# Patient Record
Sex: MALE | Race: WHITE | NOT HISPANIC OR LATINO
[De-identification: names, ages, dates, MRNs, and addresses within clinical notes are randomized per-mention and may not be internally consistent; named-entity substitution may affect disease eponyms.]

---

## 2017-03-22 ENCOUNTER — APPOINTMENT (OUTPATIENT)
Dept: SURGERY | Facility: CLINIC | Age: 82
End: 2017-03-22

## 2017-03-22 VITALS
HEART RATE: 78 BPM | HEIGHT: 67 IN | SYSTOLIC BLOOD PRESSURE: 168 MMHG | WEIGHT: 221 LBS | OXYGEN SATURATION: 98 % | DIASTOLIC BLOOD PRESSURE: 66 MMHG | TEMPERATURE: 98.2 F | BODY MASS INDEX: 34.69 KG/M2 | RESPIRATION RATE: 15 BRPM

## 2017-03-22 DIAGNOSIS — D50.9 IRON DEFICIENCY ANEMIA, UNSPECIFIED: ICD-10-CM

## 2017-03-22 DIAGNOSIS — H40.9 UNSPECIFIED GLAUCOMA: ICD-10-CM

## 2017-03-22 DIAGNOSIS — K59.09 OTHER CONSTIPATION: ICD-10-CM

## 2017-03-22 DIAGNOSIS — Z82.49 FAMILY HISTORY OF ISCHEMIC HEART DISEASE AND OTHER DISEASES OF THE CIRCULATORY SYSTEM: ICD-10-CM

## 2017-03-22 DIAGNOSIS — D46.9 MYELODYSPLASTIC SYNDROME, UNSPECIFIED: ICD-10-CM

## 2017-03-22 DIAGNOSIS — N40.0 BENIGN PROSTATIC HYPERPLASIA WITHOUT LOWER URINARY TRACT SYMPMS: ICD-10-CM

## 2017-03-22 DIAGNOSIS — R21 RASH AND OTHER NONSPECIFIC SKIN ERUPTION: ICD-10-CM

## 2017-03-22 DIAGNOSIS — Z82.61 FAMILY HISTORY OF ARTHRITIS: ICD-10-CM

## 2017-03-22 RX ORDER — OXYBUTYNIN CHLORIDE 10 MG/1
10 TABLET, EXTENDED RELEASE ORAL
Qty: 90 | Refills: 0 | Status: DISCONTINUED | COMMUNITY
Start: 2017-01-16

## 2017-03-22 RX ORDER — PNEUMOCOCCAL 23-VAL P-SAC VAC 25MCG/0.5
25 VIAL (ML) INJECTION
Qty: 1 | Refills: 0 | Status: DISCONTINUED | COMMUNITY
Start: 2016-12-26

## 2017-03-22 RX ORDER — TAMSULOSIN HYDROCHLORIDE 0.4 MG/1
0.4 CAPSULE ORAL
Qty: 30 | Refills: 0 | Status: ACTIVE | COMMUNITY
Start: 2016-12-26

## 2017-03-22 RX ORDER — FERROUS FUMARATE/ASCORBIC ACID 65MG-25 MG
65-25 TABLET, EXTENDED RELEASE ORAL
Qty: 60 | Refills: 0 | Status: DISCONTINUED | COMMUNITY
Start: 2017-01-04

## 2017-03-22 RX ORDER — DORZOLAMIDE HYDROCHLORIDE TIMOLOL MALEATE 20; 5 MG/ML; MG/ML
22.3-6.8 SOLUTION/ DROPS OPHTHALMIC
Qty: 10 | Refills: 0 | Status: DISCONTINUED | COMMUNITY
Start: 2016-10-17

## 2017-03-22 RX ORDER — AMLODIPINE BESYLATE 10 MG/1
10 TABLET ORAL
Qty: 30 | Refills: 0 | Status: ACTIVE | COMMUNITY
Start: 2016-10-24

## 2017-03-22 RX ORDER — BRIMONIDINE TARTRATE 1 MG/ML
0.1 SOLUTION/ DROPS OPHTHALMIC
Qty: 5 | Refills: 0 | Status: DISCONTINUED | COMMUNITY
Start: 2016-09-04

## 2017-03-22 RX ORDER — FINASTERIDE 5 MG/1
5 TABLET, FILM COATED ORAL
Qty: 30 | Refills: 0 | Status: DISCONTINUED | COMMUNITY
Start: 2016-12-26

## 2017-03-22 RX ORDER — DORZOLAMIDE HYDROCHLORIDE AND TIMOLOL MALEATE 20; 5 MG/ML; MG/ML
SOLUTION/ DROPS OPHTHALMIC
Refills: 0 | Status: ACTIVE | COMMUNITY

## 2017-03-22 RX ORDER — ALPRAZOLAM 0.25 MG/1
0.25 TABLET ORAL
Qty: 60 | Refills: 0 | Status: DISCONTINUED | COMMUNITY
Start: 2016-12-30

## 2017-03-22 RX ORDER — OMEPRAZOLE 20 MG/1
20 CAPSULE, DELAYED RELEASE ORAL
Qty: 90 | Refills: 0 | Status: DISCONTINUED | COMMUNITY
Start: 2016-04-25

## 2017-03-22 RX ORDER — ATENOLOL 50 MG/1
50 TABLET ORAL
Qty: 30 | Refills: 0 | Status: DISCONTINUED | COMMUNITY
Start: 2016-09-06

## 2017-03-22 RX ORDER — FINASTERIDE 5 MG/1
5 TABLET, FILM COATED ORAL
Refills: 0 | Status: ACTIVE | COMMUNITY

## 2017-04-07 ENCOUNTER — OUTPATIENT (OUTPATIENT)
Dept: OUTPATIENT SERVICES | Facility: HOSPITAL | Age: 82
LOS: 1 days | End: 2017-04-07
Payer: MEDICARE

## 2017-04-07 VITALS
TEMPERATURE: 98 F | SYSTOLIC BLOOD PRESSURE: 160 MMHG | HEIGHT: 67 IN | OXYGEN SATURATION: 98 % | HEART RATE: 64 BPM | RESPIRATION RATE: 14 BRPM | WEIGHT: 223.11 LBS | DIASTOLIC BLOOD PRESSURE: 63 MMHG

## 2017-04-07 DIAGNOSIS — M95.0 ACQUIRED DEFORMITY OF NOSE: Chronic | ICD-10-CM

## 2017-04-07 DIAGNOSIS — S61.412A LACERATION WITHOUT FOREIGN BODY OF LEFT HAND, INITIAL ENCOUNTER: Chronic | ICD-10-CM

## 2017-04-07 DIAGNOSIS — Z01.818 ENCOUNTER FOR OTHER PREPROCEDURAL EXAMINATION: ICD-10-CM

## 2017-04-07 DIAGNOSIS — K40.90 UNILATERAL INGUINAL HERNIA, WITHOUT OBSTRUCTION OR GANGRENE, NOT SPECIFIED AS RECURRENT: ICD-10-CM

## 2017-04-07 PROCEDURE — 93005 ELECTROCARDIOGRAM TRACING: CPT

## 2017-04-07 PROCEDURE — G0463: CPT

## 2017-04-07 PROCEDURE — 80048 BASIC METABOLIC PNL TOTAL CA: CPT

## 2017-04-07 PROCEDURE — 93010 ELECTROCARDIOGRAM REPORT: CPT

## 2017-04-07 PROCEDURE — 85027 COMPLETE CBC AUTOMATED: CPT

## 2017-04-07 RX ORDER — SODIUM CHLORIDE 9 MG/ML
3 INJECTION INTRAMUSCULAR; INTRAVENOUS; SUBCUTANEOUS EVERY 8 HOURS
Qty: 0 | Refills: 0 | Status: DISCONTINUED | OUTPATIENT
Start: 2017-04-18 | End: 2017-05-03

## 2017-04-07 RX ORDER — LIDOCAINE HCL 20 MG/ML
0.2 VIAL (ML) INJECTION ONCE
Qty: 0 | Refills: 0 | Status: DISCONTINUED | OUTPATIENT
Start: 2017-04-18 | End: 2017-05-03

## 2017-04-07 RX ORDER — CEFAZOLIN SODIUM 1 G
2000 VIAL (EA) INJECTION ONCE
Qty: 0 | Refills: 0 | Status: DISCONTINUED | OUTPATIENT
Start: 2017-04-18 | End: 2017-05-03

## 2017-04-07 NOTE — H&P PST ADULT - NSANTHOSAYNRD_GEN_A_CORE
No. DILIA screening performed.  STOP BANG Legend: 0-2 = LOW Risk; 3-4 = INTERMEDIATE Risk; 5-8 = HIGH Risk

## 2017-04-07 NOTE — H&P PST ADULT - PSH
History of cataract extraction  right eye 6/11  Laceration of finger, left, with tendon  1976  Mohs defect of nose  1990's  Osteomyelitis  left humerous- surgery childhood age 13  S/P Cholecystectomy    S/P TURP

## 2017-04-07 NOTE — H&P PST ADULT - PMH
Anemia    Benign Hypertension    BPH (Benign Prostatic Hypertrophy)    Chronic constipation    Gall stones    Glaucoma    MDS (Myelodysplastic Syndrome)    Nocturia associated with benign prostatic hypertrophy

## 2017-04-17 ENCOUNTER — RESULT REVIEW (OUTPATIENT)
Age: 82
End: 2017-04-17

## 2017-04-18 ENCOUNTER — OUTPATIENT (OUTPATIENT)
Dept: OUTPATIENT SERVICES | Facility: HOSPITAL | Age: 82
LOS: 1 days | End: 2017-04-18
Payer: MEDICARE

## 2017-04-18 ENCOUNTER — RESULT REVIEW (OUTPATIENT)
Age: 82
End: 2017-04-18

## 2017-04-18 ENCOUNTER — TRANSCRIPTION ENCOUNTER (OUTPATIENT)
Age: 82
End: 2017-04-18

## 2017-04-18 ENCOUNTER — APPOINTMENT (OUTPATIENT)
Dept: SURGERY | Facility: HOSPITAL | Age: 82
End: 2017-04-18

## 2017-04-18 VITALS
OXYGEN SATURATION: 98 % | HEIGHT: 67 IN | WEIGHT: 223.11 LBS | HEART RATE: 75 BPM | SYSTOLIC BLOOD PRESSURE: 179 MMHG | DIASTOLIC BLOOD PRESSURE: 69 MMHG | RESPIRATION RATE: 18 BRPM | TEMPERATURE: 98 F

## 2017-04-18 VITALS
SYSTOLIC BLOOD PRESSURE: 143 MMHG | DIASTOLIC BLOOD PRESSURE: 67 MMHG | HEART RATE: 68 BPM | OXYGEN SATURATION: 99 % | RESPIRATION RATE: 15 BRPM

## 2017-04-18 DIAGNOSIS — K40.90 UNILATERAL INGUINAL HERNIA, WITHOUT OBSTRUCTION OR GANGRENE, NOT SPECIFIED AS RECURRENT: ICD-10-CM

## 2017-04-18 DIAGNOSIS — S61.412A LACERATION WITHOUT FOREIGN BODY OF LEFT HAND, INITIAL ENCOUNTER: Chronic | ICD-10-CM

## 2017-04-18 DIAGNOSIS — M95.0 ACQUIRED DEFORMITY OF NOSE: Chronic | ICD-10-CM

## 2017-04-18 PROCEDURE — 88307 TISSUE EXAM BY PATHOLOGIST: CPT | Mod: 26

## 2017-04-18 PROCEDURE — 88341 IMHCHEM/IMCYTCHM EA ADD ANTB: CPT | Mod: 26,59

## 2017-04-18 PROCEDURE — 88307 TISSUE EXAM BY PATHOLOGIST: CPT

## 2017-04-18 PROCEDURE — 88342 IMHCHEM/IMCYTCHM 1ST ANTB: CPT

## 2017-04-18 PROCEDURE — 88184 FLOWCYTOMETRY/ TC 1 MARKER: CPT

## 2017-04-18 PROCEDURE — 88189 FLOWCYTOMETRY/READ 16 & >: CPT

## 2017-04-18 PROCEDURE — 88360 TUMOR IMMUNOHISTOCHEM/MANUAL: CPT

## 2017-04-18 PROCEDURE — 88264 CHROMOSOME ANALYSIS 20-25: CPT

## 2017-04-18 PROCEDURE — C1781: CPT

## 2017-04-18 PROCEDURE — 88237 TISSUE CULTURE BONE MARROW: CPT

## 2017-04-18 PROCEDURE — 88185 FLOWCYTOMETRY/TC ADD-ON: CPT

## 2017-04-18 PROCEDURE — 88341 IMHCHEM/IMCYTCHM EA ADD ANTB: CPT

## 2017-04-18 PROCEDURE — 88342 IMHCHEM/IMCYTCHM 1ST ANTB: CPT | Mod: 26,59

## 2017-04-18 PROCEDURE — 49505 PRP I/HERN INIT REDUC >5 YR: CPT | Mod: RT

## 2017-04-18 PROCEDURE — 88280 CHROMOSOME KARYOTYPE STUDY: CPT

## 2017-04-18 PROCEDURE — 88360 TUMOR IMMUNOHISTOCHEM/MANUAL: CPT | Mod: 26

## 2017-04-18 RX ORDER — SODIUM CHLORIDE 9 MG/ML
1000 INJECTION, SOLUTION INTRAVENOUS
Qty: 0 | Refills: 0 | Status: DISCONTINUED | OUTPATIENT
Start: 2017-04-18 | End: 2017-05-03

## 2017-04-18 RX ORDER — ONDANSETRON 8 MG/1
4 TABLET, FILM COATED ORAL ONCE
Qty: 0 | Refills: 0 | Status: COMPLETED | OUTPATIENT
Start: 2017-04-18 | End: 2017-04-18

## 2017-04-18 RX ORDER — ACETAMINOPHEN 500 MG
1000 TABLET ORAL ONCE
Qty: 0 | Refills: 0 | Status: COMPLETED | OUTPATIENT
Start: 2017-04-18 | End: 2017-04-18

## 2017-04-18 RX ADMIN — Medication 400 MILLIGRAM(S): at 17:20

## 2017-04-18 RX ADMIN — ONDANSETRON 4 MILLIGRAM(S): 8 TABLET, FILM COATED ORAL at 17:20

## 2017-04-18 NOTE — BRIEF OPERATIVE NOTE - POST-OP DX
Non-recurrent unilateral inguinal hernia without obstruction or gangrene  04/18/2017    Active  Mady Leary

## 2017-04-18 NOTE — ASU DISCHARGE PLAN (ADULT/PEDIATRIC). - MEDICATION SUMMARY - MEDICATIONS TO TAKE
I will START or STAY ON the medications listed below when I get home from the hospital:    Proscar 5 mg oral tablet  -- 1 tab(s) by mouth once a day  -- Indication: For home medication    losartan 50 mg oral tablet  -- 1 tab(s) by mouth once a day  -- Indication: For home medication    terazosin 10 mg oral tablet  -- 1 tab(s) by mouth once a day  -- Indication: For home medication    Flomax 0.4 mg oral capsule  -- 1 cap(s) by mouth once a day  -- Indication: For home medication    atenolol 50 mg oral tablet  -- 1 tab(s) by mouth once a day  -- Indication: For home medication    amLODIPine 5 mg oral tablet  -- 1 tab(s) by mouth once a day  -- Indication: For home medication    Aranesp 25 mcg/0.42 mL injectable solution  -- 1 dose(s) injectable every 4 weeks  -- Indication: For home medication    Linzess 145 mcg oral capsule  -- 1 cap(s) by mouth once a day  -- Indication: For home medication    ursodiol 300 mg oral tablet  -- tab(s) by mouth 2 times a day  -- Indication: For home medication    Fish Oil oral capsule  -- 2 cap(s) by mouth 2 times a day  -- Indication: For home medication    CoQ10 300 mg oral capsule  -- 1 cap(s) by mouth once a day  -- Indication: For home medication     Alphagan P 0.15% ophthalmic solution  -- 1 drop(s) to each affected eye 2 times a day  -- Indication: For home medication    Cosopt PF 2%-0.5% ophthalmic solution  -- 1 drop(s) to each affected eye 2 times a day  -- Indication: For home medication    Lumigan 0.03% ophthalmic solution  -- 1 drop(s) to each affected eye once a day (in the evening)  -- Indication: For home medication    pilocarpine 4% ophthalmic solution  -- 2 drop(s) to each affected eye 4 times a day  -- Indication: For home medication    Probiotic Formula oral capsule  -- 1 cap(s) by mouth once a day  -- Indication: For home medication    oxybutynin 15 mg/24 hr oral tablet, extended release  -- 1 tab(s) by mouth once a day  -- Indication: For home medication    Calcium 600+D oral tablet  -- 1 tab(s) by mouth 2 times a day  -- Indication: For home medication    Multi-Day Plus Minerals oral tablet  -- 1 tab(s) by mouth once a day  -- Indication: For home medication

## 2017-04-18 NOTE — ASU DISCHARGE PLAN (ADULT/PEDIATRIC). - NOTIFY
Swelling that continues/Pain not relieved by Medications/Fever greater than 101/Persistent Nausea and Vomiting/Unable to Urinate/Bleeding that does not stop

## 2017-04-19 ENCOUNTER — EMERGENCY (EMERGENCY)
Facility: HOSPITAL | Age: 82
LOS: 1 days | Discharge: HOME | End: 2017-04-19
Attending: EMERGENCY MEDICINE | Admitting: EMERGENCY MEDICINE
Payer: MEDICARE

## 2017-04-19 VITALS
TEMPERATURE: 98 F | DIASTOLIC BLOOD PRESSURE: 58 MMHG | SYSTOLIC BLOOD PRESSURE: 132 MMHG | OXYGEN SATURATION: 98 % | HEART RATE: 72 BPM | RESPIRATION RATE: 18 BRPM

## 2017-04-19 VITALS
SYSTOLIC BLOOD PRESSURE: 136 MMHG | RESPIRATION RATE: 18 BRPM | DIASTOLIC BLOOD PRESSURE: 60 MMHG | HEART RATE: 63 BPM | OXYGEN SATURATION: 98 % | TEMPERATURE: 98 F

## 2017-04-19 DIAGNOSIS — M95.0 ACQUIRED DEFORMITY OF NOSE: Chronic | ICD-10-CM

## 2017-04-19 DIAGNOSIS — Z88.2 ALLERGY STATUS TO SULFONAMIDES: ICD-10-CM

## 2017-04-19 DIAGNOSIS — Z86.2 PERSONAL HISTORY OF DISEASES OF THE BLOOD AND BLOOD-FORMING ORGANS AND CERTAIN DISORDERS INVOLVING THE IMMUNE MECHANISM: ICD-10-CM

## 2017-04-19 DIAGNOSIS — Z79.899 OTHER LONG TERM (CURRENT) DRUG THERAPY: ICD-10-CM

## 2017-04-19 DIAGNOSIS — Z90.49 ACQUIRED ABSENCE OF OTHER SPECIFIED PARTS OF DIGESTIVE TRACT: ICD-10-CM

## 2017-04-19 DIAGNOSIS — R33.9 RETENTION OF URINE, UNSPECIFIED: ICD-10-CM

## 2017-04-19 DIAGNOSIS — S61.412A LACERATION WITHOUT FOREIGN BODY OF LEFT HAND, INITIAL ENCOUNTER: Chronic | ICD-10-CM

## 2017-04-19 DIAGNOSIS — Z87.19 PERSONAL HISTORY OF OTHER DISEASES OF THE DIGESTIVE SYSTEM: ICD-10-CM

## 2017-04-19 DIAGNOSIS — Z86.69 PERSONAL HISTORY OF OTHER DISEASES OF THE NERVOUS SYSTEM AND SENSE ORGANS: ICD-10-CM

## 2017-04-19 DIAGNOSIS — Z98.890 OTHER SPECIFIED POSTPROCEDURAL STATES: ICD-10-CM

## 2017-04-19 DIAGNOSIS — I10 ESSENTIAL (PRIMARY) HYPERTENSION: ICD-10-CM

## 2017-04-19 DIAGNOSIS — Z88.1 ALLERGY STATUS TO OTHER ANTIBIOTIC AGENTS STATUS: ICD-10-CM

## 2017-04-19 DIAGNOSIS — Z87.430 PERSONAL HISTORY OF PROSTATIC DYSPLASIA: ICD-10-CM

## 2017-04-19 PROCEDURE — 99283 EMERGENCY DEPT VISIT LOW MDM: CPT

## 2017-04-19 NOTE — ED ADULT NURSE NOTE - OBJECTIVE STATEMENT
89 year old male A&OX3 PMHX of Anemia, BPH to which he self catheterizes, hernia surgery today. Patient states that he was seen today at ambulatory surgery for a hernia repair. Patient states that tonight when he attempted to self catheterize x 2 urine did not drain. Patient states that he does not feel any pain or discomfort at this time. Patient's abdomen is distended emile is not tender to palpation. Upon arrival Bladder scan was performed which revealed 30 ml of urine. Patient denies burning urination, fevers, chills, nausea, vomiting, dizziness, weakness.

## 2017-04-19 NOTE — ED PROVIDER NOTE - MEDICAL DECISION MAKING DETAILS
89M concern for urinary retention sp elective hernia repair today. Last normal urination at 8pm. Denies pain, distension in the abdomen. Will bladder scan and catheterize if required. Landon Epstein, Resident.

## 2017-04-19 NOTE — ED ADULT NURSE REASSESSMENT NOTE - NS ED NURSE REASSESS COMMENT FT1
Pt to be discharged, pt urinated a second time 'about 100 ccs' as per patient. Pt discharged, VSS, afebrile, ambulating independently.

## 2017-04-21 ENCOUNTER — APPOINTMENT (OUTPATIENT)
Dept: SURGERY | Facility: CLINIC | Age: 82
End: 2017-04-21

## 2017-04-21 VITALS
DIASTOLIC BLOOD PRESSURE: 52 MMHG | TEMPERATURE: 98.4 F | OXYGEN SATURATION: 98 % | HEART RATE: 93 BPM | RESPIRATION RATE: 16 BRPM | SYSTOLIC BLOOD PRESSURE: 166 MMHG

## 2017-04-21 RX ORDER — OXYCODONE AND ACETAMINOPHEN 5; 325 MG/1; MG/1
5-325 TABLET ORAL
Qty: 10 | Refills: 0 | Status: DISCONTINUED | COMMUNITY
Start: 2017-04-18 | End: 2017-04-21

## 2017-04-24 LAB — TM INTERPRETATION: SIGNIFICANT CHANGE UP

## 2017-04-27 LAB — SURGICAL PATHOLOGY STUDY: SIGNIFICANT CHANGE UP

## 2017-05-03 ENCOUNTER — APPOINTMENT (OUTPATIENT)
Dept: SURGERY | Facility: CLINIC | Age: 82
End: 2017-05-03

## 2017-05-03 VITALS
TEMPERATURE: 98.2 F | DIASTOLIC BLOOD PRESSURE: 63 MMHG | RESPIRATION RATE: 15 BRPM | HEART RATE: 58 BPM | SYSTOLIC BLOOD PRESSURE: 149 MMHG | OXYGEN SATURATION: 98 %

## 2017-05-03 DIAGNOSIS — K40.90 UNILATERAL INGUINAL HERNIA, W/OUT OBSTRUCTION OR GANGRENE, NOT SPECIFIED AS RECURRENT: ICD-10-CM

## 2017-05-03 LAB — CHROM ANALY OVERALL INTERP SPEC-IMP: SIGNIFICANT CHANGE UP

## 2017-06-18 ENCOUNTER — EMERGENCY (EMERGENCY)
Facility: HOSPITAL | Age: 82
LOS: 1 days | Discharge: HOME | End: 2017-06-18
Attending: EMERGENCY MEDICINE
Payer: MEDICARE

## 2017-06-18 VITALS
OXYGEN SATURATION: 95 % | RESPIRATION RATE: 18 BRPM | HEART RATE: 66 BPM | TEMPERATURE: 98 F | SYSTOLIC BLOOD PRESSURE: 169 MMHG | DIASTOLIC BLOOD PRESSURE: 72 MMHG

## 2017-06-18 VITALS
DIASTOLIC BLOOD PRESSURE: 74 MMHG | HEART RATE: 67 BPM | OXYGEN SATURATION: 97 % | TEMPERATURE: 98 F | RESPIRATION RATE: 16 BRPM | SYSTOLIC BLOOD PRESSURE: 179 MMHG

## 2017-06-18 DIAGNOSIS — S61.412A LACERATION WITHOUT FOREIGN BODY OF LEFT HAND, INITIAL ENCOUNTER: Chronic | ICD-10-CM

## 2017-06-18 DIAGNOSIS — M95.0 ACQUIRED DEFORMITY OF NOSE: Chronic | ICD-10-CM

## 2017-06-18 LAB
ALBUMIN SERPL ELPH-MCNC: 3.8 G/DL — SIGNIFICANT CHANGE UP (ref 3.3–5)
ALP SERPL-CCNC: 51 U/L — SIGNIFICANT CHANGE UP (ref 40–120)
ALT FLD-CCNC: 12 U/L RC — SIGNIFICANT CHANGE UP (ref 10–45)
ANION GAP SERPL CALC-SCNC: 13 MMOL/L — SIGNIFICANT CHANGE UP (ref 5–17)
APTT BLD: 28 SEC — SIGNIFICANT CHANGE UP (ref 27.5–37.4)
AST SERPL-CCNC: 18 U/L — SIGNIFICANT CHANGE UP (ref 10–40)
BASOPHILS # BLD AUTO: 0.1 K/UL — SIGNIFICANT CHANGE UP (ref 0–0.2)
BASOPHILS NFR BLD AUTO: 0.8 % — SIGNIFICANT CHANGE UP (ref 0–2)
BILIRUB SERPL-MCNC: 0.5 MG/DL — SIGNIFICANT CHANGE UP (ref 0.2–1.2)
BUN SERPL-MCNC: 46 MG/DL — HIGH (ref 7–23)
CALCIUM SERPL-MCNC: 9.2 MG/DL — SIGNIFICANT CHANGE UP (ref 8.4–10.5)
CHLORIDE SERPL-SCNC: 102 MMOL/L — SIGNIFICANT CHANGE UP (ref 96–108)
CK MB BLD-MCNC: 6 % — HIGH (ref 0–3.5)
CK MB CFR SERPL CALC: 2.6 NG/ML — SIGNIFICANT CHANGE UP (ref 0–6.7)
CK SERPL-CCNC: 43 U/L — SIGNIFICANT CHANGE UP (ref 30–200)
CO2 SERPL-SCNC: 24 MMOL/L — SIGNIFICANT CHANGE UP (ref 22–31)
CREAT SERPL-MCNC: 1.8 MG/DL — HIGH (ref 0.5–1.3)
EOSINOPHIL # BLD AUTO: 0.1 K/UL — SIGNIFICANT CHANGE UP (ref 0–0.5)
EOSINOPHIL NFR BLD AUTO: 2 % — SIGNIFICANT CHANGE UP (ref 0–6)
GAS PNL BLDV: SIGNIFICANT CHANGE UP
GLUCOSE SERPL-MCNC: 135 MG/DL — HIGH (ref 70–99)
HCT VFR BLD CALC: 34.9 % — LOW (ref 39–50)
HGB BLD-MCNC: 11.1 G/DL — LOW (ref 13–17)
INR BLD: 1.14 RATIO — SIGNIFICANT CHANGE UP (ref 0.88–1.16)
LYMPHOCYTES # BLD AUTO: 2.1 K/UL — SIGNIFICANT CHANGE UP (ref 1–3.3)
LYMPHOCYTES # BLD AUTO: 22 % — SIGNIFICANT CHANGE UP (ref 13–44)
MCHC RBC-ENTMCNC: 31.7 GM/DL — LOW (ref 32–36)
MCHC RBC-ENTMCNC: 31.9 PG — SIGNIFICANT CHANGE UP (ref 27–34)
MCV RBC AUTO: 101 FL — HIGH (ref 80–100)
MONOCYTES # BLD AUTO: 0.8 K/UL — SIGNIFICANT CHANGE UP (ref 0–0.9)
MONOCYTES NFR BLD AUTO: 6 % — SIGNIFICANT CHANGE UP (ref 2–14)
NEUTROPHILS # BLD AUTO: 6.6 K/UL — SIGNIFICANT CHANGE UP (ref 1.8–7.4)
NEUTROPHILS NFR BLD AUTO: 68 % — SIGNIFICANT CHANGE UP (ref 43–77)
NT-PROBNP SERPL-SCNC: 583 PG/ML — HIGH (ref 0–300)
PLATELET # BLD AUTO: 322 K/UL — SIGNIFICANT CHANGE UP (ref 150–400)
POTASSIUM SERPL-MCNC: 4.3 MMOL/L — SIGNIFICANT CHANGE UP (ref 3.5–5.3)
POTASSIUM SERPL-SCNC: 4.3 MMOL/L — SIGNIFICANT CHANGE UP (ref 3.5–5.3)
PROT SERPL-MCNC: 7.3 G/DL — SIGNIFICANT CHANGE UP (ref 6–8.3)
PROTHROM AB SERPL-ACNC: 12.4 SEC — SIGNIFICANT CHANGE UP (ref 9.8–12.7)
RBC # BLD: 3.47 M/UL — LOW (ref 4.2–5.8)
RBC # FLD: 19 % — HIGH (ref 10.3–14.5)
SODIUM SERPL-SCNC: 139 MMOL/L — SIGNIFICANT CHANGE UP (ref 135–145)
TROPONIN T SERPL-MCNC: <0.01 NG/ML — SIGNIFICANT CHANGE UP (ref 0–0.06)
WBC # BLD: 9.7 K/UL — SIGNIFICANT CHANGE UP (ref 3.8–10.5)
WBC # FLD AUTO: 9.7 K/UL — SIGNIFICANT CHANGE UP (ref 3.8–10.5)

## 2017-06-18 PROCEDURE — 82803 BLOOD GASES ANY COMBINATION: CPT

## 2017-06-18 PROCEDURE — 80053 COMPREHEN METABOLIC PANEL: CPT

## 2017-06-18 PROCEDURE — 82435 ASSAY OF BLOOD CHLORIDE: CPT

## 2017-06-18 PROCEDURE — 85610 PROTHROMBIN TIME: CPT

## 2017-06-18 PROCEDURE — 82550 ASSAY OF CK (CPK): CPT

## 2017-06-18 PROCEDURE — 71045 X-RAY EXAM CHEST 1 VIEW: CPT

## 2017-06-18 PROCEDURE — 83880 ASSAY OF NATRIURETIC PEPTIDE: CPT

## 2017-06-18 PROCEDURE — 85014 HEMATOCRIT: CPT

## 2017-06-18 PROCEDURE — 93005 ELECTROCARDIOGRAM TRACING: CPT

## 2017-06-18 PROCEDURE — 83605 ASSAY OF LACTIC ACID: CPT

## 2017-06-18 PROCEDURE — 71010: CPT | Mod: 26

## 2017-06-18 PROCEDURE — 82553 CREATINE MB FRACTION: CPT

## 2017-06-18 PROCEDURE — 82330 ASSAY OF CALCIUM: CPT

## 2017-06-18 PROCEDURE — 84484 ASSAY OF TROPONIN QUANT: CPT

## 2017-06-18 PROCEDURE — 99283 EMERGENCY DEPT VISIT LOW MDM: CPT | Mod: 25

## 2017-06-18 PROCEDURE — 85730 THROMBOPLASTIN TIME PARTIAL: CPT

## 2017-06-18 PROCEDURE — 84295 ASSAY OF SERUM SODIUM: CPT

## 2017-06-18 PROCEDURE — 85027 COMPLETE CBC AUTOMATED: CPT

## 2017-06-18 PROCEDURE — 84132 ASSAY OF SERUM POTASSIUM: CPT

## 2017-06-18 PROCEDURE — 99285 EMERGENCY DEPT VISIT HI MDM: CPT | Mod: 25,GC

## 2017-06-18 PROCEDURE — 82947 ASSAY GLUCOSE BLOOD QUANT: CPT

## 2017-06-18 PROCEDURE — 93010 ELECTROCARDIOGRAM REPORT: CPT

## 2017-06-18 RX ORDER — SODIUM CHLORIDE 9 MG/ML
3 INJECTION INTRAMUSCULAR; INTRAVENOUS; SUBCUTANEOUS ONCE
Qty: 0 | Refills: 0 | Status: COMPLETED | OUTPATIENT
Start: 2017-06-18 | End: 2017-06-18

## 2017-06-18 RX ADMIN — SODIUM CHLORIDE 3 MILLILITER(S): 9 INJECTION INTRAMUSCULAR; INTRAVENOUS; SUBCUTANEOUS at 07:14

## 2017-06-18 NOTE — ED ADULT NURSE NOTE - CAS EDN DISCHARGE ASSESSMENT
Alert and oriented to person, place and time/Symptoms improved/MD Yojana aware of vital signs at DC, states pt OK to leave

## 2017-06-18 NOTE — ED PROVIDER NOTE - CARE PLAN
Principal Discharge DX:	Palpitations  Instructions for follow-up, activity and diet:	Please follow up with Dr. Patel in the next 3-5 days in regards to your symptoms.  Drink at least 2 Liters or 64 Ounces of water each day.  Return for any persistent, worsening symptoms, or ANY concerns at all.

## 2017-06-18 NOTE — ED PROVIDER NOTE - OBJECTIVE STATEMENT
89 y/o M with PMHx of HTN, BPH, chronic constipation, peripheral edema presents with elevated heart rate x 4 hrs. Pt states his heart rate is usually 50-60's but today it is elevated to over 70's. He states this has happened in the past and resolved on its own without intervention, but usually within 2 hours. Pt alerted Dr Rincon (on call for PMD) who recommended to come to ED for evaluation. Pt also reports bilateral lower leg edema, which is at baseline. He denies fever, chest pain, SOB, diaphoresis, syncope, n/v, abdominal pain, back pain, headache. He also reports urinary frequency, but no dysuria.   PCP: Dr. Patel 89 y/o M with PMHx of HTN, BPH, chronic constipation, peripheral edema presents with elevated heart rate x 4 hrs. Pt states his heart rate is usually 50-60's but today it is elevated to over 70's (he hears his elevated heart rate in his ears). He states this has happened in the past and resolved on its own without intervention, but usually within 2 hours. Pt alerted Dr Rincon (on call for PMD) who recommended to come to ED for evaluation. Pt also reports bilateral lower leg edema, which is at baseline. He denies fever, chest pain, SOB, diaphoresis, syncope, n/v, abdominal pain, back pain, headache. He also reports urinary frequency, but no dysuria.   PCP: Dr. Patel

## 2017-06-18 NOTE — ED PROVIDER NOTE - MEDICAL DECISION MAKING DETAILS
89 M presents with feeling of elevated heart rate and sent in by PMD.  Will obtain labs, ekg, cxr and re-asses.  - Eboni Martinez, DO

## 2017-06-18 NOTE — MEDICAL STUDENT ADULT H&P (EDUCATION) - NS MD HP STUD HX OF PRESENT ILLNESS FT
89 y/o M with PMHx of HTN, BPH, chronic constipation, edema presents with elevated heart rate x 4 hrs. Pt states his heart rate is usually 50-60's but today it is elevated to over 70's. He states this has happened in the past and resolved on its own without intervention, but usually within 2 hours. Pt alerted PMD who recommended to come to ED for evaluation. Pt also reports bilateral lower leg edema, which is at baseline. He denies fever, chest pain, SOB, diaphoresis, syncope, n/v, abdominal pain, back pain, headache. He also reports urinary frequency, but no dysuria.   PCP: Dr. Patel

## 2017-06-18 NOTE — ED PROVIDER NOTE - PHYSICAL EXAMINATION
Gen: NAD, AOx3  Head: NCAT  HEENT: PERRL, oral mucosa moist, normal conjunctiva  Lung: CTAB, no respiratory distress  CV: rrr, no murmurs, Normal perfusion, bilateral pedal edema  Abd: soft, NTND, no CVA tenderness  MSK: No edema, no visible deformities  Neuro: No focal neurologic deficits  Skin: No rash   Psych: normal affect   - Eboni Martinez, DO

## 2017-06-18 NOTE — MEDICAL STUDENT ADULT H&P (EDUCATION) - NS MD HP STUD ASPLAN ASSES FT
89 y/o M with PMHx HTN presents with elevated heartrate (75, baseline 50-60's) x 4 hours, told to get ED evaluation by PMD. No other complaints. On exam, vital signs stable and exam was unremarkable. Plan- cardiac work-up to rule out ACS

## 2017-06-18 NOTE — ED ADULT NURSE NOTE - OBJECTIVE STATEMENT
89 y.o male A+Ox3 with pmh HTN, BPH, depression and anxiety BIBA from [facility] accompanied by his wife c/o palpitations and "rapid HR". pt states his HR is normally in the 50s-60s and states he began feeling palpitations this morning with a rapid HR that he can feel and hear in his ears. states he had this happen before a few months ago and was told it was tachycardia and his HR had resolved on its own. pts HR en route to ED and upon ED arrival is in the 70s which pt states is not his norm and is irregular for him. denies any other s/s. no sob, cp, weakness, numbness, tingling, dizziness, or diaphoresis. VS and BP stable, EKG fone upon arrival. wife at bedside. safety and fall precautions maintained.

## 2017-06-18 NOTE — ED PROVIDER NOTE - PROGRESS NOTE DETAILS
called dr noonan at 830; re attempting now. spoke with dr noonan; was solely concerned about heart rate and if patient had gone into afib; agreed that patient can go home and follow up with Dr. Patel after case and results were discussed;  patient is asymptomatic and feels good, heart rate below 100 not feeling palpitations or hearing heartbeat in ear.

## 2017-06-18 NOTE — ED PROVIDER NOTE - PLAN OF CARE
Please follow up with Dr. Patel in the next 3-5 days in regards to your symptoms.  Drink at least 2 Liters or 64 Ounces of water each day.  Return for any persistent, worsening symptoms, or ANY concerns at all.

## 2017-06-18 NOTE — ED PROVIDER NOTE - ATTENDING CONTRIBUTION TO CARE
Attending MD Rhoades: I personally have seen and examined this patient.  Resident note reviewed and agree on plan of care and except where noted.  See below for details.    89M with PMH including HTN, BPH, chronic constipation, B/L LE edema presents to the ED with elevated HR for 4 hours.  Reports that his HR is usually in the 50-60s but today it has been in the 70s for about 4 hours.  Reports that this has happened in the past and has resolved without intervention but that usually happens within two hours.  Reports spoke with Dr. Rincon (on call PMD) who recommended he come to the ED for evaluation.  Reports bilateral lower extremity edema unchanged. Denies abdominal pain, nausea, vomiting, diarrhea, blood in stools. Denies dysuria, hematuria, change in urinary habits including urgency.  Reports baseline urinates frequently.  Denies chest pain, shortness of breath.  On exam, head NCAT, PERRL, FROM at neck, no tenderness to palpation or stepoffs along length of spine, lungs CTAB with good inspiratory effort, +S1S2, no m/r/g, abdomen soft with +BS, NT, ND, no CVAT, moving all extremities with 5/5 strength bilateral upper and lower extremities, good and equal  strength bilaterally, +pitting edema bilateral lower extremities; A/P: 89M with reports of increased HR, which are in the ED, within normal limits, EKG, CXR, labs, monitor, contact PMD, reassess

## 2017-09-25 ENCOUNTER — INPATIENT (INPATIENT)
Facility: HOSPITAL | Age: 82
LOS: 0 days | Discharge: HOME | DRG: 378 | End: 2017-09-26
Attending: INTERNAL MEDICINE | Admitting: INTERNAL MEDICINE
Payer: COMMERCIAL

## 2017-09-25 VITALS — DIASTOLIC BLOOD PRESSURE: 58 MMHG | SYSTOLIC BLOOD PRESSURE: 140 MMHG | HEART RATE: 80 BPM

## 2017-09-25 DIAGNOSIS — K62.5 HEMORRHAGE OF ANUS AND RECTUM: ICD-10-CM

## 2017-09-25 DIAGNOSIS — N18.3 CHRONIC KIDNEY DISEASE, STAGE 3 (MODERATE): ICD-10-CM

## 2017-09-25 DIAGNOSIS — I10 ESSENTIAL (PRIMARY) HYPERTENSION: ICD-10-CM

## 2017-09-25 DIAGNOSIS — Z29.9 ENCOUNTER FOR PROPHYLACTIC MEASURES, UNSPECIFIED: ICD-10-CM

## 2017-09-25 DIAGNOSIS — H40.9 UNSPECIFIED GLAUCOMA: ICD-10-CM

## 2017-09-25 DIAGNOSIS — S61.412A LACERATION WITHOUT FOREIGN BODY OF LEFT HAND, INITIAL ENCOUNTER: Chronic | ICD-10-CM

## 2017-09-25 DIAGNOSIS — N40.1 BENIGN PROSTATIC HYPERPLASIA WITH LOWER URINARY TRACT SYMPTOMS: ICD-10-CM

## 2017-09-25 DIAGNOSIS — D72.829 ELEVATED WHITE BLOOD CELL COUNT, UNSPECIFIED: ICD-10-CM

## 2017-09-25 DIAGNOSIS — D46.9 MYELODYSPLASTIC SYNDROME, UNSPECIFIED: ICD-10-CM

## 2017-09-25 DIAGNOSIS — M95.0 ACQUIRED DEFORMITY OF NOSE: Chronic | ICD-10-CM

## 2017-09-25 DIAGNOSIS — E87.1 HYPO-OSMOLALITY AND HYPONATREMIA: ICD-10-CM

## 2017-09-25 LAB
ALBUMIN SERPL ELPH-MCNC: 3.7 G/DL — SIGNIFICANT CHANGE UP (ref 3.3–5)
ALP SERPL-CCNC: 42 U/L — SIGNIFICANT CHANGE UP (ref 40–120)
ALT FLD-CCNC: 22 U/L RC — SIGNIFICANT CHANGE UP (ref 10–45)
ANION GAP SERPL CALC-SCNC: 10 MMOL/L — SIGNIFICANT CHANGE UP (ref 5–17)
ANION GAP SERPL CALC-SCNC: 9 MMOL/L — SIGNIFICANT CHANGE UP (ref 5–17)
AST SERPL-CCNC: 29 U/L — SIGNIFICANT CHANGE UP (ref 10–40)
BASOPHILS # BLD AUTO: 0.1 K/UL — SIGNIFICANT CHANGE UP (ref 0–0.2)
BASOPHILS NFR BLD AUTO: 0.7 % — SIGNIFICANT CHANGE UP (ref 0–2)
BILIRUB SERPL-MCNC: 0.8 MG/DL — SIGNIFICANT CHANGE UP (ref 0.2–1.2)
BLD GP AB SCN SERPL QL: NEGATIVE — SIGNIFICANT CHANGE UP
BUN SERPL-MCNC: 39 MG/DL — HIGH (ref 7–23)
BUN SERPL-MCNC: 42 MG/DL — HIGH (ref 7–23)
CALCIUM SERPL-MCNC: 9.2 MG/DL — SIGNIFICANT CHANGE UP (ref 8.4–10.5)
CALCIUM SERPL-MCNC: 9.2 MG/DL — SIGNIFICANT CHANGE UP (ref 8.4–10.5)
CHLORIDE SERPL-SCNC: 92 MMOL/L — LOW (ref 96–108)
CHLORIDE SERPL-SCNC: 94 MMOL/L — LOW (ref 96–108)
CO2 SERPL-SCNC: 26 MMOL/L — SIGNIFICANT CHANGE UP (ref 22–31)
CO2 SERPL-SCNC: 28 MMOL/L — SIGNIFICANT CHANGE UP (ref 22–31)
CREAT SERPL-MCNC: 1.55 MG/DL — HIGH (ref 0.5–1.3)
CREAT SERPL-MCNC: 1.65 MG/DL — HIGH (ref 0.5–1.3)
EOSINOPHIL # BLD AUTO: 0.1 K/UL — SIGNIFICANT CHANGE UP (ref 0–0.5)
EOSINOPHIL NFR BLD AUTO: 0.5 % — SIGNIFICANT CHANGE UP (ref 0–6)
GAS PNL BLDV: SIGNIFICANT CHANGE UP
GLUCOSE SERPL-MCNC: 122 MG/DL — HIGH (ref 70–99)
GLUCOSE SERPL-MCNC: 132 MG/DL — HIGH (ref 70–99)
HCT VFR BLD CALC: 28.4 % — LOW (ref 39–50)
HCT VFR BLD CALC: 29.6 % — LOW (ref 39–50)
HGB BLD-MCNC: 9.8 G/DL — LOW (ref 13–17)
HGB BLD-MCNC: 9.9 G/DL — LOW (ref 13–17)
INR BLD: 1.13 RATIO — SIGNIFICANT CHANGE UP (ref 0.88–1.16)
LYMPHOCYTES # BLD AUTO: 18.7 % — SIGNIFICANT CHANGE UP (ref 13–44)
LYMPHOCYTES # BLD AUTO: 2.2 K/UL — SIGNIFICANT CHANGE UP (ref 1–3.3)
MAGNESIUM SERPL-MCNC: 1.8 MG/DL — SIGNIFICANT CHANGE UP (ref 1.6–2.6)
MCHC RBC-ENTMCNC: 33.3 PG — SIGNIFICANT CHANGE UP (ref 27–34)
MCHC RBC-ENTMCNC: 33.5 GM/DL — SIGNIFICANT CHANGE UP (ref 32–36)
MCHC RBC-ENTMCNC: 34.5 GM/DL — SIGNIFICANT CHANGE UP (ref 32–36)
MCHC RBC-ENTMCNC: 34.5 PG — HIGH (ref 27–34)
MCV RBC AUTO: 99.3 FL — SIGNIFICANT CHANGE UP (ref 80–100)
MCV RBC AUTO: 99.9 FL — SIGNIFICANT CHANGE UP (ref 80–100)
MONOCYTES # BLD AUTO: 1.1 K/UL — HIGH (ref 0–0.9)
MONOCYTES NFR BLD AUTO: 9.1 % — SIGNIFICANT CHANGE UP (ref 2–14)
NEUTROPHILS # BLD AUTO: 8.6 K/UL — HIGH (ref 1.8–7.4)
NEUTROPHILS NFR BLD AUTO: 71 % — SIGNIFICANT CHANGE UP (ref 43–77)
OSMOLALITY SERPL: 283 MOS/KG — SIGNIFICANT CHANGE UP (ref 275–300)
OSMOLALITY UR: 420 MOS/KG — SIGNIFICANT CHANGE UP (ref 50–1200)
PLATELET # BLD AUTO: 291 K/UL — SIGNIFICANT CHANGE UP (ref 150–400)
PLATELET # BLD AUTO: 301 K/UL — SIGNIFICANT CHANGE UP (ref 150–400)
POTASSIUM SERPL-MCNC: 4.6 MMOL/L — SIGNIFICANT CHANGE UP (ref 3.5–5.3)
POTASSIUM SERPL-MCNC: 4.6 MMOL/L — SIGNIFICANT CHANGE UP (ref 3.5–5.3)
POTASSIUM SERPL-SCNC: 4.6 MMOL/L — SIGNIFICANT CHANGE UP (ref 3.5–5.3)
POTASSIUM SERPL-SCNC: 4.6 MMOL/L — SIGNIFICANT CHANGE UP (ref 3.5–5.3)
PROT SERPL-MCNC: 6.7 G/DL — SIGNIFICANT CHANGE UP (ref 6–8.3)
PROTHROM AB SERPL-ACNC: 12.4 SEC — SIGNIFICANT CHANGE UP (ref 9.8–12.7)
RBC # BLD: 2.85 M/UL — LOW (ref 4.2–5.8)
RBC # BLD: 2.98 M/UL — LOW (ref 4.2–5.8)
RBC # FLD: 18.5 % — HIGH (ref 10.3–14.5)
RBC # FLD: 18.6 % — HIGH (ref 10.3–14.5)
RH IG SCN BLD-IMP: POSITIVE — SIGNIFICANT CHANGE UP
SODIUM SERPL-SCNC: 128 MMOL/L — LOW (ref 135–145)
SODIUM SERPL-SCNC: 131 MMOL/L — LOW (ref 135–145)
SODIUM UR-SCNC: 70 MMOL/L — SIGNIFICANT CHANGE UP
WBC # BLD: 12.1 K/UL — HIGH (ref 3.8–10.5)
WBC # BLD: 12.5 K/UL — HIGH (ref 3.8–10.5)
WBC # FLD AUTO: 12.1 K/UL — HIGH (ref 3.8–10.5)
WBC # FLD AUTO: 12.5 K/UL — HIGH (ref 3.8–10.5)

## 2017-09-25 PROCEDURE — 99285 EMERGENCY DEPT VISIT HI MDM: CPT | Mod: GC

## 2017-09-25 PROCEDURE — 99223 1ST HOSP IP/OBS HIGH 75: CPT

## 2017-09-25 RX ORDER — TAMSULOSIN HYDROCHLORIDE 0.4 MG/1
0.4 CAPSULE ORAL AT BEDTIME
Qty: 0 | Refills: 0 | Status: DISCONTINUED | OUTPATIENT
Start: 2017-09-25 | End: 2017-09-26

## 2017-09-25 RX ORDER — LATANOPROST 0.05 MG/ML
1 SOLUTION/ DROPS OPHTHALMIC; TOPICAL AT BEDTIME
Qty: 0 | Refills: 0 | Status: DISCONTINUED | OUTPATIENT
Start: 2017-09-25 | End: 2017-09-26

## 2017-09-25 RX ORDER — BRIMONIDINE TARTRATE 2 MG/MG
1 SOLUTION/ DROPS OPHTHALMIC
Qty: 0 | Refills: 0 | Status: DISCONTINUED | OUTPATIENT
Start: 2017-09-25 | End: 2017-09-26

## 2017-09-25 RX ORDER — AMLODIPINE BESYLATE 2.5 MG/1
2.5 TABLET ORAL DAILY
Qty: 0 | Refills: 0 | Status: DISCONTINUED | OUTPATIENT
Start: 2017-09-25 | End: 2017-09-26

## 2017-09-25 RX ORDER — OXYBUTYNIN CHLORIDE 5 MG
5 TABLET ORAL THREE TIMES A DAY
Qty: 0 | Refills: 0 | Status: DISCONTINUED | OUTPATIENT
Start: 2017-09-25 | End: 2017-09-26

## 2017-09-25 RX ORDER — DORZOLAMIDE HYDROCHLORIDE 20 MG/ML
1 SOLUTION/ DROPS OPHTHALMIC
Qty: 0 | Refills: 0 | Status: DISCONTINUED | OUTPATIENT
Start: 2017-09-25 | End: 2017-09-26

## 2017-09-25 RX ORDER — MULTIVIT-MIN/FERROUS GLUCONATE 9 MG/15 ML
1 LIQUID (ML) ORAL DAILY
Qty: 0 | Refills: 0 | Status: DISCONTINUED | OUTPATIENT
Start: 2017-09-25 | End: 2017-09-26

## 2017-09-25 RX ORDER — LACTOBACILLUS ACIDOPHILUS 100MM CELL
1 CAPSULE ORAL DAILY
Qty: 0 | Refills: 0 | Status: DISCONTINUED | OUTPATIENT
Start: 2017-09-25 | End: 2017-09-26

## 2017-09-25 RX ORDER — PANTOPRAZOLE SODIUM 20 MG/1
40 TABLET, DELAYED RELEASE ORAL
Qty: 0 | Refills: 0 | Status: DISCONTINUED | OUTPATIENT
Start: 2017-09-25 | End: 2017-09-26

## 2017-09-25 RX ORDER — AMLODIPINE BESYLATE 2.5 MG/1
0.5 TABLET ORAL
Qty: 0 | Refills: 0 | COMMUNITY

## 2017-09-25 RX ORDER — BRIMONIDINE TARTRATE 2 MG/MG
1 SOLUTION/ DROPS OPHTHALMIC DAILY
Qty: 0 | Refills: 0 | Status: DISCONTINUED | OUTPATIENT
Start: 2017-09-25 | End: 2017-09-26

## 2017-09-25 RX ORDER — PILOCARPINE HCL 4 %
2 DROPS OPHTHALMIC (EYE)
Qty: 0 | Refills: 0 | COMMUNITY

## 2017-09-25 RX ORDER — ALPRAZOLAM 0.25 MG
0.25 TABLET ORAL
Qty: 0 | Refills: 0 | Status: DISCONTINUED | OUTPATIENT
Start: 2017-09-25 | End: 2017-09-26

## 2017-09-25 RX ORDER — LOSARTAN POTASSIUM 100 MG/1
100 TABLET, FILM COATED ORAL DAILY
Qty: 0 | Refills: 0 | Status: DISCONTINUED | OUTPATIENT
Start: 2017-09-25 | End: 2017-09-26

## 2017-09-25 RX ORDER — UBIDECARENONE 100 MG
1 CAPSULE ORAL
Qty: 0 | Refills: 0 | COMMUNITY

## 2017-09-25 RX ORDER — TIMOLOL 0.5 %
1 DROPS OPHTHALMIC (EYE)
Qty: 0 | Refills: 0 | Status: DISCONTINUED | OUTPATIENT
Start: 2017-09-25 | End: 2017-09-26

## 2017-09-25 RX ORDER — AMLODIPINE BESYLATE 2.5 MG/1
1 TABLET ORAL
Qty: 0 | Refills: 0 | COMMUNITY

## 2017-09-25 RX ORDER — TAMSULOSIN HYDROCHLORIDE 0.4 MG/1
1 CAPSULE ORAL
Qty: 0 | Refills: 0 | COMMUNITY

## 2017-09-25 RX ORDER — INFLUENZA VIRUS VACCINE 15; 15; 15; 15 UG/.5ML; UG/.5ML; UG/.5ML; UG/.5ML
0.5 SUSPENSION INTRAMUSCULAR ONCE
Qty: 0 | Refills: 0 | Status: DISCONTINUED | OUTPATIENT
Start: 2017-09-25 | End: 2017-09-26

## 2017-09-25 RX ORDER — PILOCARPINE HCL 4 %
1 DROPS OPHTHALMIC (EYE) THREE TIMES A DAY
Qty: 0 | Refills: 0 | Status: DISCONTINUED | OUTPATIENT
Start: 2017-09-25 | End: 2017-09-26

## 2017-09-25 RX ORDER — DORZOLAMIDE HYDROCHLORIDE TIMOLOL MALEATE 20; 5 MG/ML; MG/ML
1 SOLUTION/ DROPS OPHTHALMIC
Qty: 0 | Refills: 0 | Status: DISCONTINUED | OUTPATIENT
Start: 2017-09-25 | End: 2017-09-25

## 2017-09-25 RX ORDER — DARBEPOETIN ALFA IN POLYSORBAT 200MCG/0.4
1 PEN INJECTOR (ML) SUBCUTANEOUS
Qty: 0 | Refills: 0 | COMMUNITY

## 2017-09-25 RX ORDER — URSODIOL 250 MG/1
300 TABLET, FILM COATED ORAL
Qty: 0 | Refills: 0 | Status: DISCONTINUED | OUTPATIENT
Start: 2017-09-25 | End: 2017-09-26

## 2017-09-25 RX ORDER — OMEGA-3 ACID ETHYL ESTERS 1 G
2 CAPSULE ORAL
Qty: 0 | Refills: 0 | COMMUNITY

## 2017-09-25 RX ORDER — FINASTERIDE 5 MG/1
5 TABLET, FILM COATED ORAL DAILY
Qty: 0 | Refills: 0 | Status: DISCONTINUED | OUTPATIENT
Start: 2017-09-25 | End: 2017-09-26

## 2017-09-25 RX ORDER — POLYETHYLENE GLYCOL 3350 17 G/17G
17 POWDER, FOR SOLUTION ORAL DAILY
Qty: 0 | Refills: 0 | Status: DISCONTINUED | OUTPATIENT
Start: 2017-09-25 | End: 2017-09-26

## 2017-09-25 RX ORDER — DOXAZOSIN MESYLATE 4 MG
8 TABLET ORAL AT BEDTIME
Qty: 0 | Refills: 0 | Status: DISCONTINUED | OUTPATIENT
Start: 2017-09-25 | End: 2017-09-26

## 2017-09-25 RX ORDER — ATENOLOL 25 MG/1
50 TABLET ORAL DAILY
Qty: 0 | Refills: 0 | Status: DISCONTINUED | OUTPATIENT
Start: 2017-09-25 | End: 2017-09-26

## 2017-09-25 RX ORDER — OXYBUTYNIN CHLORIDE 5 MG
1 TABLET ORAL
Qty: 0 | Refills: 0 | COMMUNITY

## 2017-09-25 RX ADMIN — Medication 10 MILLIGRAM(S): at 21:00

## 2017-09-25 RX ADMIN — POLYETHYLENE GLYCOL 3350 17 GRAM(S): 17 POWDER, FOR SOLUTION ORAL at 18:56

## 2017-09-25 RX ADMIN — DORZOLAMIDE HYDROCHLORIDE 1 DROP(S): 20 SOLUTION/ DROPS OPHTHALMIC at 18:43

## 2017-09-25 RX ADMIN — LATANOPROST 1 DROP(S): 0.05 SOLUTION/ DROPS OPHTHALMIC; TOPICAL at 22:31

## 2017-09-25 RX ADMIN — Medication 5 MILLIGRAM(S): at 22:26

## 2017-09-25 RX ADMIN — Medication 5 MILLIGRAM(S): at 18:13

## 2017-09-25 RX ADMIN — TAMSULOSIN HYDROCHLORIDE 0.4 MILLIGRAM(S): 0.4 CAPSULE ORAL at 22:26

## 2017-09-25 RX ADMIN — Medication 1 DROP(S): at 18:14

## 2017-09-25 RX ADMIN — URSODIOL 300 MILLIGRAM(S): 250 TABLET, FILM COATED ORAL at 18:15

## 2017-09-25 RX ADMIN — BRIMONIDINE TARTRATE 1 DROP(S): 2 SOLUTION/ DROPS OPHTHALMIC at 18:18

## 2017-09-25 RX ADMIN — BRIMONIDINE TARTRATE 1 DROP(S): 2 SOLUTION/ DROPS OPHTHALMIC at 18:14

## 2017-09-25 RX ADMIN — Medication 1 DROP(S): at 18:42

## 2017-09-25 RX ADMIN — FINASTERIDE 5 MILLIGRAM(S): 5 TABLET, FILM COATED ORAL at 18:13

## 2017-09-25 RX ADMIN — Medication 8 MILLIGRAM(S): at 22:59

## 2017-09-25 RX ADMIN — Medication 1 DROP(S): at 22:28

## 2017-09-25 NOTE — H&P ADULT - ASSESSMENT
The patient is a 89-year-old man with PMH of HTN, MDS, BPH s/p TURP, CKD 3, chronic constipation, glaucoma, osteomyelitis (of the left arm at age 13), and choledocholithiasis who presents with bloody stools.

## 2017-09-25 NOTE — ED ADULT NURSE NOTE - OBJECTIVE STATEMENT
89 year old male patient BIBA from [facility] c/o three episodes of blood in stool since yesterday  Patient had a procedure for enlarged prostate one week ago, in which patient states "they put heat up my rectum." Patient d/c home with rivera for two weeks and has leg bag with clear urine present. Patient reports first noticing bright red blood in stool yesterday morning, and bright red blood while wiping. Denies passage of clots, chest pain, palpitations, SOB, dizziness or lightheadedness, abd pain, n/v/d, fever, chills. +nathanael by MD. 18G IV present to RUE by EMS, flushes without difficulty. Patient well appearing, not in apparent distress.

## 2017-09-25 NOTE — ED PROVIDER NOTE - PHYSICAL EXAMINATION
Gen: NAD, AOx3  Head: NCAT  HEENT: PERRL, oral mucosa moist, normal conjunctiva  Lung: CTAB, no respiratory distress  CV: rrr, no murmurs, Normal perfusion  Abd: soft, NTND  Rectal: No hemorrhoids or fissures, reddish-brown stool in vault, Guaiac positive  MSK: No edema, no visible deformities  Neuro: No focal neurologic deficits  Skin: No rash   - Eboni Markham, DO

## 2017-09-25 NOTE — H&P ADULT - PMH
Anemia    Benign Hypertension    BPH (Benign Prostatic Hypertrophy)    Choledocholithiasis    Chronic constipation    CKD (chronic kidney disease) stage 3, GFR 30-59 ml/min    Gall stones    Glaucoma    MDS (Myelodysplastic Syndrome)    Nocturia associated with benign prostatic hypertrophy    Osteomyelitis of arm

## 2017-09-25 NOTE — CONSULT NOTE ADULT - SUBJECTIVE AND OBJECTIVE BOX
SUBJECTIVE:  89yMale presents with lower GI bleeding.  Had a transrectal urologic procedure (not sure exactly which) a week ago.  Two days ago had acute worsening of his chronic constipation (for which he takes Linzess 290 mcg daily) - eventually started defecating, mostly formed, had "up to 20 bowel movements in last day".  Saw BRB mixed with stool and on toilet paper so came to ER.  Denies dizziness, nausea, vomiting.  Mild LLQ cramps/tenderness, now resolved.  Reports that his last bowel movement did NOT show blood in it.  No recent significant weight loss (patient thinks he lost a few lbs, family does not), no change in appetite.  ______________________________________________________________________  PMH/PSH:  PAST MEDICAL & SURGICAL HISTORY:  CKD (chronic kidney disease) stage 3, GFR 30-59 ml/min  Choledocholithiasis  Osteomyelitis of arm  Nocturia associated with benign prostatic hypertrophy  Gall stones  Chronic constipation  Anemia  Glaucoma  MDS (Myelodysplastic Syndrome)  BPH (Benign Prostatic Hypertrophy)  Benign Hypertension  Mohs defect of nose: 1990&#x27;s  Laceration of finger, left, with tendon: 1976  Osteomyelitis: left humerous- surgery childhood age 13  History of cataract extraction: right eye 6/11  S/P TURP  S/P Cholecystectomy    ______________________________________________________________________  MEDS:  MEDICATIONS  (STANDING):  influenza   Vaccine 0.5 milliLiter(s) IntraMuscular once  finasteride 5 milliGRAM(s) Oral daily  losartan 100 milliGRAM(s) Oral daily  tamsulosin 0.4 milliGRAM(s) Oral at bedtime  doxazosin 8 milliGRAM(s) Oral at bedtime  ATENolol  Tablet 50 milliGRAM(s) Oral daily  amLODIPine   Tablet 2.5 milliGRAM(s) Oral daily  ursodiol Capsule 300 milliGRAM(s) Oral two times a day with meals  polyethylene glycol 3350 17 Gram(s) Oral daily  latanoprost 0.005% Ophthalmic Solution 1 Drop(s) Both EYES at bedtime  pantoprazole    Tablet 40 milliGRAM(s) Oral before breakfast  multivitamin/minerals 1 Tablet(s) Oral daily  pilocarpine 4% Solution 1 Drop(s) Left EYE three times a day  calcium carbonate 1250 mG + Vitamin D (OsCal 500 + D) 1 Tablet(s) Oral daily  oxybutynin 5 milliGRAM(s) Oral three times a day  lactobacillus acidophilus 1 Tablet(s) Oral daily  brimonidine 0.2% Ophthalmic Solution 1 Drop(s) Right EYE two times a day  brimonidine 0.2% Ophthalmic Solution 1 Drop(s) Left EYE daily  dorzolamide 2% Ophthalmic Solution 1 Drop(s) Both EYES two times a day  timolol 0.5% Solution 1 Drop(s) Both EYES two times a day    MEDICATIONS  (PRN):  ALPRAZolam 0.25 milliGRAM(s) Oral two times a day PRN Anxiety  bisacodyl 10 milliGRAM(s) Oral daily PRN for constipation    ______________________________________________________________________  ALL:   Allergies    Cipro (Swelling)  sulfa drugs (Rash)    Intolerances      ______________________________________________________________________  SH:  ______________________________________________________________________  FH:  FAMILY HISTORY:  Family history of prostate cancer (Father)    ______________________________________________________________________  ROS:    CONSTITUTIONAL:  No weight loss, fever, chills, weakness or fatigue.    HEENT:  Eyes:  No visual loss, blurred vision, double vision or yellow sclerae. Ears, Nose, Throat:  No hearing loss, sneezing, congestion, runny nose or sore throat.    SKIN:  No rash or itching.    CARDIOVASCULAR:  No chest pain, chest pressure or chest discomfort. No palpitations or edema.    RESPIRATORY:  No shortness of breath, cough or sputum.    GASTROINTESTINAL:  SEE HPI    GENITOURINARY:  No dysuria, hematuria, urinary frequency.  has chronic Beckman    NEUROLOGICAL:  No headache, dizziness, syncope, paralysis, ataxia, numbness or tingling in the extremities. No change in bowel or bladder control.    MUSCULOSKELETAL:  No muscle, back pain, joint pain or stiffness.    HEMATOLOGIC:  No anemia, bleeding or bruising.    LYMPHATICS:  No enlarged nodes. No history of splenectomy.    PSYCHIATRIC:  No history of depression or anxiety.    ENDOCRINOLOGIC:  No reports of sweating, cold or heat intolerance. No polyuria or polydipsia.    ALLERGIES:  No history of asthma, hives, eczema or rhinitis.  ______________________________________________________________________  PHYSICAL EXAM:  T(C): 36.7 (09-25-17 @ 10:42), Max: 36.8 (09-25-17 @ 06:15)  HR: 55 (09-25-17 @ 10:42)  BP: 139/49 (09-25-17 @ 10:42)  RR: 19 (09-25-17 @ 10:42)  SpO2: 98% (09-25-17 @ 10:42)  Wt(kg): --    PHYSICAL EXAM:      Constitutional: NAD    Respiratory: CTA    Cardiovascular: irregular RR    Gastrointestinal: soft, NTND, +BS, no r/g    Genitourinary: +Beckman    Rectal: brown stool, +blood in ER    Extremities: no C/C    Neurological: A&O x 3    ______________________________________________________________________  LABS:                        9.9    12.1  )-----------( 301      ( 25 Sep 2017 06:27 )             29.6     09-25    128<L>  |  92<L>  |  42<H>  ----------------------------<  122<H>  4.6   |  26  |  1.55<H>    Ca    9.2      25 Sep 2017 06:27    TPro  6.7  /  Alb  3.7  /  TBili  0.8  /  DBili  x   /  AST  29  /  ALT  22  /  AlkPhos  42  09-25    LIVER FUNCTIONS - ( 25 Sep 2017 06:27 )  Alb: 3.7 g/dL / Pro: 6.7 g/dL / ALK PHOS: 42 U/L / ALT: 22 U/L RC / AST: 29 U/L / GGT: x           ______________________________________________________________________  IMAGING: no pertinent imaging    ______________________________________________________________________  ASSESSMENT:  89y Male with acute lower GI bleed, ?hemorrhoidal vs. ulcer from urologic procedure vs. ischemic colitis, stable Hgb (has known MDS, baselined Hgb ~9).  Clinically better.    PLAN:  1.  Trend Hgb (q8-12 hrs), keep on liquid diet  2.  If stable hgb and no further GI bleeding may advance diet  3.  Continue Miralax, hold Linzess for now  4.  Will follow - if bleeding recurs will consider flexible sigmoidoscopy      Papo Andrews M.D.  [facility]  (O) [phone]

## 2017-09-25 NOTE — H&P ADULT - NSHPLABSRESULTS_GEN_ALL_CORE
LABS:                        9.9    12.1  )-----------( 301      ( 25 Sep 2017 06:27 )             29.6     09-25    128<L>  |  92<L>  |  42<H>  ----------------------------<  122<H>  4.6   |  26  |  1.55<H>    Ca    9.2      25 Sep 2017 06:27    TPro  6.7  /  Alb  3.7  /  TBili  0.8  /  DBili  x   /  AST  29  /  ALT  22  /  AlkPhos  42  09-25    PT/INR - ( 25 Sep 2017 06:27 )   PT: 12.4 sec;   INR: 1.13 ratio      VBG: pH-7.40     EKG - Sinus bradycardia with first degree AV block.

## 2017-09-25 NOTE — H&P ADULT - PROBLEM SELECTOR PLAN 6
Continue home meds. Mild leukocytosis.  Afebrile, no symptoms of infection.  Monitor clinically and repeat CBC in AM.

## 2017-09-25 NOTE — H&P ADULT - PROBLEM SELECTOR PLAN 4
New hyponatremia.  Sodium level was 139 in June.  Suspect may be secondary to HCTZ which per patient was started about 2 months ago.  (Amlodipine dose was decreased at that time due to chronic LE edema and HCTZ was added.)  Will check urine Na, urine and serum osmol.  Hold HCTZ for now.

## 2017-09-25 NOTE — CONSULT NOTE ADULT - ASSESSMENT
89-year-old man with PMH of HTN, MDS, BPH s/p TURP, CKD 3, chronic constipation, glaucoma, osteomyelitis (of the left arm at age 13), and choledocholithiasis with hyponatremia

## 2017-09-25 NOTE — H&P ADULT - PROBLEM SELECTOR PLAN 1
Rectal bleeding in setting of recent transrectal urologic procedure and multiple recent bowel movements.  Suspect hemorrhoidal bleed versus bleeding possibly related to recent urologic procedure.  hemoglobin at prior baseline.  Trend Hemoglobin.  GI consult (Dr. Asmita Cuevas) called.  Case briefly discussed with Dr. Asmita Cuevas, full consult pending.

## 2017-09-25 NOTE — ED PROVIDER NOTE - MEDICAL DECISION MAKING DETAILS
89 M pmh BPG, anemia with recent procedure in rectum for BPH p/w rectal bleeding.  3 episodes, not on blood thinners.  No chest pain, shortness of breath, dizziness.  Will obtain cbc, cmp, type, ekg, provide blood tx if necessary and likely admit.    - Eboni Markham,  89 M pmh BPH, anemia with recent procedure in rectum for BPH p/w rectal bleeding.  3 episodes, not on blood thinners.  No chest pain, shortness of breath, dizziness.  Will obtain cbc, cmp, type, ekg, provide blood tx if necessary and likely admit.    - DO EVER Quiroz: 89 year old male with brbpr x 3 episodes in the past day. no sob, no cp. on aranesp.  recent urologic procedure as well. will get labs, ivf, admit for gi bleed.

## 2017-09-25 NOTE — CONSULT NOTE ADULT - SUBJECTIVE AND OBJECTIVE BOX
The patient is a 89-year-old man with PMH of HTN, MDS, BPH s/p TURP, CKD 3, chronic constipation, glaucoma, osteomyelitis (of the left arm at age 13), and choledocholithiasis.  The patient was in his usual state of health until six days ago when he underwent a urologic transrectal procedure with thermal therapy for management of BPH with difficulty urinating.  After the procedure the patient had a rivera placed.  The patient had no complications, but subsequently he developed severe constipation and took a dose of Linzess.  The patient that had about 20 bowel movements (one loose, the rest formed) from two days ago into yesterday.  Then, yesterday afternoon, the patient had a bloody bowel movement with blood mixed with brown stool.  The patient also has blood on the toilet paper with wiping.  The patient then had a second bloody bowel movement at about 3am and presented to the ED.  No fevers.  No abd pain.  No N/V.  Reports 7lb weight loss over the past 2 weeks.  Exam in the ED showed reddish/brown stool in the rectal vault that Guaiac positive.    Pt has had dec po intake, recently some diarrhea and on hctz  Denies any n/v/cp/sob currently    Cipro (Swelling)  sulfa drugs (Rash)    Hospital Medications:   MEDICATIONS  (STANDING):  influenza   Vaccine 0.5 milliLiter(s) IntraMuscular once  finasteride 5 milliGRAM(s) Oral daily  losartan 100 milliGRAM(s) Oral daily  tamsulosin 0.4 milliGRAM(s) Oral at bedtime  doxazosin 8 milliGRAM(s) Oral at bedtime  ATENolol  Tablet 50 milliGRAM(s) Oral daily  amLODIPine   Tablet 2.5 milliGRAM(s) Oral daily  ursodiol Capsule 300 milliGRAM(s) Oral two times a day with meals  polyethylene glycol 3350 17 Gram(s) Oral daily  latanoprost 0.005% Ophthalmic Solution 1 Drop(s) Both EYES at bedtime  pantoprazole    Tablet 40 milliGRAM(s) Oral before breakfast  multivitamin/minerals 1 Tablet(s) Oral daily  pilocarpine 4% Solution 1 Drop(s) Left EYE three times a day  calcium carbonate 1250 mG + Vitamin D (OsCal 500 + D) 1 Tablet(s) Oral daily  oxybutynin 5 milliGRAM(s) Oral three times a day  lactobacillus acidophilus 1 Tablet(s) Oral daily  brimonidine 0.2% Ophthalmic Solution 1 Drop(s) Right EYE two times a day  brimonidine 0.2% Ophthalmic Solution 1 Drop(s) Left EYE daily  dorzolamide 2% Ophthalmic Solution 1 Drop(s) Both EYES two times a day  timolol 0.5% Solution 1 Drop(s) Both EYES two times a day     Review of Systems:  · Negative General Symptoms	no fever	  · Negative Skin Symptoms	no rash	  · Negative Ophthalmologic Symptoms	Blind in left eye	  · Negative ENMT Symptoms	no throat pain; no dysphagia	  · ENMT Symptoms	hearing difficulty	  · Hearing Disturbance	loss	  · Hearing Loss	L; R	  · Negative Respiratory and Thorax Symptoms	no dyspnea; no cough	  · Negative Cardiovascular Symptoms	no chest pain	  · Cardiovascular Symptoms	peripheral edema	  · Cardiovascular Comments	chronic bilateral leg edema.	  · Negative Gastrointestinal Symptoms	no nausea; no vomiting	  · Negative General Genitourinary Symptoms	no hematuria	  · Negative Musculoskeletal Symptoms	no myalgia	  · Negative Neurological Symptoms	no headache	    VITALS:  T(F): 98 (09-25-17 @ 10:42), Max: 98.3 (09-25-17 @ 06:15)  HR: 55 (09-25-17 @ 10:42)  BP: 139/49 (09-25-17 @ 10:42)  RR: 19 (09-25-17 @ 10:42)  SpO2: 98% (09-25-17 @ 10:42)  Wt(kg): --      PHYSICAL EXAM:  Constitutional: NAD  HEENT: anicteric sclera, oropharynx clear, MMM  Neck: No JVD  Respiratory: CTAB, no wheezes, rales or rhonchi  Cardiovascular: S1, S2, RRR  Gastrointestinal: BS+, soft, NT/ND  Extremities: 1+ peripheral edema  Neurological: A/O x 3, no focal deficits  Psychiatric: Normal mood, normal affect  : + INDWELLING RIVERA  skin: No rashes      LABS:  09-25    128<L>  |  92<L>  |  42<H>  ----------------------------<  122<H>  4.6   |  26  |  1.55<H>    Ca    9.2      25 Sep 2017 06:27    TPro  6.7  /  Alb  3.7  /  TBili  0.8  /  DBili      /  AST  29  /  ALT  22  /  AlkPhos  42  09-25    Creatinine Trend: 1.55 <--                        9.9    12.1  )-----------( 301      ( 25 Sep 2017 06:27 )             29.6     Urine Studies:      RADIOLOGY & ADDITIONAL STUDIES:

## 2017-09-25 NOTE — ED PROVIDER NOTE - PROGRESS NOTE DETAILS
Spoke to Dr. Zapata (urologist), made aware patient is being admitted to [facility] for BRBPR w/ recent urology procedure.

## 2017-09-25 NOTE — PATIENT PROFILE ADULT. - FALL HARM RISK
age(85 years old or older) coagulation(Bleeding disorder R/T clinical cond/anti-coags)/age(85 years old or older)

## 2017-09-25 NOTE — CONSULT NOTE ADULT - PROBLEM SELECTOR RECOMMENDATION 9
likely hypovolemic 2/2 diarrhea/dec po intake/and being on HCTZ  agree with holding hctz  check urine osm, urine na/k/cl/urea  check tsh/am cortisol/uric acid/  trend na q8h for now  may need IVF- will watch off for now

## 2017-09-25 NOTE — H&P ADULT - HISTORY OF PRESENT ILLNESS
The patient is a 89-year-old man with PMH of HTN, MDS, BPH s/p TURP, CKD 3, chronic constipation, glaucoma, osteomyelitis (of the left arm at age 13), and choledocholithiasis.  The patient was in his usual state of health until six days ago when he underwent a urologic transrectal procedure with thermal therapy for management of BPH with difficulty urinating.  After the procedure the patient had a rivera placed.  The patient had no complications, but subsequently he developed severe constipation and took a dose of Linzess.  The patient that had about 20 bowel movements (one loose, the rest formed) from two days ago into yesterday.  Then, yesterday afternoon, the patient had a bloody bowel movement with blood mixed with brown stool.  The patient also has blood on the toilet paper with wiping.  The patient then had a second bloody bowel movement at about 3am and presented to the ED.  No fevers.  No abd pain.  No N/V.  Reports 7lb weight loss over the past 2 weeks.  Exam in the ED showed reddish/brown stool in the rectal vault that Guaiac positive.

## 2017-09-25 NOTE — ED PROVIDER NOTE - OBJECTIVE STATEMENT
89 M pmh HTN, MDS, anemia, BPH p/w BRBPR.  Pt had recent procedure done through his rectum for his BPH and yesterday he had 3 episodes of bright red blood per rectum.  Pt denies chest pain, shortness of breath, nausea/vomiting/diarrhea, abd pain, fever/chills.  Pt has history of anemia and was due for an aranesp shot today with his doctor.    - Eboni Markham DO 89 M pmh HTN, MDS, anemia, BPH p/w BRBPR.  Pt had recent procedure done through his rectum for his BPH and yesterday he had 3 episodes of bright red blood per rectum.  Pt denies chest pain, shortness of breath, nausea/vomiting/diarrhea, abd pain, fever/chills.  Pt has history of anemia and was due for an aranesp shot today with his doctor.    PMD/Card: Jorge  Uro: Lobo Alston  - Eboni Markham DO 89 M pmh HTN, MDS, anemia, BPH p/w BRBPR.  Pt had recent procedure about one week ago done through his rectum for his BPH and yesterday he had 3 episodes of bright red blood per rectum.  Pt denies chest pain, shortness of breath, nausea/vomiting/diarrhea, abd pain, fever/chills.  Pt has history of anemia and was due for an aranesp shot today with his doctor.    PMD/Card: Jorge  Uro: Lobo Alston DO

## 2017-09-25 NOTE — PATIENT PROFILE ADULT. - VISION (WITH CORRECTIVE LENSES IF THE PATIENT USUALLY WEARS THEM):
Normal vision: sees adequately in most situations; can see medication labels, newsprint/"long distance glasses Normal vision: sees adequately in most situations; can see medication labels, newsprint

## 2017-09-25 NOTE — H&P ADULT - NSHPSOCIALHISTORY_GEN_ALL_CORE
Former cigar smoker (smoked 4 cigars a day for about 20 years).  No recent alcohol use.  No drug use.

## 2017-09-25 NOTE — ED ADULT NURSE NOTE - CHPI ED SYMPTOMS NEG
no hematuria/no dysuria/no vomiting/no fever/no abdominal distension/no nausea/no diarrhea/no chills/no burning urination

## 2017-09-25 NOTE — H&P ADULT - EYES
detailed exam Left pupil clouded, not reactive to light.  Right pupil round, reactive to light./EOMI

## 2017-09-25 NOTE — PATIENT PROFILE ADULT. - ABILITY TO HEAR (WITH HEARING AID OR HEARING APPLIANCE IF NORMALLY USED):
left ear hearing aide/Adequate: hears normal conversation without difficulty Adequate: hears normal conversation without difficulty

## 2017-09-26 ENCOUNTER — TRANSCRIPTION ENCOUNTER (OUTPATIENT)
Age: 82
End: 2017-09-26

## 2017-09-26 VITALS
OXYGEN SATURATION: 98 % | DIASTOLIC BLOOD PRESSURE: 70 MMHG | SYSTOLIC BLOOD PRESSURE: 159 MMHG | RESPIRATION RATE: 17 BRPM | HEART RATE: 54 BPM | TEMPERATURE: 99 F

## 2017-09-26 DIAGNOSIS — K59.09 OTHER CONSTIPATION: ICD-10-CM

## 2017-09-26 DIAGNOSIS — R73.03 PREDIABETES: ICD-10-CM

## 2017-09-26 LAB
ANION GAP SERPL CALC-SCNC: 15 MMOL/L — SIGNIFICANT CHANGE UP (ref 5–17)
BASOPHILS # BLD AUTO: 0.02 K/UL — SIGNIFICANT CHANGE UP (ref 0–0.2)
BASOPHILS NFR BLD AUTO: 0.2 % — SIGNIFICANT CHANGE UP (ref 0–2)
BUN SERPL-MCNC: 34 MG/DL — HIGH (ref 7–23)
CALCIUM SERPL-MCNC: 9.2 MG/DL — SIGNIFICANT CHANGE UP (ref 8.4–10.5)
CHLORIDE SERPL-SCNC: 92 MMOL/L — LOW (ref 96–108)
CHLORIDE UR-SCNC: 51 MMOL/L — SIGNIFICANT CHANGE UP
CO2 SERPL-SCNC: 23 MMOL/L — SIGNIFICANT CHANGE UP (ref 22–31)
CREAT ?TM UR-MCNC: 58 MG/DL — SIGNIFICANT CHANGE UP
CREAT SERPL-MCNC: 1.3 MG/DL — SIGNIFICANT CHANGE UP (ref 0.5–1.3)
EOSINOPHIL # BLD AUTO: 0.05 K/UL — SIGNIFICANT CHANGE UP (ref 0–0.5)
EOSINOPHIL NFR BLD AUTO: 0.6 % — SIGNIFICANT CHANGE UP (ref 0–6)
GLUCOSE SERPL-MCNC: 113 MG/DL — HIGH (ref 70–99)
HCT VFR BLD CALC: 27.7 % — LOW (ref 39–50)
HGB BLD-MCNC: 9.5 G/DL — LOW (ref 13–17)
IMM GRANULOCYTES NFR BLD AUTO: 0.6 % — SIGNIFICANT CHANGE UP (ref 0–1.5)
LYMPHOCYTES # BLD AUTO: 2.17 K/UL — SIGNIFICANT CHANGE UP (ref 1–3.3)
LYMPHOCYTES # BLD AUTO: 25.4 % — SIGNIFICANT CHANGE UP (ref 13–44)
MAGNESIUM SERPL-MCNC: 1.8 MG/DL — SIGNIFICANT CHANGE UP (ref 1.6–2.6)
MCHC RBC-ENTMCNC: 32.1 PG — SIGNIFICANT CHANGE UP (ref 27–34)
MCHC RBC-ENTMCNC: 34.3 GM/DL — SIGNIFICANT CHANGE UP (ref 32–36)
MCV RBC AUTO: 93.6 FL — SIGNIFICANT CHANGE UP (ref 80–100)
MONOCYTES # BLD AUTO: 0.67 K/UL — SIGNIFICANT CHANGE UP (ref 0–0.9)
MONOCYTES NFR BLD AUTO: 7.8 % — SIGNIFICANT CHANGE UP (ref 2–14)
NEUTROPHILS # BLD AUTO: 5.58 K/UL — SIGNIFICANT CHANGE UP (ref 1.8–7.4)
NEUTROPHILS NFR BLD AUTO: 65.4 % — SIGNIFICANT CHANGE UP (ref 43–77)
OSMOLALITY UR: 390 MOS/KG — SIGNIFICANT CHANGE UP (ref 50–1200)
PLATELET # BLD AUTO: 301 K/UL — SIGNIFICANT CHANGE UP (ref 150–400)
POTASSIUM SERPL-MCNC: 4.3 MMOL/L — SIGNIFICANT CHANGE UP (ref 3.5–5.3)
POTASSIUM SERPL-SCNC: 4.3 MMOL/L — SIGNIFICANT CHANGE UP (ref 3.5–5.3)
POTASSIUM UR-SCNC: 23 MMOL/L — SIGNIFICANT CHANGE UP
PROT ?TM UR-MCNC: 79 MG/DL — HIGH (ref 0–12)
PROT/CREAT UR-RTO: 1.4 RATIO — HIGH (ref 0–0.2)
RBC # BLD: 2.96 M/UL — LOW (ref 4.2–5.8)
RBC # FLD: 19.4 % — HIGH (ref 10.3–14.5)
SODIUM SERPL-SCNC: 130 MMOL/L — LOW (ref 135–145)
SODIUM UR-SCNC: 57 MMOL/L — SIGNIFICANT CHANGE UP
TSH SERPL-MCNC: 1.75 UIU/ML — SIGNIFICANT CHANGE UP (ref 0.27–4.2)
URATE SERPL-MCNC: 6.3 MG/DL — SIGNIFICANT CHANGE UP (ref 3.4–8.8)
UUN UR-MCNC: 594 MG/DL — SIGNIFICANT CHANGE UP
WBC # BLD: 8.54 K/UL — SIGNIFICANT CHANGE UP (ref 3.8–10.5)
WBC # FLD AUTO: 8.54 K/UL — SIGNIFICANT CHANGE UP (ref 3.8–10.5)

## 2017-09-26 RX ORDER — LINACLOTIDE 145 UG/1
2 CAPSULE, GELATIN COATED ORAL
Qty: 60 | Refills: 0
Start: 2017-09-26 | End: 2017-10-26

## 2017-09-26 RX ORDER — PANTOPRAZOLE SODIUM 20 MG/1
1 TABLET, DELAYED RELEASE ORAL
Qty: 30 | Refills: 0
Start: 2017-09-26 | End: 2017-10-26

## 2017-09-26 RX ORDER — LINACLOTIDE 145 UG/1
1 CAPSULE, GELATIN COATED ORAL
Qty: 0 | Refills: 0 | COMMUNITY

## 2017-09-26 RX ORDER — LINACLOTIDE 145 UG/1
290 CAPSULE, GELATIN COATED ORAL
Qty: 0 | Refills: 0 | Status: DISCONTINUED | OUTPATIENT
Start: 2017-09-26 | End: 2017-09-26

## 2017-09-26 RX ORDER — OMEPRAZOLE 10 MG/1
1 CAPSULE, DELAYED RELEASE ORAL
Qty: 0 | Refills: 0 | COMMUNITY

## 2017-09-26 RX ORDER — GLYCERIN ADULT
1 SUPPOSITORY, RECTAL RECTAL
Qty: 0 | Refills: 0 | Status: DISCONTINUED | OUTPATIENT
Start: 2017-09-26 | End: 2017-09-26

## 2017-09-26 RX ADMIN — LOSARTAN POTASSIUM 100 MILLIGRAM(S): 100 TABLET, FILM COATED ORAL at 06:20

## 2017-09-26 RX ADMIN — POLYETHYLENE GLYCOL 3350 17 GRAM(S): 17 POWDER, FOR SOLUTION ORAL at 11:47

## 2017-09-26 RX ADMIN — FINASTERIDE 5 MILLIGRAM(S): 5 TABLET, FILM COATED ORAL at 11:46

## 2017-09-26 RX ADMIN — BRIMONIDINE TARTRATE 1 DROP(S): 2 SOLUTION/ DROPS OPHTHALMIC at 06:22

## 2017-09-26 RX ADMIN — DORZOLAMIDE HYDROCHLORIDE 1 DROP(S): 20 SOLUTION/ DROPS OPHTHALMIC at 06:24

## 2017-09-26 RX ADMIN — Medication 1 SUPPOSITORY(S): at 02:36

## 2017-09-26 RX ADMIN — Medication 1 DROP(S): at 06:23

## 2017-09-26 RX ADMIN — Medication 1 TABLET(S): at 11:46

## 2017-09-26 RX ADMIN — ATENOLOL 50 MILLIGRAM(S): 25 TABLET ORAL at 06:20

## 2017-09-26 RX ADMIN — Medication 1 DROP(S): at 06:25

## 2017-09-26 RX ADMIN — Medication 1 SUPPOSITORY(S): at 09:56

## 2017-09-26 RX ADMIN — BRIMONIDINE TARTRATE 1 DROP(S): 2 SOLUTION/ DROPS OPHTHALMIC at 11:47

## 2017-09-26 RX ADMIN — AMLODIPINE BESYLATE 2.5 MILLIGRAM(S): 2.5 TABLET ORAL at 06:20

## 2017-09-26 RX ADMIN — Medication 5 MILLIGRAM(S): at 06:56

## 2017-09-26 RX ADMIN — PANTOPRAZOLE SODIUM 40 MILLIGRAM(S): 20 TABLET, DELAYED RELEASE ORAL at 06:20

## 2017-09-26 RX ADMIN — URSODIOL 300 MILLIGRAM(S): 250 TABLET, FILM COATED ORAL at 09:11

## 2017-09-26 RX ADMIN — Medication 1 ENEMA: at 02:36

## 2017-09-26 NOTE — DISCHARGE NOTE ADULT - PLAN OF CARE
resolution of symptoms There are 2 common types of GI Bleed, Upper GI Bleed and Lower GI Bleed.  Upper GI Bleed affects the esophagus, stomach, and first part of the small intestine. Lower GI Bleed affects the colon and rectum.  Upper GI Bleed signs and symptoms to notify your Health Care Provider are vomiting blood, or coffee ground vomitus, and bowel movements that look like black tar.  Lower GI Bleed signs and symptoms to notify your health care provider are bright red bloody bowel movements.   Take your medications as prescribed by your Gastroenterologist.  If you have had an Endoscopy or Colonoscopy, follow up with your Gastroenterologist for Pathology results.  Avoid NSAIDs unless your Health Care Provider tells you that it is ok (Aspirin, Ibuprofen, Advil, Motrin, Aleve).  Follow up with your Gastroenterologist within 1-2 weeks of discharge. Follow up with your medical doctor to establish long term blood pressure treatment goals. continue with rivera catheter  continue home medications  follow up with urology out patient stop taking hydrochlorothiazide  follow up with Dr. Patel for BMP within 2 days follow up with outpatient provider

## 2017-09-26 NOTE — CONSULT NOTE ADULT - CONSULT REASON
89 yo m known to me many years under my care at [facility], chronic constip, called me two wks ago for refferal to GI 2nd opinion, saw Dr Burrell, changed Laxatives, then pt called 5am fri last wk co no bm, I did rx laxative, which worked, then ca.lled me yesterday am re going to ed c BRBPR  adm, see labs, see notes, rob , going home this am and can fu c his usual team of docs, tho often calls me in emergency and can fu c me if needed AT [facility]

## 2017-09-26 NOTE — DISCHARGE NOTE ADULT - ADDITIONAL INSTRUCTIONS
follow up with Dr. Patel within 2 days for check of lab values  follow up with Dr. Eleni LAWRENCE within 1 week

## 2017-09-26 NOTE — CONSULT NOTE ADULT - ASSESSMENT
no bm in hosp  no further GI B  H/H stable  Na lo  elev wbc  feels ok, wants to go home  plan re primary team  I will fu for continuity at [facility] where pt lives

## 2017-09-26 NOTE — DISCHARGE NOTE ADULT - MEDICATION SUMMARY - MEDICATIONS TO CHANGE
I will SWITCH the dose or number of times a day I take the medications listed below when I get home from the hospital:    Linzess 145 mcg oral capsule  -- 1 cap(s) by mouth once a day, As Needed - for constipation    PriLOSEC 20 mg oral delayed release capsule  -- 1 cap(s) by mouth once a day

## 2017-09-26 NOTE — DISCHARGE NOTE ADULT - CARE PLAN
Principal Discharge DX:	Rectal bleed  Secondary Diagnosis:	Essential hypertension  Secondary Diagnosis:	Benign prostatic hyperplasia with incomplete bladder emptying  Secondary Diagnosis:	Hyponatremia  Secondary Diagnosis:	MDS (Myelodysplastic Syndrome) Principal Discharge DX:	Rectal bleed  Goal:	resolution of symptoms  Instructions for follow-up, activity and diet:	There are 2 common types of GI Bleed, Upper GI Bleed and Lower GI Bleed.  Upper GI Bleed affects the esophagus, stomach, and first part of the small intestine. Lower GI Bleed affects the colon and rectum.  Upper GI Bleed signs and symptoms to notify your Health Care Provider are vomiting blood, or coffee ground vomitus, and bowel movements that look like black tar.  Lower GI Bleed signs and symptoms to notify your health care provider are bright red bloody bowel movements.   Take your medications as prescribed by your Gastroenterologist.  If you have had an Endoscopy or Colonoscopy, follow up with your Gastroenterologist for Pathology results.  Avoid NSAIDs unless your Health Care Provider tells you that it is ok (Aspirin, Ibuprofen, Advil, Motrin, Aleve).  Follow up with your Gastroenterologist within 1-2 weeks of discharge.  Secondary Diagnosis:	Essential hypertension  Instructions for follow-up, activity and diet:	Follow up with your medical doctor to establish long term blood pressure treatment goals.  Secondary Diagnosis:	Benign prostatic hyperplasia with incomplete bladder emptying  Instructions for follow-up, activity and diet:	continue with rivera catheter  continue home medications  follow up with urology out patient  Secondary Diagnosis:	Hyponatremia  Instructions for follow-up, activity and diet:	stop taking hydrochlorothiazide  follow up with Dr. Patel for BMP within 2 days  Secondary Diagnosis:	MDS (Myelodysplastic Syndrome)  Instructions for follow-up, activity and diet:	follow up with outpatient provider

## 2017-09-26 NOTE — DISCHARGE NOTE ADULT - HOSPITAL COURSE
The patient is a 89-year-old man with PMH of HTN, MDS, BPH s/p TURP, CKD 3, chronic constipation, glaucoma, osteomyelitis (of the left arm at age 13), and choledocholithiasis and hyponatremia.  The patient was in his usual state of health until six days ago when he underwent a urologic transrectal procedure with thermal therapy for management of BPH with difficulty urinating.  After the procedure the patient had a rivera placed.  The patient had no complications, but subsequently he developed severe constipation and took a dose of Linzess.  The patient that had about 20 bowel movements (one loose, the rest formed) from two days ago into yesterday.  Then, yesterday afternoon, the patient had a bloody bowel movement with blood mixed with brown stool.  Likely hypovolemic hyponatremia 2/2 diarrhea/dec po intake/and being on HCTZ  hold hctz  check urine osm, urine na/k/cl/urea  check tsh/am cortisol/uric acid  /89y Male with acute lower GI bleed, ?hemorrhoidal vs. ulcer from urologic procedure vs. ischemic colitis, stable Hgb (has known MDS, baselined Hgb ~9).  Clinically better.    PLAN:   Trend Hgb (q8-12 hrs),    stable hgb 9.4 , no further GI bleeding diet advanced to regular    Continue Miralax,   Resume Linzess 290 mcg daily   OK to discharge home with close outpatient follow up with Dr. Woody Knowles The patient is a 89-year-old man with PMH of HTN, MDS, BPH s/p TURP, CKD 3, chronic constipation, glaucoma, osteomyelitis (of the left arm at age 13), and choledocholithiasis and hyponatremia.  The patient was in his usual state of health until six days ago when he underwent a urologic transrectal procedure with thermal therapy for management of BPH with difficulty urinating.  After the procedure the patient had a rivera placed.  The patient had no complications, but subsequently he developed severe constipation and took a dose of Linzess.  The patient that had about 20 bowel movements (one loose, the rest formed) from two days ago into yesterday.  Then, yesterday afternoon, the patient had a bloody bowel movement with blood mixed with brown stool.  Likely hypovolemic hyponatremia 2/2 diarrhea/dec po intake/and being on HCTZ  hold hctz  check urine osm, urine na/k/cl/urea  check tsh/am cortisol/uric acid  /89y Male with acute lower GI bleed, ?hemorrhoidal vs. ulcer from urologic procedure vs. ischemic colitis, stable Hgb (has known MDS, baselined Hgb ~9).  Clinically better.    PLAN:   Trend Hgb (q8-12 hrs),    stable hgb 9.4 , no further GI bleeding diet advanced to regular    Continue Miralax,   Resume Linzess 290 mcg daily   OK to discharge home with close outpatient follow up with Dr. Woody Knowles      89-year-old man with PMH of HTN, MDS, BPH s/p TURP, CKD 3, chronic constipation, glaucoma, osteomyelitis (of the left arm at age 13), and choledocholithiasis who presents with bloody stools found to be hyponatremic 2/2 HTZ.  HTZ stopped and sodium has improved greatly.  blood in stools has resolved and pt evaluted by GI. hb stable pt has been cleared by GI for DC.  pt is to have repeat  bmp in 48hrs to monitor renal funtion.  pt has improved greatly and is ready for DC

## 2017-09-26 NOTE — CONSULT NOTE ADULT - SUBJECTIVE AND OBJECTIVE BOX
Patient is a 89y old  Male who presents with a chief complaint of Bloody bowel movements (25 Sep 2017 11:55)      HPI:  The patient is a 89-year-old man with PMH of HTN, MDS, BPH s/p TURP, CKD 3, chronic constipation, glaucoma, osteomyelitis (of the left arm at age 13), and choledocholithiasis.  The patient was in his usual state of health until six days ago when he underwent a urologic transrectal procedure with thermal therapy for management of BPH with difficulty urinating.  After the procedure the patient had a rivera placed.  The patient had no complications, but subsequently he developed severe constipation and took a dose of Linzess.  The patient that had about 20 bowel movements (one loose, the rest formed) from two days ago into yesterday.  Then, yesterday afternoon, the patient had a bloody bowel movement with blood mixed with brown stool.  The patient also has blood on the toilet paper with wiping.  The patient then had a second bloody bowel movement at about 3am and presented to the ED.  No fevers.  No abd pain.  No N/V.  Reports 7lb weight loss over the past 2 weeks.  Exam in the ED showed reddish/brown stool in the rectal vault that Guaiac positive. (25 Sep 2017 11:55)        PAST MEDICAL & SURGICAL HISTORY:  CKD (chronic kidney disease) stage 3, GFR 30-59 ml/min  Choledocholithiasis  Osteomyelitis of arm  Nocturia associated with benign prostatic hypertrophy  Gall stones  Chronic constipation  Anemia  Glaucoma  MDS (Myelodysplastic Syndrome)  BPH (Benign Prostatic Hypertrophy)  Benign Hypertension  Mohs defect of nose: 1990&#x27;s  Laceration of finger, left, with tendon: 1976  Osteomyelitis: left humerous- surgery childhood age 13  History of cataract extraction: right eye 6/11  S/P TURP  S/P Cholecystectomy      Medications:  influenza   Vaccine 0.5 milliLiter(s) IntraMuscular once  finasteride 5 milliGRAM(s) Oral daily  losartan 100 milliGRAM(s) Oral daily  tamsulosin 0.4 milliGRAM(s) Oral at bedtime  doxazosin 8 milliGRAM(s) Oral at bedtime  ALPRAZolam 0.25 milliGRAM(s) Oral two times a day PRN  ATENolol  Tablet 50 milliGRAM(s) Oral daily  amLODIPine   Tablet 2.5 milliGRAM(s) Oral daily  ursodiol Capsule 300 milliGRAM(s) Oral two times a day with meals  bisacodyl 10 milliGRAM(s) Oral daily PRN  polyethylene glycol 3350 17 Gram(s) Oral daily  latanoprost 0.005% Ophthalmic Solution 1 Drop(s) Both EYES at bedtime  pantoprazole    Tablet 40 milliGRAM(s) Oral before breakfast  multivitamin/minerals 1 Tablet(s) Oral daily  pilocarpine 4% Solution 1 Drop(s) Left EYE three times a day  calcium carbonate 1250 mG + Vitamin D (OsCal 500 + D) 1 Tablet(s) Oral daily  oxybutynin 5 milliGRAM(s) Oral three times a day  lactobacillus acidophilus 1 Tablet(s) Oral daily  brimonidine 0.2% Ophthalmic Solution 1 Drop(s) Right EYE two times a day  brimonidine 0.2% Ophthalmic Solution 1 Drop(s) Left EYE daily  dorzolamide 2% Ophthalmic Solution 1 Drop(s) Both EYES two times a day  timolol 0.5% Solution 1 Drop(s) Both EYES two times a day  glycerin Suppository - Adult 1 Suppository(s) Rectal five times a day        FAMILY HISTORY:  Family history of prostate cancer (Father)      Social History non smoker, no etoh, lives c wife at [employer], health declining, stopped driving, requiring more medical fu recently    REVIEW OF SYSTEMS      General:feels better nad	    Skin/Breast:  	  Ophthalmologic:no ch v/h  	  ENMT:	    Respiratory and Thorax:no cough nosp  	  Cardiovascular:	nio cp palp sob    Gastrointestinal:no nvd no BM yet today no discomfort no further bleed    Genitourinary:	no fdi    Musculoskeletal:	no pain    Neurological:	    Psychiatric:gets anxious, occ fixates on BM	    Hematology/Lymphatics:	know labs ok, gets aranasp    Endocrine:	    Allergic/Immunologic:	  AOSN    Vital Signs Last 24 Hrs  T(C): 36.6 (26 Sep 2017 04:33), Max: 36.8 (25 Sep 2017 16:15)  T(F): 97.8 (26 Sep 2017 04:33), Max: 98.2 (25 Sep 2017 16:15)  HR: 60 (26 Sep 2017 04:33) (50 - 112)  BP: 158/68 (26 Sep 2017 04:33) (119/69 - 158/68)  BP(mean): --  RR: 17 (26 Sep 2017 04:33) (17 - 19)  SpO2: 97% (26 Sep 2017 04:33) (95% - 98%)    Physical Exam: nad vssa, oob ambulated ate  H&N:nc at   CV:rrr  Pulm:ctab norrw  GI:bs + soft obese nt  :  Extrem:mild pitting edema bl  Skin: cdi  Vasc:  Neuro:Speechclear               Affect:approp               Memory:ok               Judgment: ok but gets anxious               Orientation:x3               Cognition:int, nl               Sensory:gr nl               Motor:nf atmae               Gait:able indep               CN:gr nl  Psych:anxiety  Other:    Labs:  CBC Full  -  ( 25 Sep 2017 17:09 )  WBC Count : 12.5 K/uL  Hemoglobin : 9.8 g/dL  Hematocrit : 28.4 %  Platelet Count - Automated : 291 K/uL  Mean Cell Volume : 99.9 fl  Mean Cell Hemoglobin : 34.5 pg  Mean Cell Hemoglobin Concentration : 34.5 gm/dL  Auto Neutrophil # : x  Auto Lymphocyte # : x  Auto Monocyte # : x  Auto Eosinophil # : x  Auto Basophil # : x  Auto Neutrophil % : x  Auto Lymphocyte % : x  Auto Monocyte % : x  Auto Eosinophil % : x  Auto Basophil % : x      09-25    131<L>  |  94<L>  |  39<H>  ----------------------------<  132<H>  4.6   |  28  |  1.65<H>    Ca    9.2      25 Sep 2017 15:33  Mg     1.8     09-25    TPro  6.7  /  Alb  3.7  /  TBili  0.8  /  DBili  x   /  AST  29  /  ALT  22  /  AlkPhos  42  09-25      LIVER FUNCTIONS - ( 25 Sep 2017 06:27 )  Alb: 3.7 g/dL / Pro: 6.7 g/dL / ALK PHOS: 42 U/L / ALT: 22 U/L RC / AST: 29 U/L / GGT: x             PT/INR - ( 25 Sep 2017 06:27 )   PT: 12.4 sec;   INR: 1.13 ratio             HEALTH ISSUES - PROBLEM Dx:  Leukocytosis: Leukocytosis  Need for prophylactic measure: Need for prophylactic measure  Glaucoma: Glaucoma  Benign prostatic hyperplasia with incomplete bladder emptying: Benign prostatic hyperplasia with incomplete bladder emptying  Essential hypertension: Essential hypertension  Hyponatremia: Hyponatremia  CKD (chronic kidney disease) stage 3, GFR 30-59 ml/min: CKD (chronic kidney disease) stage 3, GFR 30-59 ml/min  MDS (Myelodysplastic Syndrome): MDS (Myelodysplastic Syndrome)  Rectal bleed: Rectal bleed

## 2017-09-26 NOTE — PROGRESS NOTE ADULT - PROBLEM SELECTOR PLAN 1
Na better  likely 2/2 hctz  hold hctz  limit fee h20 to 1liter a day  plan for discharge - would check bmp in 2 days by pmd  d/w wife/pt/and daughter and primary team

## 2017-09-26 NOTE — DISCHARGE NOTE ADULT - CARE PROVIDER_API CALL
Daisy Patel), Cardiovascular Disease; Internal Medicine  [address] [facility]  [address]  [place] [address]  Phone: [phone]  Fax: [phone]    Woody Knowles), Gastroenterology; Internal Medicine  [address] [place] [id number]  Phone: [phone]  Fax: [phone]

## 2017-09-26 NOTE — PROGRESS NOTE ADULT - SUBJECTIVE AND OBJECTIVE BOX
Patient seen and examined  no complains      Cipro (Swelling)  sulfa drugs (Rash)    Hospital Medications:   MEDICATIONS  (STANDING):  influenza   Vaccine 0.5 milliLiter(s) IntraMuscular once  finasteride 5 milliGRAM(s) Oral daily  losartan 100 milliGRAM(s) Oral daily  tamsulosin 0.4 milliGRAM(s) Oral at bedtime  doxazosin 8 milliGRAM(s) Oral at bedtime  ATENolol  Tablet 50 milliGRAM(s) Oral daily  amLODIPine   Tablet 2.5 milliGRAM(s) Oral daily  ursodiol Capsule 300 milliGRAM(s) Oral two times a day with meals  polyethylene glycol 3350 17 Gram(s) Oral daily  latanoprost 0.005% Ophthalmic Solution 1 Drop(s) Both EYES at bedtime  pantoprazole    Tablet 40 milliGRAM(s) Oral before breakfast  multivitamin/minerals 1 Tablet(s) Oral daily  pilocarpine 4% Solution 1 Drop(s) Left EYE three times a day  calcium carbonate 1250 mG + Vitamin D (OsCal 500 + D) 1 Tablet(s) Oral daily  oxybutynin 5 milliGRAM(s) Oral three times a day  lactobacillus acidophilus 1 Tablet(s) Oral daily  brimonidine 0.2% Ophthalmic Solution 1 Drop(s) Right EYE two times a day  brimonidine 0.2% Ophthalmic Solution 1 Drop(s) Left EYE daily  dorzolamide 2% Ophthalmic Solution 1 Drop(s) Both EYES two times a day  timolol 0.5% Solution 1 Drop(s) Both EYES two times a day  glycerin Suppository - Adult 1 Suppository(s) Rectal five times a day  linaclotide 290 MICROGram(s) Oral before breakfast      VITALS:  T(F): 98.7 (09-26-17 @ 12:08), Max: 98.7 (09-26-17 @ 12:08)  HR: 54 (09-26-17 @ 12:08)  BP: 159/70 (09-26-17 @ 12:08)  RR: 17 (09-26-17 @ 12:08)  SpO2: 98% (09-26-17 @ 12:08)  Wt(kg): --    09-25 @ 07:01  -  09-26 @ 07:00  --------------------------------------------------------  IN: 240 mL / OUT: 1150 mL / NET: -910 mL    09-26 @ 07:01  -  09-26 @ 13:29  --------------------------------------------------------  IN: 240 mL / OUT: 0 mL / NET: 240 mL      Height (cm): 165.1 (09-25 @ 16:15)  Weight (kg): 88.6 (09-25 @ 16:15)  BMI (kg/m2): 32.5 (09-25 @ 16:15)  BSA (m2): 1.96 (09-25 @ 16:15)  PHYSICAL EXAM:  Constitutional: NAD  HEENT: anicteric sclera, oropharynx clear, MMM  Neck: No JVD  Respiratory: CTAB, no wheezes, rales or rhonchi  Cardiovascular: S1, S2, RRR  Gastrointestinal: BS+, soft, NT/ND  Extremities: 1+ peripheral edema  Neurological: A/O x 3, no focal deficits  Psychiatric: Normal mood, normal affect  : No CVA tenderness. +rivera.   Skin: No rashes      LABS:  09-26    130<L>  |  92<L>  |  34<H>  ----------------------------<  113<H>  4.3   |  23  |  1.30    Ca    9.2      26 Sep 2017 09:25  Mg     1.8     09-26    TPro  6.7  /  Alb  3.7  /  TBili  0.8  /  DBili      /  AST  29  /  ALT  22  /  AlkPhos  42  09-25    Creatinine Trend: 1.30 <--, 1.65 <--, 1.55 <--                        9.5    8.54  )-----------( 301      ( 26 Sep 2017 09:48 )             27.7     Urine Studies:    Creatinine, Random Urine: 58 mg/dL (09-25 @ 23:23)  Protein/Creatinine Ratio Calculation: 1.4 Ratio (09-25 @ 23:23)  Chloride, Random Urine: 51 mmol/L (09-25 @ 23:23)  Potassium, Random Urine: 23 mmol/L (09-25 @ 23:23)  Osmolality, Random Urine: 390 mos/kg (09-25 @ 23:23)  Sodium, Random Urine: 57 mmol/L (09-25 @ 23:23)  Osmolality, Random Urine: 420 mos/kg (09-25 @ 15:21)  Sodium, Random Urine: 70 mmol/L (09-25 @ 15:21)    RADIOLOGY & ADDITIONAL STUDIES:

## 2017-09-26 NOTE — DISCHARGE NOTE ADULT - PATIENT PORTAL LINK FT
“You can access the Follow[employer] Patient Portal, offered by [facility], by registering with the following website: [email or web address]”

## 2017-09-26 NOTE — DISCHARGE NOTE ADULT - SECONDARY DIAGNOSIS.
Essential hypertension Benign prostatic hyperplasia with incomplete bladder emptying Hyponatremia MDS (Myelodysplastic Syndrome)

## 2017-09-26 NOTE — PROGRESS NOTE ADULT - SUBJECTIVE AND OBJECTIVE BOX
SUBJECTIVE:  No further GI bleeding, feels fine, anxious to go home.  _____________________________________________________  OBJECTIVE:    T(C): 36.6 (09-26-17 @ 04:33), Max: 36.8 (09-25-17 @ 16:15)  HR: 60 (09-26-17 @ 04:33)  BP: 158/68 (09-26-17 @ 04:33)  RR: 17 (09-26-17 @ 04:33)  SpO2: 97% (09-26-17 @ 04:33)  Wt(kg): --    09-25 @ 07:01  -  09-26 @ 07:00  --------------------------------------------------------  IN:    Oral Fluid: 240 mL  Total IN: 240 mL    OUT:    Indwelling Catheter - Urethral: 1150 mL  Total OUT: 1150 mL    Total NET: -910 mL        PHYSICAL EXAM:    Gastrointestinal: soft, NTND, +BS    _____________________________________________________  LABS:                        9.8    12.5  )-----------( 291      ( 25 Sep 2017 17:09 )             28.4     09-25    131<L>  |  94<L>  |  39<H>  ----------------------------<  132<H>  4.6   |  28  |  1.65<H>    Ca    9.2      25 Sep 2017 15:33  Mg     1.8     09-25    TPro  6.7  /  Alb  3.7  /  TBili  0.8  /  DBili  x   /  AST  29  /  ALT  22  /  AlkPhos  42  09-25    LIVER FUNCTIONS - ( 25 Sep 2017 06:27 )  Alb: 3.7 g/dL / Pro: 6.7 g/dL / ALK PHOS: 42 U/L / ALT: 22 U/L RC / AST: 29 U/L / GGT: x           Hemoglobin: 9.8 (09-25-17 @ 17:09)  Hemoglobin: 9.9 (09-25-17 @ 06:27)    _____________________________________________________  ACTIVE MEDS:  MEDICATIONS  (STANDING):  influenza   Vaccine 0.5 milliLiter(s) IntraMuscular once  finasteride 5 milliGRAM(s) Oral daily  losartan 100 milliGRAM(s) Oral daily  tamsulosin 0.4 milliGRAM(s) Oral at bedtime  doxazosin 8 milliGRAM(s) Oral at bedtime  ATENolol  Tablet 50 milliGRAM(s) Oral daily  amLODIPine   Tablet 2.5 milliGRAM(s) Oral daily  ursodiol Capsule 300 milliGRAM(s) Oral two times a day with meals  polyethylene glycol 3350 17 Gram(s) Oral daily  latanoprost 0.005% Ophthalmic Solution 1 Drop(s) Both EYES at bedtime  pantoprazole    Tablet 40 milliGRAM(s) Oral before breakfast  multivitamin/minerals 1 Tablet(s) Oral daily  pilocarpine 4% Solution 1 Drop(s) Left EYE three times a day  calcium carbonate 1250 mG + Vitamin D (OsCal 500 + D) 1 Tablet(s) Oral daily  oxybutynin 5 milliGRAM(s) Oral three times a day  lactobacillus acidophilus 1 Tablet(s) Oral daily  brimonidine 0.2% Ophthalmic Solution 1 Drop(s) Right EYE two times a day  brimonidine 0.2% Ophthalmic Solution 1 Drop(s) Left EYE daily  dorzolamide 2% Ophthalmic Solution 1 Drop(s) Both EYES two times a day  timolol 0.5% Solution 1 Drop(s) Both EYES two times a day  glycerin Suppository - Adult 1 Suppository(s) Rectal five times a day    MEDICATIONS  (PRN):  ALPRAZolam 0.25 milliGRAM(s) Oral two times a day PRN Anxiety  bisacodyl 10 milliGRAM(s) Oral daily PRN for constipation    _____________________________________________________  ASSESSMENT:  89yMale with resolved acute lower GI bleed    PLAN:  1.  Advance diet to regular  2.  Resume Linzess 290 mcg daily  3.  OK to discharge home with close outpatient follow up with Dr. Woody Andrews M.D.  [facility]  (O) [phone]

## 2017-09-26 NOTE — DISCHARGE NOTE ADULT - MEDICATION SUMMARY - MEDICATIONS TO TAKE
I will START or STAY ON the medications listed below when I get home from the hospital:    CoQ10 oral capsule  -- 1 cap(s) by mouth once a day  -- Indication: For supplement    Proscar 5 mg oral tablet  -- 1 tab(s) by mouth once a day  -- Indication: For Bph    losartan 100 mg oral tablet  -- 1 tab(s) by mouth once a day  -- Indication: For Htn    Flomax 0.4 mg oral capsule  -- 1 cap(s) by mouth once a day (at bedtime)  -- Indication: For Bph    terazosin 10 mg oral tablet  -- 1 tab(s) by mouth once a day  -- Indication: For Bph    Xanax 0.25 mg oral tablet  -- 1 tab(s) by mouth 2 times a day, As Needed  -- Indication: For anxiety    atenolol 50 mg oral tablet  -- 1 tab(s) by mouth once a day  -- Indication: For Htn    amLODIPine 2.5 mg oral tablet  -- 1 tab(s) by mouth once a day  -- Indication: For Htn    Aranesp 300 mcg/mL injectable solution  -- 1 injection every 4 weeks as needed depending on hemoglobin level  -- Indication: For anemia    Linzess 145 mcg oral capsule  -- 2 cap(s) by mouth once a day (before a meal)  -- Indication: For Constipation    ursodiol 300 mg oral tablet  -- 1 tab(s) by mouth 2 times a day  -- Indication: For supplement    Dulcolax Laxative 5 mg oral delayed release tablet  -- 2 tab(s) by mouth once a day, As Needed - for constipation  -- Indication: For Constipation    MiraLax oral powder for reconstitution  -- 17 gram(s) by mouth once a day  -- Indication: For Constipation    Fish Oil oral capsule  -- 2 cap(s) by mouth once a day  -- Indication: For supplement    Lumigan 0.03% ophthalmic solution  -- 1 drop(s) in each eye once a day (in the evening)  -- Indication: For Glaucoma    Alphagan P 0.15% ophthalmic solution  -- 1-2 drop(s) in the right eye 2 times a day and 1 drop in the left eye once daily  -- Indication: For Glaucoma    pilocarpine 4% ophthalmic solution  -- 1 drop(s) in the left eye 3 times a day  -- Indication: For Glaucoma    Cosopt PF 2%-0.5% ophthalmic solution  -- 1 drop(s) to each affected eye 2 times a day  -- Indication: For Glaucoma    Probiotic Formula oral capsule  -- 1 cap(s) by mouth once a day  -- Indication: For supplement    pantoprazole 40 mg oral delayed release tablet  -- 1 tab(s) by mouth once a day (before a meal)  -- Indication: For Gib    oxybutynin 15 mg/24 hr oral tablet, extended release  -- 1 tab(s) by mouth once a day  -- Indication: For Oab    Calcium 600+D oral tablet  -- 1 tab(s) by mouth once a day  -- Indication: For supplement    Multi-Day Plus Minerals oral tablet  -- 1 tab(s) by mouth once a day  -- Indication: For supplement

## 2017-10-02 LAB
CORTICOSTEROID BINDING GLOBULIN RESULT: 1.8 MG/DL — SIGNIFICANT CHANGE UP
CORTIS F/TOTAL MFR SERPL: 28 % — SIGNIFICANT CHANGE UP
CORTIS SERPL-MCNC: 16 UG/DL — SIGNIFICANT CHANGE UP
CORTISOL, FREE RESULT: 4.5 UG/DL — SIGNIFICANT CHANGE UP

## 2017-10-19 PROCEDURE — 82533 TOTAL CORTISOL: CPT

## 2017-10-19 PROCEDURE — 80053 COMPREHEN METABOLIC PANEL: CPT

## 2017-10-19 PROCEDURE — 84300 ASSAY OF URINE SODIUM: CPT

## 2017-10-19 PROCEDURE — 84156 ASSAY OF PROTEIN URINE: CPT

## 2017-10-19 PROCEDURE — 82435 ASSAY OF BLOOD CHLORIDE: CPT

## 2017-10-19 PROCEDURE — 84132 ASSAY OF SERUM POTASSIUM: CPT

## 2017-10-19 PROCEDURE — 82330 ASSAY OF CALCIUM: CPT

## 2017-10-19 PROCEDURE — 82272 OCCULT BLD FECES 1-3 TESTS: CPT

## 2017-10-19 PROCEDURE — 83930 ASSAY OF BLOOD OSMOLALITY: CPT

## 2017-10-19 PROCEDURE — 84133 ASSAY OF URINE POTASSIUM: CPT

## 2017-10-19 PROCEDURE — 84295 ASSAY OF SERUM SODIUM: CPT

## 2017-10-19 PROCEDURE — 82803 BLOOD GASES ANY COMBINATION: CPT

## 2017-10-19 PROCEDURE — 84540 ASSAY OF URINE/UREA-N: CPT

## 2017-10-19 PROCEDURE — 82436 ASSAY OF URINE CHLORIDE: CPT

## 2017-10-19 PROCEDURE — 86900 BLOOD TYPING SEROLOGIC ABO: CPT

## 2017-10-19 PROCEDURE — 84550 ASSAY OF BLOOD/URIC ACID: CPT

## 2017-10-19 PROCEDURE — 83735 ASSAY OF MAGNESIUM: CPT

## 2017-10-19 PROCEDURE — 86901 BLOOD TYPING SEROLOGIC RH(D): CPT

## 2017-10-19 PROCEDURE — 93005 ELECTROCARDIOGRAM TRACING: CPT

## 2017-10-19 PROCEDURE — 84443 ASSAY THYROID STIM HORMONE: CPT

## 2017-10-19 PROCEDURE — 85014 HEMATOCRIT: CPT

## 2017-10-19 PROCEDURE — 83605 ASSAY OF LACTIC ACID: CPT

## 2017-10-19 PROCEDURE — 86850 RBC ANTIBODY SCREEN: CPT

## 2017-10-19 PROCEDURE — 82947 ASSAY GLUCOSE BLOOD QUANT: CPT

## 2017-10-19 PROCEDURE — 80048 BASIC METABOLIC PNL TOTAL CA: CPT

## 2017-10-19 PROCEDURE — 85610 PROTHROMBIN TIME: CPT

## 2017-10-19 PROCEDURE — 85027 COMPLETE CBC AUTOMATED: CPT

## 2017-10-19 PROCEDURE — 99285 EMERGENCY DEPT VISIT HI MDM: CPT | Mod: 25

## 2017-10-19 PROCEDURE — 83935 ASSAY OF URINE OSMOLALITY: CPT

## 2017-11-26 NOTE — ED ADULT NURSE NOTE - NS ED NOTE ABUSE SUSPICION NEGLECT YN
"I'm having LLQ abd pain x 4days, increasing in pain", lupus pt, frequency and dysuria with urination, 101.1 temp yesterday no

## 2018-01-11 NOTE — ASU DISCHARGE PLAN (ADULT/PEDIATRIC). - ACCOMPANYING ADULT'S SIGNATURE_______________________________________
Chief Complaint  She is a 10year old patient here for her Flu injection today      Active Problems    1  Acute suppurative otitis media of left ear without spontaneous rupture of tympanic   membrane, recurrence not specified (382 00) (H66 002)   2  Need for prophylactic fluoride administration (V07 31) (Z29 3)   3  Pes planus of both feet (734) (M21 41,M21 42)    Current Meds   1  Amoxicillin-Pot Clavulanate 400-57 MG/5ML Oral Suspension Reconstituted; TAKE 5 ML   EVERY 12 HOURS UNTIL GONE;   Therapy: [id number] to (Evaluate:06Oct2017)  Requested for: [id number]; Last   Rx:[id number] Ordered   2  Fluoritab 2 2 (1 F) MG Oral Tablet Chewable; Take 1 tablet daily; Therapy: [id number] to (Last Rx:12Jun2017)  Requested for: 12Jun2017 Ordered   3  Kids Gummy Bear Vitamins CHEW;   Therapy: (Recorded:[id number]) to Recorded    Allergies    1  No Known Drug Allergies    2   Seasonal    Plan  Encounter for immunization    · Fluzone Quadrivalent 0 5 ML Intramuscular Suspension Prefilled Syringe    Future Appointments    Date/Time Provider Specialty Site   10/16/2017 03:45 PM Ladan Nolan MD Pediatrics [facility]     Signatures   Electronically signed by : Nicolle Hanley MD; Oct  6 2017  5:12PM EST                       (Author)
Statement Selected

## 2018-05-02 ENCOUNTER — APPOINTMENT (OUTPATIENT)
Dept: UROLOGY | Facility: CLINIC | Age: 83
End: 2018-05-02

## 2018-05-27 ENCOUNTER — TRANSCRIPTION ENCOUNTER (OUTPATIENT)
Age: 83
End: 2018-05-27

## 2018-07-10 ENCOUNTER — TRANSCRIPTION ENCOUNTER (OUTPATIENT)
Age: 83
End: 2018-07-10

## 2018-10-04 ENCOUNTER — TRANSCRIPTION ENCOUNTER (OUTPATIENT)
Age: 83
End: 2018-10-04

## 2018-11-26 ENCOUNTER — TRANSCRIPTION ENCOUNTER (OUTPATIENT)
Age: 83
End: 2018-11-26

## 2019-01-19 NOTE — H&P ADULT - CRANIAL NERVE
Patient Education     Nosebleed (Adult)    Bleeding from the nose most commonly occurs due to injury or drying and cracking of the inner lining of the nose. Â Most nosebleeds are due to dry air or nose-picking. Â TheyÂ can occur during a common cold or anÂ allergy attack. They can also occur on a very hot day, or from dry air in the winter. If the bleeding site is found, it may be cauterized (treated to cause a blood clot to form). This may be done with a chemical, heat, or electricity. If the bleeding continues after the site is cauterized, or if the site cannot be found, packing may be placed in your nose. This is to apply pressure and stop the bleeding. The packing may be made of gauze or sponge. A small balloon catheter is sometimes used. These must be removed by your doctor. Some types of packing dissolve on their own. Home care  Â· If packing was put in your nose, unless told otherwise, do not pull on it or try to remove it yourself. You will be given an appointment to have it removed. You may also have been given antibiotics to prevent a sinus infection. If so, finish all of the medicine. Â· Do not blow your nose for 12 hours after the bleeding stops. This will allow a strong blood clot to form. Do not pick your nose. This may restart bleeding. Â· Avoid drinking alcohol and hot liquids for the next two days. Alcohol or hot liquids in your mouth can dilate blood vessels in your nose. This can cause bleeding to start again. Â· Do not take ibuprofen, naproxen,Â or aspirin-containing medicines. These thin the blood and may promote nose bleeding. You may take acetaminophenÂ for pain, unless another pain medicine was prescribed. Â· If the bleeding starts again, sit up and lean forward to prevent swallowing blood. Pinch your nose tightlyÂ onÂ both sides, as depicted above, for 10 to 15 minutes. Â  Time yourself, and donât release the pressure on your nose until 10 minutes is up.  If bleeding is not controlled, continue to pinchÂ your noseÂ and call your health care provider or return to this facility. Â· If you have a cold, allergies, or dry nasal membranes, lubricate the nasal passages. Apply a small amount of petroleum jelly inside the nose with a cotton swab twice a day (morning and night). Â· Avoid overheating your home, which can dry the air and worsen your condition. Â· A humidifier in the room where you sleep willÂ add moisture to the air. Â· Use a saline nasal sprayÂ to keep nasal passages moist.  Â· Do not pick your nose. Keep fingernails trimmed to decrease risk of bleeds. Follow-up care  Follow up with your health care provider, or as advised. Â Nasal packing should be rechecked or removed within 2 to 3 days. When to seek medical advice  Call your health care provider right away if any of these occur. Â· Another nosebleed that you cannot control  Â· Dizziness, weakness or fainting  Â· You become tired or confused  Â· Fever ofÂ 100.4ÂºF (38ÂºC)Â or higher, or as directed by your health care provider  Â· Headache  Â· Sinus or facial pain  Â· Shortness of breath or trouble breathing  Â© 7385-6768 The [address] [place], [place] [address]y, [place], [address] [facility]. All rights reserved. This information is not intended as a substitute for professional medical care. Always follow your healthcare professional's instructions. 1.  For prevention of nosebleeds: Use a saline nasal spray your rinse 5 or 6 times daily and apply ointment to both nostrils for you go to bed at night. Continue with the vaporizer humidifier in your bedroom. Drink plenty of fluids throughout the day. She get a nosebleed do his you been directed in the past:keep your head bent down, use Afrin and you'll sever some Afrin, nasal decongestant, nasal spray 2 sprays each nostril once, use a moistened use of tissue paper or cotton in cold water placing your nose and then use the clamp or squeeze with your 2 fingers for at least 10 minutes.     2. I put in a referral to see the ENT specialist as possible. On Monday after 8 AM call Marisela Carr at [phone], she can help get urine appointment to see the ENT specialist as soon as possible. 3. I will make sure your primary care physician and your hematologist know that you're having trouble with recurrent nosebleeds. [place].  Pupillary exam as above.  Otherwise CN II-XII grossly intact.

## 2019-06-19 PROBLEM — K80.50 CALCULUS OF BILE DUCT WITHOUT CHOLANGITIS OR CHOLECYSTITIS WITHOUT OBSTRUCTION: Chronic | Status: ACTIVE | Noted: 2017-09-25

## 2019-06-19 PROBLEM — N40.1 BENIGN PROSTATIC HYPERPLASIA WITH LOWER URINARY TRACT SYMPTOMS: Chronic | Status: ACTIVE | Noted: 2017-04-07

## 2019-06-19 PROBLEM — M86.9 OSTEOMYELITIS, UNSPECIFIED: Chronic | Status: ACTIVE | Noted: 2017-09-25

## 2019-06-19 PROBLEM — N18.3 CHRONIC KIDNEY DISEASE, STAGE 3 (MODERATE): Chronic | Status: ACTIVE | Noted: 2017-09-25

## 2019-06-20 ENCOUNTER — OUTPATIENT (OUTPATIENT)
Dept: OUTPATIENT SERVICES | Facility: HOSPITAL | Age: 84
LOS: 1 days | End: 2019-06-20
Payer: MEDICARE

## 2019-06-20 ENCOUNTER — APPOINTMENT (OUTPATIENT)
Dept: CT IMAGING | Facility: CLINIC | Age: 84
End: 2019-06-20
Payer: MEDICARE

## 2019-06-20 DIAGNOSIS — Z00.8 ENCOUNTER FOR OTHER GENERAL EXAMINATION: ICD-10-CM

## 2019-06-20 DIAGNOSIS — M95.0 ACQUIRED DEFORMITY OF NOSE: Chronic | ICD-10-CM

## 2019-06-20 DIAGNOSIS — S61.412A LACERATION WITHOUT FOREIGN BODY OF LEFT HAND, INITIAL ENCOUNTER: Chronic | ICD-10-CM

## 2019-06-20 PROCEDURE — 70486 CT MAXILLOFACIAL W/O DYE: CPT

## 2019-06-20 PROCEDURE — 70486 CT MAXILLOFACIAL W/O DYE: CPT | Mod: 26

## 2019-07-20 ENCOUNTER — APPOINTMENT (OUTPATIENT)
Dept: MRI IMAGING | Facility: CLINIC | Age: 84
End: 2019-07-20

## 2019-08-03 ENCOUNTER — APPOINTMENT (OUTPATIENT)
Dept: MRI IMAGING | Facility: CLINIC | Age: 84
End: 2019-08-03

## 2019-10-07 ENCOUNTER — APPOINTMENT (OUTPATIENT)
Dept: ULTRASOUND IMAGING | Facility: CLINIC | Age: 84
End: 2019-10-07

## 2019-10-22 ENCOUNTER — APPOINTMENT (OUTPATIENT)
Dept: ULTRASOUND IMAGING | Facility: CLINIC | Age: 84
End: 2019-10-22
Payer: MEDICARE

## 2019-10-22 ENCOUNTER — OUTPATIENT (OUTPATIENT)
Dept: OUTPATIENT SERVICES | Facility: HOSPITAL | Age: 84
LOS: 1 days | End: 2019-10-22
Payer: MEDICARE

## 2019-10-22 DIAGNOSIS — S61.412A LACERATION WITHOUT FOREIGN BODY OF LEFT HAND, INITIAL ENCOUNTER: Chronic | ICD-10-CM

## 2019-10-22 DIAGNOSIS — M95.0 ACQUIRED DEFORMITY OF NOSE: Chronic | ICD-10-CM

## 2019-10-22 DIAGNOSIS — R35.1 NOCTURIA: ICD-10-CM

## 2019-10-22 DIAGNOSIS — N18.4 CHRONIC KIDNEY DISEASE, STAGE 4 (SEVERE): ICD-10-CM

## 2019-10-22 DIAGNOSIS — I10 ESSENTIAL (PRIMARY) HYPERTENSION: ICD-10-CM

## 2019-10-22 DIAGNOSIS — N18.9 CHRONIC KIDNEY DISEASE, UNSPECIFIED: ICD-10-CM

## 2019-10-22 PROCEDURE — 76770 US EXAM ABDO BACK WALL COMP: CPT

## 2019-10-22 PROCEDURE — 76770 US EXAM ABDO BACK WALL COMP: CPT | Mod: 26

## 2020-04-22 ENCOUNTER — INPATIENT (INPATIENT)
Facility: HOSPITAL | Age: 85
LOS: 8 days | Discharge: NURSING, REHAB OR LONG-TERM CARE | End: 2020-05-01
Attending: INTERNAL MEDICINE | Admitting: INTERNAL MEDICINE
Payer: MEDICARE

## 2020-04-22 VITALS
DIASTOLIC BLOOD PRESSURE: 75 MMHG | OXYGEN SATURATION: 95 % | SYSTOLIC BLOOD PRESSURE: 143 MMHG | HEART RATE: 66 BPM | RESPIRATION RATE: 20 BRPM

## 2020-04-22 DIAGNOSIS — Z29.9 ENCOUNTER FOR PROPHYLACTIC MEASURES, UNSPECIFIED: ICD-10-CM

## 2020-04-22 DIAGNOSIS — J98.8 OTHER SPECIFIED RESPIRATORY DISORDERS: ICD-10-CM

## 2020-04-22 DIAGNOSIS — S61.412A LACERATION WITHOUT FOREIGN BODY OF LEFT HAND, INITIAL ENCOUNTER: Chronic | ICD-10-CM

## 2020-04-22 DIAGNOSIS — N18.3 CHRONIC KIDNEY DISEASE, STAGE 3 (MODERATE): ICD-10-CM

## 2020-04-22 DIAGNOSIS — U07.1 COVID-19: ICD-10-CM

## 2020-04-22 DIAGNOSIS — Z71.89 OTHER SPECIFIED COUNSELING: ICD-10-CM

## 2020-04-22 DIAGNOSIS — R00.1 BRADYCARDIA, UNSPECIFIED: ICD-10-CM

## 2020-04-22 DIAGNOSIS — Z02.9 ENCOUNTER FOR ADMINISTRATIVE EXAMINATIONS, UNSPECIFIED: ICD-10-CM

## 2020-04-22 DIAGNOSIS — T68.XXXA HYPOTHERMIA, INITIAL ENCOUNTER: ICD-10-CM

## 2020-04-22 DIAGNOSIS — N30.00 ACUTE CYSTITIS WITHOUT HEMATURIA: ICD-10-CM

## 2020-04-22 DIAGNOSIS — M95.0 ACQUIRED DEFORMITY OF NOSE: Chronic | ICD-10-CM

## 2020-04-22 DIAGNOSIS — I48.91 UNSPECIFIED ATRIAL FIBRILLATION: ICD-10-CM

## 2020-04-22 LAB
ALBUMIN SERPL ELPH-MCNC: 3.3 G/DL — SIGNIFICANT CHANGE UP (ref 3.3–5)
ALP SERPL-CCNC: 65 U/L — SIGNIFICANT CHANGE UP (ref 40–120)
ALT FLD-CCNC: 37 U/L — SIGNIFICANT CHANGE UP (ref 4–41)
ANION GAP SERPL CALC-SCNC: 14 MMO/L — SIGNIFICANT CHANGE UP (ref 7–14)
ANISOCYTOSIS BLD QL: SLIGHT — SIGNIFICANT CHANGE UP
APPEARANCE UR: CLEAR — SIGNIFICANT CHANGE UP
AST SERPL-CCNC: 45 U/L — HIGH (ref 4–40)
BACTERIA # UR AUTO: HIGH
BASE EXCESS BLDV CALC-SCNC: -2.4 MMOL/L — SIGNIFICANT CHANGE UP
BASOPHILS # BLD AUTO: 0.04 K/UL — SIGNIFICANT CHANGE UP (ref 0–0.2)
BASOPHILS NFR BLD AUTO: 0.6 % — SIGNIFICANT CHANGE UP (ref 0–2)
BASOPHILS NFR SPEC: 0 % — SIGNIFICANT CHANGE UP (ref 0–2)
BILIRUB SERPL-MCNC: 0.6 MG/DL — SIGNIFICANT CHANGE UP (ref 0.2–1.2)
BILIRUB UR-MCNC: NEGATIVE — SIGNIFICANT CHANGE UP
BLASTS # FLD: 0 % — SIGNIFICANT CHANGE UP (ref 0–0)
BLOOD GAS VENOUS - CREATININE: 1.48 MG/DL — HIGH (ref 0.5–1.3)
BLOOD GAS VENOUS - FIO2: 21 — SIGNIFICANT CHANGE UP
BLOOD UR QL VISUAL: SIGNIFICANT CHANGE UP
BUN SERPL-MCNC: 42 MG/DL — HIGH (ref 7–23)
CALCIUM SERPL-MCNC: 9 MG/DL — SIGNIFICANT CHANGE UP (ref 8.4–10.5)
CHLORIDE BLDV-SCNC: 110 MMOL/L — HIGH (ref 96–108)
CHLORIDE SERPL-SCNC: 105 MMOL/L — SIGNIFICANT CHANGE UP (ref 98–107)
CO2 SERPL-SCNC: 20 MMOL/L — LOW (ref 22–31)
COLOR SPEC: YELLOW — SIGNIFICANT CHANGE UP
CREAT SERPL-MCNC: 1.47 MG/DL — HIGH (ref 0.5–1.3)
EOSINOPHIL # BLD AUTO: 0.01 K/UL — SIGNIFICANT CHANGE UP (ref 0–0.5)
EOSINOPHIL NFR BLD AUTO: 0.1 % — SIGNIFICANT CHANGE UP (ref 0–6)
EOSINOPHIL NFR FLD: 0 % — SIGNIFICANT CHANGE UP (ref 0–6)
GAS PNL BLDV: 138 MMOL/L — SIGNIFICANT CHANGE UP (ref 136–146)
GIANT PLATELETS BLD QL SMEAR: PRESENT — SIGNIFICANT CHANGE UP
GLUCOSE BLDV-MCNC: 151 MG/DL — HIGH (ref 70–99)
GLUCOSE SERPL-MCNC: 159 MG/DL — HIGH (ref 70–99)
GLUCOSE UR-MCNC: NEGATIVE — SIGNIFICANT CHANGE UP
HCO3 BLDV-SCNC: 22 MMOL/L — SIGNIFICANT CHANGE UP (ref 20–27)
HCT VFR BLD CALC: 29.4 % — LOW (ref 39–50)
HCT VFR BLDV CALC: 33.1 % — LOW (ref 39–51)
HGB BLD-MCNC: 10.2 G/DL — LOW (ref 13–17)
HGB BLDV-MCNC: 10.7 G/DL — LOW (ref 13–17)
HYALINE CASTS # UR AUTO: NEGATIVE — SIGNIFICANT CHANGE UP
IMM GRANULOCYTES NFR BLD AUTO: 6.3 % — HIGH (ref 0–1.5)
KETONES UR-MCNC: NEGATIVE — SIGNIFICANT CHANGE UP
LACTATE BLDV-MCNC: 1.5 MMOL/L — SIGNIFICANT CHANGE UP (ref 0.5–2)
LEUKOCYTE ESTERASE UR-ACNC: SIGNIFICANT CHANGE UP
LYMPHOCYTES # BLD AUTO: 1.04 K/UL — SIGNIFICANT CHANGE UP (ref 1–3.3)
LYMPHOCYTES # BLD AUTO: 15 % — SIGNIFICANT CHANGE UP (ref 13–44)
LYMPHOCYTES NFR SPEC AUTO: 9.7 % — LOW (ref 13–44)
MACROCYTES BLD QL: SLIGHT — SIGNIFICANT CHANGE UP
MCHC RBC-ENTMCNC: 33 PG — SIGNIFICANT CHANGE UP (ref 27–34)
MCHC RBC-ENTMCNC: 34.7 % — SIGNIFICANT CHANGE UP (ref 32–36)
MCV RBC AUTO: 95.1 FL — SIGNIFICANT CHANGE UP (ref 80–100)
METAMYELOCYTES # FLD: 0.9 % — SIGNIFICANT CHANGE UP (ref 0–1)
MONOCYTES # BLD AUTO: 0.41 K/UL — SIGNIFICANT CHANGE UP (ref 0–0.9)
MONOCYTES NFR BLD AUTO: 5.9 % — SIGNIFICANT CHANGE UP (ref 2–14)
MONOCYTES NFR BLD: 1.8 % — LOW (ref 2–9)
MYELOCYTES NFR BLD: 3.5 % — HIGH (ref 0–0)
NEUTROPHIL AB SER-ACNC: 69.9 % — SIGNIFICANT CHANGE UP (ref 43–77)
NEUTROPHILS # BLD AUTO: 5 K/UL — SIGNIFICANT CHANGE UP (ref 1.8–7.4)
NEUTROPHILS NFR BLD AUTO: 72.1 % — SIGNIFICANT CHANGE UP (ref 43–77)
NEUTS BAND # BLD: 8 % — HIGH (ref 0–6)
NITRITE UR-MCNC: POSITIVE — HIGH
NRBC # BLD: 2 /100WBC — SIGNIFICANT CHANGE UP
NRBC # FLD: 0.03 K/UL — SIGNIFICANT CHANGE UP (ref 0–0)
NT-PROBNP SERPL-SCNC: 9222 PG/ML — SIGNIFICANT CHANGE UP
OTHER - HEMATOLOGY %: 0 — SIGNIFICANT CHANGE UP
PCO2 BLDV: 38 MMHG — LOW (ref 41–51)
PH BLDV: 7.38 PH — SIGNIFICANT CHANGE UP (ref 7.32–7.43)
PH UR: 5.5 — SIGNIFICANT CHANGE UP (ref 5–8)
PHOSPHATE SERPL-MCNC: 3 MG/DL — SIGNIFICANT CHANGE UP (ref 2.5–4.5)
PLATELET # BLD AUTO: 373 K/UL — SIGNIFICANT CHANGE UP (ref 150–400)
PLATELET COUNT - ESTIMATE: NORMAL — SIGNIFICANT CHANGE UP
PMV BLD: 12.3 FL — SIGNIFICANT CHANGE UP (ref 7–13)
PO2 BLDV: 34 MMHG — LOW (ref 35–40)
POIKILOCYTOSIS BLD QL AUTO: SLIGHT — SIGNIFICANT CHANGE UP
POLYCHROMASIA BLD QL SMEAR: SLIGHT — SIGNIFICANT CHANGE UP
POTASSIUM BLDV-SCNC: 3.6 MMOL/L — SIGNIFICANT CHANGE UP (ref 3.4–4.5)
POTASSIUM SERPL-MCNC: 3.5 MMOL/L — SIGNIFICANT CHANGE UP (ref 3.5–5.3)
POTASSIUM SERPL-SCNC: 3.5 MMOL/L — SIGNIFICANT CHANGE UP (ref 3.5–5.3)
PROMYELOCYTES # FLD: 0 % — SIGNIFICANT CHANGE UP (ref 0–0)
PROT SERPL-MCNC: 7.5 G/DL — SIGNIFICANT CHANGE UP (ref 6–8.3)
PROT UR-MCNC: 50 — SIGNIFICANT CHANGE UP
RBC # BLD: 3.09 M/UL — LOW (ref 4.2–5.8)
RBC # FLD: 19.3 % — HIGH (ref 10.3–14.5)
RBC CASTS # UR COMP ASSIST: SIGNIFICANT CHANGE UP (ref 0–?)
SAO2 % BLDV: 57.9 % — LOW (ref 60–85)
SARS-COV-2 RNA SPEC QL NAA+PROBE: DETECTED
SCHISTOCYTES BLD QL AUTO: SLIGHT — SIGNIFICANT CHANGE UP
SODIUM SERPL-SCNC: 139 MMOL/L — SIGNIFICANT CHANGE UP (ref 135–145)
SP GR SPEC: 1.02 — SIGNIFICANT CHANGE UP (ref 1–1.04)
SQUAMOUS # UR AUTO: SIGNIFICANT CHANGE UP
TARGETS BLD QL SMEAR: SLIGHT — SIGNIFICANT CHANGE UP
UROBILINOGEN FLD QL: NORMAL — SIGNIFICANT CHANGE UP
VARIANT LYMPHS # BLD: 6.2 % — SIGNIFICANT CHANGE UP
WBC # BLD: 6.94 K/UL — SIGNIFICANT CHANGE UP (ref 3.8–10.5)
WBC # FLD AUTO: 6.94 K/UL — SIGNIFICANT CHANGE UP (ref 3.8–10.5)
WBC UR QL: HIGH (ref 0–?)

## 2020-04-22 PROCEDURE — 71045 X-RAY EXAM CHEST 1 VIEW: CPT | Mod: 26

## 2020-04-22 PROCEDURE — 99233 SBSQ HOSP IP/OBS HIGH 50: CPT

## 2020-04-22 PROCEDURE — 99223 1ST HOSP IP/OBS HIGH 75: CPT

## 2020-04-22 PROCEDURE — 99497 ADVNCD CARE PLAN 30 MIN: CPT

## 2020-04-22 PROCEDURE — 93010 ELECTROCARDIOGRAM REPORT: CPT | Mod: CS

## 2020-04-22 PROCEDURE — 12345: CPT | Mod: NC

## 2020-04-22 PROCEDURE — 70450 CT HEAD/BRAIN W/O DYE: CPT | Mod: 26

## 2020-04-22 RX ORDER — CEFTRIAXONE 500 MG/1
1000 INJECTION, POWDER, FOR SOLUTION INTRAMUSCULAR; INTRAVENOUS ONCE
Refills: 0 | Status: COMPLETED | OUTPATIENT
Start: 2020-04-22 | End: 2020-04-22

## 2020-04-22 RX ORDER — LATANOPROST 0.05 MG/ML
1 SOLUTION/ DROPS OPHTHALMIC; TOPICAL AT BEDTIME
Refills: 0 | Status: DISCONTINUED | OUTPATIENT
Start: 2020-04-22 | End: 2020-05-01

## 2020-04-22 RX ORDER — DORZOLAMIDE HYDROCHLORIDE TIMOLOL MALEATE 20; 5 MG/ML; MG/ML
1 SOLUTION/ DROPS OPHTHALMIC
Refills: 0 | Status: DISCONTINUED | OUTPATIENT
Start: 2020-04-22 | End: 2020-04-22

## 2020-04-22 RX ORDER — HEPARIN SODIUM 5000 [USP'U]/ML
5000 INJECTION INTRAVENOUS; SUBCUTANEOUS EVERY 8 HOURS
Refills: 0 | Status: DISCONTINUED | OUTPATIENT
Start: 2020-04-22 | End: 2020-04-23

## 2020-04-22 RX ORDER — AMLODIPINE BESYLATE 2.5 MG/1
5 TABLET ORAL DAILY
Refills: 0 | Status: DISCONTINUED | OUTPATIENT
Start: 2020-04-22 | End: 2020-05-01

## 2020-04-22 RX ORDER — TAMSULOSIN HYDROCHLORIDE 0.4 MG/1
0.4 CAPSULE ORAL AT BEDTIME
Refills: 0 | Status: DISCONTINUED | OUTPATIENT
Start: 2020-04-22 | End: 2020-05-01

## 2020-04-22 RX ORDER — PILOCARPINE HCL 4 %
2 DROPS OPHTHALMIC (EYE)
Qty: 0 | Refills: 0 | DISCHARGE

## 2020-04-22 RX ORDER — SODIUM CHLORIDE 9 MG/ML
1000 INJECTION, SOLUTION INTRAVENOUS
Refills: 0 | Status: DISCONTINUED | OUTPATIENT
Start: 2020-04-22 | End: 2020-04-24

## 2020-04-22 RX ORDER — PANTOPRAZOLE SODIUM 20 MG/1
40 TABLET, DELAYED RELEASE ORAL
Refills: 0 | Status: DISCONTINUED | OUTPATIENT
Start: 2020-04-22 | End: 2020-05-01

## 2020-04-22 RX ORDER — PILOCARPINE HCL 4 %
1 DROPS OPHTHALMIC (EYE) THREE TIMES A DAY
Refills: 0 | Status: DISCONTINUED | OUTPATIENT
Start: 2020-04-22 | End: 2020-05-01

## 2020-04-22 RX ORDER — LOSARTAN POTASSIUM 100 MG/1
100 TABLET, FILM COATED ORAL DAILY
Refills: 0 | Status: DISCONTINUED | OUTPATIENT
Start: 2020-04-22 | End: 2020-04-22

## 2020-04-22 RX ORDER — FINASTERIDE 5 MG/1
5 TABLET, FILM COATED ORAL DAILY
Refills: 0 | Status: DISCONTINUED | OUTPATIENT
Start: 2020-04-22 | End: 2020-05-01

## 2020-04-22 RX ORDER — ACETAMINOPHEN 500 MG
650 TABLET ORAL EVERY 8 HOURS
Refills: 0 | Status: DISCONTINUED | OUTPATIENT
Start: 2020-04-22 | End: 2020-05-01

## 2020-04-22 RX ORDER — PILOCARPINE HCL 4 %
1 DROPS OPHTHALMIC (EYE)
Qty: 0 | Refills: 0 | DISCHARGE

## 2020-04-22 RX ORDER — URSODIOL 250 MG/1
300 TABLET, FILM COATED ORAL
Refills: 0 | Status: DISCONTINUED | OUTPATIENT
Start: 2020-04-22 | End: 2020-05-01

## 2020-04-22 RX ORDER — CEFTRIAXONE 500 MG/1
1000 INJECTION, POWDER, FOR SOLUTION INTRAMUSCULAR; INTRAVENOUS EVERY 24 HOURS
Refills: 0 | Status: COMPLETED | OUTPATIENT
Start: 2020-04-23 | End: 2020-04-29

## 2020-04-22 RX ADMIN — PANTOPRAZOLE SODIUM 40 MILLIGRAM(S): 20 TABLET, DELAYED RELEASE ORAL at 09:50

## 2020-04-22 RX ADMIN — FINASTERIDE 5 MILLIGRAM(S): 5 TABLET, FILM COATED ORAL at 13:28

## 2020-04-22 RX ADMIN — Medication 1 DROP(S): at 13:28

## 2020-04-22 RX ADMIN — HEPARIN SODIUM 5000 UNIT(S): 5000 INJECTION INTRAVENOUS; SUBCUTANEOUS at 21:43

## 2020-04-22 RX ADMIN — Medication 1 DROP(S): at 21:44

## 2020-04-22 RX ADMIN — HEPARIN SODIUM 5000 UNIT(S): 5000 INJECTION INTRAVENOUS; SUBCUTANEOUS at 13:27

## 2020-04-22 RX ADMIN — SODIUM CHLORIDE 50 MILLILITER(S): 9 INJECTION, SOLUTION INTRAVENOUS at 17:04

## 2020-04-22 RX ADMIN — AMLODIPINE BESYLATE 5 MILLIGRAM(S): 2.5 TABLET ORAL at 09:50

## 2020-04-22 RX ADMIN — TAMSULOSIN HYDROCHLORIDE 0.4 MILLIGRAM(S): 0.4 CAPSULE ORAL at 21:43

## 2020-04-22 RX ADMIN — URSODIOL 300 MILLIGRAM(S): 250 TABLET, FILM COATED ORAL at 17:04

## 2020-04-22 RX ADMIN — LATANOPROST 1 DROP(S): 0.05 SOLUTION/ DROPS OPHTHALMIC; TOPICAL at 23:27

## 2020-04-22 RX ADMIN — CEFTRIAXONE 100 MILLIGRAM(S): 500 INJECTION, POWDER, FOR SOLUTION INTRAMUSCULAR; INTRAVENOUS at 09:47

## 2020-04-22 NOTE — H&P ADULT - NSHPSOCIALHISTORY_GEN_ALL_CORE
[facility]ident [facility] Resident of [facility]    Spouse reportedly with h/o dementia also in assisted living   Retired  Reports no h/o tobacco or alcohol use

## 2020-04-22 NOTE — ED ADULT NURSE NOTE - CHIEF COMPLAINT QUOTE
Pt arrives from [facility]. As per EMS reports pt called ambulance himself c/o "sob, memory loss and increase in urination". Hx BPH, HTN, Glaucoma. Unable to obtain temp in triage. No distress noted. Noted pt arrives with x1 shoe.

## 2020-04-22 NOTE — CONSULT NOTE ADULT - ATTENDING COMMENTS
89y old male with history of  hx anemia, dementia, MDS, pre-DM, HTN, BPH, glaucoma, brought into the ED complaining of memory loss, shortness of breath, lower leg swelling. fatigue, weakness and urinary frequency. Found to be COVID 19+ with new onset  atrial fibrillation with slow ventricular response (30's)  with pauses. Will dc atenolol and timolol eye drops. Keep atropine at bedside. Will follow.

## 2020-04-22 NOTE — CHART NOTE - NSCHARTNOTEFT_GEN_A_CORE
Spoke to daughter Lasha @ [phone] and updated on patients current status and plan of care.  [facility] discussed and Lasha states that the patient has a living will which states DNR/DNI and no feeding tube however defers to her sister Damari who is a RN (both daughters are HCP per Lasha) and she is in agreement with whatever decision her Damari makes.  Damari contacted @ [phone] and updated on patients status GOC discussed and Damari states that her fathers shared living will with both daughters expressing DNR/ DNI no feeding tube.  They are in agreement for IV antibiotics & fluids. MOLST form filled out and placed in chart, DNR order entered.  Conversation discussed with attending.

## 2020-04-22 NOTE — CONSULT NOTE ADULT - SUBJECTIVE AND OBJECTIVE BOX
Patient is a 89y old male with history of  hx anemia, dementia, MDS, pre-DM, HTN, BPH, glaucoma, brought into the ED from the [facility] assisted living in [place]  complaining of memory loss, shortness of breath, lower leg swelling. fatigue, weakness and urinary frequency. Also states he fell one week ago but could not give details. He does not recall having chest pain, dizziness, palpitations or syncope either before or during this hospitalization. Found to be COVID 19+.  In the ED he was  in atrial fibrillation with slow ventricular response to the low 30's and pauses.  The atrial fibrillation is a new diagnosis although he was recently noted to be bradycardic (40's) at the assisted living and he was told to stop the Atenolol but it is unclear whether he did. Atenolol on hold now.   Today, on telemetry he continue to be in atrial fibrillation with pauses.  He has been asymptomatic and hemodynamically stable.  He is not hypoxic- his O2 saturation is 98% RA. Last dose of Atenolol was yesterday.                 PAST MEDICAL & SURGICAL HISTORY:  Glaucoma  Anemia  MDS (myelodysplastic syndrome)  BPH (benign prostatic hyperplasia)  No significant past surgical history                            MEDICATIONS  (STANDING):  amLODIPine   Tablet 5 milliGRAM(s) Oral daily  dorzolamide 2%/timolol 0.5% Ophthalmic Solution 1 Drop(s) Both EYES two times a day  finasteride 5 milliGRAM(s) Oral daily  heparin   Injectable 5000 Unit(s) SubCutaneous every 8 hours  latanoprost 0.005% Ophthalmic Solution 1 Drop(s) Both EYES at bedtime  pantoprazole    Tablet 40 milliGRAM(s) Oral before breakfast  pilocarpine 1% Solution 1 Drop(s) Left EYE three times a day  tamsulosin 0.4 milliGRAM(s) Oral at bedtime  ursodiol Capsule 300 milliGRAM(s) Oral two times a day    MEDICATIONS  (PRN):  acetaminophen   Tablet .. 650 milliGRAM(s) Oral every 8 hours PRN Temp greater or equal to 38C (100.4F)      FAMILY HISTORY:  No pertinent family history in first degree relatives      SOCIAL HISTORY:    CIGARETTES:    ALCOHOL:    REVIEW OF SYSTEMS:    CONSTITUTIONAL: No fever, weight loss, chills, shakes, or fatigue  EYES: No eye pain, visual disturbances, or discharge  ENMT:  No difficulty hearing, tinnitus, vertigo; No sinus or throat pain  NECK: No pain or stiffness  BREASTS: No pain, masses, or nipple discharge  RESPIRATORY: No cough, wheezing, hemoptysis, or shortness of breath  CARDIOVASCULAR: No chest pain, dyspnea, palpitations, dizziness, syncope, paroxysmal nocturnal dyspnea, orthopnea, or arm or leg swelling  GASTROINTESTINAL: No abdominal  or epigastric pain, nausea, vomiting, hematemesis, diarrhea, constipation, melena or bright red blood.  GENITOURINARY: No dysuria, nocturia, hematuria, or urinary incontinence  NEUROLOGICAL: No headaches, memory loss, slurred speech, limb weakness, loss of strength, numbness, or tremors  SKIN: No itching, burning, rashes, or lesions   LYMPH NODES: No enlarged glands  ENDOCRINE: No heat or cold intolerance, or hair loss  MUSCULOSKELETAL: No joint pain or swelling, muscle, back, or extremity pain  PSYCHIATRIC: No depression, anxiety, or difficulty sleeping  HEME/LYMPH: No easy bruising or bleeding gums  ALLERY AND IMMUNOLOGIC: No hives or rash.      Vital Signs Last 24 Hrs  T(C): 33.5 (2020 08:51), Max: 34.6 (2020 07:18)  T(F): 92.3 (2020 08:51), Max: 94.2 (2020 07:18)  HR: 60 (2020 08:51) (60 - 73)  BP: 152/68 (2020 08:51) (131/63 - 176/76)  BP(mean): --  RR: 18 (2020 08:51) (15 - 25)  SpO2: 94% (2020 08:51) (91% - 98%)    PHYSICAL EXAM:    GENERAL: In no apparent distress, well nourished, and hydrated.  HEAD:  Atraumatic, Normocephalic  EYES: EOMI, PERRLA, conjunctiva and sclera clear  ENMT: No tonsillar erythema, exudates, or enlargement; Moist mucous membranes, Good dentition, No lesions  NECK: Supple and normal thyroid.  No JVD or carotid bruit.  Carotid pulse is 2+ bilaterally.  HEART: Regular rate and rhythm; No murmurs, rubs, or gallops.  PULMONARY: Clear to auscultation and perfusion.  No rales, wheezing, or rhonchi bilaterally.  ABDOMEN: Soft, Nontender, Nondistended; Bowel sounds present  EXTREMITIES:  2+ Peripheral Pulses, No clubbing, cyanosis, or edema  LYMPH: No lymphadenopathy noted  NEUROLOGICAL: Grossly nonfocal          INTERPRETATION OF TELEMETRY:    ECG:    I&O's Detail      LABS:                        10.2   6.94  )-----------( 373      ( 2020 04:00 )             29.4         139  |  105  |  42<H>  ----------------------------<  159<H>  3.5   |  20<L>  |  1.47<H>    Ca    9.0      2020 04:00  Phos  3.0         TPro  7.5  /  Alb  3.3  /  TBili  0.6  /  DBili  x   /  AST  45<H>  /  ALT  37  /  AlkPhos  65            Urinalysis Basic - ( 2020 04:30 )    Color: YELLOW / Appearance: CLEAR / S.016 / pH: 5.5  Gluc: NEGATIVE / Ketone: NEGATIVE  / Bili: NEGATIVE / Urobili: NORMAL   Blood: TRACE / Protein: 50 / Nitrite: POSITIVE   Leuk Esterase: TRACE / RBC: 0-2 / WBC 6-10   Sq Epi: OCC / Non Sq Epi: x / Bacteria: MANY      BNPSerum Pro-Brain Natriuretic Peptide: 9222 pg/mL ( @ 04:00)    I&O's Detail    Daily Height in cm: 167.64 (2020 08:51)    Daily Weight in k.3 (2020 08:51)    RADIOLOGY & ADDITIONAL STUDIES:  PREVIOUS DIAGNOSTIC TESTING:      ECHO  FINDINGS:    STRESS  FINDINGS:    CATHETERIZATION  FINDINGS:      ASSESSMENT:        RECOMMENDATIONS: Patient is a 89y old male with history of  hx anemia, dementia, MDS, pre-DM, HTN, BPH, glaucoma, brought into the ED from the [facility] assisted living in [place]  complaining of memory loss, shortness of breath, lower leg swelling. fatigue, weakness and urinary frequency. Also states he fell one week ago but could not give details. He does not recall having chest pain, dizziness, palpitations or syncope either before or during this hospitalization. Found to be COVID 19+.  In the ED he was  in atrial fibrillation with slow ventricular response to the low 30's and pauses.  The atrial fibrillation is a new diagnosis although he was recently noted to be bradycardic (40's) at the assisted living and he was told to stop the Atenolol but it is unclear whether he did. Atenolol on hold now.   Today, on telemetry he continue to be in atrial fibrillation with pauses.  He has been asymptomatic and hemodynamically stable.  He is not hypoxic- his O2 saturation is 98% RA. Last dose of Atenolol was yesterday.       PAST MEDICAL & SURGICAL HISTORY:  Glaucoma  Anemia  MDS (myelodysplastic syndrome)  BPH (benign prostatic hyperplasia)  No significant past surgical history      MEDICATIONS  (STANDING):  amLODIPine   Tablet 5 milliGRAM(s) Oral daily  dorzolamide 2%/timolol 0.5% Ophthalmic Solution 1 Drop(s) Both EYES two times a day  finasteride 5 milliGRAM(s) Oral daily  heparin   Injectable 5000 Unit(s) SubCutaneous every 8 hours  latanoprost 0.005% Ophthalmic Solution 1 Drop(s) Both EYES at bedtime  pantoprazole    Tablet 40 milliGRAM(s) Oral before breakfast  pilocarpine 1% Solution 1 Drop(s) Left EYE three times a day  tamsulosin 0.4 milliGRAM(s) Oral at bedtime  ursodiol Capsule 300 milliGRAM(s) Oral two times a day    MEDICATIONS  (PRN):  acetaminophen   Tablet .. 650 milliGRAM(s) Oral every 8 hours PRN Temp greater or equal to 38C (100.4F)      FAMILY HISTORY:  No pertinent family history in first degree relatives      SOCIAL HISTORY: Lives with his wife in the [facility]. His wife suffers from dementia.  He is the caretaker.    CIGARETTES:  no    ALCOHOL:  no    REVIEW OF SYSTEMS:    CONSTITUTIONAL: No fever, weight loss, chills, shakes  +weakness and fatigue x 1-2 weeks  EYES: No eye pain but decreased visual acuity  ENMT:  Hard of hearing tinnitus, vertigo; No sinus or throat pain  NECK: No pain or stiffness  BREASTS: No pain, masses, or nipple discharge  RESPIRATORY: + cough and shortness of breath  CARDIOVASCULAR: No chest pain, dyspnea, palpitations, dizziness, syncope, paroxysmal nocturnal dyspnea, orthopnea, or arm or leg swelling  GASTROINTESTINAL: No abdominal  or epigastric pain, nausea, vomiting, hematemesis, diarrhea, constipation, melena or bright red blood.  GENITOURINARY: No dysuria, nocturia, hematuria, or urinary incontinence  NEUROLOGICAL: No headaches, slurred speech, limb weakness, loss of strength, numbness, or tremors  Increasing memory loss  SKIN: No itching, burning, rashes, or lesions   LYMPH NODES: No enlarged glands  ENDOCRINE: No heat or cold intolerance, or hair loss  MUSCULOSKELETAL: No joint pain or swelling, muscle, back, or extremity pain  PSYCHIATRIC: No depression, anxiety, or difficulty sleeping  HEME/LYMPH: No easy bruising or bleeding gums  ALLERY AND IMMUNOLOGIC: No hives or rash.      Vital Signs Last 24 Hrs  T(C): 33.5 (2020 08:51), Max: 34.6 (2020 07:18)  T(F): 92.3 (2020 08:51), Max: 94.2 (2020 07:18)  HR: 60 (2020 08:51) (60 - 73)  BP: 152/68 (2020 08:51) (131/63 - 176/76)  BP(mean): --  RR: 18 (2020 08:51) (15 - 25)  SpO2: 94% (2020 08:51) (91% - 98%)    PHYSICAL EXAM:    GENERAL: In no apparent distress, well nourished, and hydrated.  HEAD:  Atraumatic, Normocephalic  EYES: EOMI, PERRLA, conjunctiva and sclera clear  ENMT: No tonsillar erythema, exudates, or enlargement; Moist mucous membranes, Good dentition, No lesions  NECK: Supple and normal thyroid.  No JVD or carotid bruit.  Carotid pulse is 2+ bilaterally.  HEART: Regular rate and rhythm; No murmurs, rubs, or gallops.  PULMONARY: Clear to auscultation and perfusion.  No rales, wheezing, or rhonchi bilaterally.  ABDOMEN: Soft, Nontender, Nondistended; Bowel sounds present  EXTREMITIES:  2+ Peripheral Pulses, No clubbing, cyanosis, or edema  LYMPH: No lymphadenopathy noted  NEUROLOGICAL: Grossly nonfocal          INTERPRETATION OF TELEMETRY:    ECG:    I&O's Detail      LABS:                        10.2   6.94  )-----------( 373      ( 2020 04:00 )             29.4         139  |  105  |  42<H>  ----------------------------<  159<H>  3.5   |  20<L>  |  1.47<H>    Ca    9.0      2020 04:00  Phos  3.0         TPro  7.5  /  Alb  3.3  /  TBili  0.6  /  DBili  x   /  AST  45<H>  /  ALT  37  /  AlkPhos  65            Urinalysis Basic - ( 2020 04:30 )    Color: YELLOW / Appearance: CLEAR / S.016 / pH: 5.5  Gluc: NEGATIVE / Ketone: NEGATIVE  / Bili: NEGATIVE / Urobili: NORMAL   Blood: TRACE / Protein: 50 / Nitrite: POSITIVE   Leuk Esterase: TRACE / RBC: 0-2 / WBC 6-10   Sq Epi: OCC / Non Sq Epi: x / Bacteria: MANY      BNPSerum Pro-Brain Natriuretic Peptide: 9222 pg/mL ( @ 04:00)    I&O's Detail    Daily Height in cm: 167.64 (2020 08:51)    Daily Weight in k.3 (2020 08:51)    RADIOLOGY & ADDITIONAL STUDIES:  PREVIOUS DIAGNOSTIC TESTING:      ECHO  FINDINGS:    STRESS  FINDINGS:    CATHETERIZATION  FINDINGS:      ASSESSMENT:        RECOMMENDATIONS: Patient is a 89y old male with history of  hx anemia, dementia, MDS, pre-DM, HTN, BPH, glaucoma, brought into the ED from the [facility] assisted [facility] in [place]  complaining of memory loss, shortness of breath, lower leg swelling. fatigue, weakness and urinary frequency. Also states he fell one week ago but could not give details. He does not recall having chest pain, dizziness, palpitations or syncope either before or during this hospitalization. In the ED he was bradycardic and hypothermic. Found to be COVID 19+and in atrial fibrillation with slow ventricular response to the low 30's with pauses.  The atrial fibrillation is a new diagnosis although he was recently noted to be bradycardic (40's) at the assisted living and he was told to stop the Atenolol but it is unclear whether he did. Atenolol on hold now.   Today, on telemetry he continue to be in atrial fibrillation with pauses.  He has been asymptomatic and hemodynamically stable.  He is not hypoxic- his O2 saturation is 98% RA. Last dose of Atenolol was yesterday.       PAST MEDICAL & SURGICAL HISTORY:  Glaucoma  Anemia  MDS (myelodysplastic syndrome)  BPH (benign prostatic hyperplasia)  No significant past surgical history      MEDICATIONS  (STANDING):  amLODIPine   Tablet 5 milliGRAM(s) Oral daily  dorzolamide 2%/timolol 0.5% Ophthalmic Solution 1 Drop(s) Both EYES two times a day  finasteride 5 milliGRAM(s) Oral daily  heparin   Injectable 5000 Unit(s) SubCutaneous every 8 hours  latanoprost 0.005% Ophthalmic Solution 1 Drop(s) Both EYES at bedtime  pantoprazole    Tablet 40 milliGRAM(s) Oral before breakfast  pilocarpine 1% Solution 1 Drop(s) Left EYE three times a day  tamsulosin 0.4 milliGRAM(s) Oral at bedtime  ursodiol Capsule 300 milliGRAM(s) Oral two times a day    MEDICATIONS  (PRN):  acetaminophen   Tablet .. 650 milliGRAM(s) Oral every 8 hours PRN Temp greater or equal to 38C (100.4F)      FAMILY HISTORY:  No pertinent family history in first degree relatives      SOCIAL HISTORY: Lives with his wife in the [facility]. His wife suffers from dementia.  He is the caretaker.    CIGARETTES:  no    ALCOHOL:  no    REVIEW OF SYSTEMS:    CONSTITUTIONAL: No fever, weight loss, chills, shakes  +weakness and fatigue x 1-2 weeks  EYES: No eye pain but decreased visual acuity  ENMT:  Hard of hearing tinnitus, vertigo; No sinus or throat pain  NECK: No pain or stiffness  BREASTS: No pain, masses, or nipple discharge  RESPIRATORY: + cough and shortness of breath  CARDIOVASCULAR: No chest pain, dyspnea, palpitations, dizziness, syncope, paroxysmal nocturnal dyspnea, orthopnea, or arm or leg swelling  GASTROINTESTINAL: No abdominal  or epigastric pain, nausea, vomiting, hematemesis, diarrhea, constipation, melena or bright red blood.  GENITOURINARY: No dysuria, nocturia, hematuria, or urinary incontinence  NEUROLOGICAL: No headaches, slurred speech, limb weakness, loss of strength, numbness, or tremors  Increasing memory loss  SKIN: No itching, burning, rashes, or lesions   LYMPH NODES: No enlarged glands  ENDOCRINE: No heat or cold intolerance, or hair loss  MUSCULOSKELETAL: No joint pain or swelling, muscle, back, or extremity pain  PSYCHIATRIC: No depression, anxiety, or difficulty sleeping  HEME/LYMPH: No easy bruising or bleeding gums  ALLERGY AND IMMUNOLOGIC: No hives or rash.      Vital Signs Last 24 Hrs  T(C): 33.5 (2020 08:51), Max: 34.6 (2020 07:18)  T(F): 92.3 (2020 08:51), Max: 94.2 (2020 07:18)  HR: 60 (2020 08:51) (60 - 73)  BP: 152/68 (2020 08:51) (131/63 - 176/76)  BP(mean): --  RR: 18 (2020 08:51) (15 - 25)  SpO2: 94% (2020 08:51) (91% - 98%)    PHYSICAL EXAM:    GENERAL: In no apparent distress, well nourished, and hydrated.   HEAD:  Atraumatic, Normocephalic  EYES: EOMI, PERRLA, conjunctiva and sclera clear  ENMT: No tonsillar erythema, exudates, or enlargement; Drymucous membranes, Good dentition, No lesions  NECK: Supple and normal thyroid.  No JVD or carotid bruit.  Carotid pulse is 2+ bilaterally.  HEART: Irregular rate and rhythm; No murmurs, rubs, or gallops.  PULMONARY: Bilateral inspiratory wheezing.   ABDOMEN: Soft, obese, nontender.  Bowel sounds present  EXTREMITIES:  2+ Peripheral Pulses, No clubbing, cyanosis.  + edema  LYMPH: No lymphadenopathy noted  NEUROLOGICAL: Grossly nonfocal          INTERPRETATION OF TELEMETRY:  Atrial fibrillation with ventricular rates 30-70's. Pauses < 50 seconds.     ECG: Atrial fibrillation 76 bpm      LABS:                        10.2   6.94  )-----------( 373      ( 2020 04:00 )             29.4         139  |  105  |  42<H>  ----------------------------<  159<H>  3.5   |  20<L>  |  1.47<H>    Ca    9.0      2020 04:00  Phos  3.0         TPro  7.5  /  Alb  3.3  /  TBili  0.6  /  DBili  x   /  AST  45<H>  /  ALT  37  /  AlkPhos  65            Urinalysis Basic - ( 2020 04:30 )    Color: YELLOW / Appearance: CLEAR / S.016 / pH: 5.5  Gluc: NEGATIVE / Ketone: NEGATIVE  / Bili: NEGATIVE / Urobili: NORMAL   Blood: TRACE / Protein: 50 / Nitrite: POSITIVE   Leuk Esterase: TRACE / RBC: 0-2 / WBC 6-10   Sq Epi: OCC / Non Sq Epi: x / Bacteria: MANY      BNPSerum Pro-Brain Natriuretic Peptide: 9222 pg/mL ( @ 04:00)    Daily Height in cm: 167.64 (2020 08:51)    Daily Weight in k.3 (2020 08:51)    RADIOLOGY & ADDITIONAL STUDIES:  PREVIOUS DIAGNOSTIC TESTING:      < from: Xray Chest 1 View- PORTABLE-Urgent (20 @ 04:47) >  EXAM:  XR CHEST PORTABLE URGENT 1V        PROCEDURE DATE:  2020    INTERPRETATION:  CLINICAL INFORMATION: Shortness of breath.    EXAM: AP portable radiograph of the chest.    COMPARISON: No similar prior studies available for comparison.    FINDINGS:  Patchy/hazy bilateral opacities.  No pleural effusion or pneumothorax.  Cardiac size is not accurately evaluated in this projection.  Intact visualized osseous structures.    IMPRESSION:  Patchy/hazy bilateral opacities consistent with multifocal covid 19 pneumonia.    ES CAVANAUGH M.D., RADIOLOGY RESIDENT  This document has been electronically signed.  IAN MENDENHALL M.D., ATTENDING RADIOLOGIST  This document has been electronically signed. 2020  7:38AM            ECHO  FINDINGS:   NA    STRESS  FINDINGS:   NA

## 2020-04-22 NOTE — H&P ADULT - NSHPLABSRESULTS_GEN_ALL_CORE
CT BRAIN    PROCEDURE DATE:  Apr 22 2020   INTERPRETATION:  CLINICAL INFORMATION: Acute onset memory loss  TECHNIQUE:  Axial CT images were acquired through the head.  Intravenous contrast: None  Two-dimensional reformats were generated.  COMPARISON STUDY: None  FINDINGS:   There is no CT evidence of acute intracranial hemorrhage,  mass effect, midline shift or acute territorial infarct.   There is mild cerebral volume loss. There is mild white matter hypoattenuation which is nonspecific in etiology but likely related to chronic microvascular ischemic disease.  The basal cisterns are patent.    Retention cyst or polyp is noted in the left sphenoid sinus.  The mastoid air cells and middle ear cavities are clear.  The calvarium, skull base, and extracranial soft tissues are unremarkable.  IMPRESSION:   No CT evidence of acute intracranial hemorrhage,  mass effect, midline shift or acute territorial infarct.    CXR - b/l extensive patchy opacities c/w multifocal pneumonia seen in COVID-19 infection - my reading EKG, afib, 62bpm, qtc 505, RBBB, no acute Tw or ST changes - my reading     CT BRAIN    PROCEDURE DATE:  Apr 22 2020   INTERPRETATION:  CLINICAL INFORMATION: Acute onset memory loss  TECHNIQUE:  Axial CT images were acquired through the head.  Intravenous contrast: None  Two-dimensional reformats were generated.  COMPARISON STUDY: None  FINDINGS:   There is no CT evidence of acute intracranial hemorrhage,  mass effect, midline shift or acute territorial infarct.   There is mild cerebral volume loss. There is mild white matter hypoattenuation which is nonspecific in etiology but likely related to chronic microvascular ischemic disease.  The basal cisterns are patent.    Retention cyst or polyp is noted in the left sphenoid sinus.  The mastoid air cells and middle ear cavities are clear.  The calvarium, skull base, and extracranial soft tissues are unremarkable.  IMPRESSION:   No CT evidence of acute intracranial hemorrhage,  mass effect, midline shift or acute territorial infarct.    CXR - b/l extensive patchy opacities c/w multifocal pneumonia seen in COVID-19 infection - my reading

## 2020-04-22 NOTE — ED PROVIDER NOTE - ATTENDING CONTRIBUTION TO CARE
MD Panchal:  I performed a face to face bedside interview with patient regarding history of present illness, review of symptoms and past medical history. I completed an independent physical exam(documented below).  I have discussed patient's plan of care with resident.   I agree with note as stated above, having amended the EMR as needed to reflect my findings. I have discussed the assessment and plan of care.  This includes during the time I functioned as the attending physician for this patient.  PE:  Gen: Alert, NAD  Head: NC, AT,  EOMI, normal lids/conjunctiva  ENT:  normal hearing, patent oropharynx without erythema/exudate  Neck: +supple, no tenderness/meningismus/JVD, +Trachea midline  Chest: no chest wall tenderness, equal chest rise  Pulm: Bilateral BS, normal resp effort, no wheeze/stridor/retractions  CV: intermittently bradycardic, no M/R/G, +dist pulses  Abd: +BS, soft, NT/ND  Rectal: deferred  Mskel: no edema/erythema/cyanosis  Skin: no rash  Neuro: AAOx2, moves all extremities; +word finding difficulty   MDM:   89yo M w/ pmh of htn, bph, bibems from NH c/o short term memory loss and sob X couple of days. On ROS, reports fall 1wk ago, no LOC, not on blood thinners. Rectal temp is 93.5. Denies fever or chills. Denies cp, n/v. Resident spoke w/ pt's daughter, states pt is DNR/DNI but no MOLST form and no health proxy listed on NH paperwork. Will call nursing home to get more info; for now pt will remain fullcode (until further clarification). Labs, imaging, admit.

## 2020-04-22 NOTE — H&P ADULT - PROBLEM SELECTOR PLAN 8
1.  Name of PCP: Rocky Rahman [phone]  2.  PCP Contacted on Admission: [ ] Y    [ ] N    3.  PCP contacted at Discharge: [ ] Y    [ ] N    [ ] N/A  4.  Post-Discharge Appointment Date and Location:  5.  Summary of Handoff given to PCP: Heparin sq DVT ppx

## 2020-04-22 NOTE — H&P ADULT - MENTAL STATUS
AO to self, not lethargic, slightly confused to time and place, answers simple questions, follows only one-step simple commands

## 2020-04-22 NOTE — ED PROVIDER NOTE - OBJECTIVE STATEMENT
89M hx htn, bph, glaucoma, per EMS pt coming from [facility] (where he reportedly called 911 from) complaining of memory loss, shortness of breath, and urinary difficulties. On questioning pt confirms he's been short of breath today and that he "lost his memory" today, but denies pain or problems with urination. When asked if he's taken any recent falls, pt says "yes, a week ago" but denies trauma to his head. Pt also denies recent fever, chills, chest pain, abdominal pain, n/v/d. 89M hx myodysplasia, htn, bph, glaucoma, per EMS pt coming from [facility] (where he reportedly called 911 from) complaining of memory loss, shortness of breath, and urinary difficulties. On questioning pt confirms he's been short of breath today and that he "lost his memory" today, but denies pain or problems with urination. When asked if he's taken any recent falls, pt says "yes, a week ago" but denies trauma to his head. Pt also denies recent fever, chills, chest pain, abdominal pain, n/v/d.

## 2020-04-22 NOTE — H&P ADULT - PROBLEM SELECTOR PLAN 5
EKG c/w Afib, likely new, with episodes of bradycardia to 30s;   [id number] risk stratification score 3  TSH pending   Given Covid-19 (+) holding TTE  EP c/s as above  Atenolol held EKG c/w Afib, likely new, with episodes of bradycardia to 30s;   [id number] risk stratification score 3  TSH pending   Given Covid-19 (+) holding TTE pending EP evaluation   EP c/s as above  Atenolol held EKG c/w Afib, likely new, with episodes of bradycardia to 30s;   [id number] risk stratification score 3  TSH pending   Given Covid-19 (+) holding TTE pending EP evaluation   EP c/s as above  Atenolol held  Given MDS, anemia and ?dementia holding full a/c until GOC d/w the family

## 2020-04-22 NOTE — PROGRESS NOTE ADULT - PROBLEM SELECTOR PLAN 7
Unable to d/w GOC with the pt due to mild encephalopathy;   Unable to confirm DNR status with the pt daughter via phone as above per HPI  -Primary team to attempt to confirm GOC with the family in AM

## 2020-04-22 NOTE — CONSULT NOTE ADULT - ASSESSMENT
Patient is a 89y old male with history of  hx anemia, dementia, MDS, pre-DM, HTN, BPH, glaucoma, brought into the ED complaining of memory loss, shortness of breath, lower leg swelling. fatigue, weakness and urinary frequency. Found to be COVID 19+ with new onset  atrial fibrillation with slow ventricular response (30's)  with pauses.  The atrial fibrillation is a new diagnosis although he was recently noted to be bradycardic (40's) at the assisted living and he was told to stop the Atenolol but it is unclear whether he did. Atenolol on hold now. Patient also on timolol eye gtts for glaucoma. .  He has been asymptomatic. LVEF unknown. No history of syncope. It is likely that improvement of his respiratory status as well as holding AV dulce blocker (Atenolol and timolol eye gtts) will improve his overall heart rate. If significant bradycardia remains atropine can be used.  He may need a cardioversion and or  pacemaker in the future but not until he becomes COVID negative.   Plan: Atropine at the bedside           Zoll pads on           TSH with next blood draw           Continue telemetry           Avoid AV nodals -> hold Atenolol and timolol.            Will follow with you.

## 2020-04-22 NOTE — ED PROVIDER NOTE - CLINICAL SUMMARY MEDICAL DECISION MAKING FREE TEXT BOX
89M BIBEMS from [facility] after reportedly calling EMS for 'memory loss, shortness of breath, and problems with urination'. VSWNL. Pt AOx3 on arrival but demonstrates significant word-finding difficulty - unclear if baseline. No focal neuro deficits. Lungs sound clear, abdomen nondistended and nontender. 2+ pitting edema to b/l LEs. Will obtain CT head to evaluate for acute intracranial cause of reported memory problems, urine, labs, pro-bnp, blood gas, tba. Will also swab for covid-19.

## 2020-04-22 NOTE — ED ADULT NURSE NOTE - OBJECTIVE STATEMENT
Pt. presents to room 4, a&ox4, brought in by EMS from [facility]. Pt. states he called 911 because he felt short of breath and because he states he is experiencing memory loss. Pt. also c/o urinary incontinence and states he has difficulty urinating. Skin appears intact. Pt. denies any fever, chills, nausea, vomiting, HA, or chest pain. VS as noted. MD notified. 20 G IV placed to right arm. Labs drawn and sent. PHx of HTN, BPH, and glaucoma. Awaiting further orders. Will continue to monitor.

## 2020-04-22 NOTE — ED PROVIDER NOTE - CARE PLAN
Principal Discharge DX:	Respiratory infection Principal Discharge DX:	Respiratory infection  Secondary Diagnosis:	AMS (altered mental status)

## 2020-04-22 NOTE — H&P ADULT - PROBLEM SELECTOR PLAN 3
Telemetry - episode of bradycardia to 30s; Reports no CP or palpitations   Hold Atenolol   EP c/s in AM  TSH added on   Trop added on  f/u CKMB/CK and #2 hsTrop Telemetry - episode of bradycardia to 30s (strips in the chart); Reports no CP or palpitations   Hold Atenolol and other dulce blockers   EP c/s in AM  TSH added on   Trop added on  f/u CKMB/CK and #2 hsTrop  Zoll Pads applied  Atropine at bedside

## 2020-04-22 NOTE — H&P ADULT - NSICDXPASTMEDICALHX_GEN_ALL_CORE_FT
PAST MEDICAL HISTORY:  Anemia     BPH (benign prostatic hyperplasia)     Glaucoma     MDS (myelodysplastic syndrome)

## 2020-04-22 NOTE — H&P ADULT - PROBLEM SELECTOR PLAN 7
Heparin sq DVT ppx Unable to d/w GOC with the pt due to mild encephalopathy;   Unable to confirm DNR status with the pt daughter via phone as above per HPI  -Primary team to attempt to confirm GOC with the family in AM

## 2020-04-22 NOTE — H&P ADULT - PROBLEM/PLAN-8
DISPLAY PLAN FREE TEXT Consent 1/Introductory Paragraph: The rationale for Mohs was explained to the patient and consent was obtained. The risks, benefits and alternatives to therapy were discussed in detail. Specifically, the risks of infection, scarring, bleeding, prolonged wound healing, incomplete removal, allergy to anesthesia, nerve injury and recurrence were addressed. Prior to the procedure, the treatment site was clearly identified and confirmed by the patient. All components of Universal Protocol/PAUSE Rule completed.

## 2020-04-22 NOTE — H&P ADULT - HISTORY OF PRESENT ILLNESS
89yo Male hx htn, bph, glaucoma, per EMS pt coming from [facility] (where he reportedly called 911 from) complaining of memory loss, shortness of breath, and urinary difficulties. On questioning pt confirms he's been short of breath today and that he "lost his memory" today, but denies pain or problems with urination. When asked if he's taken any recent falls, pt says "yes, a week ago" but denies trauma to his head. Pt also denies recent fever, chills, chest pain, abdominal pain, n/v/d 89yo Male hx anemia, MDS, pre-DM, htn, bph, glaucoma, per EMS pt coming from [facility] (where he reportedly called 911 from) complaining of memory loss, shortness of breath, and urinary difficulties. On questioning pt confirms he's been short of breath today and that he "lost his memory" today, but denies pain or problems with urination. When asked if he's taken any recent falls, pt says "yes, a week ago" but denies trauma to his head. Pt also denies recent fever, chills, chest pain, abdominal pain, n/v/d 89yo Male hx anemia, dementia, MDS, pre-DM, HTN, BPH, glaucoma, per EMS [facility] 911 from[facility] complaining of memory loss, shortness of breath, and urinary frequency. Reports no CP, chills, sore throat, Abd pain, diarrhea, n/v; Pt is slightly confused, does not know where he is. Does not voice other complaints.   Of note, per ED note, the daughter and the patient are in agreement, and have previously discussed, that pt was code status was DNR/DNI, however ED staff did not confirm whether the daughter is HCP or if there are other children. The pt is , however the spouse reportedly is demented. Second attempted to call/clarify the DNR/DNI stauts by ED staff in AM was unsuccessful.

## 2020-04-22 NOTE — H&P ADULT - PROBLEM SELECTOR PLAN 2
1.  Name of PCP: Rocky Rahman [phone]  2.  PCP Contacted on Admission: [ ] Y    [ ] N    3.  PCP contacted at Discharge: [ ] Y    [ ] N    [ ] N/A  4.  Post-Discharge Appointment Date and Location:  5.  Summary of Handoff given to PCP: UA (+) Nitrite; Reports increased urgency   Started Ceftriaxone IV  UCx ordered UA (+) Nitrite; Reports increased urgency; Bandemia 8%  Started Ceftriaxone IV  UCx ordered

## 2020-04-22 NOTE — H&P ADULT - PROBLEM SELECTOR PLAN 9
1.  Name of PCP: Rocky Rahman [phone]  2.  PCP Contacted on Admission: [ ] Y    [ ] N    3.  PCP contacted at Discharge: [ ] Y    [ ] N    [ ] N/A  4.  Post-Discharge Appointment Date and Location:  5.  Summary of Handoff given to PCP:

## 2020-04-22 NOTE — H&P ADULT - ASSESSMENT
89yo Male hx anemia, dementia, MDS, pre-DM, HTN, BPH, glaucoma a/w UTI 89yo Male hx anemia, dementia, MDS, pre-DM, HTN, BPH, glaucoma a/w UTI, A-fib with bradycardia to 30s, multifocal viral PNA due to Covid-19 infection; 89yo Male hx anemia, dementia, MDS, pre-DM, HTN, BPH, glaucoma a/w UTI, A-fib with bradycardia to 30s, multifocal viral PNA due to Covid-19 infection; Bandemia 8%

## 2020-04-22 NOTE — ED ADULT NURSE REASSESSMENT NOTE - NS ED NURSE REASSESS COMMENT FT1
Admitted MD made aware of pt.'s rectal temp. MD instructed RN to use heating packs and warm blankets. Will continue to monitor.

## 2020-04-22 NOTE — ED ADULT NURSE REASSESSMENT NOTE - NS ED NURSE REASSESS COMMENT FT1
Attempt to call report. Pt. report faxed to floor. Floor notified. Pt. awaiting transport. Admitting MD notified and stated that patient can be transported without a monitor.

## 2020-04-22 NOTE — H&P ADULT - PROBLEM SELECTOR PLAN 1
CXR - b/l extensive patchy opacities c/w multifocal pneumonia - my reading  Covid-19 PCR (+)  EKG: QTc 505 CXR - b/l extensive patchy opacities c/w multifocal pneumonia - my reading  95% on RA  Covid-19 PCR (+)  EKG: QTc 505  Supportive care  ID c/s in AM  Supp O2 PRN via NC if desaturates  ProBNP pending CXR - b/l extensive patchy opacities c/w multifocal pneumonia - my reading  95% on RA  Covid-19 PCR (+)  EKG: QTc 505  Supportive care  ID c/s in AM  f/u inflammation labs   Supp O2 PRN via NC if desaturates  ProBNP pending

## 2020-04-22 NOTE — H&P ADULT - PROBLEM SELECTOR PLAN 6
Likely CKD III  f/u bladder scan Likely CKD III  f/u bladder scan  avoid nephrotoxins   monitor renal function

## 2020-04-22 NOTE — ED PROVIDER NOTE - PROGRESS NOTE DETAILS
Toan Villa DO, PGY-3: (saleem boo - [phone], [phone]). Since Friday has been feeling weak and achey. Pt previously recently had cystitis, took course of abx which caused pt to have diarrhea. Pt also told daughter over phone that he's been having trouble finding words lately.   goals of care - per saleem, both she and her father (the patient) are in agreement, and have previously discussed, that pt is DNR/DNI.     [place] - PMD  [place] - heme/onc Toan Villa DO, PGY-3: (saleem boo - [phone], [phone]). Since Friday has been feeling weak and achey. Pt previously recently had cystitis, took course of abx which caused pt to have diarrhea. Pt also told daughter over phone that he's been having trouble finding words lately.   [place] - ARTI Drummond - alicia/onc Toan Villa DO, PGY-3: Spoke to daughter (saleem) again to clarify - she is one of two children of the pt and that both she and her sister are jointly health care proxies. explained that inpt team will likely reach out later today to clarify pt's advanced directives.

## 2020-04-22 NOTE — ED ADULT TRIAGE NOTE - CHIEF COMPLAINT QUOTE
Pt arrives from [facility]. As per EMS reports pt called ambulance himself c/o "sob, memory loss and increase in urination". Hx BPH, HTN, Glaucoma. Unable to obtain temp in triage. No distress noted. Pt arrives from [facility]. As per EMS reports pt called ambulance himself c/o "sob, memory loss and increase in urination". Hx BPH, HTN, Glaucoma. Unable to obtain temp in triage. No distress noted. Noted pt arrives with x1 shoe.

## 2020-04-22 NOTE — PROGRESS NOTE ADULT - ATTENDING COMMENTS
Hr 20- 60- Ep eval req  T 92- warming blanket- f/u blood and urine cult- c/w Ceftriaxone for UTI ?  NEED GOC !!!!!- d/w NP Hr 20- 60- Ep eval req  T 92- warming blanket- f/u blood and urine cult- c/w Ceftriaxone for UTI ?  NEED GOC !!!!!- d/w NP  Called Wild -daughter She was updated- after taking to sister- Patient is DNR  Called Sister Val Terry at [phone] , [phone] - daughter is an RN and concurs with Wild that patient is to be DNR.- MOLST form to complete  Of note cannot w/o walker Hr 20- 60- Ep eval req  T 92- warming blanket- f/u blood and urine cult- c/w Ceftriaxone for UTI ?  NEED GOC !!!!!- d/w NP    Addendum  Called Wild -daughter She was updated- after taking to sister- Patient is DNR  Called Sister Val Terry at [phone] , [phone] - daughter is an RN and concurs with Wild that patient is to be DNR.- MOLST form to complete  Of note cannot w/o walker as per daughter  35 min to coordinate advance care

## 2020-04-23 LAB
ALBUMIN SERPL ELPH-MCNC: 2.9 G/DL — LOW (ref 3.3–5)
ALP SERPL-CCNC: 68 U/L — SIGNIFICANT CHANGE UP (ref 40–120)
ALT FLD-CCNC: 50 U/L — HIGH (ref 4–41)
ANION GAP SERPL CALC-SCNC: 14 MMO/L — SIGNIFICANT CHANGE UP (ref 7–14)
AST SERPL-CCNC: 65 U/L — HIGH (ref 4–40)
BASOPHILS # BLD AUTO: 0.04 K/UL — SIGNIFICANT CHANGE UP (ref 0–0.2)
BASOPHILS NFR BLD AUTO: 0.4 % — SIGNIFICANT CHANGE UP (ref 0–2)
BILIRUB SERPL-MCNC: 0.5 MG/DL — SIGNIFICANT CHANGE UP (ref 0.2–1.2)
BUN SERPL-MCNC: 37 MG/DL — HIGH (ref 7–23)
CALCIUM SERPL-MCNC: 8.9 MG/DL — SIGNIFICANT CHANGE UP (ref 8.4–10.5)
CHLORIDE SERPL-SCNC: 108 MMOL/L — HIGH (ref 98–107)
CO2 SERPL-SCNC: 18 MMOL/L — LOW (ref 22–31)
CREAT SERPL-MCNC: 1.35 MG/DL — HIGH (ref 0.5–1.3)
CRP SERPL-MCNC: 30.2 MG/L — HIGH
EOSINOPHIL # BLD AUTO: 0.01 K/UL — SIGNIFICANT CHANGE UP (ref 0–0.5)
EOSINOPHIL NFR BLD AUTO: 0.1 % — SIGNIFICANT CHANGE UP (ref 0–6)
FERRITIN SERPL-MCNC: 698.9 NG/ML — HIGH (ref 30–400)
GLUCOSE SERPL-MCNC: 159 MG/DL — HIGH (ref 70–99)
HCT VFR BLD CALC: 30.1 % — LOW (ref 39–50)
HGB BLD-MCNC: 10.1 G/DL — LOW (ref 13–17)
IMM GRANULOCYTES NFR BLD AUTO: 6.3 % — HIGH (ref 0–1.5)
LDH SERPL L TO P-CCNC: 283 U/L — HIGH (ref 135–225)
LYMPHOCYTES # BLD AUTO: 1.15 K/UL — SIGNIFICANT CHANGE UP (ref 1–3.3)
LYMPHOCYTES # BLD AUTO: 12.8 % — LOW (ref 13–44)
MAGNESIUM SERPL-MCNC: 1.9 MG/DL — SIGNIFICANT CHANGE UP (ref 1.6–2.6)
MCHC RBC-ENTMCNC: 32.1 PG — SIGNIFICANT CHANGE UP (ref 27–34)
MCHC RBC-ENTMCNC: 33.6 % — SIGNIFICANT CHANGE UP (ref 32–36)
MCV RBC AUTO: 95.6 FL — SIGNIFICANT CHANGE UP (ref 80–100)
MONOCYTES # BLD AUTO: 0.61 K/UL — SIGNIFICANT CHANGE UP (ref 0–0.9)
MONOCYTES NFR BLD AUTO: 6.8 % — SIGNIFICANT CHANGE UP (ref 2–14)
NEUTROPHILS # BLD AUTO: 6.58 K/UL — SIGNIFICANT CHANGE UP (ref 1.8–7.4)
NEUTROPHILS NFR BLD AUTO: 73.6 % — SIGNIFICANT CHANGE UP (ref 43–77)
NRBC # FLD: 0.05 K/UL — SIGNIFICANT CHANGE UP (ref 0–0)
PHOSPHATE SERPL-MCNC: 2.8 MG/DL — SIGNIFICANT CHANGE UP (ref 2.5–4.5)
PLATELET # BLD AUTO: 396 K/UL — SIGNIFICANT CHANGE UP (ref 150–400)
PMV BLD: 11.2 FL — SIGNIFICANT CHANGE UP (ref 7–13)
POTASSIUM SERPL-MCNC: 3.4 MMOL/L — LOW (ref 3.5–5.3)
POTASSIUM SERPL-SCNC: 3.4 MMOL/L — LOW (ref 3.5–5.3)
PROCALCITONIN SERPL-MCNC: 0.1 NG/ML — SIGNIFICANT CHANGE UP (ref 0.02–0.1)
PROT SERPL-MCNC: 7.1 G/DL — SIGNIFICANT CHANGE UP (ref 6–8.3)
RBC # BLD: 3.15 M/UL — LOW (ref 4.2–5.8)
RBC # FLD: 19.4 % — HIGH (ref 10.3–14.5)
SODIUM SERPL-SCNC: 140 MMOL/L — SIGNIFICANT CHANGE UP (ref 135–145)
TSH SERPL-MCNC: 0.42 UIU/ML — SIGNIFICANT CHANGE UP (ref 0.27–4.2)
WBC # BLD: 8.95 K/UL — SIGNIFICANT CHANGE UP (ref 3.8–10.5)
WBC # FLD AUTO: 8.95 K/UL — SIGNIFICANT CHANGE UP (ref 3.8–10.5)

## 2020-04-23 PROCEDURE — 93306 TTE W/DOPPLER COMPLETE: CPT | Mod: 26,CS

## 2020-04-23 PROCEDURE — 99233 SBSQ HOSP IP/OBS HIGH 50: CPT

## 2020-04-23 RX ORDER — POTASSIUM CHLORIDE 20 MEQ
40 PACKET (EA) ORAL ONCE
Refills: 0 | Status: COMPLETED | OUTPATIENT
Start: 2020-04-23 | End: 2020-04-23

## 2020-04-23 RX ORDER — APIXABAN 2.5 MG/1
5 TABLET, FILM COATED ORAL
Refills: 0 | Status: DISCONTINUED | OUTPATIENT
Start: 2020-04-23 | End: 2020-04-30

## 2020-04-23 RX ADMIN — URSODIOL 300 MILLIGRAM(S): 250 TABLET, FILM COATED ORAL at 19:22

## 2020-04-23 RX ADMIN — APIXABAN 5 MILLIGRAM(S): 2.5 TABLET, FILM COATED ORAL at 19:24

## 2020-04-23 RX ADMIN — AMLODIPINE BESYLATE 5 MILLIGRAM(S): 2.5 TABLET ORAL at 04:32

## 2020-04-23 RX ADMIN — Medication 1 DROP(S): at 04:37

## 2020-04-23 RX ADMIN — Medication 1 DROP(S): at 20:20

## 2020-04-23 RX ADMIN — TAMSULOSIN HYDROCHLORIDE 0.4 MILLIGRAM(S): 0.4 CAPSULE ORAL at 20:20

## 2020-04-23 RX ADMIN — CEFTRIAXONE 100 MILLIGRAM(S): 500 INJECTION, POWDER, FOR SOLUTION INTRAMUSCULAR; INTRAVENOUS at 04:33

## 2020-04-23 RX ADMIN — PANTOPRAZOLE SODIUM 40 MILLIGRAM(S): 20 TABLET, DELAYED RELEASE ORAL at 04:33

## 2020-04-23 RX ADMIN — Medication 1 DROP(S): at 13:22

## 2020-04-23 RX ADMIN — FINASTERIDE 5 MILLIGRAM(S): 5 TABLET, FILM COATED ORAL at 13:21

## 2020-04-23 RX ADMIN — URSODIOL 300 MILLIGRAM(S): 250 TABLET, FILM COATED ORAL at 04:33

## 2020-04-23 RX ADMIN — LATANOPROST 1 DROP(S): 0.05 SOLUTION/ DROPS OPHTHALMIC; TOPICAL at 20:21

## 2020-04-23 RX ADMIN — HEPARIN SODIUM 5000 UNIT(S): 5000 INJECTION INTRAVENOUS; SUBCUTANEOUS at 04:32

## 2020-04-23 RX ADMIN — Medication 40 MILLIEQUIVALENT(S): at 13:33

## 2020-04-23 RX ADMIN — APIXABAN 5 MILLIGRAM(S): 2.5 TABLET, FILM COATED ORAL at 13:33

## 2020-04-23 NOTE — PROVIDER CONTACT NOTE (OTHER) - ACTION/TREATMENT ORDERED:
Provider stated there was an order for a hypothermic blanket. RN ordered another hypothermic blanket on tele tracking, while waiting fo hypothermic blanket, RN gave pt heated blanket with hot packs.

## 2020-04-23 NOTE — PROGRESS NOTE ADULT - ATTENDING COMMENTS
89y old male with history of  hx anemia, dementia, MDS, pre-DM, HTN, BPH, glaucoma, brought into the ED complaining of memory loss, shortness of breath, lower leg swelling. fatigue, weakness and urinary frequency. Found to be COVID 19+ with new onset  atrial fibrillation with slow ventricular response (30's)  with pauses. Keep atropine at bedside. Longest pause upto 3s. Asymptomatic. Will follow.

## 2020-04-24 ENCOUNTER — TRANSCRIPTION ENCOUNTER (OUTPATIENT)
Age: 85
End: 2020-04-24

## 2020-04-24 LAB
-  AMIKACIN: SIGNIFICANT CHANGE UP
-  AMPICILLIN/SULBACTAM: SIGNIFICANT CHANGE UP
-  AMPICILLIN: SIGNIFICANT CHANGE UP
-  AZTREONAM: SIGNIFICANT CHANGE UP
-  CEFAZOLIN: SIGNIFICANT CHANGE UP
-  CEFEPIME: SIGNIFICANT CHANGE UP
-  CEFOXITIN: SIGNIFICANT CHANGE UP
-  CEFTRIAXONE: SIGNIFICANT CHANGE UP
-  CIPROFLOXACIN: SIGNIFICANT CHANGE UP
-  GENTAMICIN: SIGNIFICANT CHANGE UP
-  IMIPENEM: SIGNIFICANT CHANGE UP
-  LEVOFLOXACIN: SIGNIFICANT CHANGE UP
-  MEROPENEM: SIGNIFICANT CHANGE UP
-  NITROFURANTOIN: SIGNIFICANT CHANGE UP
-  PIPERACILLIN/TAZOBACTAM: SIGNIFICANT CHANGE UP
-  TIGECYCLINE: SIGNIFICANT CHANGE UP
-  TOBRAMYCIN: SIGNIFICANT CHANGE UP
-  TRIMETHOPRIM/SULFAMETHOXAZOLE: SIGNIFICANT CHANGE UP
ANION GAP SERPL CALC-SCNC: 13 MMO/L — SIGNIFICANT CHANGE UP (ref 7–14)
BASOPHILS # BLD AUTO: 0.05 K/UL — SIGNIFICANT CHANGE UP (ref 0–0.2)
BASOPHILS NFR BLD AUTO: 0.5 % — SIGNIFICANT CHANGE UP (ref 0–2)
BUN SERPL-MCNC: 40 MG/DL — HIGH (ref 7–23)
CALCIUM SERPL-MCNC: 8.7 MG/DL — SIGNIFICANT CHANGE UP (ref 8.4–10.5)
CHLORIDE SERPL-SCNC: 113 MMOL/L — HIGH (ref 98–107)
CO2 SERPL-SCNC: 20 MMOL/L — LOW (ref 22–31)
CREAT SERPL-MCNC: 1.54 MG/DL — HIGH (ref 0.5–1.3)
CULTURE RESULTS: SIGNIFICANT CHANGE UP
EOSINOPHIL # BLD AUTO: 0.02 K/UL — SIGNIFICANT CHANGE UP (ref 0–0.5)
EOSINOPHIL NFR BLD AUTO: 0.2 % — SIGNIFICANT CHANGE UP (ref 0–6)
GLUCOSE SERPL-MCNC: 147 MG/DL — HIGH (ref 70–99)
HCT VFR BLD CALC: 29.5 % — LOW (ref 39–50)
HGB BLD-MCNC: 9.8 G/DL — LOW (ref 13–17)
IMM GRANULOCYTES NFR BLD AUTO: 5.8 % — HIGH (ref 0–1.5)
LYMPHOCYTES # BLD AUTO: 0.86 K/UL — LOW (ref 1–3.3)
LYMPHOCYTES # BLD AUTO: 7.7 % — LOW (ref 13–44)
MAGNESIUM SERPL-MCNC: 1.8 MG/DL — SIGNIFICANT CHANGE UP (ref 1.6–2.6)
MANUAL SMEAR VERIFICATION: SIGNIFICANT CHANGE UP
MCHC RBC-ENTMCNC: 31.8 PG — SIGNIFICANT CHANGE UP (ref 27–34)
MCHC RBC-ENTMCNC: 33.2 % — SIGNIFICANT CHANGE UP (ref 32–36)
MCV RBC AUTO: 95.8 FL — SIGNIFICANT CHANGE UP (ref 80–100)
METHOD TYPE: SIGNIFICANT CHANGE UP
MONOCYTES # BLD AUTO: 0.85 K/UL — SIGNIFICANT CHANGE UP (ref 0–0.9)
MONOCYTES NFR BLD AUTO: 7.7 % — SIGNIFICANT CHANGE UP (ref 2–14)
NEUTROPHILS # BLD AUTO: 8.68 K/UL — HIGH (ref 1.8–7.4)
NEUTROPHILS NFR BLD AUTO: 78.1 % — HIGH (ref 43–77)
NRBC # FLD: 0.15 K/UL — SIGNIFICANT CHANGE UP (ref 0–0)
NRBC FLD-RTO: 1.4 — SIGNIFICANT CHANGE UP
ORGANISM # SPEC MICROSCOPIC CNT: SIGNIFICANT CHANGE UP
ORGANISM # SPEC MICROSCOPIC CNT: SIGNIFICANT CHANGE UP
PHOSPHATE SERPL-MCNC: 2.1 MG/DL — LOW (ref 2.5–4.5)
PLATELET # BLD AUTO: 413 K/UL — HIGH (ref 150–400)
PMV BLD: 11.8 FL — SIGNIFICANT CHANGE UP (ref 7–13)
POTASSIUM SERPL-MCNC: 3.6 MMOL/L — SIGNIFICANT CHANGE UP (ref 3.5–5.3)
POTASSIUM SERPL-SCNC: 3.6 MMOL/L — SIGNIFICANT CHANGE UP (ref 3.5–5.3)
RBC # BLD: 3.08 M/UL — LOW (ref 4.2–5.8)
RBC # FLD: 19.5 % — HIGH (ref 10.3–14.5)
SODIUM SERPL-SCNC: 146 MMOL/L — HIGH (ref 135–145)
SPECIMEN SOURCE: SIGNIFICANT CHANGE UP
WBC # BLD: 11.1 K/UL — HIGH (ref 3.8–10.5)
WBC # FLD AUTO: 11.1 K/UL — HIGH (ref 3.8–10.5)

## 2020-04-24 RX ADMIN — AMLODIPINE BESYLATE 5 MILLIGRAM(S): 2.5 TABLET ORAL at 04:14

## 2020-04-24 RX ADMIN — LATANOPROST 1 DROP(S): 0.05 SOLUTION/ DROPS OPHTHALMIC; TOPICAL at 20:41

## 2020-04-24 RX ADMIN — APIXABAN 5 MILLIGRAM(S): 2.5 TABLET, FILM COATED ORAL at 04:13

## 2020-04-24 RX ADMIN — Medication 1 DROP(S): at 04:14

## 2020-04-24 RX ADMIN — URSODIOL 300 MILLIGRAM(S): 250 TABLET, FILM COATED ORAL at 04:13

## 2020-04-24 RX ADMIN — FINASTERIDE 5 MILLIGRAM(S): 5 TABLET, FILM COATED ORAL at 13:01

## 2020-04-24 RX ADMIN — CEFTRIAXONE 100 MILLIGRAM(S): 500 INJECTION, POWDER, FOR SOLUTION INTRAMUSCULAR; INTRAVENOUS at 04:14

## 2020-04-24 RX ADMIN — TAMSULOSIN HYDROCHLORIDE 0.4 MILLIGRAM(S): 0.4 CAPSULE ORAL at 20:41

## 2020-04-24 RX ADMIN — URSODIOL 300 MILLIGRAM(S): 250 TABLET, FILM COATED ORAL at 17:11

## 2020-04-24 RX ADMIN — PANTOPRAZOLE SODIUM 40 MILLIGRAM(S): 20 TABLET, DELAYED RELEASE ORAL at 05:29

## 2020-04-24 RX ADMIN — Medication 1 DROP(S): at 20:41

## 2020-04-24 RX ADMIN — APIXABAN 5 MILLIGRAM(S): 2.5 TABLET, FILM COATED ORAL at 17:11

## 2020-04-24 RX ADMIN — Medication 1 DROP(S): at 13:01

## 2020-04-24 NOTE — DISCHARGE NOTE PROVIDER - NSDCCPCAREPLAN_GEN_ALL_CORE_FT
PRINCIPAL DISCHARGE DIAGNOSIS  Diagnosis: Respiratory infection  Assessment and Plan of Treatment: You have been diagnosed with the COVID-19 virus during your hospital stay. You must self quarantine to complete a 14 day time period.  Monitor for fevers, shortness of breath and cough primarily.  Monitor your temperature daily to not any changes and increases.    It has been determined that you no longer need hospitalization and can recover while remaining in self-quarantine at home. You should follow the prevention steps below until a healthcare provider or local or state health department says you can return to your normal activities.  1. You should restrict activities outside your home, except for getting medical care.  2. Do not go to work, school, or public areas.  3. Avoid using public transportation, ride-sharing, or taxis.  4. Separate yourself from other people and animals in your home.  5. Call ahead before visiting your doctor.  6. Wear a facemask.  7. Cover your coughs and sneezes.  8. Clean your hands often.  9. Avoid sharing personal household items.  10. Clean all “high-touch” surfaces everyday.  11. Monitor your symptoms.  If you have a medical emergency and need to call 911, notify the dispatch personnel that you have COVID-19 If possible, put on a facemask before emergency medical services arrive.  12. Stopping home isolation.  Patients with confirmed COVID-19 should remain under home isolation precautions for 14 days since the positive COVID-19 test and until the risk of secondary transmission to others is thought to be low. The decision to discontinue home isolation precautions should be made on a case-by-case basis, in consultation with healthcare providers and state and local health departments. Your [facility] Department of Health can be reached at [phone] for further information about COVID-19.      SECONDARY DISCHARGE DIAGNOSES  Diagnosis: AMS (altered mental status)  Assessment and Plan of Treatment:

## 2020-04-24 NOTE — DISCHARGE NOTE PROVIDER - HOSPITAL COURSE
89yo Male with a history of anemia, dementia, MDS, pre-diabetes, high blood pressure, BPH, glaucoma, per EMS [facility] from[facility] complaining of memory loss, shortness of breath, and urinary frequency. Reports no chest pain, chills, sore throat; no abdominal pain, diarrhea, n/v; Patient is slightly confused, does not know where he is. He does not voice other complaints at this time.         Problem/Plan - 1:    · Pneumonia due to 2019-nCoV.      - Plan: CXR - b/l extensive patchy opacities c/w multifocal pneumonia     	doing better. Saturation stable on Room air    	Covid-19 PCR (+), EKG: QTc 505, trended inflammatory labs.     	Supp O2 PRN via NC if desaturates: CXR 4/25 is unchanged.          Problem - 2:    ·  Problem: Acute cystitis without hematuria.      - Plan: UA (+) Nitrite; Reports increased urgency; Bandemia 8%    	Cont Ceftriaxone IV; total of 7 days antibiotic therapy, last day is 4/29    	UCx growing > 100K non-ESBL E coli.          Problem- 3:    ·  Problem: Bradycardia.      -  Plan: Improved after holding Atenolol and other dulce blockers     	TSH was normal, Zoll Pads applied, Atropine at bedside    	Appreciate EP, continue telemetry.          Problem- 4:    ·  Problem: Hypothermia, initial encounter.      -  Plan: TSH is normal, Hot packs as needed, Treat UTI     	Improved.          Problem- 5:    ·  Problem: Atrial fibrillation, unspecified type.      -  Plan: EKG c/w Afib, likely new, with episodes of bradycardia to 30s; AVNB on hold for now    	[id number] risk stratification score 3, TSH normal, TTE revealed nl LA and nl LV 			systolic fxn, Holding AVNB 2' pauses, Continue Eliquis.          Problem - 6:    Problem: CKD (chronic kidney disease), stage III. Plan: Likely CKD III    	avoid nephrotoxins     	monitor renal function.         Problem- 7:    ·  Problem: Goals of care, counseling/discussion.      -  Plan: Pt's HCP rescinded DNR/DNI on 4/28/20.          Problem/Plan - 8:    ·  Problem: Need for prophylactic measure.  Plan: Eliquis.          Problem/Plan - 9:    ·  Problem: Discharge planning issues.      -  Plan: 1.  Name of PCP: Rocky Rahman [phone].             Patient to be discharged to rehab when bed is available.  is aware.            The above plan has been discussed with attending. Agree with plan to discharge to rehab. 89yo Male with a history of anemia, dementia, MDS, pre-diabetes, high blood pressure, BPH, glaucoma, per EMS [facility] called 911 from) complaining of memory loss, shortness of breath, and urinary frequency.     Reports no chest pain, chills, sore throat; no abdominal pain, diarrhea, n/v; Patient is slightly confused, does not know where he is.     He does not voice other complaints at this time.         Problem/Plan - 1:    · Pneumonia due to 2019-nCoV.      - Plan: CXR - b/l extensive patchy opacities c/w multifocal pneumonia     	doing better. Saturation stable on Room air    	Covid-19 PCR (+), EKG: QTc 505, trended inflammatory labs.     	Supp O2 PRN via NC if desaturates: CXR 4/25 is unchanged.          Problem - 2:    ·  Problem: Acute cystitis without hematuria.      - Plan: UA (+) Nitrite; Reports increased urgency; Bandemia 8%    	Cont Ceftriaxone IV; total of 7 days antibiotic therapy, last day is 4/29    	UCx growing > 100K non-ESBL E coli.          Problem- 3:    ·  Problem: Bradycardia.      -  Plan: Improved after holding Atenolol and other dulce blockers     	TSH was normal, Zoll Pads applied, Atropine at bedside    	Appreciate EP, continue telemetry.          Problem- 4:    ·  Problem: Hypothermia, initial encounter.      -  Plan: TSH is normal, Hot packs as needed, Treat UTI     	Improved.          Problem- 5:    ·  Problem: Atrial fibrillation, unspecified type.      -  Plan: EKG c/w Afib, likely new, with episodes of bradycardia to 30s; AVNB on hold for now    	[id number] risk stratification score 3, TSH normal, TTE revealed nl LA and nl LV 			    	 systolic fxn, Holding AVNB 2' pauses, Continue Eliquis.          Problem - 6:    Problem: CKD (chronic kidney disease), stage III. Plan: Likely CKD III    	avoid nephrotoxins     	monitor renal function.         Problem- 7:    ·  Problem: Goals of care, counseling/discussion.      -  Plan: Pt's HCP rescinded DNR/DNI on 4/28/20.          Problem/Plan - 8:    ·  Problem: Need for prophylactic measure.  Plan: Eliquis.          Problem/Plan - 9:    ·  Problem: Discharge planning issues.      -  Plan: 1.  Name of PCP: Rocky Rahman [phone].             Patient to be discharged to rehab when bed is available.  is aware.            The above plan has been discussed with attending. Agree with plan to discharge to rehab. 89 yo Male with a history of anemia, dementia, MDS, pre-diabetes, high blood pressure, BPH, glaucoma, per EMS pt coming from     [facility] (where he reportedly called 911 from) complaining of memory loss, shortness of breath, and urinary frequency.     Reports no chest pain, chills, sore throat; no abdominal pain, diarrhea, n/v; Patient is slightly confused, does not know where he is.     He does not voice other complaints at this time.         Problem/Plan - 1:    · Pneumonia due to 2019-nCoV.    - Plan: CXR - b/l extensive patchy opacities c/w multifocal pneumonia     	doing better. Saturation stable on Room air    	Covid-19 PCR (+), EKG: QTc 505, trended inflammatory labs.     	Supp O2 PRN via NC if desaturates: CXR 4/25 is unchanged.          Problem - 2:    ·  Problem: Acute cystitis without hematuria.      - Plan: UA (+) Nitrite; Reports increased urgency; Bandemia 8%    	Cont Ceftriaxone IV; total of 7 days antibiotic therapy, last day is 4/29    	UCx growing > 100K non-ESBL E coli.          Problem- 3:    ·  Problem: Bradycardia.      -  Plan: Improved after holding Atenolol and other dulce blockers     	TSH was normal, Zoll Pads applied, Atropine at bedside    	Appreciate EP, continue telemetry.          Problem- 4:    ·  Problem: Hypothermia, initial encounter.      -  Plan: TSH is normal, Hot packs as needed, Treat UTI     	Improved.          Problem- 5:    ·  Problem: Atrial fibrillation, unspecified type.      -  Plan: EKG c/w Afib, likely new, with episodes of bradycardia to 30s; AVNB on hold for now    	[id number] risk stratification score 3, TSH normal, TTE revealed nl LA and nl LV 			    	 systolic fxn, Holding AVNB 2' pauses, Continue Eliquis.          Problem - 6:    Problem: CKD (chronic kidney disease), stage III. Plan: Likely CKD III    	avoid nephrotoxins     	monitor renal function.         Problem- 7:    ·  Problem: Goals of care, counseling/discussion.      -  Plan: Pt's HCP rescinded DNR/DNI on 4/28/20.          Problem/Plan - 8:    ·  Problem: Need for prophylactic measure.  Plan: Eliquis.          Problem/Plan - 9:    ·  Problem: Discharge planning issues.      -  Plan: 1.  Name of PCP: Rocky Rahman [phone].             Patient to be discharged to rehab when bed is available.  is aware.        The above plan has been discussed with attending. Agree with plan to discharge to rehab.        Attending Addendum:    Patient seen and examined by me on the discharge day. Medications reviewed.    doing well. remain stable on room air. no cp, no sob, no n/v/d. no abdominal pain.    no headache, no dizziness. Labs/vitals reviewed.    All questions answered in details. Follow up plan explained.    More than 30 mins were spent evaluating patient and coordinating care for discharge.    Discharge summary sent to pt's primary care physician at [facility].

## 2020-04-24 NOTE — DISCHARGE NOTE PROVIDER - CARE PROVIDER_API CALL
Shin Rawls)  [facility] Medicine; Internal Medicine  [address] [place] [address]  Phone: [phone]  Fax: [phone]  Follow Up Time: 1 week    [facility] doctor,   Phone: (   )    -  Fax: (   )    -  Follow Up Time: 1 week Shin Rawls)  [facility] Medicine; Internal Medicine  [id number] [place] [address]  Phone: [phone]  Fax: [phone]  Follow Up Time: 1 week    Rocky Lemus)  Family Medicine  [address] [place] [address]  Phone: [phone]  Fax: [phone]  Follow Up Time: 1 week

## 2020-04-24 NOTE — CHART NOTE - NSCHARTNOTEFT_GEN_A_CORE
telemetry reviewed and shows atrial fibrillation with VR 40s- 90s. Lowest VR 42 bpm. Patient had no associated symptoms. TSH normal  - c/w apixaban for anticoagulation  - Hold AV dulce blockers  - No pacing indications at this time  - Discussed with Dr. Turner  - Continue management as per primary team

## 2020-04-24 NOTE — DISCHARGE NOTE PROVIDER - NSDCMRMEDTOKEN_GEN_ALL_CORE_FT
Alphagan P 0.15% ophthalmic solution: 1-2 drop(s) in the right eye 2 times a day and 1 drop in the left eye once daily  amLODIPine 2.5 mg oral tablet: 1 tab(s) orally once a day  amLODIPine 5 mg oral tablet: 1 tab(s) orally once a day  Aranesp 300 mcg/mL injectable solution: 1 injection every 4 weeks as needed depending on hemoglobin level  atenolol 25 mg oral tablet: 1 tab(s) orally once a day  atenolol 50 mg oral tablet: 1 tab(s) orally once a day  Calcium 600+D oral tablet: 1 tab(s) orally once a day  CoQ10 oral capsule: 1 cap(s) orally once a day  Cosopt 2.23%-0.68% ophthalmic solution: 1 drop(s) to each affected eye 2 times a day  Cosopt PF 2%-0.5% ophthalmic solution: 1 drop(s) to each affected eye 2 times a day  Dulcolax Laxative 5 mg oral delayed release tablet: 2 tab(s) orally once a day, As Needed - for constipation  Fish Oil oral capsule: 2 cap(s) orally once a day  Flomax 0.4 mg oral capsule: 1 cap(s) orally once a day (at bedtime)  Flomax 0.4 mg oral capsule: 1 cap(s) orally once a day  Imodium 2 mg oral capsule: 1 cap(s) orally once a day, As Needed  Lasix 40 mg oral tablet: 1 tab(s) orally once a day  Linzess 145 mcg oral capsule: 2 cap(s) orally once a day (before a meal)  Linzess 145 mcg oral capsule: 1 cap(s) orally once a day  losartan 100 mg oral tablet: 1 tab(s) orally once a day  losartan 100 mg oral tablet: 1 tab(s) orally once a day  Lumigan 0.01% ophthalmic solution: 1 drop(s) to each affected eye once a day (in the evening) BOTH eyes  Lumigan 0.03% ophthalmic solution: 1 drop(s) in each eye once a day (in the evening)  MiraLax oral powder for reconstitution: 17 gram(s) orally once a day  Multi-Day Plus Minerals oral tablet: 1 tab(s) orally once a day  omeprazole 20 mg oral delayed release capsule: 1 cap(s) orally once a day  oxybutynin 15 mg/24 hr oral tablet, extended release: 1 tab(s) orally once a day  pantoprazole 40 mg oral delayed release tablet: 1 tab(s) orally once a day (before a meal)  pilocarpine 1% ophthalmic solution: 1 drop(s) to each affected eye 3 times a day LEFT eye  pilocarpine 4% ophthalmic solution: 1 drop(s) in the left eye 3 times a day  Probiotic Formula oral capsule: 1 cap(s) orally once a day  Proscar 5 mg oral tablet: 1 tab(s) orally once a day  Proscar 5 mg oral tablet: 1 tab(s) orally once a day  terazosin 10 mg oral tablet: 1 tab(s) orally once a day  ursodiol 300 mg oral capsule: 1 cap(s) orally 2 times a day  ursodiol 300 mg oral tablet: 1 tab(s) orally 2 times a day  Xanax 0.25 mg oral tablet: 1 tab(s) orally 2 times a day, As Needed  Xanax 0.25 mg oral tablet: 1 tab(s) orally 2 times a day, As Needed acetaminophen 325 mg oral tablet: 2 tab(s) orally every 8 hours, As needed, Temp greater or equal to 38C (100.4F)  amLODIPine 5 mg oral tablet: 1 tab(s) orally once a day  apixaban 5 mg oral tablet: 1 tab(s) orally 2 times a day  finasteride 5 mg oral tablet: 1 tab(s) orally once a day  latanoprost 0.005% ophthalmic solution: 1 drop(s) to each affected eye once a day (at bedtime)  pantoprazole 40 mg oral delayed release tablet: 1 tab(s) orally once a day (before a meal)  pilocarpine 1% ophthalmic solution: 1 drop(s) to each affected eye 3 times a day  tamsulosin 0.4 mg oral capsule: 1 cap(s) orally once a day (at bedtime)  ursodiol 300 mg oral capsule: 1 cap(s) orally 2 times a day acetaminophen 325 mg oral tablet: 2 tab(s) orally every 8 hours, As needed, Temp greater or equal to 38C (100.4F)  amLODIPine 5 mg oral tablet: 1 tab(s) orally once a day  Eliquis 2.5 mg oral tablet: 1 tab(s) orally 2 times a day  finasteride 5 mg oral tablet: 1 tab(s) orally once a day  latanoprost 0.005% ophthalmic solution: 1 drop(s) to each affected eye once a day (at bedtime)  pantoprazole 40 mg oral delayed release tablet: 1 tab(s) orally once a day (before a meal)  pilocarpine 1% ophthalmic solution: 1 drop(s) to each affected eye 3 times a day  tamsulosin 0.4 mg oral capsule: 1 cap(s) orally once a day (at bedtime)  ursodiol 300 mg oral capsule: 1 cap(s) orally 2 times a day

## 2020-04-24 NOTE — DISCHARGE NOTE PROVIDER - PROVIDER TOKENS
PROVIDER:[TOKEN:[[id number]],FOLLOWUP:[1 week]],FREE:[LAST:[[facility] doctor],PHONE:[(   )    -],FAX:[(   )    -],FOLLOWUP:[1 week]] PROVIDER:[TOKEN:[[id number]],FOLLOWUP:[1 week]],PROVIDER:[TOKEN:[[id number]],FOLLOWUP:[1 week]]

## 2020-04-25 LAB
ANION GAP SERPL CALC-SCNC: 13 MMO/L — SIGNIFICANT CHANGE UP (ref 7–14)
BUN SERPL-MCNC: 37 MG/DL — HIGH (ref 7–23)
CALCIUM SERPL-MCNC: 9.2 MG/DL — SIGNIFICANT CHANGE UP (ref 8.4–10.5)
CHLORIDE SERPL-SCNC: 114 MMOL/L — HIGH (ref 98–107)
CO2 SERPL-SCNC: 20 MMOL/L — LOW (ref 22–31)
CREAT SERPL-MCNC: 1.55 MG/DL — HIGH (ref 0.5–1.3)
CRP SERPL-MCNC: 38.5 MG/L — HIGH
D DIMER BLD IA.RAPID-MCNC: 827 NG/ML — SIGNIFICANT CHANGE UP
FERRITIN SERPL-MCNC: 596.6 NG/ML — HIGH (ref 30–400)
GLUCOSE SERPL-MCNC: 139 MG/DL — HIGH (ref 70–99)
HCT VFR BLD CALC: 30.9 % — LOW (ref 39–50)
HGB BLD-MCNC: 9.8 G/DL — LOW (ref 13–17)
LDH SERPL L TO P-CCNC: 242 U/L — HIGH (ref 135–225)
MAGNESIUM SERPL-MCNC: 2 MG/DL — SIGNIFICANT CHANGE UP (ref 1.6–2.6)
MCHC RBC-ENTMCNC: 31 PG — SIGNIFICANT CHANGE UP (ref 27–34)
MCHC RBC-ENTMCNC: 31.7 % — LOW (ref 32–36)
MCV RBC AUTO: 97.8 FL — SIGNIFICANT CHANGE UP (ref 80–100)
NRBC # FLD: 0.13 K/UL — SIGNIFICANT CHANGE UP (ref 0–0)
PHOSPHATE SERPL-MCNC: 2.3 MG/DL — LOW (ref 2.5–4.5)
PLATELET # BLD AUTO: 404 K/UL — HIGH (ref 150–400)
PMV BLD: 10.9 FL — SIGNIFICANT CHANGE UP (ref 7–13)
POTASSIUM SERPL-MCNC: 3.8 MMOL/L — SIGNIFICANT CHANGE UP (ref 3.5–5.3)
POTASSIUM SERPL-SCNC: 3.8 MMOL/L — SIGNIFICANT CHANGE UP (ref 3.5–5.3)
PROCALCITONIN SERPL-MCNC: 0.09 NG/ML — SIGNIFICANT CHANGE UP (ref 0.02–0.1)
RBC # BLD: 3.16 M/UL — LOW (ref 4.2–5.8)
RBC # FLD: 20 % — HIGH (ref 10.3–14.5)
SODIUM SERPL-SCNC: 147 MMOL/L — HIGH (ref 135–145)
WBC # BLD: 15.79 K/UL — HIGH (ref 3.8–10.5)
WBC # FLD AUTO: 15.79 K/UL — HIGH (ref 3.8–10.5)

## 2020-04-25 PROCEDURE — 71045 X-RAY EXAM CHEST 1 VIEW: CPT | Mod: 26,CS

## 2020-04-25 RX ORDER — POTASSIUM PHOSPHATE, MONOBASIC POTASSIUM PHOSPHATE, DIBASIC 236; 224 MG/ML; MG/ML
15 INJECTION, SOLUTION INTRAVENOUS ONCE
Refills: 0 | Status: COMPLETED | OUTPATIENT
Start: 2020-04-25 | End: 2020-04-25

## 2020-04-25 RX ORDER — SODIUM CHLORIDE 9 MG/ML
1000 INJECTION, SOLUTION INTRAVENOUS
Refills: 0 | Status: COMPLETED | OUTPATIENT
Start: 2020-04-25 | End: 2020-04-25

## 2020-04-25 RX ADMIN — SODIUM CHLORIDE 50 MILLILITER(S): 9 INJECTION, SOLUTION INTRAVENOUS at 10:28

## 2020-04-25 RX ADMIN — APIXABAN 5 MILLIGRAM(S): 2.5 TABLET, FILM COATED ORAL at 17:46

## 2020-04-25 RX ADMIN — APIXABAN 5 MILLIGRAM(S): 2.5 TABLET, FILM COATED ORAL at 04:29

## 2020-04-25 RX ADMIN — POTASSIUM PHOSPHATE, MONOBASIC POTASSIUM PHOSPHATE, DIBASIC 62.5 MILLIMOLE(S): 236; 224 INJECTION, SOLUTION INTRAVENOUS at 12:47

## 2020-04-25 RX ADMIN — CEFTRIAXONE 100 MILLIGRAM(S): 500 INJECTION, POWDER, FOR SOLUTION INTRAMUSCULAR; INTRAVENOUS at 04:28

## 2020-04-25 RX ADMIN — URSODIOL 300 MILLIGRAM(S): 250 TABLET, FILM COATED ORAL at 04:29

## 2020-04-25 RX ADMIN — Medication 1 DROP(S): at 21:04

## 2020-04-25 RX ADMIN — SODIUM CHLORIDE 50 MILLILITER(S): 9 INJECTION, SOLUTION INTRAVENOUS at 21:15

## 2020-04-25 RX ADMIN — LATANOPROST 1 DROP(S): 0.05 SOLUTION/ DROPS OPHTHALMIC; TOPICAL at 21:04

## 2020-04-25 RX ADMIN — AMLODIPINE BESYLATE 5 MILLIGRAM(S): 2.5 TABLET ORAL at 04:29

## 2020-04-25 RX ADMIN — TAMSULOSIN HYDROCHLORIDE 0.4 MILLIGRAM(S): 0.4 CAPSULE ORAL at 21:03

## 2020-04-25 RX ADMIN — FINASTERIDE 5 MILLIGRAM(S): 5 TABLET, FILM COATED ORAL at 12:48

## 2020-04-25 RX ADMIN — URSODIOL 300 MILLIGRAM(S): 250 TABLET, FILM COATED ORAL at 17:46

## 2020-04-25 RX ADMIN — Medication 1 DROP(S): at 13:10

## 2020-04-25 RX ADMIN — PANTOPRAZOLE SODIUM 40 MILLIGRAM(S): 20 TABLET, DELAYED RELEASE ORAL at 04:29

## 2020-04-25 RX ADMIN — Medication 1 DROP(S): at 04:29

## 2020-04-26 LAB
ALBUMIN SERPL ELPH-MCNC: 2.8 G/DL — LOW (ref 3.3–5)
ALP SERPL-CCNC: 73 U/L — SIGNIFICANT CHANGE UP (ref 40–120)
ALT FLD-CCNC: 47 U/L — HIGH (ref 4–41)
ANION GAP SERPL CALC-SCNC: 10 MMO/L — SIGNIFICANT CHANGE UP (ref 7–14)
ANION GAP SERPL CALC-SCNC: 10 MMO/L — SIGNIFICANT CHANGE UP (ref 7–14)
AST SERPL-CCNC: 35 U/L — SIGNIFICANT CHANGE UP (ref 4–40)
BASOPHILS # BLD AUTO: 0.04 K/UL — SIGNIFICANT CHANGE UP (ref 0–0.2)
BASOPHILS NFR BLD AUTO: 0.3 % — SIGNIFICANT CHANGE UP (ref 0–2)
BILIRUB SERPL-MCNC: 0.5 MG/DL — SIGNIFICANT CHANGE UP (ref 0.2–1.2)
BUN SERPL-MCNC: 32 MG/DL — HIGH (ref 7–23)
BUN SERPL-MCNC: 32 MG/DL — HIGH (ref 7–23)
CALCIUM SERPL-MCNC: 9 MG/DL — SIGNIFICANT CHANGE UP (ref 8.4–10.5)
CALCIUM SERPL-MCNC: 9 MG/DL — SIGNIFICANT CHANGE UP (ref 8.4–10.5)
CHLORIDE SERPL-SCNC: 117 MMOL/L — HIGH (ref 98–107)
CHLORIDE SERPL-SCNC: 117 MMOL/L — HIGH (ref 98–107)
CO2 SERPL-SCNC: 22 MMOL/L — SIGNIFICANT CHANGE UP (ref 22–31)
CO2 SERPL-SCNC: 22 MMOL/L — SIGNIFICANT CHANGE UP (ref 22–31)
CREAT SERPL-MCNC: 1.28 MG/DL — SIGNIFICANT CHANGE UP (ref 0.5–1.3)
CREAT SERPL-MCNC: 1.28 MG/DL — SIGNIFICANT CHANGE UP (ref 0.5–1.3)
EOSINOPHIL # BLD AUTO: 0.12 K/UL — SIGNIFICANT CHANGE UP (ref 0–0.5)
EOSINOPHIL NFR BLD AUTO: 0.8 % — SIGNIFICANT CHANGE UP (ref 0–6)
GLUCOSE SERPL-MCNC: 137 MG/DL — HIGH (ref 70–99)
GLUCOSE SERPL-MCNC: 137 MG/DL — HIGH (ref 70–99)
HCT VFR BLD CALC: 31.8 % — LOW (ref 39–50)
HGB BLD-MCNC: 10.2 G/DL — LOW (ref 13–17)
IMM GRANULOCYTES NFR BLD AUTO: 3.4 % — HIGH (ref 0–1.5)
LYMPHOCYTES # BLD AUTO: 1.55 K/UL — SIGNIFICANT CHANGE UP (ref 1–3.3)
LYMPHOCYTES # BLD AUTO: 10.5 % — LOW (ref 13–44)
MAGNESIUM SERPL-MCNC: 2.1 MG/DL — SIGNIFICANT CHANGE UP (ref 1.6–2.6)
MCHC RBC-ENTMCNC: 31.5 PG — SIGNIFICANT CHANGE UP (ref 27–34)
MCHC RBC-ENTMCNC: 32.1 % — SIGNIFICANT CHANGE UP (ref 32–36)
MCV RBC AUTO: 98.1 FL — SIGNIFICANT CHANGE UP (ref 80–100)
MONOCYTES # BLD AUTO: 1.41 K/UL — HIGH (ref 0–0.9)
MONOCYTES NFR BLD AUTO: 9.5 % — SIGNIFICANT CHANGE UP (ref 2–14)
NEUTROPHILS # BLD AUTO: 11.21 K/UL — HIGH (ref 1.8–7.4)
NEUTROPHILS NFR BLD AUTO: 75.5 % — SIGNIFICANT CHANGE UP (ref 43–77)
NRBC # FLD: 0.09 K/UL — SIGNIFICANT CHANGE UP (ref 0–0)
PHOSPHATE SERPL-MCNC: 2.6 MG/DL — SIGNIFICANT CHANGE UP (ref 2.5–4.5)
PLATELET # BLD AUTO: 379 K/UL — SIGNIFICANT CHANGE UP (ref 150–400)
PMV BLD: 11.4 FL — SIGNIFICANT CHANGE UP (ref 7–13)
POTASSIUM SERPL-MCNC: 3.7 MMOL/L — SIGNIFICANT CHANGE UP (ref 3.5–5.3)
POTASSIUM SERPL-MCNC: 3.7 MMOL/L — SIGNIFICANT CHANGE UP (ref 3.5–5.3)
POTASSIUM SERPL-SCNC: 3.7 MMOL/L — SIGNIFICANT CHANGE UP (ref 3.5–5.3)
POTASSIUM SERPL-SCNC: 3.7 MMOL/L — SIGNIFICANT CHANGE UP (ref 3.5–5.3)
PROCALCITONIN SERPL-MCNC: 0.07 NG/ML — SIGNIFICANT CHANGE UP (ref 0.02–0.1)
PROT SERPL-MCNC: 7 G/DL — SIGNIFICANT CHANGE UP (ref 6–8.3)
RBC # BLD: 3.24 M/UL — LOW (ref 4.2–5.8)
RBC # FLD: 19.9 % — HIGH (ref 10.3–14.5)
SODIUM SERPL-SCNC: 149 MMOL/L — HIGH (ref 135–145)
SODIUM SERPL-SCNC: 149 MMOL/L — HIGH (ref 135–145)
WBC # BLD: 14.83 K/UL — HIGH (ref 3.8–10.5)
WBC # FLD AUTO: 14.83 K/UL — HIGH (ref 3.8–10.5)

## 2020-04-26 RX ADMIN — Medication 1 DROP(S): at 20:09

## 2020-04-26 RX ADMIN — Medication 1 DROP(S): at 12:27

## 2020-04-26 RX ADMIN — SODIUM CHLORIDE 50 MILLILITER(S): 9 INJECTION, SOLUTION INTRAVENOUS at 05:26

## 2020-04-26 RX ADMIN — URSODIOL 300 MILLIGRAM(S): 250 TABLET, FILM COATED ORAL at 05:01

## 2020-04-26 RX ADMIN — URSODIOL 300 MILLIGRAM(S): 250 TABLET, FILM COATED ORAL at 17:07

## 2020-04-26 RX ADMIN — AMLODIPINE BESYLATE 5 MILLIGRAM(S): 2.5 TABLET ORAL at 05:00

## 2020-04-26 RX ADMIN — APIXABAN 5 MILLIGRAM(S): 2.5 TABLET, FILM COATED ORAL at 17:07

## 2020-04-26 RX ADMIN — LATANOPROST 1 DROP(S): 0.05 SOLUTION/ DROPS OPHTHALMIC; TOPICAL at 20:09

## 2020-04-26 RX ADMIN — CEFTRIAXONE 100 MILLIGRAM(S): 500 INJECTION, POWDER, FOR SOLUTION INTRAMUSCULAR; INTRAVENOUS at 05:27

## 2020-04-26 RX ADMIN — TAMSULOSIN HYDROCHLORIDE 0.4 MILLIGRAM(S): 0.4 CAPSULE ORAL at 20:10

## 2020-04-26 RX ADMIN — APIXABAN 5 MILLIGRAM(S): 2.5 TABLET, FILM COATED ORAL at 05:01

## 2020-04-26 RX ADMIN — FINASTERIDE 5 MILLIGRAM(S): 5 TABLET, FILM COATED ORAL at 12:26

## 2020-04-26 RX ADMIN — Medication 1 DROP(S): at 05:28

## 2020-04-26 RX ADMIN — PANTOPRAZOLE SODIUM 40 MILLIGRAM(S): 20 TABLET, DELAYED RELEASE ORAL at 05:01

## 2020-04-26 NOTE — PROGRESS NOTE ADULT - PROBLEM SELECTOR PLAN 7
Unable to d/w GOC with the pt due to mild encephalopathy;   Unable to confirm DNR status with the pt daughter via phone as above per HPI  -Primary team to attempt to confirm GOC with the family in AM DNR/DNI

## 2020-04-26 NOTE — CHART NOTE - NSCHARTNOTEFT_GEN_A_CORE
Brief progress note:    Patient with no bradycardia overnight. Continue to hold AV dulce blockers. On eliquis 5mg BID.    Kendrick Ordoñez MD  Cardiology Fellow PGY-6  For all cardiology service contact information, please go to [email or web address] and use password [email or web address]'

## 2020-04-26 NOTE — PHYSICAL THERAPY INITIAL EVALUATION ADULT - PERTINENT HX OF CURRENT PROBLEM, REHAB EVAL
patient presents with fever / SOB. (+) COVID 19. PMH includes anemia, dementia, MDS, pre-DM, HTN, BPH, glaucoma

## 2020-04-27 LAB
ANION GAP SERPL CALC-SCNC: 11 MMO/L — SIGNIFICANT CHANGE UP (ref 7–14)
BUN SERPL-MCNC: 30 MG/DL — HIGH (ref 7–23)
CALCIUM SERPL-MCNC: 8.9 MG/DL — SIGNIFICANT CHANGE UP (ref 8.4–10.5)
CHLORIDE SERPL-SCNC: 112 MMOL/L — HIGH (ref 98–107)
CO2 SERPL-SCNC: 22 MMOL/L — SIGNIFICANT CHANGE UP (ref 22–31)
CREAT SERPL-MCNC: 1.3 MG/DL — SIGNIFICANT CHANGE UP (ref 0.5–1.3)
CRP SERPL-MCNC: 77 MG/L — HIGH
CULTURE RESULTS: SIGNIFICANT CHANGE UP
CULTURE RESULTS: SIGNIFICANT CHANGE UP
D DIMER BLD IA.RAPID-MCNC: 605 NG/ML — SIGNIFICANT CHANGE UP
FERRITIN SERPL-MCNC: 596.5 NG/ML — HIGH (ref 30–400)
GLUCOSE SERPL-MCNC: 117 MG/DL — HIGH (ref 70–99)
LDH SERPL L TO P-CCNC: 199 U/L — SIGNIFICANT CHANGE UP (ref 135–225)
MAGNESIUM SERPL-MCNC: 2.1 MG/DL — SIGNIFICANT CHANGE UP (ref 1.6–2.6)
PHOSPHATE SERPL-MCNC: 2.3 MG/DL — LOW (ref 2.5–4.5)
POTASSIUM SERPL-MCNC: 3.9 MMOL/L — SIGNIFICANT CHANGE UP (ref 3.5–5.3)
POTASSIUM SERPL-SCNC: 3.9 MMOL/L — SIGNIFICANT CHANGE UP (ref 3.5–5.3)
PROCALCITONIN SERPL-MCNC: 0.09 NG/ML — SIGNIFICANT CHANGE UP (ref 0.02–0.1)
SODIUM SERPL-SCNC: 145 MMOL/L — SIGNIFICANT CHANGE UP (ref 135–145)
SPECIMEN SOURCE: SIGNIFICANT CHANGE UP
SPECIMEN SOURCE: SIGNIFICANT CHANGE UP

## 2020-04-27 RX ADMIN — APIXABAN 5 MILLIGRAM(S): 2.5 TABLET, FILM COATED ORAL at 05:23

## 2020-04-27 RX ADMIN — PANTOPRAZOLE SODIUM 40 MILLIGRAM(S): 20 TABLET, DELAYED RELEASE ORAL at 05:23

## 2020-04-27 RX ADMIN — LATANOPROST 1 DROP(S): 0.05 SOLUTION/ DROPS OPHTHALMIC; TOPICAL at 21:45

## 2020-04-27 RX ADMIN — FINASTERIDE 5 MILLIGRAM(S): 5 TABLET, FILM COATED ORAL at 11:24

## 2020-04-27 RX ADMIN — URSODIOL 300 MILLIGRAM(S): 250 TABLET, FILM COATED ORAL at 05:23

## 2020-04-27 RX ADMIN — APIXABAN 5 MILLIGRAM(S): 2.5 TABLET, FILM COATED ORAL at 17:13

## 2020-04-27 RX ADMIN — Medication 1 DROP(S): at 21:40

## 2020-04-27 RX ADMIN — Medication 1 DROP(S): at 11:24

## 2020-04-27 RX ADMIN — CEFTRIAXONE 100 MILLIGRAM(S): 500 INJECTION, POWDER, FOR SOLUTION INTRAMUSCULAR; INTRAVENOUS at 05:22

## 2020-04-27 RX ADMIN — URSODIOL 300 MILLIGRAM(S): 250 TABLET, FILM COATED ORAL at 17:13

## 2020-04-27 RX ADMIN — Medication 1 DROP(S): at 05:22

## 2020-04-27 RX ADMIN — AMLODIPINE BESYLATE 5 MILLIGRAM(S): 2.5 TABLET ORAL at 05:23

## 2020-04-28 LAB
ALBUMIN SERPL ELPH-MCNC: 2.8 G/DL — LOW (ref 3.3–5)
ALP SERPL-CCNC: 79 U/L — SIGNIFICANT CHANGE UP (ref 40–120)
ALT FLD-CCNC: 35 U/L — SIGNIFICANT CHANGE UP (ref 4–41)
ANION GAP SERPL CALC-SCNC: 11 MMO/L — SIGNIFICANT CHANGE UP (ref 7–14)
AST SERPL-CCNC: 24 U/L — SIGNIFICANT CHANGE UP (ref 4–40)
BASOPHILS # BLD AUTO: 0.05 K/UL — SIGNIFICANT CHANGE UP (ref 0–0.2)
BASOPHILS NFR BLD AUTO: 0.4 % — SIGNIFICANT CHANGE UP (ref 0–2)
BILIRUB SERPL-MCNC: 0.7 MG/DL — SIGNIFICANT CHANGE UP (ref 0.2–1.2)
BUN SERPL-MCNC: 29 MG/DL — HIGH (ref 7–23)
CALCIUM SERPL-MCNC: 8.8 MG/DL — SIGNIFICANT CHANGE UP (ref 8.4–10.5)
CHLORIDE SERPL-SCNC: 109 MMOL/L — HIGH (ref 98–107)
CO2 SERPL-SCNC: 23 MMOL/L — SIGNIFICANT CHANGE UP (ref 22–31)
CREAT SERPL-MCNC: 1.35 MG/DL — HIGH (ref 0.5–1.3)
CRP SERPL-MCNC: 124.3 MG/L — HIGH
EOSINOPHIL # BLD AUTO: 0.22 K/UL — SIGNIFICANT CHANGE UP (ref 0–0.5)
EOSINOPHIL NFR BLD AUTO: 1.8 % — SIGNIFICANT CHANGE UP (ref 0–6)
FERRITIN SERPL-MCNC: 664.3 NG/ML — HIGH (ref 30–400)
GLUCOSE SERPL-MCNC: 90 MG/DL — SIGNIFICANT CHANGE UP (ref 70–99)
HCT VFR BLD CALC: 30.2 % — LOW (ref 39–50)
HGB BLD-MCNC: 9.7 G/DL — LOW (ref 13–17)
IMM GRANULOCYTES NFR BLD AUTO: 1.9 % — HIGH (ref 0–1.5)
LYMPHOCYTES # BLD AUTO: 1.88 K/UL — SIGNIFICANT CHANGE UP (ref 1–3.3)
LYMPHOCYTES # BLD AUTO: 15.2 % — SIGNIFICANT CHANGE UP (ref 13–44)
MCHC RBC-ENTMCNC: 31.8 PG — SIGNIFICANT CHANGE UP (ref 27–34)
MCHC RBC-ENTMCNC: 32.1 % — SIGNIFICANT CHANGE UP (ref 32–36)
MCV RBC AUTO: 99 FL — SIGNIFICANT CHANGE UP (ref 80–100)
MONOCYTES # BLD AUTO: 1.46 K/UL — HIGH (ref 0–0.9)
MONOCYTES NFR BLD AUTO: 11.8 % — SIGNIFICANT CHANGE UP (ref 2–14)
NEUTROPHILS # BLD AUTO: 8.53 K/UL — HIGH (ref 1.8–7.4)
NEUTROPHILS NFR BLD AUTO: 68.9 % — SIGNIFICANT CHANGE UP (ref 43–77)
NRBC # FLD: 0.08 K/UL — SIGNIFICANT CHANGE UP (ref 0–0)
PLATELET # BLD AUTO: 285 K/UL — SIGNIFICANT CHANGE UP (ref 150–400)
PMV BLD: 11.3 FL — SIGNIFICANT CHANGE UP (ref 7–13)
POTASSIUM SERPL-MCNC: 4 MMOL/L — SIGNIFICANT CHANGE UP (ref 3.5–5.3)
POTASSIUM SERPL-SCNC: 4 MMOL/L — SIGNIFICANT CHANGE UP (ref 3.5–5.3)
PROCALCITONIN SERPL-MCNC: 0.11 NG/ML — HIGH (ref 0.02–0.1)
PROT SERPL-MCNC: 7 G/DL — SIGNIFICANT CHANGE UP (ref 6–8.3)
RBC # BLD: 3.05 M/UL — LOW (ref 4.2–5.8)
RBC # FLD: 20.2 % — HIGH (ref 10.3–14.5)
SODIUM SERPL-SCNC: 143 MMOL/L — SIGNIFICANT CHANGE UP (ref 135–145)
WBC # BLD: 12.38 K/UL — HIGH (ref 3.8–10.5)
WBC # FLD AUTO: 12.38 K/UL — HIGH (ref 3.8–10.5)

## 2020-04-28 RX ADMIN — TAMSULOSIN HYDROCHLORIDE 0.4 MILLIGRAM(S): 0.4 CAPSULE ORAL at 01:40

## 2020-04-28 RX ADMIN — PANTOPRAZOLE SODIUM 40 MILLIGRAM(S): 20 TABLET, DELAYED RELEASE ORAL at 04:46

## 2020-04-28 RX ADMIN — URSODIOL 300 MILLIGRAM(S): 250 TABLET, FILM COATED ORAL at 04:46

## 2020-04-28 RX ADMIN — APIXABAN 5 MILLIGRAM(S): 2.5 TABLET, FILM COATED ORAL at 16:17

## 2020-04-28 RX ADMIN — LATANOPROST 1 DROP(S): 0.05 SOLUTION/ DROPS OPHTHALMIC; TOPICAL at 22:52

## 2020-04-28 RX ADMIN — URSODIOL 300 MILLIGRAM(S): 250 TABLET, FILM COATED ORAL at 16:17

## 2020-04-28 RX ADMIN — FINASTERIDE 5 MILLIGRAM(S): 5 TABLET, FILM COATED ORAL at 12:30

## 2020-04-28 RX ADMIN — TAMSULOSIN HYDROCHLORIDE 0.4 MILLIGRAM(S): 0.4 CAPSULE ORAL at 22:52

## 2020-04-28 RX ADMIN — Medication 1 DROP(S): at 04:46

## 2020-04-28 RX ADMIN — CEFTRIAXONE 100 MILLIGRAM(S): 500 INJECTION, POWDER, FOR SOLUTION INTRAMUSCULAR; INTRAVENOUS at 03:00

## 2020-04-28 RX ADMIN — AMLODIPINE BESYLATE 5 MILLIGRAM(S): 2.5 TABLET ORAL at 04:46

## 2020-04-28 RX ADMIN — Medication 1 DROP(S): at 12:30

## 2020-04-28 RX ADMIN — APIXABAN 5 MILLIGRAM(S): 2.5 TABLET, FILM COATED ORAL at 04:46

## 2020-04-28 RX ADMIN — Medication 1 DROP(S): at 22:52

## 2020-04-28 NOTE — CHART NOTE - NSCHARTNOTEFT_GEN_A_CORE
Received a call from RN that patient's daughter Damari Elise would like to rescind the DNR.   Dr Flores informed.       Herbert Herman NP  pager [phone]

## 2020-04-28 NOTE — PROGRESS NOTE ADULT - NSREFPHYEXREFTO_GEN_ALL_CORE
Other
Inpatient Physical Exam

## 2020-04-28 NOTE — CHART NOTE - NSCHARTNOTEFT_GEN_A_CORE
telemetry reviewed and shows atrial fibrillation with VR 70s- 80s.   - c/w apixaban for anticoagulation  - Hold AV dulce blockers  - No pacing indications at this time

## 2020-04-29 LAB
ALBUMIN SERPL ELPH-MCNC: 2.6 G/DL — LOW (ref 3.3–5)
ALP SERPL-CCNC: 80 U/L — SIGNIFICANT CHANGE UP (ref 40–120)
ALT FLD-CCNC: 32 U/L — SIGNIFICANT CHANGE UP (ref 4–41)
ANION GAP SERPL CALC-SCNC: 12 MMO/L — SIGNIFICANT CHANGE UP (ref 7–14)
AST SERPL-CCNC: 25 U/L — SIGNIFICANT CHANGE UP (ref 4–40)
BILIRUB SERPL-MCNC: 0.8 MG/DL — SIGNIFICANT CHANGE UP (ref 0.2–1.2)
BUN SERPL-MCNC: 27 MG/DL — HIGH (ref 7–23)
CALCIUM SERPL-MCNC: 9 MG/DL — SIGNIFICANT CHANGE UP (ref 8.4–10.5)
CHLORIDE SERPL-SCNC: 108 MMOL/L — HIGH (ref 98–107)
CO2 SERPL-SCNC: 22 MMOL/L — SIGNIFICANT CHANGE UP (ref 22–31)
CREAT SERPL-MCNC: 1.37 MG/DL — HIGH (ref 0.5–1.3)
GLUCOSE SERPL-MCNC: 102 MG/DL — HIGH (ref 70–99)
HCT VFR BLD CALC: 31.8 % — LOW (ref 39–50)
HGB BLD-MCNC: 10.1 G/DL — LOW (ref 13–17)
MAGNESIUM SERPL-MCNC: 2.1 MG/DL — SIGNIFICANT CHANGE UP (ref 1.6–2.6)
MCHC RBC-ENTMCNC: 31.6 PG — SIGNIFICANT CHANGE UP (ref 27–34)
MCHC RBC-ENTMCNC: 31.8 % — LOW (ref 32–36)
MCV RBC AUTO: 99.4 FL — SIGNIFICANT CHANGE UP (ref 80–100)
NRBC # FLD: 0.05 K/UL — SIGNIFICANT CHANGE UP (ref 0–0)
PHOSPHATE SERPL-MCNC: 2.7 MG/DL — SIGNIFICANT CHANGE UP (ref 2.5–4.5)
PLATELET # BLD AUTO: 245 K/UL — SIGNIFICANT CHANGE UP (ref 150–400)
PMV BLD: 11.3 FL — SIGNIFICANT CHANGE UP (ref 7–13)
POTASSIUM SERPL-MCNC: 4 MMOL/L — SIGNIFICANT CHANGE UP (ref 3.5–5.3)
POTASSIUM SERPL-SCNC: 4 MMOL/L — SIGNIFICANT CHANGE UP (ref 3.5–5.3)
PROT SERPL-MCNC: 6.9 G/DL — SIGNIFICANT CHANGE UP (ref 6–8.3)
RBC # BLD: 3.2 M/UL — LOW (ref 4.2–5.8)
RBC # FLD: 19.7 % — HIGH (ref 10.3–14.5)
SODIUM SERPL-SCNC: 142 MMOL/L — SIGNIFICANT CHANGE UP (ref 135–145)
WBC # BLD: 10.66 K/UL — HIGH (ref 3.8–10.5)
WBC # FLD AUTO: 10.66 K/UL — HIGH (ref 3.8–10.5)

## 2020-04-29 RX ADMIN — APIXABAN 5 MILLIGRAM(S): 2.5 TABLET, FILM COATED ORAL at 05:20

## 2020-04-29 RX ADMIN — Medication 1 DROP(S): at 05:22

## 2020-04-29 RX ADMIN — PANTOPRAZOLE SODIUM 40 MILLIGRAM(S): 20 TABLET, DELAYED RELEASE ORAL at 05:22

## 2020-04-29 RX ADMIN — TAMSULOSIN HYDROCHLORIDE 0.4 MILLIGRAM(S): 0.4 CAPSULE ORAL at 21:07

## 2020-04-29 RX ADMIN — FINASTERIDE 5 MILLIGRAM(S): 5 TABLET, FILM COATED ORAL at 12:25

## 2020-04-29 RX ADMIN — CEFTRIAXONE 100 MILLIGRAM(S): 500 INJECTION, POWDER, FOR SOLUTION INTRAMUSCULAR; INTRAVENOUS at 05:21

## 2020-04-29 RX ADMIN — Medication 1 DROP(S): at 12:26

## 2020-04-29 RX ADMIN — APIXABAN 5 MILLIGRAM(S): 2.5 TABLET, FILM COATED ORAL at 18:02

## 2020-04-29 RX ADMIN — Medication 1 DROP(S): at 21:07

## 2020-04-29 RX ADMIN — URSODIOL 300 MILLIGRAM(S): 250 TABLET, FILM COATED ORAL at 05:20

## 2020-04-29 RX ADMIN — LATANOPROST 1 DROP(S): 0.05 SOLUTION/ DROPS OPHTHALMIC; TOPICAL at 21:07

## 2020-04-29 RX ADMIN — URSODIOL 300 MILLIGRAM(S): 250 TABLET, FILM COATED ORAL at 18:02

## 2020-04-29 RX ADMIN — AMLODIPINE BESYLATE 5 MILLIGRAM(S): 2.5 TABLET ORAL at 05:21

## 2020-04-29 NOTE — DIETITIAN INITIAL EVALUATION ADULT. - PROBLEM SELECTOR PLAN 5
EKG c/w Afib, likely new, with episodes of bradycardia to 30s;   [id number] risk stratification score 3  TSH pending   Given Covid-19 (+) holding TTE pending EP evaluation   EP c/s as above  Atenolol held  Given MDS, anemia and ?dementia holding full a/c until GOC d/w the family

## 2020-04-29 NOTE — CHART NOTE - NSCHARTNOTEFT_GEN_A_CORE
Telemetry reviewed and shows atrial fibrillation with VR 80s- 90s.   - c/w apixaban for anticoagulation  - Hold AV dulce blockers  - No pacing indications at this time.

## 2020-04-29 NOTE — DIETITIAN INITIAL EVALUATION ADULT. - OTHER INFO
89 yo Male hx anemia, dementia, MDS, pre-DM, HTN, BPH, glaucoma a/w UTI, presents with new-onset A-fib with bradycardia to 30s, sepsis 2' multifocal viral PNA due to Covid-19 infection; uncomplicated UTI. Unable to conduct a face to face interview or nutrition-focused physical exam due to limited contact restrictions related to Pt's medical condition and isolation precautions. Attempted to call patients daughters and patients cell- no response. Collateral obtained from chart review. Unable to assess PO intake. No GI distress (nausea/vomiting/diarrhea/constipation) per chart. Last documented BM 4/26 (per flowsheets) No chewing or swallowing difficulties at this time per chart. Admit weight (4/22/20) 96.3kg. BMI 34.3 (obese) Unable to assess wt history.

## 2020-04-29 NOTE — DIETITIAN INITIAL EVALUATION ADULT. - PROBLEM SELECTOR PLAN 1
CXR - b/l extensive patchy opacities c/w multifocal pneumonia - my reading  95% on RA  Covid-19 PCR (+)  EKG: QTc 505  Supportive care  ID c/s in AM  f/u inflammation labs   Supp O2 PRN via NC if desaturates  ProBNP pending

## 2020-04-29 NOTE — DIETITIAN INITIAL EVALUATION ADULT. - PERTINENT LABORATORY DATA
04-29 Na142 mmol/L Glu 102 mg/dL<H> K+ 4.0 mmol/L Cr  1.37 mg/dL<H> BUN 27 mg/dL<H> 04-29 Phos 2.7 mg/dL 04-29 Alb 2.6 g/dL<L>

## 2020-04-29 NOTE — DIETITIAN INITIAL EVALUATION ADULT. - ADD RECOMMEND
1. Add Ensure Enlive 240mls 2x daily (700kcal, 40g protein). 2. Please Encourage po intake, assist with meals and menu selections, provide alternatives PRN. 3. Monitor weights, labs, BM's, skin integrity, p.o. intake. 4. RD remains available, re-consult as needed.

## 2020-04-29 NOTE — DIETITIAN INITIAL EVALUATION ADULT. - PROBLEM SELECTOR PLAN 9
1.  Name of PCP: Rocky Rahman [phone]  2.  PCP Contacted on Admission: [ ] Y    [ ] N    3.  PCP contacted at Discharge: [ ] Y    [ ] N    [ ] N/A  4.  Post-Discharge Appointment Date and Location:  5.  Summary of Handoff given to PCP:

## 2020-04-29 NOTE — DIETITIAN INITIAL EVALUATION ADULT. - PROBLEM SELECTOR PLAN 3
Telemetry - episode of bradycardia to 30s (strips in the chart); Reports no CP or palpitations   Hold Atenolol and other dulce blockers   EP c/s in AM  TSH added on   Trop added on  f/u CKMB/CK and #2 hsTrop  Zoll Pads applied  Atropine at bedside

## 2020-04-29 NOTE — DIETITIAN INITIAL EVALUATION ADULT. - PERTINENT MEDS FT
MEDICATIONS  (STANDING):  amLODIPine   Tablet 5 milliGRAM(s) Oral daily  apixaban 5 milliGRAM(s) Oral two times a day  finasteride 5 milliGRAM(s) Oral daily  latanoprost 0.005% Ophthalmic Solution 1 Drop(s) Both EYES at bedtime  pantoprazole    Tablet 40 milliGRAM(s) Oral before breakfast  pilocarpine 1% Solution 1 Drop(s) Left EYE three times a day  tamsulosin 0.4 milliGRAM(s) Oral at bedtime  ursodiol Capsule 300 milliGRAM(s) Oral two times a day    MEDICATIONS  (PRN):  acetaminophen   Tablet .. 650 milliGRAM(s) Oral every 8 hours PRN Temp greater or equal to 38C (100.4F)

## 2020-04-30 LAB
ANION GAP SERPL CALC-SCNC: 12 MMO/L — SIGNIFICANT CHANGE UP (ref 7–14)
BUN SERPL-MCNC: 35 MG/DL — HIGH (ref 7–23)
CALCIUM SERPL-MCNC: 8.5 MG/DL — SIGNIFICANT CHANGE UP (ref 8.4–10.5)
CHLORIDE SERPL-SCNC: 108 MMOL/L — HIGH (ref 98–107)
CO2 SERPL-SCNC: 22 MMOL/L — SIGNIFICANT CHANGE UP (ref 22–31)
CREAT SERPL-MCNC: 1.54 MG/DL — HIGH (ref 0.5–1.3)
GLUCOSE SERPL-MCNC: 141 MG/DL — HIGH (ref 70–99)
HCT VFR BLD CALC: 27 % — LOW (ref 39–50)
HGB BLD-MCNC: 8.7 G/DL — LOW (ref 13–17)
MAGNESIUM SERPL-MCNC: 2 MG/DL — SIGNIFICANT CHANGE UP (ref 1.6–2.6)
MCHC RBC-ENTMCNC: 32.2 % — SIGNIFICANT CHANGE UP (ref 32–36)
MCHC RBC-ENTMCNC: 32.7 PG — SIGNIFICANT CHANGE UP (ref 27–34)
MCV RBC AUTO: 101.5 FL — HIGH (ref 80–100)
NRBC # FLD: 0.03 K/UL — SIGNIFICANT CHANGE UP (ref 0–0)
PLATELET # BLD AUTO: 217 K/UL — SIGNIFICANT CHANGE UP (ref 150–400)
PMV BLD: 11.8 FL — SIGNIFICANT CHANGE UP (ref 7–13)
POTASSIUM SERPL-MCNC: 3.9 MMOL/L — SIGNIFICANT CHANGE UP (ref 3.5–5.3)
POTASSIUM SERPL-SCNC: 3.9 MMOL/L — SIGNIFICANT CHANGE UP (ref 3.5–5.3)
RBC # BLD: 2.66 M/UL — LOW (ref 4.2–5.8)
RBC # FLD: 19.9 % — HIGH (ref 10.3–14.5)
SODIUM SERPL-SCNC: 142 MMOL/L — SIGNIFICANT CHANGE UP (ref 135–145)
WBC # BLD: 8.6 K/UL — SIGNIFICANT CHANGE UP (ref 3.8–10.5)
WBC # FLD AUTO: 8.6 K/UL — SIGNIFICANT CHANGE UP (ref 3.8–10.5)

## 2020-04-30 RX ORDER — ACETAMINOPHEN 500 MG
2 TABLET ORAL
Qty: 0 | Refills: 0 | DISCHARGE
Start: 2020-04-30

## 2020-04-30 RX ORDER — ATENOLOL 25 MG/1
1 TABLET ORAL
Qty: 0 | Refills: 0 | DISCHARGE

## 2020-04-30 RX ORDER — FUROSEMIDE 40 MG
1 TABLET ORAL
Qty: 0 | Refills: 0 | DISCHARGE

## 2020-04-30 RX ORDER — LINACLOTIDE 145 UG/1
1 CAPSULE, GELATIN COATED ORAL
Qty: 0 | Refills: 0 | DISCHARGE

## 2020-04-30 RX ORDER — URSODIOL 250 MG/1
1 TABLET, FILM COATED ORAL
Qty: 0 | Refills: 0 | DISCHARGE

## 2020-04-30 RX ORDER — AMLODIPINE BESYLATE 2.5 MG/1
1 TABLET ORAL
Qty: 0 | Refills: 0 | DISCHARGE

## 2020-04-30 RX ORDER — LOSARTAN POTASSIUM 100 MG/1
1 TABLET, FILM COATED ORAL
Qty: 0 | Refills: 0 | DISCHARGE

## 2020-04-30 RX ORDER — PILOCARPINE HCL 4 %
1 DROPS OPHTHALMIC (EYE)
Qty: 0 | Refills: 0 | DISCHARGE

## 2020-04-30 RX ORDER — BRIMONIDINE TARTRATE 2 MG/MG
1 SOLUTION/ DROPS OPHTHALMIC
Qty: 0 | Refills: 0 | DISCHARGE

## 2020-04-30 RX ORDER — OXYBUTYNIN CHLORIDE 5 MG
1 TABLET ORAL
Qty: 0 | Refills: 0 | DISCHARGE

## 2020-04-30 RX ORDER — APIXABAN 2.5 MG/1
2.5 TABLET, FILM COATED ORAL EVERY 12 HOURS
Refills: 0 | Status: DISCONTINUED | OUTPATIENT
Start: 2020-04-30 | End: 2020-05-01

## 2020-04-30 RX ORDER — MULTIVIT-MIN/FERROUS GLUCONATE 9 MG/15 ML
1 LIQUID (ML) ORAL
Qty: 0 | Refills: 0 | DISCHARGE

## 2020-04-30 RX ORDER — ALPRAZOLAM 0.25 MG
1 TABLET ORAL
Qty: 0 | Refills: 0 | DISCHARGE

## 2020-04-30 RX ORDER — LOPERAMIDE HCL 2 MG
1 TABLET ORAL
Qty: 0 | Refills: 0 | DISCHARGE

## 2020-04-30 RX ORDER — DARBEPOETIN ALFA IN POLYSORBAT 200MCG/0.4
1 PEN INJECTOR (ML) SUBCUTANEOUS
Qty: 0 | Refills: 0 | DISCHARGE

## 2020-04-30 RX ORDER — POLYETHYLENE GLYCOL 3350 17 G/17G
17 POWDER, FOR SOLUTION ORAL
Qty: 0 | Refills: 0 | DISCHARGE

## 2020-04-30 RX ORDER — OMEGA-3 ACID ETHYL ESTERS 1 G
2 CAPSULE ORAL
Qty: 0 | Refills: 0 | DISCHARGE

## 2020-04-30 RX ORDER — FINASTERIDE 5 MG/1
1 TABLET, FILM COATED ORAL
Qty: 0 | Refills: 0 | DISCHARGE

## 2020-04-30 RX ORDER — PILOCARPINE HCL 4 %
1 DROPS OPHTHALMIC (EYE)
Qty: 0 | Refills: 0 | DISCHARGE
Start: 2020-04-30

## 2020-04-30 RX ORDER — TAMSULOSIN HYDROCHLORIDE 0.4 MG/1
1 CAPSULE ORAL
Qty: 0 | Refills: 0 | DISCHARGE

## 2020-04-30 RX ORDER — DORZOLAMIDE HYDROCHLORIDE TIMOLOL MALEATE 20; 5 MG/ML; MG/ML
1 SOLUTION/ DROPS OPHTHALMIC
Qty: 0 | Refills: 0 | DISCHARGE

## 2020-04-30 RX ORDER — OMEPRAZOLE 10 MG/1
1 CAPSULE, DELAYED RELEASE ORAL
Qty: 0 | Refills: 0 | DISCHARGE

## 2020-04-30 RX ORDER — BIMATOPROST 0.3 MG/ML
1 SOLUTION/ DROPS OPHTHALMIC
Qty: 0 | Refills: 0 | DISCHARGE

## 2020-04-30 RX ORDER — TAMSULOSIN HYDROCHLORIDE 0.4 MG/1
1 CAPSULE ORAL
Qty: 0 | Refills: 0 | DISCHARGE
Start: 2020-04-30

## 2020-04-30 RX ORDER — AMLODIPINE BESYLATE 2.5 MG/1
1 TABLET ORAL
Qty: 0 | Refills: 0 | DISCHARGE
Start: 2020-04-30

## 2020-04-30 RX ORDER — LATANOPROST 0.05 MG/ML
1 SOLUTION/ DROPS OPHTHALMIC; TOPICAL
Qty: 0 | Refills: 0 | DISCHARGE
Start: 2020-04-30

## 2020-04-30 RX ORDER — PANTOPRAZOLE SODIUM 20 MG/1
1 TABLET, DELAYED RELEASE ORAL
Qty: 0 | Refills: 0 | DISCHARGE
Start: 2020-04-30

## 2020-04-30 RX ORDER — FINASTERIDE 5 MG/1
1 TABLET, FILM COATED ORAL
Qty: 0 | Refills: 0 | DISCHARGE
Start: 2020-04-30

## 2020-04-30 RX ORDER — L.ACIDOPH/B.ANIMALIS/B.LONGUM 15B CELL
1 CAPSULE ORAL
Qty: 0 | Refills: 0 | DISCHARGE

## 2020-04-30 RX ORDER — APIXABAN 2.5 MG/1
1 TABLET, FILM COATED ORAL
Qty: 0 | Refills: 0 | DISCHARGE
Start: 2020-04-30

## 2020-04-30 RX ORDER — URSODIOL 250 MG/1
1 TABLET, FILM COATED ORAL
Qty: 0 | Refills: 0 | DISCHARGE
Start: 2020-04-30

## 2020-04-30 RX ADMIN — Medication 1 DROP(S): at 13:01

## 2020-04-30 RX ADMIN — Medication 1 DROP(S): at 04:26

## 2020-04-30 RX ADMIN — FINASTERIDE 5 MILLIGRAM(S): 5 TABLET, FILM COATED ORAL at 12:20

## 2020-04-30 RX ADMIN — AMLODIPINE BESYLATE 5 MILLIGRAM(S): 2.5 TABLET ORAL at 04:26

## 2020-04-30 RX ADMIN — APIXABAN 5 MILLIGRAM(S): 2.5 TABLET, FILM COATED ORAL at 04:26

## 2020-04-30 RX ADMIN — PANTOPRAZOLE SODIUM 40 MILLIGRAM(S): 20 TABLET, DELAYED RELEASE ORAL at 04:26

## 2020-04-30 RX ADMIN — APIXABAN 2.5 MILLIGRAM(S): 2.5 TABLET, FILM COATED ORAL at 16:42

## 2020-04-30 RX ADMIN — Medication 1 DROP(S): at 21:54

## 2020-04-30 RX ADMIN — TAMSULOSIN HYDROCHLORIDE 0.4 MILLIGRAM(S): 0.4 CAPSULE ORAL at 21:55

## 2020-04-30 RX ADMIN — LATANOPROST 1 DROP(S): 0.05 SOLUTION/ DROPS OPHTHALMIC; TOPICAL at 21:54

## 2020-04-30 RX ADMIN — URSODIOL 300 MILLIGRAM(S): 250 TABLET, FILM COATED ORAL at 16:42

## 2020-04-30 RX ADMIN — URSODIOL 300 MILLIGRAM(S): 250 TABLET, FILM COATED ORAL at 04:26

## 2020-04-30 NOTE — PROGRESS NOTE ADULT - PROBLEM SELECTOR PROBLEM 1
Pneumonia due to 2019-nCoV

## 2020-04-30 NOTE — PROGRESS NOTE ADULT - PROBLEM SELECTOR PROBLEM 2
Acute cystitis without hematuria

## 2020-04-30 NOTE — PROGRESS NOTE ADULT - PROBLEM SELECTOR PLAN 3
Improved after holding Atenolol and other dulce blockers   TSH was nl  Zoll Pads applied  Atropine at bedside  Appreciate EP  cont tele
Improved after holding Atenolol and other dulce blockers   TSH was nl  f/u CKMB/CK and #2 hsTrop  Zoll Pads applied  Atropine at bedside
Telemetry - episode of bradycardia to 30s (strips in the chart); Reports no CP or palpitations   Hold Atenolol and other dulce blockers   EP c/s in AM  TSH added on   Trop added on  f/u CKMB/CK and #2 hsTrop  Zoll Pads applied  Atropine at bedside

## 2020-04-30 NOTE — PROGRESS NOTE ADULT - ASSESSMENT
89yo Male hx anemia, dementia, MDS, pre-DM, HTN, BPH, glaucoma a/w UTI, presents with new-onset A-fib with bradycardia to 30s, sepsis 2' multifocal viral PNA due to Covid-19 infection; uncomplicated UTI
89yo Male hx anemia, dementia, MDS, pre-DM, HTN, BPH, glaucoma a/w UTI, presents with new-onset A-fib with bradycardia to 30s, sepsis 2' multifocal viral PNA due to Covid-19 infection; uncomplicated UTI
89 yo Male hx anemia, dementia, MDS, pre-DM, HTN, BPH, glaucoma a/w UTI, presents with new-onset A-fib with bradycardia to 30s, sepsis 2' multifocal viral PNA due to Covid-19 infection; uncomplicated UTI.
89 yo Male hx anemia, dementia, MDS, pre-DM, HTN, BPH, glaucoma a/w UTI, presents with new-onset A-fib with bradycardia to 30s, sepsis 2' multifocal viral PNA due to Covid-19 infection; uncomplicated UTI.
89yo Male hx anemia, dementia, MDS, pre-DM, HTN, BPH, glaucoma a/w UTI, presents with new-onset A-fib with bradycardia to 30s, sepsis 2' multifocal viral PNA due to Covid-19 infection; uncomplicated UTI
89yo Male hx anemia, dementia, MDS, pre-DM, HTN, BPH, glaucoma, per EMS [facility]where he reportedly called 911 from) complaining of memory loss, shortness of breath, and urinary frequency. Reports no CP, chills, sore throat, Abd pain, diarrhea, n/v; Pt is slightly confused, does not know where he is. Does not voice other complaints.  89yo Male hx anemia, dementia, MDS, pre-DM, HTN, BPH, glaucoma a/w UTI, A-fib with bradycardia to 30s, multifocal viral PNA due to Covid-19 infection; Bandemia 8%  COVID19 PNA  UTI   Sepsis most likely sec to UTI with Hypothermia T 92.3 and Bands 8 on WBC  Creat 1.47- ? - gentle IVF
Patient is a 89y old male with history of  hx anemia, dementia, MDS, pre-DM, HTN, BPH, glaucoma, brought into the ED complaining of memory loss, shortness of breath, lower leg swelling. fatigue, weakness and urinary frequency. Found to be COVID 19+ with new onset  atrial fibrillation with slow ventricular response (30's)  with pauses.  The atrial fibrillation is a new diagnosis although he was recently noted to be bradycardic (40's) at the assisted living. Atenolol and timolol on hold with some improvement in heart rate. He remains asymptomatic. LVEF 59%.  No history of syncope. but recent fall and very unsteady (uses walker). It is likely that improvement of his respiratory status as well as holding AV dulce blocker (Atenolol and timolol eye gtts) will improve his overall heart rate. If significant bradycardia remains atropine can be used.  He may need a cardioversion and or  pacemaker in the future but not until he becomes COVID negative.   Plan: Atropine at the bedside           Zoll pads on           TSH with next blood draw           Continue telemetry           Avoid AV nodals -> hold Atenolol and timolol.            Will follow with you.           Patient started on Eliquis for stroke prevention.
89yo Male hx anemia, dementia, MDS, pre-DM, HTN, BPH, glaucoma, per EMS pt coming [facility] (where he reportedly called 911 from) complaining of memory loss, shortness of breath, and urinary frequency. Reports no CP, chills, sore throat, Abd pain, diarrhea, n/v; Pt is slightly confused, does not know where he is. Does not voice other complaints.  89yo Male hx anemia, dementia, MDS, pre-DM, HTN, BPH, glaucoma a/w UTI, A-fib with bradycardia to 30s, multifocal viral PNA due to Covid-19 infection; Bandemia 8%  COVID19 PNA  UTI   Sepsis most likely sec to UTI with Hypothermia T 92.3 and Bands 8 on WBC  Creat 1.47- ? - gentle IVF

## 2020-04-30 NOTE — PROGRESS NOTE ADULT - PROBLEM SELECTOR PLAN 5
EKG c/w Afib, likely new, with episodes of bradycardia to 30s; AVNB on hold for now  [id number] risk stratification score 3  TSH nl  TTE pending  Holding AVNB 2' pauses  Cont eliquis
EKG c/w Afib, likely new, with episodes of bradycardia to 30s; AVNB on hold for now  [id number] risk stratification score 3  TSH nl  TTE revealed nl LA and nl LV systolic fxn  Holding AVNB 2' pauses  Cont eliquis
EKG c/w Afib, likely new, with episodes of bradycardia to 30s; AVNB on hold for now  [id number] risk stratification score 3  TSH nl  TTE revealed nl LA and nl LV systolic fxn  Holding AVNB 2' pauses  Cont eliquis
EKG c/w Afib, likely new, with episodes of bradycardia to 30s; AVNB on hold for now  [id number] risk stratification score 3  TSH nl  TTE pending  Holding AVNB 2' pauses  Cont eliquis
EKG c/w Afib, likely new, with episodes of bradycardia to 30s; AVNB on hold for now  [id number] risk stratification score 3  TSH nl  TTE revealed nl LA and nl LV systolic fxn  Holding AVNB 2' pauses  Cont Eliquis (will dose adjust to age and Cr > 1.5, hence 2.5 mg BID)  monitor hgb
EKG c/w Afib, likely new, with episodes of bradycardia to 30s; AVNB on hold for now  [id number] risk stratification score 3  TSH nl  TTE pending  Holding AVNB 2' pauses  Cont eliquis
EKG c/w Afib, likely new, with episodes of bradycardia to 30s; AVNB on hold for now  [id number] risk stratification score 3  TSH nl  TTE pending  Given MDS, anemia and ?dementia holding full a/c until GOC d/w the family
EKG c/w Afib, likely new, with episodes of bradycardia to 30s; AVNB on hold for now  [id number] risk stratification score 3  TSH nl  TTE pending  Holding AVNB 2' pauses  Cont eliquis
EKG c/w Afib, likely new, with episodes of bradycardia to 30s;   [id number] risk stratification score 3  TSH pending   Given Covid-19 (+) holding TTE pending EP evaluation   EP c/s as above  Atenolol held  Given MDS, anemia and ?dementia holding full a/c until GOC d/w the family

## 2020-04-30 NOTE — PROGRESS NOTE ADULT - PROBLEM SELECTOR PLAN 1
CXR - b/l extensive patchy opacities c/w multifocal pneumonia   Saturating stably on RA  Covid-19 PCR (+)  EKG: QTc 505  f/u inflammation labs every 3rd day  Supp O2 PRN via NC if desaturates  CXR 4/25 is unchanged
CXR - b/l extensive patchy opacities c/w multifocal pneumonia   Saturating well on RA  Covid-19 PCR (+)  EKG: QTc 505  Supportive care  f/u inflammation labs every 3rd day  Supp O2 PRN via NC if desaturates
CXR - b/l extensive patchy opacities c/w multifocal pneumonia   Saturating well on RA  Covid-19 PCR (+)  EKG: QTc 505  Supportive care  f/u inflammation labs every 3rd day  Supp O2 PRN via NC if desaturates
CXR - b/l extensive patchy opacities c/w multifocal pneumonia   Saturating well on RA  Covid-19 PCR (+)  EKG: QTc 505  f/u inflammation labs every 3rd day  Supp O2 PRN via NC if desaturates  Ordered f/u CXR 4/25; will check procalcitonin in AM
CXR - b/l extensive patchy opacities c/w multifocal pneumonia   doing better  Saturating stably on RA  Covid-19 PCR (+)  EKG: QTc 505  f/u inflammation labs every 3rd day  Supp O2 PRN via NC if desaturates  CXR 4/25 is unchanged
CXR - b/l extensive patchy opacities c/w multifocal pneumonia   doing better  Saturating stably on RA  Covid-19 PCR (+)  EKG: QTc 505  f/u inflammation labs every 3rd day  Supp O2 PRN via NC if desaturates  CXR 4/25 is unchanged  doing better overall
CXR - b/l extensive patchy opacities c/w multifocal pneumonia - my reading  95% on RA  Covid-19 PCR (+)  EKG: QTc 505  Supportive care  ID c/s in AM  f/u inflammation labs   Supp O2 PRN via NC if desaturates  ProBNP pending

## 2020-04-30 NOTE — PROGRESS NOTE ADULT - PROBLEM SELECTOR PLAN 9
1.  Name of PCP: Rocky Rahman [phone]
1.  Name of PCP: Rocky Rahman [phone]  2.  PCP Contacted on Admission: [ ] Y    [ ] N    3.  PCP contacted at Discharge: [ ] Y    [ ] N    [ ] N/A  4.  Post-Discharge Appointment Date and Location:  5.  Summary of Handoff given to PCP:
1.  Name of PCP: Rocky Rahman [phone]  2.  PCP Contacted on Admission: [ ] Y    [ ] N    3.  PCP contacted at Discharge: [ ] Y    [ ] N    [ ] N/A  4.  Post-Discharge Appointment Date and Location:  5.  Summary of Handoff given to PCP:
1.  Name of PCP: Rocky Rahman [phone]  2.  PCP Contacted on Admission: [ ] Y    [ ] N    3.  PCP contacted at Discharge: [ ] Y    [ ] N    [ ] N/A  4.  Post-Discharge Appointment Date and Location:  5.  Summary of Handoff given to PCP:
1.  Name of PCP: Rocky Rahman [phone]  2.  PCP Contacted on Admission: [ ] Y    [ ] N    3.  PCP contacted at Discharge: [ ] Y    [ ] N    [ ] N/A  4.  Post-Discharge Appointment Date and Location:  5.  Summary of Handoff given to PCP:
1.  Name of PCP: Rocky Rahman [phone]  2.  PCP Contacted on Admission: [ ] Y    [ ] N    3.  PCP contacted at Discharge: [ ] Y    [ ] N    [ ] N/A  4.  Post-Discharge Appointment Date and Location:  5.  Summary of Handoff given to PCP:
1.  Name of PCP: Rocky Rahman [phone]
1.  Name of PCP: Rocky Rahman [phone]  2.  PCP Contacted on Admission: [ ] Y    [ ] N    3.  PCP contacted at Discharge: [ ] Y    [ ] N    [ ] N/A  4.  Post-Discharge Appointment Date and Location:  5.  Summary of Handoff given to PCP:
1.  Name of PCP: Rocky Rahman [phone]  2.  PCP Contacted on Admission: [ ] Y    [ ] N    3.  PCP contacted at Discharge: [ ] Y    [ ] N    [ ] N/A  4.  Post-Discharge Appointment Date and Location:  5.  Summary of Handoff given to PCP:

## 2020-04-30 NOTE — PROGRESS NOTE ADULT - PROBLEM SELECTOR PLAN 8
Eliquis
Eliquis 2.5 mg bid
Heparin sq DVT ppx
Heparin sq DVT ppx

## 2020-04-30 NOTE — PROGRESS NOTE ADULT - PROBLEM SELECTOR PROBLEM 4
Hypothermia, initial encounter

## 2020-04-30 NOTE — PROGRESS NOTE ADULT - PROBLEM SELECTOR PLAN 4
TSH is nl  Hot packs  Treat UTI   VS q6h
TSH is nl  Hot packs prn  Treat UTI   Improved
TSH added on   Hot packs  Treat UTI   VS q6h

## 2020-04-30 NOTE — PROGRESS NOTE ADULT - PROBLEM SELECTOR PLAN 2
UA (+) Nitrite; Reports increased urgency; Bandemia 8%  Cont Ceftriaxone IV  UCx growing > 100K GNR
UA (+) Nitrite; Reports increased urgency; Bandemia 8%  Cont Ceftriaxone IV  UCx growing > 100K non-ESBL E coli
UA (+) Nitrite; Reports increased urgency; Bandemia 8%  Cont Ceftriaxone IV; total of 7 days antibiotic therapy  UCx growing > 100K non-ESBL E coli
UA (+) Nitrite; Reports increased urgency; Bandemia 8%  Cont Ceftriaxone IV; total of 7 days antibiotic therapy  UCx growing > 100K non-ESBL E coli
UA (+) Nitrite; Reports increased urgency; Bandemia 8%  Cont Ceftriaxone IV; total of 7 days antibiotic therapy, last day is 4/29  UCx growing > 100K non-ESBL E coli
UA (+) Nitrite; Reports increased urgency; Bandemia 8%  Started Ceftriaxone IV  UCx ordered

## 2020-04-30 NOTE — PROGRESS NOTE ADULT - SUBJECTIVE AND OBJECTIVE BOX
Awake but confused  Not agitated    Vital Signs Last 24 Hrs  T(C): 36.4 (27 Apr 2020 10:10), Max: 36.7 (27 Apr 2020 05:30)  T(F): 97.5 (27 Apr 2020 10:10), Max: 98 (27 Apr 2020 05:30)  HR: 73 (27 Apr 2020 10:10) (71 - 91)  BP: 151/83 (27 Apr 2020 10:10) (123/85 - 151/84)  BP(mean): --  RR: 18 (27 Apr 2020 10:10) (18 - 20)  SpO2: 96% (27 Apr 2020 10:10) (94% - 98%)    I&O's Summary    04-26-20 @ 07:01  -  04-27-20 @ 07:00  --------------------------------------------------------  IN: 250 mL / OUT: 450 mL / NET: -200 mL        PHYSICAL EXAM:  GENERAL: NAD  HEAD:  Atraumatic, Normocephalic  EYES: EOMI, PERRLA, conjunctiva and sclera clear  NECK: Supple, No JVD  CHEST/LUNG: Clear to auscultation bilaterally; No wheeze  HEART: Regular rate and rhythm; No murmurs, rubs, or gallops  ABDOMEN: Soft, Nontender, Nondistended; Bowel sounds present  EXTREMITIES:  2+ Peripheral Pulses, No clubbing, cyanosis, or edema  PSYCH: awake but confused  NEUROLOGY: non-focal, A+O x 1  SKIN: No rashes or lesions    LABS:                        10.2   14.83 )-----------( 379      ( 26 Apr 2020 06:00 )             31.8     04-27    145  |  112<H>  |  30<H>  ----------------------------<  117<H>  3.9   |  22  |  1.30    Ca    8.9      27 Apr 2020 06:33  Phos  2.3     04-27  Mg     2.1     04-27    TPro  7.0  /  Alb  2.8<L>  /  TBili  0.5  /  DBili  x   /  AST  35  /  ALT  47<H>  /  AlkPhos  73  04-26      CAPILLARY BLOOD GLUCOSE                RADIOLOGY & ADDITIONAL TESTS:    Imaging Personally Reviewed:  [x] YES  [ ] NO    Consultant(s) Notes Reviewed:  [x] YES  [ ] NO
Awake but remains mildly confused  Not agitated    Vital Signs Last 24 Hrs  T(C): 36.6 (28 Apr 2020 12:28), Max: 36.7 (27 Apr 2020 22:00)  T(F): 97.8 (28 Apr 2020 12:28), Max: 98.1 (27 Apr 2020 22:00)  HR: 81 (28 Apr 2020 12:28) (81 - 85)  BP: 138/70 (28 Apr 2020 12:28) (138/70 - 142/80)  BP(mean): --  RR: 18 (28 Apr 2020 12:28) (18 - 18)  SpO2: 97% (28 Apr 2020 12:28) (97% - 97%)    I&O's Summary          PHYSICAL EXAM:  GENERAL: NAD  HEAD:  Atraumatic, Normocephalic  EYES: EOMI, PERRLA, conjunctiva and sclera clear  NECK: Supple, No JVD  CHEST/LUNG: Clear to auscultation bilaterally; No wheeze  HEART: Irregular rate and rhythm; No murmurs, rubs, or gallops  ABDOMEN: Soft, Nontender, Nondistended; Bowel sounds present  EXTREMITIES:  2+ Peripheral Pulses, No clubbing, cyanosis, or edema  PSYCH: awake but confused  NEUROLOGY: non-focal, A+O x 1-2  SKIN: No rashes or lesions    LABS:                        9.7    12.38 )-----------( 285      ( 28 Apr 2020 08:45 )             30.2     04-28    143  |  109<H>  |  29<H>  ----------------------------<  90  4.0   |  23  |  1.35<H>    Ca    8.8      28 Apr 2020 08:45  Phos  2.3     04-27  Mg     2.1     04-27    TPro  7.0  /  Alb  2.8<L>  /  TBili  0.7  /  DBili  x   /  AST  24  /  ALT  35  /  AlkPhos  79  04-28      CAPILLARY BLOOD GLUCOSE                RADIOLOGY & ADDITIONAL TESTS:    Imaging Personally Reviewed:  [x] YES  [ ] NO    Consultant(s) Notes Reviewed:  [x] YES  [ ] NO      MEDICATIONS  (STANDING):  amLODIPine   Tablet 5 milliGRAM(s) Oral daily  apixaban 5 milliGRAM(s) Oral two times a day  cefTRIAXone   IVPB 1000 milliGRAM(s) IV Intermittent every 24 hours  finasteride 5 milliGRAM(s) Oral daily  latanoprost 0.005% Ophthalmic Solution 1 Drop(s) Both EYES at bedtime  pantoprazole    Tablet 40 milliGRAM(s) Oral before breakfast  pilocarpine 1% Solution 1 Drop(s) Left EYE three times a day  tamsulosin 0.4 milliGRAM(s) Oral at bedtime  ursodiol Capsule 300 milliGRAM(s) Oral two times a day    MEDICATIONS  (PRN):  acetaminophen   Tablet .. 650 milliGRAM(s) Oral every 8 hours PRN Temp greater or equal to 38C (100.4F)      Care Discussed with Consultants/Other Providers [x] YES  [ ] NO    HEALTH ISSUES - PROBLEM Dx:  Goals of care, counseling/discussion: Goals of care, counseling/discussion  CKD (chronic kidney disease), stage III: CKD (chronic kidney disease), stage III  Atrial fibrillation, unspecified type: Atrial fibrillation, unspecified type  Hypothermia, initial encounter: Hypothermia, initial encounter  Bradycardia: Bradycardia  Acute cystitis without hematuria: Acute cystitis without hematuria  Pneumonia due to 2019-nCoV: Pneumonia due to 2019-nCoV  Discharge planning issues: Discharge planning issues  Need for prophylactic measure: Need for prophylactic measure
Hard of hearing but comfortably lying in bed  confused but not agitated  Breathing is not uncomfortable      Vital Signs Last 24 Hrs  T(C): 36.4 (24 Apr 2020 09:25), Max: 36.7 (24 Apr 2020 04:19)  T(F): 97.5 (24 Apr 2020 09:25), Max: 98.1 (24 Apr 2020 04:19)  HR: 77 (24 Apr 2020 09:25) (75 - 86)  BP: 144/67 (24 Apr 2020 09:25) (144/67 - 157/60)  BP(mean): 82 (24 Apr 2020 09:25) (82 - 82)  RR: 22 (24 Apr 2020 09:25) (22 - 22)  SpO2: 96% (24 Apr 2020 09:25) (95% - 96%)    PHYSICAL EXAM:  GENERAL: NAD, well-developed  HEAD:  Atraumatic, Normocephalic  EYES: EOMI, PERRLA, conjunctiva and sclera clear  NECK: Supple, No JVD  CHEST/LUNG: Clear to auscultation bilaterally; No wheeze  HEART: Regular rate and rhythm; No murmurs, rubs, or gallops  ABDOMEN: Soft, Nontender, Nondistended; Bowel sounds present  EXTREMITIES:  2+ Peripheral Pulses, No clubbing, cyanosis, or edema  PSYCH: awake but confused  NEUROLOGY: non-focal, A+O x 1  SKIN: No rashes or lesions    LABS:                        9.8    11.10 )-----------( 413      ( 24 Apr 2020 05:32 )             29.5     04-24    146<H>  |  113<H>  |  40<H>  ----------------------------<  147<H>  3.6   |  20<L>  |  1.54<H>    Ca    8.7      24 Apr 2020 05:32  Phos  2.1     04-24  Mg     1.8     04-24    TPro  7.1  /  Alb  2.9<L>  /  TBili  0.5  /  DBili  x   /  AST  65<H>  /  ALT  50<H>  /  AlkPhos  68  04-23      CAPILLARY BLOOD GLUCOSE                RADIOLOGY & ADDITIONAL TESTS:    Imaging Personally Reviewed:  [x] YES  [ ] NO    Consultant(s) Notes Reviewed:  [x] YES  [ ] NO      MEDICATIONS  (STANDING):  amLODIPine   Tablet 5 milliGRAM(s) Oral daily  apixaban 5 milliGRAM(s) Oral two times a day  cefTRIAXone   IVPB 1000 milliGRAM(s) IV Intermittent every 24 hours  finasteride 5 milliGRAM(s) Oral daily  latanoprost 0.005% Ophthalmic Solution 1 Drop(s) Both EYES at bedtime  pantoprazole    Tablet 40 milliGRAM(s) Oral before breakfast  pilocarpine 1% Solution 1 Drop(s) Left EYE three times a day  tamsulosin 0.4 milliGRAM(s) Oral at bedtime  ursodiol Capsule 300 milliGRAM(s) Oral two times a day    MEDICATIONS  (PRN):  acetaminophen   Tablet .. 650 milliGRAM(s) Oral every 8 hours PRN Temp greater or equal to 38C (100.4F)      Care Discussed with Consultants/Other Providers [x] YES  [ ] NO    HEALTH ISSUES - PROBLEM Dx:  Goals of care, counseling/discussion: Goals of care, counseling/discussion  CKD (chronic kidney disease), stage III: CKD (chronic kidney disease), stage III  Atrial fibrillation, unspecified type: Atrial fibrillation, unspecified type  Hypothermia, initial encounter: Hypothermia, initial encounter  Bradycardia: Bradycardia  Acute cystitis without hematuria: Acute cystitis without hematuria  Pneumonia due to 2019-nCoV: Pneumonia due to 2019-nCoV  Discharge planning issues: Discharge planning issues  Need for prophylactic measure: Need for prophylactic measure
Hard of hearing but comfortably lying in bed  confused but not agitated  Breathing is not uncomfortable    MEDICATIONS  (STANDING):  amLODIPine   Tablet 5 milliGRAM(s) Oral daily  dorzolamide 2%/timolol 0.5% Ophthalmic Solution 1 Drop(s) Both EYES two times a day  finasteride 5 milliGRAM(s) Oral daily  heparin   Injectable 5000 Unit(s) SubCutaneous every 8 hours  latanoprost 0.005% Ophthalmic Solution 1 Drop(s) Both EYES at bedtime  pantoprazole    Tablet 40 milliGRAM(s) Oral before breakfast  pilocarpine 1% Solution 1 Drop(s) Left EYE three times a day  tamsulosin 0.4 milliGRAM(s) Oral at bedtime  ursodiol Capsule 300 milliGRAM(s) Oral two times a day    MEDICATIONS  (PRN):  acetaminophen   Tablet .. 650 milliGRAM(s) Oral every 8 hours PRN Temp greater or equal to 38C (100.4F)          Vital Signs Last 24 Hrs  T(C): 34.8 (2020 20:30), Max: 34.8 (2020 20:30)  T(F): 94.7 (2020 20:30), Max: 94.7 (2020 20:30)  HR: 77 (2020 20:30) (77 - 77)  BP: 148/78 (2020 20:30) (148/78 - 148/78)  BP(mean): --  RR: 20 (2020 20:30) (20 - 20)  SpO2: 94% (2020 20:30) (94% - 94%)    PHYSICAL EXAM:  GENERAL: NAD, well-developed  HEAD:  Atraumatic, Normocephalic  EYES: EOMI, PERRLA, conjunctiva and sclera clear  NECK: Supple, No JVD  CHEST/LUNG: Clear to auscultation bilaterally; No wheeze  HEART: Regular rate and rhythm; No murmurs, rubs, or gallops  ABDOMEN: Soft, Nontender, Nondistended; Bowel sounds present  EXTREMITIES:  2+ Peripheral Pulses, No clubbing, cyanosis, or edema  PSYCH: awake but confused  NEUROLOGY: non-focal, A+O x 1  SKIN: No rashes or lesions        LABS:                        10.1   8.95  )-----------( 396      ( 2020 05:50 )             30.1         140  |  108<H>  |  37<H>  ----------------------------<  159<H>  3.4<L>   |  18<L>  |  1.35<H>    Ca    8.9      2020 05:50  Phos  2.8       Mg     1.9         TPro  7.1  /  Alb  2.9<L>  /  TBili  0.5  /  DBili  x   /  AST  65<H>  /  ALT  50<H>  /  AlkPhos  68        CAPILLARY BLOOD GLUCOSE            Urinalysis Basic - ( 2020 04:30 )    Color: YELLOW / Appearance: CLEAR / S.016 / pH: 5.5  Gluc: NEGATIVE / Ketone: NEGATIVE  / Bili: NEGATIVE / Urobili: NORMAL   Blood: TRACE / Protein: 50 / Nitrite: POSITIVE   Leuk Esterase: TRACE / RBC: 0-2 / WBC 6-10   Sq Epi: OCC / Non Sq Epi: x / Bacteria: MANY          RADIOLOGY & ADDITIONAL TESTS:  < from: CT Head No Cont (20 @ 05:00) >  IMPRESSION:   No CT evidence of acute intracranial hemorrhage,  mass effect, midline shift or acute territorial infarct.    < from: Xray Chest 1 View- PORTABLE-Urgent (20 @ 04:47) >  IMPRESSION:  Patchy/hazy bilateral opacities consistent with multifocal covid 19 pneumonia.
No fevers last night  WBC has increased to 15    Vital Signs Last 24 Hrs  T(C): 36.3 (25 Apr 2020 08:14), Max: 36.9 (25 Apr 2020 04:24)  T(F): 97.3 (25 Apr 2020 08:14), Max: 98.5 (25 Apr 2020 04:24)  HR: 89 (25 Apr 2020 08:14) (80 - 89)  BP: 155/77 (25 Apr 2020 08:14) (149/87 - 155/77)  BP(mean): 98 (25 Apr 2020 08:14) (96 - 98)  RR: 20 (25 Apr 2020 08:14) (18 - 20)  SpO2: 94% (25 Apr 2020 08:14) (94% - 95%)    I&O's Summary      PHYSICAL EXAM:  GENERAL: NAD  HEAD:  Atraumatic, Normocephalic  EYES: EOMI, PERRLA, conjunctiva and sclera clear  NECK: Supple, No JVD  CHEST/LUNG: Clear to auscultation bilaterally; No wheeze  HEART: Regular rate and rhythm; No murmurs, rubs, or gallops  ABDOMEN: Soft, Nontender, Nondistended; Bowel sounds present  EXTREMITIES:  2+ Peripheral Pulses, No clubbing, cyanosis, or edema  PSYCH: awake but confused  NEUROLOGY: non-focal, A+O x 1  SKIN: No rashes or lesions        LABS:                        9.8    15.79 )-----------( 404      ( 25 Apr 2020 06:08 )             30.9     04-25    147<H>  |  114<H>  |  37<H>  ----------------------------<  139<H>  3.8   |  20<L>  |  1.55<H>    Ca    9.2      25 Apr 2020 06:08  Phos  2.3     04-25  Mg     2.0     04-25        CAPILLARY BLOOD GLUCOSE                RADIOLOGY & ADDITIONAL TESTS:    Imaging Personally Reviewed:  [x] YES  [ ] NO    Consultant(s) Notes Reviewed:  [x] YES  [ ] NO
Patient denies chest pain, shortness of breath, palpitations or dizziness.   Heart rate trend slightly improved this morning.  Still with abril episodes to the 30-40's but remains asymptomatic. Pauses < 2 seconds.   Patient on anticoagulation.     Vital Signs Last 24 Hrs  T(C): 33.9 (23 Apr 2020 14:41), Max: 34.8 (22 Apr 2020 20:30)  T(F): 93 (23 Apr 2020 14:41), Max: 94.7 (22 Apr 2020 20:30)  HR: 86 (23 Apr 2020 14:41) (77 - 86)  BP: 151/77 (23 Apr 2020 14:41) (148/78 - 151/77)  BP(mean): --  RR: 22 (23 Apr 2020 14:41) (20 - 22)  SpO2: 95% (23 Apr 2020 14:41) (94% - 95%) Room air      Telemetry:  Atrial fibrillation with heart rate trends 30-80's.  Pauses < 2 seconds.     MEDICATIONS  (STANDING):  amLODIPine   Tablet 5 milliGRAM(s) Oral daily  apixaban 5 milliGRAM(s) Oral two times a day  cefTRIAXone   IVPB 1000 milliGRAM(s) IV Intermittent every 24 hours  finasteride 5 milliGRAM(s) Oral daily  latanoprost 0.005% Ophthalmic Solution 1 Drop(s) Both EYES at bedtime  pantoprazole    Tablet 40 milliGRAM(s) Oral before breakfast  pilocarpine 1% Solution 1 Drop(s) Left EYE three times a day  sodium chloride 0.45%. 1000 milliLiter(s) (50 mL/Hr) IV Continuous <Continuous>  tamsulosin 0.4 milliGRAM(s) Oral at bedtime  ursodiol Capsule 300 milliGRAM(s) Oral two times a day    MEDICATIONS  (PRN):  acetaminophen   Tablet .. 650 milliGRAM(s) Oral every 8 hours PRN Temp greater or equal to 38C (100.4F)          Physical exam:   Gen- awake alert NAD  Resp- decreased breath sounds lower lobes. No wheezing  CV- S1 and S2 irregular irregular No murmurs, gallops or rubs  ABD- soft obese +umbilical hernia + bowel sounds  EXT- trace lower extremity edema  Neuro- forgetful but more alert                            10.1   8.95  )-----------( 396      ( 23 Apr 2020 05:50 )             30.1       04-23    140  |  108<H>  |  37<H>  ----------------------------<  159<H>  3.4<L>   |  18<L>  |  1.35<H>    Ca    8.9      23 Apr 2020 05:50  Phos  2.8     04-23  Mg     1.9     04-23    TPro  7.1  /  Alb  2.9<L>  /  TBili  0.5  /  DBili  x   /  AST  65<H>  /  ALT  50<H>  /  AlkPhos  68  04-23          ECHOCARDIOGRAM;  Patient name: DAVID MCKENZIE  YOB: 1924   Age: 89 (M)   MR#: [id number]  Study Date: 4/23/2020  Location: [id number]: Bernice Taylor [facility]  Study quality: Technically good  Referring Physician: Ronald Flores MD  Blood Pressure: 148/78 mmHg  Height: 168 cm  Weight: 96 kg  BSA: 2.1 m2  ------------------------------------------------------------------------  PROCEDURE: Transthoracic echocardiogram with 2-D, M-Mode  and complete spectral and color flow Doppler.  INDICATION: Unspecified atrial fibrillation (I48.91)  ------------------------------------------------------------------------  DIMENSIONS:  Dimensions:     Normal Values:  LA:     3.1 cm    2.0 - 4.0 cm  Ao:     3.3 cm    2.0 - 3.8 cm  SEPTUM: 1.0 cm    0.6 - 1.2 cm  PWT:    1.0 cm    0.6 - 1.1 cm  LVIDd:  4.2 cm    3.0 - 5.6 cm  LVIDs:  2.9 cm    1.8 - 4.0 cm  Derived Variables:  LVMI: 67 g/m2  RWT: 0.47  Fractional short: 31 %  Ejection Fraction (Teicholtz): 59 %  ------------------------------------------------------------------------  OBSERVATIONS:  Mitral Valve: Mitral annular calcification, otherwise  normal mitral valve. Mild mitral regurgitation.  Aortic Root: Normal aortic root.  Aortic Valve: Calcified trileaflet aortic valve with mildly  decreased opening. Minimal aortic regurgitation.  Left Atrium: Normal left atrium.  Left Ventricle: Normal left ventricular systolic function.  No segmental wall motion abnormalities. Increased relative  wall thickness with normal left ventricular mass index,  consistent with concentric left ventricular remodeling.  Right Heart: Normal right atrium. Normal right ventricular  size and function. Normal tricuspid valve. Mild tricuspid  regurgitation. Normal pulmonic valve. Minimal pulmonic  regurgitation.  Pericardium/PleuraNormal pericardium with no pericardial  effusion.  Hemodynamic: Estimated right ventricular systolic pressure  equals 41 mm Hg, assuming right atrial pressure equals 10  mm Hg, consistent with mild pulmonary hypertension.  ------------------------------------------------------------------------  CONCLUSIONS:  1. Calcified trileaflet aortic valve with mildly decreased  opening.  2. Normal left ventricular systolic function. No segmental  wall motion abnormalities.  3. Normal right ventricular size and function.  4. Normal tricuspid valve. Mild tricuspid regurgitation.  5. Estimated pulmonary artery systolic pressure equals 41  mm Hg, assuming right atrial pressure equals 10  mm Hg,  consistent with mild pulmonary hypertension.  ------------------------------------------------------------------------  Confirmed on  4/23/2020 - 13:20:08 by Annabel Gandhi M.D. RPVI  ------------------------------------------------------------------------
Patient is a 89y old  Male who presents with a chief complaint of SOB (28 Apr 2020 15:46)      SUBJECTIVE / OVERNIGHT EVENTS:  feeling better  no cp, no sob, no n/v/d. no abdominal pain.  no headache, no dizziness.   on room air  "you think I am doing okay?"      Vital Signs Last 24 Hrs  T(C): 36.4 (28 Apr 2020 22:46), Max: 36.4 (28 Apr 2020 22:46)  T(F): 97.6 (28 Apr 2020 22:46), Max: 97.6 (28 Apr 2020 22:46)  HR: 98 (29 Apr 2020 08:05) (91 - 100)  BP: 150/65 (29 Apr 2020 08:05) (138/78 - 150/65)  BP(mean): --  RR: 18 (29 Apr 2020 08:05) (18 - 18)  SpO2: 97% (29 Apr 2020 08:05) (97% - 97%)  I&O's Summary    28 Apr 2020 07:01  -  29 Apr 2020 07:00  --------------------------------------------------------  IN: 0 mL / OUT: 200 mL / NET: -200 mL      PHYSICAL EXAM:  GENERAL: NAD, Comfortable, on room air  HEAD:  Atraumatic, Normocephalic  EYES: EOMI, PERRLA, conjunctiva and sclera clear  NECK: Supple, No JVD  CHEST/LUNG: Clear to auscultation bilaterally; No wheeze  HEART: Regular rate and rhythm; No murmurs, rubs, or gallops  ABDOMEN: Soft, Nontender, Nondistended; Bowel sounds present  Neuro: AAO x 2, no focal deficit, 5/5 b/l extremities  EXTREMITIES:  2+ Peripheral Pulses, No clubbing, cyanosis, or edema  SKIN: No rashes or lesions      LABS:                        10.1   10.66 )-----------( 245      ( 29 Apr 2020 05:08 )             31.8     04-29    142  |  108<H>  |  27<H>  ----------------------------<  102<H>  4.0   |  22  |  1.37<H>    Ca    9.0      29 Apr 2020 05:08  Phos  2.7     04-29  Mg     2.1     04-29    TPro  6.9  /  Alb  2.6<L>  /  TBili  0.8  /  DBili  x   /  AST  25  /  ALT  32  /  AlkPhos  80  04-29      CAPILLARY BLOOD GLUCOSE                RADIOLOGY & ADDITIONAL TESTS:    Imaging Personally Reviewed:  [x] YES  [ ] NO    Consultant(s) Notes Reviewed:  [x] YES  [ ] NO      MEDICATIONS  (STANDING):  amLODIPine   Tablet 5 milliGRAM(s) Oral daily  apixaban 5 milliGRAM(s) Oral two times a day  finasteride 5 milliGRAM(s) Oral daily  latanoprost 0.005% Ophthalmic Solution 1 Drop(s) Both EYES at bedtime  pantoprazole    Tablet 40 milliGRAM(s) Oral before breakfast  pilocarpine 1% Solution 1 Drop(s) Left EYE three times a day  tamsulosin 0.4 milliGRAM(s) Oral at bedtime  ursodiol Capsule 300 milliGRAM(s) Oral two times a day    MEDICATIONS  (PRN):  acetaminophen   Tablet .. 650 milliGRAM(s) Oral every 8 hours PRN Temp greater or equal to 38C (100.4F)      Care Discussed with Consultants/Other Providers [x] YES  [ ] NO    HEALTH ISSUES - PROBLEM Dx:  Goals of care, counseling/discussion: Goals of care, counseling/discussion  CKD (chronic kidney disease), stage III: CKD (chronic kidney disease), stage III  Atrial fibrillation, unspecified type: Atrial fibrillation, unspecified type  Hypothermia, initial encounter: Hypothermia, initial encounter  Bradycardia: Bradycardia  Acute cystitis without hematuria: Acute cystitis without hematuria  Pneumonia due to 2019-nCoV: Pneumonia due to 2019-nCoV  Discharge planning issues: Discharge planning issues  Need for prophylactic measure: Need for prophylactic measure
Patient is a 89y old  Male who presents with a chief complaint of SOB (29 Apr 2020 14:19)      SUBJECTIVE / OVERNIGHT EVENTS:  stable overall.  awake alert. comfortable.  denied pain.  no n/v/d.   tolerating diet.   PT: rehab      Vital Signs Last 24 Hrs  T(C): 36.4 (30 Apr 2020 07:51), Max: 36.5 (29 Apr 2020 20:46)  T(F): 97.6 (30 Apr 2020 07:51), Max: 97.7 (29 Apr 2020 20:46)  HR: 84 (30 Apr 2020 07:51) (79 - 93)  BP: 137/77 (30 Apr 2020 07:51) (130/68 - 147/71)  BP(mean): --  RR: 21 (30 Apr 2020 07:51) (21 - 24)  SpO2: 97% (30 Apr 2020 07:51) (95% - 97%)  I&O's Summary    29 Apr 2020 07:01  -  30 Apr 2020 07:00  --------------------------------------------------------  IN: 0 mL / OUT: 400 mL / NET: -400 mL      PHYSICAL EXAM:  GENERAL: NAD, Comfortable, on room air  HEAD:  Atraumatic, Normocephalic  EYES: EOMI, PERRLA, conjunctiva and sclera clear  NECK: Supple, No JVD  CHEST/LUNG: Clear to auscultation bilaterally; No wheeze  HEART: Regular rate and rhythm; No murmurs, rubs, or gallops  ABDOMEN: Soft, Nontender, Nondistended; Bowel sounds present  Neuro: AAO x 2, no focal deficit, 5/5 b/l extremities  EXTREMITIES:  2+ Peripheral Pulses, No clubbing, cyanosis, or edema  SKIN: No rashes or lesions      LABS:                        8.7    8.60  )-----------( 217      ( 30 Apr 2020 06:20 )             27.0     04-30    142  |  108<H>  |  35<H>  ----------------------------<  141<H>  3.9   |  22  |  1.54<H>    Ca    8.5      30 Apr 2020 06:20  Phos  2.7     04-29  Mg     2.0     04-30    TPro  6.9  /  Alb  2.6<L>  /  TBili  0.8  /  DBili  x   /  AST  25  /  ALT  32  /  AlkPhos  80  04-29      CAPILLARY BLOOD GLUCOSE                RADIOLOGY & ADDITIONAL TESTS:    Imaging Personally Reviewed:  [x] YES  [ ] NO    Consultant(s) Notes Reviewed:  [x] YES  [ ] NO      MEDICATIONS  (STANDING):  amLODIPine   Tablet 5 milliGRAM(s) Oral daily  apixaban 5 milliGRAM(s) Oral two times a day  finasteride 5 milliGRAM(s) Oral daily  latanoprost 0.005% Ophthalmic Solution 1 Drop(s) Both EYES at bedtime  pantoprazole    Tablet 40 milliGRAM(s) Oral before breakfast  pilocarpine 1% Solution 1 Drop(s) Left EYE three times a day  tamsulosin 0.4 milliGRAM(s) Oral at bedtime  ursodiol Capsule 300 milliGRAM(s) Oral two times a day    MEDICATIONS  (PRN):  acetaminophen   Tablet .. 650 milliGRAM(s) Oral every 8 hours PRN Temp greater or equal to 38C (100.4F)      Care Discussed with Consultants/Other Providers [x] YES  [ ] NO    HEALTH ISSUES - PROBLEM Dx:  Goals of care, counseling/discussion: Goals of care, counseling/discussion  CKD (chronic kidney disease), stage III: CKD (chronic kidney disease), stage III  Atrial fibrillation, unspecified type: Atrial fibrillation, unspecified type  Hypothermia, initial encounter: Hypothermia, initial encounter  Bradycardia: Bradycardia  Acute cystitis without hematuria: Acute cystitis without hematuria  Pneumonia due to 2019-nCoV: Pneumonia due to 2019-nCoV  Discharge planning issues: Discharge planning issues  Need for prophylactic measure: Need for prophylactic measure
Saturating stably on RA  No fevers recorded last night    Vital Signs Last 24 Hrs  T(C): 37 (25 Apr 2020 16:22), Max: 37 (25 Apr 2020 16:22)  T(F): 98.6 (25 Apr 2020 16:22), Max: 98.6 (25 Apr 2020 16:22)  HR: 83 (25 Apr 2020 16:22) (83 - 83)  BP: 140/89 (25 Apr 2020 16:22) (140/89 - 140/89)  BP(mean): 101 (25 Apr 2020 16:22) (101 - 101)  RR: 22 (25 Apr 2020 16:22) (22 - 22)  SpO2: 95% (25 Apr 2020 16:22) (95% - 95%)    I&O's Summary      PHYSICAL EXAM:  GENERAL: NAD  HEAD:  Atraumatic, Normocephalic  EYES: EOMI, PERRLA, conjunctiva and sclera clear  NECK: Supple, No JVD  CHEST/LUNG: Clear to auscultation bilaterally; No wheeze  HEART: Regular rate and rhythm; No murmurs, rubs, or gallops  ABDOMEN: Soft, Nontender, Nondistended; Bowel sounds present  EXTREMITIES:  2+ Peripheral Pulses, No clubbing, cyanosis, or edema  PSYCH: awake but confused  NEUROLOGY: non-focal, A+O x 1  SKIN: No rashes or lesions    LABS:                        10.2   14.83 )-----------( 379      ( 26 Apr 2020 06:00 )             31.8     04-26    149<H>  |  117<H>  |  32<H>  ----------------------------<  137<H>  3.7   |  22  |  1.28    Ca    9.0      26 Apr 2020 06:00  Phos  2.6     04-26  Mg     2.1     04-26    TPro  7.0  /  Alb  2.8<L>  /  TBili  0.5  /  DBili  x   /  AST  35  /  ALT  47<H>  /  AlkPhos  73  04-26      CAPILLARY BLOOD GLUCOSE                RADIOLOGY & ADDITIONAL TESTS:    Imaging Personally Reviewed:  [x] YES  [ ] NO    Consultant(s) Notes Reviewed:  [x] YES  [ ] NO
Patient is a 89y old  Male who presents with a chief complaint of SOB (2020 10:17)      SUBJECTIVE / OVERNIGHT EVENTS:  Patient resting in bed- need to shout for him to hear.  Patient is oriented but differs to daughter to make decision at this time.  Explain we are treating hm for UTI with Antibiotics.    MEDICATIONS  (STANDING):  amLODIPine   Tablet 5 milliGRAM(s) Oral daily  dorzolamide 2%/timolol 0.5% Ophthalmic Solution 1 Drop(s) Both EYES two times a day  finasteride 5 milliGRAM(s) Oral daily  heparin   Injectable 5000 Unit(s) SubCutaneous every 8 hours  latanoprost 0.005% Ophthalmic Solution 1 Drop(s) Both EYES at bedtime  pantoprazole    Tablet 40 milliGRAM(s) Oral before breakfast  pilocarpine 1% Solution 1 Drop(s) Left EYE three times a day  tamsulosin 0.4 milliGRAM(s) Oral at bedtime  ursodiol Capsule 300 milliGRAM(s) Oral two times a day    MEDICATIONS  (PRN):  acetaminophen   Tablet .. 650 milliGRAM(s) Oral every 8 hours PRN Temp greater or equal to 38C (100.4F)          POCT Blood Glucose.: 149 mg/dL (2020 03:30)    Vital Signs Last 24 Hrs  T(C): 33.5 (2020 08:51), Max: 34.6 (2020 07:18)  T(F): 92.3 (2020 08:51), Max: 94.2 (2020 07:18)  HR: 60 (2020 08:51) (60 - 73)  BP: 152/68 (2020 08:51) (131/63 - 176/76)  BP(mean): --  RR: 18 (2020 08:51) (15 - 25)  SpO2: 94% (2020 08:51) (91% - 98%)    PHYSICAL EXAM:  GENERAL: NAD, well-developed  HEAD:  Atraumatic, Normocephalic  EYES: EOMI, PERRLA, conjunctiva and sclera clear  NECK: Supple, No JVD  CHEST/LUNG: Clear to auscultation bilaterally; No wheeze  HEART: Regular rate and rhythm; No murmurs, rubs, or gallops  ABDOMEN: Soft, Nontender, Nondistended; Bowel sounds present  EXTREMITIES:  2+ Peripheral Pulses, No clubbing, cyanosis, or edema  PSYCH: Alert   NEUROLOGY: non-focal  SKIN: No rashes or lesions    LABS:                        10.2   6.94  )-----------( 373      ( 2020 04:00 )             29.4         139  |  105  |  42<H>  ----------------------------<  159<H>  3.5   |  20<L>  |  1.47<H>    Ca    9.0      2020 04:00  Phos  3.0         TPro  7.5  /  Alb  3.3  /  TBili  0.6  /  DBili  x   /  AST  45<H>  /  ALT  37  /  AlkPhos  65            Urinalysis Basic - ( 2020 04:30 )    Color: YELLOW / Appearance: CLEAR / S.016 / pH: 5.5  Gluc: NEGATIVE / Ketone: NEGATIVE  / Bili: NEGATIVE / Urobili: NORMAL   Blood: TRACE / Protein: 50 / Nitrite: POSITIVE   Leuk Esterase: TRACE / RBC: 0-2 / WBC 6-10   Sq Epi: OCC / Non Sq Epi: x / Bacteria: MANY        RADIOLOGY & ADDITIONAL TESTS:  < from: CT Head No Cont (20 @ 05:00) >  IMPRESSION:   No CT evidence of acute intracranial hemorrhage,  mass effect, midline shift or acute territorial infarct.    < from: Xray Chest 1 View- PORTABLE-Urgent (20 @ 04:47) >  IMPRESSION:  Patchy/hazy bilateral opacities consistent with multifocal covid 19 pneumonia.

## 2020-04-30 NOTE — PROGRESS NOTE ADULT - PROBLEM SELECTOR PLAN 6
Likely CKD III  avoid nephrotoxins   monitor renal function
Likely CKD III  f/u bladder scan  avoid nephrotoxins   monitor renal function
Likely CKD III  f/u bladder scan  avoid nephrotoxins   monitor renal function

## 2020-05-01 ENCOUNTER — TRANSCRIPTION ENCOUNTER (OUTPATIENT)
Age: 85
End: 2020-05-01

## 2020-05-01 VITALS
HEART RATE: 82 BPM | OXYGEN SATURATION: 96 % | SYSTOLIC BLOOD PRESSURE: 142 MMHG | TEMPERATURE: 98 F | DIASTOLIC BLOOD PRESSURE: 66 MMHG | RESPIRATION RATE: 20 BRPM

## 2020-05-01 LAB
ANION GAP SERPL CALC-SCNC: 8 MMO/L — SIGNIFICANT CHANGE UP (ref 7–14)
BUN SERPL-MCNC: 32 MG/DL — HIGH (ref 7–23)
CALCIUM SERPL-MCNC: 8.8 MG/DL — SIGNIFICANT CHANGE UP (ref 8.4–10.5)
CHLORIDE SERPL-SCNC: 103 MMOL/L — SIGNIFICANT CHANGE UP (ref 98–107)
CO2 SERPL-SCNC: 23 MMOL/L — SIGNIFICANT CHANGE UP (ref 22–31)
CREAT SERPL-MCNC: 1.43 MG/DL — HIGH (ref 0.5–1.3)
GLUCOSE SERPL-MCNC: 104 MG/DL — HIGH (ref 70–99)
HCT VFR BLD CALC: 30 % — LOW (ref 39–50)
HGB BLD-MCNC: 9.4 G/DL — LOW (ref 13–17)
MAGNESIUM SERPL-MCNC: 2 MG/DL — SIGNIFICANT CHANGE UP (ref 1.6–2.6)
MCHC RBC-ENTMCNC: 31.3 % — LOW (ref 32–36)
MCHC RBC-ENTMCNC: 31.5 PG — SIGNIFICANT CHANGE UP (ref 27–34)
MCV RBC AUTO: 100.7 FL — HIGH (ref 80–100)
NRBC # FLD: 0.03 K/UL — SIGNIFICANT CHANGE UP (ref 0–0)
PLATELET # BLD AUTO: 261 K/UL — SIGNIFICANT CHANGE UP (ref 150–400)
PMV BLD: 12.2 FL — SIGNIFICANT CHANGE UP (ref 7–13)
POTASSIUM SERPL-MCNC: 4.3 MMOL/L — SIGNIFICANT CHANGE UP (ref 3.5–5.3)
POTASSIUM SERPL-SCNC: 4.3 MMOL/L — SIGNIFICANT CHANGE UP (ref 3.5–5.3)
RBC # BLD: 2.98 M/UL — LOW (ref 4.2–5.8)
RBC # FLD: 19.6 % — HIGH (ref 10.3–14.5)
SODIUM SERPL-SCNC: 134 MMOL/L — LOW (ref 135–145)
WBC # BLD: 7.57 K/UL — SIGNIFICANT CHANGE UP (ref 3.8–10.5)
WBC # FLD AUTO: 7.57 K/UL — SIGNIFICANT CHANGE UP (ref 3.8–10.5)

## 2020-05-01 RX ADMIN — APIXABAN 2.5 MILLIGRAM(S): 2.5 TABLET, FILM COATED ORAL at 06:28

## 2020-05-01 RX ADMIN — Medication 1 DROP(S): at 06:27

## 2020-05-01 RX ADMIN — URSODIOL 300 MILLIGRAM(S): 250 TABLET, FILM COATED ORAL at 06:27

## 2020-05-01 RX ADMIN — AMLODIPINE BESYLATE 5 MILLIGRAM(S): 2.5 TABLET ORAL at 06:27

## 2020-05-01 RX ADMIN — Medication 1 DROP(S): at 12:11

## 2020-05-01 RX ADMIN — PANTOPRAZOLE SODIUM 40 MILLIGRAM(S): 20 TABLET, DELAYED RELEASE ORAL at 06:28

## 2020-05-01 RX ADMIN — FINASTERIDE 5 MILLIGRAM(S): 5 TABLET, FILM COATED ORAL at 12:10

## 2020-05-01 NOTE — DISCHARGE NOTE NURSING/CASE MANAGEMENT/SOCIAL WORK - PATIENT PORTAL LINK FT
You can access the FollowMyHealth Patient Portal offered by [facility] by registering at the following website: [email or web address]. By joining [employer]’s FollowMyHealth portal, you will also be able to view your health information using other applications (apps) compatible with our system.

## 2020-05-01 NOTE — DISCHARGE NOTE NURSING/CASE MANAGEMENT/SOCIAL WORK - NSDCPNINST_GEN_ALL_CORE
89y/o male admitted with respiratory infection. Pt treated with meds listed. Symptoms improved. Pt is optimized for transfer to rehab

## 2020-05-11 ENCOUNTER — RESULT REVIEW (OUTPATIENT)
Age: 85
End: 2020-05-11

## 2020-05-14 ENCOUNTER — RESULT REVIEW (OUTPATIENT)
Age: 85
End: 2020-05-14

## 2020-05-26 ENCOUNTER — OUTPATIENT (OUTPATIENT)
Dept: OUTPATIENT SERVICES | Facility: HOSPITAL | Age: 85
LOS: 1 days | End: 2020-05-26
Payer: MEDICARE

## 2020-05-26 ENCOUNTER — RESULT REVIEW (OUTPATIENT)
Age: 85
End: 2020-05-26

## 2020-05-26 DIAGNOSIS — I82.409 ACUTE EMBOLISM AND THROMBOSIS OF UNSPECIFIED DEEP VEINS OF UNSPECIFIED LOWER EXTREMITY: ICD-10-CM

## 2020-05-26 DIAGNOSIS — S61.412A LACERATION WITHOUT FOREIGN BODY OF LEFT HAND, INITIAL ENCOUNTER: Chronic | ICD-10-CM

## 2020-05-26 DIAGNOSIS — M95.0 ACQUIRED DEFORMITY OF NOSE: Chronic | ICD-10-CM

## 2020-05-26 PROCEDURE — 93970 EXTREMITY STUDY: CPT | Mod: 26

## 2020-05-26 PROCEDURE — 93970 EXTREMITY STUDY: CPT

## 2020-07-03 ENCOUNTER — INPATIENT (INPATIENT)
Facility: HOSPITAL | Age: 85
LOS: 4 days | Discharge: NURSING, REHAB OR LONG-TERM CARE | DRG: 291 | End: 2020-07-08
Attending: INTERNAL MEDICINE | Admitting: INTERNAL MEDICINE
Payer: MEDICARE

## 2020-07-03 VITALS
HEART RATE: 104 BPM | DIASTOLIC BLOOD PRESSURE: 92 MMHG | SYSTOLIC BLOOD PRESSURE: 155 MMHG | RESPIRATION RATE: 20 BRPM | OXYGEN SATURATION: 100 % | TEMPERATURE: 100 F

## 2020-07-03 DIAGNOSIS — I50.9 HEART FAILURE, UNSPECIFIED: ICD-10-CM

## 2020-07-03 DIAGNOSIS — M95.0 ACQUIRED DEFORMITY OF NOSE: Chronic | ICD-10-CM

## 2020-07-03 DIAGNOSIS — N39.0 URINARY TRACT INFECTION, SITE NOT SPECIFIED: ICD-10-CM

## 2020-07-03 DIAGNOSIS — N18.3 CHRONIC KIDNEY DISEASE, STAGE 3 (MODERATE): ICD-10-CM

## 2020-07-03 DIAGNOSIS — D46.9 MYELODYSPLASTIC SYNDROME, UNSPECIFIED: ICD-10-CM

## 2020-07-03 DIAGNOSIS — N40.0 BENIGN PROSTATIC HYPERPLASIA WITHOUT LOWER URINARY TRACT SYMPTOMS: ICD-10-CM

## 2020-07-03 DIAGNOSIS — I48.91 UNSPECIFIED ATRIAL FIBRILLATION: ICD-10-CM

## 2020-07-03 DIAGNOSIS — S61.412A LACERATION WITHOUT FOREIGN BODY OF LEFT HAND, INITIAL ENCOUNTER: Chronic | ICD-10-CM

## 2020-07-03 LAB
ALBUMIN SERPL ELPH-MCNC: 3.6 G/DL — SIGNIFICANT CHANGE UP (ref 3.3–5)
ALP SERPL-CCNC: 60 U/L — SIGNIFICANT CHANGE UP (ref 40–120)
ALT FLD-CCNC: 24 U/L — SIGNIFICANT CHANGE UP (ref 10–45)
ANION GAP SERPL CALC-SCNC: 14 MMOL/L — SIGNIFICANT CHANGE UP (ref 5–17)
APPEARANCE UR: CLEAR — SIGNIFICANT CHANGE UP
APTT BLD: 29.5 SEC — SIGNIFICANT CHANGE UP (ref 27.5–35.5)
AST SERPL-CCNC: 19 U/L — SIGNIFICANT CHANGE UP (ref 10–40)
BACTERIA # UR AUTO: NEGATIVE — SIGNIFICANT CHANGE UP
BASOPHILS # BLD AUTO: 0.09 K/UL — SIGNIFICANT CHANGE UP (ref 0–0.2)
BASOPHILS NFR BLD AUTO: 0.8 % — SIGNIFICANT CHANGE UP (ref 0–2)
BILIRUB SERPL-MCNC: 0.6 MG/DL — SIGNIFICANT CHANGE UP (ref 0.2–1.2)
BILIRUB UR-MCNC: NEGATIVE — SIGNIFICANT CHANGE UP
BUN SERPL-MCNC: 27 MG/DL — HIGH (ref 7–23)
CALCIUM SERPL-MCNC: 8.7 MG/DL — SIGNIFICANT CHANGE UP (ref 8.4–10.5)
CHLORIDE SERPL-SCNC: 105 MMOL/L — SIGNIFICANT CHANGE UP (ref 96–108)
CK MB CFR SERPL CALC: 3.2 NG/ML — SIGNIFICANT CHANGE UP (ref 0–6.7)
CK SERPL-CCNC: 25 U/L — LOW (ref 30–200)
CO2 SERPL-SCNC: 21 MMOL/L — LOW (ref 22–31)
COLOR SPEC: YELLOW — SIGNIFICANT CHANGE UP
CREAT SERPL-MCNC: 1.4 MG/DL — HIGH (ref 0.5–1.3)
CRP SERPL-MCNC: 1.3 MG/DL — HIGH (ref 0–0.4)
D DIMER BLD IA.RAPID-MCNC: 592 NG/ML DDU — HIGH
DIFF PNL FLD: NEGATIVE — SIGNIFICANT CHANGE UP
EOSINOPHIL # BLD AUTO: 0.11 K/UL — SIGNIFICANT CHANGE UP (ref 0–0.5)
EOSINOPHIL NFR BLD AUTO: 1 % — SIGNIFICANT CHANGE UP (ref 0–6)
EPI CELLS # UR: 2 /HPF — SIGNIFICANT CHANGE UP
FERRITIN SERPL-MCNC: 71 NG/ML — SIGNIFICANT CHANGE UP (ref 30–400)
GAS PNL BLDV: SIGNIFICANT CHANGE UP
GLUCOSE SERPL-MCNC: 143 MG/DL — HIGH (ref 70–99)
GLUCOSE UR QL: NEGATIVE — SIGNIFICANT CHANGE UP
HCT VFR BLD CALC: 30.9 % — LOW (ref 39–50)
HGB BLD-MCNC: 9.7 G/DL — LOW (ref 13–17)
HYALINE CASTS # UR AUTO: 3 /LPF — HIGH (ref 0–2)
IMM GRANULOCYTES NFR BLD AUTO: 1 % — SIGNIFICANT CHANGE UP (ref 0–1.5)
INR BLD: 1.28 RATIO — HIGH (ref 0.88–1.16)
KETONES UR-MCNC: NEGATIVE — SIGNIFICANT CHANGE UP
LEUKOCYTE ESTERASE UR-ACNC: ABNORMAL
LYMPHOCYTES # BLD AUTO: 17.9 % — SIGNIFICANT CHANGE UP (ref 13–44)
LYMPHOCYTES # BLD AUTO: 2.05 K/UL — SIGNIFICANT CHANGE UP (ref 1–3.3)
MCHC RBC-ENTMCNC: 31.4 GM/DL — LOW (ref 32–36)
MCHC RBC-ENTMCNC: 32 PG — SIGNIFICANT CHANGE UP (ref 27–34)
MCV RBC AUTO: 102 FL — HIGH (ref 80–100)
MONOCYTES # BLD AUTO: 0.89 K/UL — SIGNIFICANT CHANGE UP (ref 0–0.9)
MONOCYTES NFR BLD AUTO: 7.8 % — SIGNIFICANT CHANGE UP (ref 2–14)
NEUTROPHILS # BLD AUTO: 8.22 K/UL — HIGH (ref 1.8–7.4)
NEUTROPHILS NFR BLD AUTO: 71.5 % — SIGNIFICANT CHANGE UP (ref 43–77)
NITRITE UR-MCNC: NEGATIVE — SIGNIFICANT CHANGE UP
NRBC # BLD: 0 /100 WBCS — SIGNIFICANT CHANGE UP (ref 0–0)
NT-PROBNP SERPL-SCNC: 4753 PG/ML — HIGH (ref 0–300)
PH UR: 6 — SIGNIFICANT CHANGE UP (ref 5–8)
PLATELET # BLD AUTO: 497 K/UL — HIGH (ref 150–400)
POTASSIUM SERPL-MCNC: 3.7 MMOL/L — SIGNIFICANT CHANGE UP (ref 3.5–5.3)
POTASSIUM SERPL-SCNC: 3.7 MMOL/L — SIGNIFICANT CHANGE UP (ref 3.5–5.3)
PROCALCITONIN SERPL-MCNC: 0.09 NG/ML — SIGNIFICANT CHANGE UP (ref 0.02–0.1)
PROCALCITONIN SERPL-MCNC: 0.1 NG/ML — SIGNIFICANT CHANGE UP (ref 0.02–0.1)
PROT SERPL-MCNC: 7.1 G/DL — SIGNIFICANT CHANGE UP (ref 6–8.3)
PROT UR-MCNC: ABNORMAL
PROTHROM AB SERPL-ACNC: 14.5 SEC — HIGH (ref 10.6–13.6)
RBC # BLD: 3.03 M/UL — LOW (ref 4.2–5.8)
RBC # FLD: 19.9 % — HIGH (ref 10.3–14.5)
RBC CASTS # UR COMP ASSIST: 5 /HPF — HIGH (ref 0–4)
SARS-COV-2 IGG SERPL QL IA: POSITIVE
SARS-COV-2 IGM SERPL IA-ACNC: 95.5 INDEX — HIGH
SARS-COV-2 RNA SPEC QL NAA+PROBE: SIGNIFICANT CHANGE UP
SODIUM SERPL-SCNC: 140 MMOL/L — SIGNIFICANT CHANGE UP (ref 135–145)
SP GR SPEC: 1.02 — SIGNIFICANT CHANGE UP (ref 1.01–1.02)
TROPONIN T, HIGH SENSITIVITY RESULT: 56 NG/L — HIGH (ref 0–51)
TROPONIN T, HIGH SENSITIVITY RESULT: 58 NG/L — HIGH (ref 0–51)
TROPONIN T, HIGH SENSITIVITY RESULT: 63 NG/L — HIGH (ref 0–51)
UROBILINOGEN FLD QL: ABNORMAL
WBC # BLD: 11.48 K/UL — HIGH (ref 3.8–10.5)
WBC # FLD AUTO: 11.48 K/UL — HIGH (ref 3.8–10.5)
WBC UR QL: 28 /HPF — HIGH (ref 0–5)

## 2020-07-03 PROCEDURE — 71275 CT ANGIOGRAPHY CHEST: CPT | Mod: 26,CS

## 2020-07-03 PROCEDURE — 71045 X-RAY EXAM CHEST 1 VIEW: CPT | Mod: 26

## 2020-07-03 PROCEDURE — 99284 EMERGENCY DEPT VISIT MOD MDM: CPT | Mod: CS,GC

## 2020-07-03 PROCEDURE — 93010 ELECTROCARDIOGRAM REPORT: CPT

## 2020-07-03 PROCEDURE — 93010 ELECTROCARDIOGRAM REPORT: CPT | Mod: GC

## 2020-07-03 RX ORDER — ALBUTEROL 90 UG/1
2 AEROSOL, METERED ORAL ONCE
Refills: 0 | Status: COMPLETED | OUTPATIENT
Start: 2020-07-03 | End: 2020-07-03

## 2020-07-03 RX ORDER — DORZOLAMIDE HYDROCHLORIDE TIMOLOL MALEATE 20; 5 MG/ML; MG/ML
1 SOLUTION/ DROPS OPHTHALMIC EVERY 12 HOURS
Refills: 0 | Status: DISCONTINUED | OUTPATIENT
Start: 2020-07-03 | End: 2020-07-08

## 2020-07-03 RX ORDER — PIPERACILLIN AND TAZOBACTAM 4; .5 G/20ML; G/20ML
3.38 INJECTION, POWDER, LYOPHILIZED, FOR SOLUTION INTRAVENOUS EVERY 8 HOURS
Refills: 0 | Status: DISCONTINUED | OUTPATIENT
Start: 2020-07-03 | End: 2020-07-03

## 2020-07-03 RX ORDER — BRIMONIDINE TARTRATE 2 MG/MG
1 SOLUTION/ DROPS OPHTHALMIC EVERY 8 HOURS
Refills: 0 | Status: DISCONTINUED | OUTPATIENT
Start: 2020-07-03 | End: 2020-07-08

## 2020-07-03 RX ORDER — URSODIOL 250 MG/1
300 TABLET, FILM COATED ORAL
Refills: 0 | Status: DISCONTINUED | OUTPATIENT
Start: 2020-07-03 | End: 2020-07-08

## 2020-07-03 RX ORDER — PIPERACILLIN AND TAZOBACTAM 4; .5 G/20ML; G/20ML
3.38 INJECTION, POWDER, LYOPHILIZED, FOR SOLUTION INTRAVENOUS EVERY 12 HOURS
Refills: 0 | Status: DISCONTINUED | OUTPATIENT
Start: 2020-07-03 | End: 2020-07-03

## 2020-07-03 RX ORDER — TAMSULOSIN HYDROCHLORIDE 0.4 MG/1
0.8 CAPSULE ORAL AT BEDTIME
Refills: 0 | Status: DISCONTINUED | OUTPATIENT
Start: 2020-07-03 | End: 2020-07-08

## 2020-07-03 RX ORDER — PIPERACILLIN AND TAZOBACTAM 4; .5 G/20ML; G/20ML
3.38 INJECTION, POWDER, LYOPHILIZED, FOR SOLUTION INTRAVENOUS ONCE
Refills: 0 | Status: COMPLETED | OUTPATIENT
Start: 2020-07-03 | End: 2020-07-03

## 2020-07-03 RX ORDER — FINASTERIDE 5 MG/1
5 TABLET, FILM COATED ORAL DAILY
Refills: 0 | Status: DISCONTINUED | OUTPATIENT
Start: 2020-07-03 | End: 2020-07-08

## 2020-07-03 RX ORDER — ACETAMINOPHEN 500 MG
650 TABLET ORAL EVERY 6 HOURS
Refills: 0 | Status: DISCONTINUED | OUTPATIENT
Start: 2020-07-03 | End: 2020-07-08

## 2020-07-03 RX ORDER — METOPROLOL TARTRATE 50 MG
12.5 TABLET ORAL EVERY 12 HOURS
Refills: 0 | Status: DISCONTINUED | OUTPATIENT
Start: 2020-07-03 | End: 2020-07-08

## 2020-07-03 RX ORDER — FUROSEMIDE 40 MG
40 TABLET ORAL ONCE
Refills: 0 | Status: COMPLETED | OUTPATIENT
Start: 2020-07-03 | End: 2020-07-03

## 2020-07-03 RX ORDER — AMLODIPINE BESYLATE 2.5 MG/1
5 TABLET ORAL DAILY
Refills: 0 | Status: DISCONTINUED | OUTPATIENT
Start: 2020-07-03 | End: 2020-07-08

## 2020-07-03 RX ORDER — PIPERACILLIN AND TAZOBACTAM 4; .5 G/20ML; G/20ML
3.38 INJECTION, POWDER, LYOPHILIZED, FOR SOLUTION INTRAVENOUS EVERY 12 HOURS
Refills: 0 | Status: COMPLETED | OUTPATIENT
Start: 2020-07-03 | End: 2020-07-08

## 2020-07-03 RX ORDER — FUROSEMIDE 40 MG
40 TABLET ORAL DAILY
Refills: 0 | Status: DISCONTINUED | OUTPATIENT
Start: 2020-07-03 | End: 2020-07-04

## 2020-07-03 RX ORDER — LATANOPROST 0.05 MG/ML
1 SOLUTION/ DROPS OPHTHALMIC; TOPICAL AT BEDTIME
Refills: 0 | Status: DISCONTINUED | OUTPATIENT
Start: 2020-07-03 | End: 2020-07-08

## 2020-07-03 RX ORDER — APIXABAN 2.5 MG/1
2.5 TABLET, FILM COATED ORAL EVERY 12 HOURS
Refills: 0 | Status: DISCONTINUED | OUTPATIENT
Start: 2020-07-03 | End: 2020-07-08

## 2020-07-03 RX ADMIN — Medication 12.5 MILLIGRAM(S): at 15:16

## 2020-07-03 RX ADMIN — BRIMONIDINE TARTRATE 1 DROP(S): 2 SOLUTION/ DROPS OPHTHALMIC at 14:00

## 2020-07-03 RX ADMIN — TAMSULOSIN HYDROCHLORIDE 0.8 MILLIGRAM(S): 0.4 CAPSULE ORAL at 21:15

## 2020-07-03 RX ADMIN — URSODIOL 300 MILLIGRAM(S): 250 TABLET, FILM COATED ORAL at 15:16

## 2020-07-03 RX ADMIN — BRIMONIDINE TARTRATE 1 DROP(S): 2 SOLUTION/ DROPS OPHTHALMIC at 21:15

## 2020-07-03 RX ADMIN — Medication 40 MILLIGRAM(S): at 15:16

## 2020-07-03 RX ADMIN — FINASTERIDE 5 MILLIGRAM(S): 5 TABLET, FILM COATED ORAL at 15:16

## 2020-07-03 RX ADMIN — ALBUTEROL 2 PUFF(S): 90 AEROSOL, METERED ORAL at 06:10

## 2020-07-03 RX ADMIN — Medication 40 MILLIGRAM(S): at 06:10

## 2020-07-03 RX ADMIN — APIXABAN 2.5 MILLIGRAM(S): 2.5 TABLET, FILM COATED ORAL at 15:16

## 2020-07-03 RX ADMIN — PIPERACILLIN AND TAZOBACTAM 200 GRAM(S): 4; .5 INJECTION, POWDER, LYOPHILIZED, FOR SOLUTION INTRAVENOUS at 07:52

## 2020-07-03 RX ADMIN — LATANOPROST 1 DROP(S): 0.05 SOLUTION/ DROPS OPHTHALMIC; TOPICAL at 21:15

## 2020-07-03 RX ADMIN — DORZOLAMIDE HYDROCHLORIDE TIMOLOL MALEATE 1 DROP(S): 20; 5 SOLUTION/ DROPS OPHTHALMIC at 21:15

## 2020-07-03 RX ADMIN — AMLODIPINE BESYLATE 5 MILLIGRAM(S): 2.5 TABLET ORAL at 17:00

## 2020-07-03 RX ADMIN — PIPERACILLIN AND TAZOBACTAM 25 GRAM(S): 4; .5 INJECTION, POWDER, LYOPHILIZED, FOR SOLUTION INTRAVENOUS at 21:16

## 2020-07-03 NOTE — ED PROVIDER NOTE - CLINICAL SUMMARY MEDICAL DECISION MAKING FREE TEXT BOX
89 yo M p/w SoB, rales on exam and pitting edema. no fevers/cough, less likely infectious and pt has recovered from COVID a few months ago. More suspicious for heart failure vs less likely ACS as pt w/o pain. Plan: Labs, CXR, lasix, admission.

## 2020-07-03 NOTE — CONSULT NOTE ADULT - ASSESSMENT
adm to [facility], notify Dr Julio  BBLkr and diuretics  fu CTA  dc rivera and str cath as needed

## 2020-07-03 NOTE — CONSULT NOTE ADULT - SUBJECTIVE AND OBJECTIVE BOX
Patient is a 89y old  Male who presents with a chief complaint of intermittent SOB past few weeks with increasing edema p new onset AF c RVR at the time of onset of C-19 illness 2 mos ago>hospitalization then rehab and recovery, but persistant AFRVR> saw Dr Julio started metoprolol 1/2x25 mg bid last week because pt was off prev Rx atenolol during covid illness with abril cardia but also was taking aricept at that time. seen 7/1 at [facility] and Rc incr to 25mg bid then again yesterday and started Lasix but pt says he did not get lasix yesterday nor this am. I called [facility], lasix was delivered, [facility] can not verify this am    HPI:above        PAST MEDICAL & SURGICAL HISTORY:  C-19>AF c RVR  HTN CCHF  MCI  Anemia  MDS (myelodysplastic syndrome)  BPH (benign prostatic hyperplasia)  CKD (chronic kidney disease) stage 3, GFR 30-59 ml/min  Choledocholithiasis  Osteomyelitis of arm  Nocturia associated with benign prostatic hypertrophy  U retention, self cath  Chronic constipation  Anemia  Glaucoma  BPH (Benign Prostatic Hypertrophy)  Hypertension  No significant past surgical history  Mohs defect of nose: 1990&#x27;s  Laceration of finger, left, with tendon: 1976  Osteomyelitis: left humerous- surgery childhood age 13  History of cataract extraction: right eye 6/11  S/P TURP  S/P Cholecystectomy      Medications:  see MAR from [facility], and above        FAMILY HISTORY:  wife C-19 illness same time as pt, but not hospitalized, also recovered also MCI SDAT  No pertinent family history in first degree relatives  2 daughters A&W  Family history of prostate cancer (Father)      Social History  non smoker no etoh, lives withwife in [facility] both have MCI and need assist c meds    REVIEW OF SYSTEMS      General:nad now co sob this am and felt faint, got confused, was unable to use phone ,but  called 911    Skin/Breast:bno co  	  Ophthalmologic: no co no change sees ok  	  ENMT:	no ch no co hears, swallows ok    Respiratory and Thorax:no cough no sp  	  Cardiovascular:	no cp np palp    Gastrointestinal:	no nvcd    Genitourinary:	no fdi but has to self cath or has lo volume    Musculoskeletal:	no a no p    Neurological:	    Psychiatric:	anxious    Hematology/Lymphatics:	    Endocrine:	bno pioly udd    Allergic/Immunologic:	  AOSN    Vital Signs Last 24 Hrs  T(C): 36.5 (03 Jul 2020 06:07), Max: 37.5 (03 Jul 2020 05:25)  T(F): 97.7 (03 Jul 2020 06:07), Max: 99.5 (03 Jul 2020 05:25)  HR: 101 (03 Jul 2020 07:55) (101 - 104)  BP: 137/90 (03 Jul 2020 07:55) (137/90 - 155/92)  BP(mean): --  RR: 22 (03 Jul 2020 07:55) (20 - 23)  SpO2: 97% (03 Jul 2020 07:55) (88% - 100%)    Physical Exam:   vss nad hr not as fadt as yestersday  H&N: nc at  no cb no tm  CV:irrg irreg  Pulm:cta L decr BS c rales R  GI:abd soft nt  :  Extrem:no pittiong, supple, better than 1 weeek ago  Skin:cdi  Vasc:h/m-, vv-  Neuro:Speechclear               Affect:approp now, gets anxious               Memory:mostly ok but gets a little confused w details               Judgment: ok               Orientation:y               Cognition:int               Sensory:gr nl               Motor: nf atmae                Gait:amb but less than prev uses wal;jker               CN:gr nl  Psych:  Other:    Labs:  CBC Full  -  ( 03 Jul 2020 06:11 )  WBC Count : 11.48 K/uL  RBC Count : 3.03 M/uL  Hemoglobin : 9.7 g/dL  Hematocrit : 30.9 %  Platelet Count - Automated : 497 K/uL  Mean Cell Volume : 102.0 fl  Mean Cell Hemoglobin : 32.0 pg  Mean Cell Hemoglobin Concentration : 31.4 gm/dL  Auto Neutrophil # : 8.22 K/uL  Auto Lymphocyte # : 2.05 K/uL  Auto Monocyte # : 0.89 K/uL  Auto Eosinophil # : 0.11 K/uL  Auto Basophil # : 0.09 K/uL  Auto Neutrophil % : 71.5 %  Auto Lymphocyte % : 17.9 %  Auto Monocyte % : 7.8 %  Auto Eosinophil % : 1.0 %  Auto Basophil % : 0.8 %      07-03    140  |  105  |  27<H>  ----------------------------<  143<H>  3.7   |  21<L>  |  1.40<H>    Ca    8.7      03 Jul 2020 06:11    TPro  7.1  /  Alb  3.6  /  TBili  0.6  /  DBili  x   /  AST  19  /  ALT  24  /  AlkPhos  60  07-03      LIVER FUNCTIONS - ( 03 Jul 2020 06:11 )  Alb: 3.6 g/dL / Pro: 7.1 g/dL / ALK PHOS: 60 U/L / ALT: 24 U/L / AST: 19 U/L / GGT: x             PT/INR - ( 03 Jul 2020 06:11 )   PT: 14.5 sec;   INR: 1.28 ratio         PTT - ( 03 Jul 2020 06:11 )  PTT:29.5 sec    HEALTH ISSUES - PROBLEM Dx:  Atrial fibrillation, unspecified type: Atrial fibrillation, unspecified type  Acute on chronic congestive heart failure, unspecified heart failure type: Acute on chronic congestive heart failure, unspecified heart failure type

## 2020-07-03 NOTE — ED ADULT NURSE REASSESSMENT NOTE - NS ED NURSE REASSESS COMMENT FT1
Pt presents to ED with cardiac device on left external chest wall.  MD Hatfield made aware and did bedside assessment.

## 2020-07-03 NOTE — CONSULT NOTE ADULT - ASSESSMENT
89M s/p COVID 19 viral infection in April with bilateral patchy opacities on CXR at time attributed to viral pneumonia  He developed AF/RVR and has been on AC and metoprolo  TTE with preserved LVF in April  No prior CT chest available: current CT with bilateral pleural effusions/compressive atelectasis. There is motion artifact. Small GG opacity on left noted in setting of motion artifact - suspect residua from prior COVID infection or CHF as opposed to acute new pneumonia  The patient's symptoms rapidly improved with diuretics, he is non toxic, afebrile and had no prodrome sugg of pneumonia  Acute on chronic CHF - ? HfPef  AF with RVR    REC    Doubt acute pneumonia, but no objection to short course abx (5 days) given age   Diurese per cardiology as tolerated  Procalcitonin ordered  Monitor O2 sats and taper nasal cannula  Continue AC 89M s/p COVID 19 viral infection in April with bilateral patchy opacities on CXR at time attributed to viral pneumonia  He developed AF/RVR and has been on AC and metoprolo  TTE with preserved LVF in April  No prior CT chest available: current CT with bilateral pleural effusions/compressive atelectasis. There is motion artifact. Small GG opacity on left noted in setting of motion artifact - suspect residua from prior COVID infection or CHF as opposed to acute new pneumonia  The patient's symptoms rapidly improved with diuretics, he is non toxic, afebrile and had no prodrome sugg of pneumonia  Acute on chronic CHF - ? HfPef  AF with RVR    REC    Doubt acute pneumonia, but no objection to short course abx (5 days) given age   Diurese per cardiology as tolerated: his symptoms are related to fluid overload and pleural effusions  Procalcitonin ordered  Monitor O2 sats and taper nasal cannula  Continue AC

## 2020-07-03 NOTE — ED PROVIDER NOTE - CARE PLAN
Principal Discharge DX:	Acute on chronic congestive heart failure, unspecified heart failure type  Secondary Diagnosis:	Urinary tract infection without hematuria, site unspecified

## 2020-07-03 NOTE — CONSULT NOTE ADULT - SUBJECTIVE AND OBJECTIVE BOX
[facility] Cardiology Consult  _________________________    Patient is a 89y old  Male who presents with a chief complaint of shortness of breath.    HPI:  89 M hx mild dementia,  anemia, MDS, pre-diabetes, htn, recent dx of AF (eliquis), BPH, glaucoma presenting with several days of Sob. Last evening he felt like he was "going to pass out".  Endorses chronic LE edema; states he was supposed to start lasix recently but "it never came" and hasn't been taking it. Was recently in [facility] for COVID found to have afib started eliquis. Denies cough, fever, cp, sore throat. States he is on keflex for a UTI, self catheterizes at home.    PAST MEDICAL & SURGICAL HISTORY:  Glaucoma  Anemia  MDS (myelodysplastic syndrome)  BPH (benign prostatic hyperplasia)  CKD (chronic kidney disease) stage 3, GFR 30-59 ml/min  Choledocholithiasis  Osteomyelitis of arm  Nocturia associated with benign prostatic hypertrophy  Gall stones  Chronic constipation  Anemia  Glaucoma  MDS (Myelodysplastic Syndrome)  BPH (Benign Prostatic Hypertrophy)  Benign Hypertension  No significant past surgical history  Mohs defect of nose: &#x27;s  Laceration of finger, left, with tendon:   Osteomyelitis: left humerous- surgery childhood age 13  History of cataract extraction: right eye   S/P TURP  S/P Cholecystectomy      MEDICATIONS  (STANDING):    MEDICATIONS  (PRN):      Allergies    Cipro (Swelling)  Cipro (Unknown)  Levaquin (Unknown)  sulfa drugs (Rash)  sulfa drugs (Unknown)    Intolerances        Social Histroy: Tobacco- , ETOH-, Illicit Drugs-    T(C): 36.5 (20 @ 06:07), Max: 37.5 (20 @ 05:25)  HR: 101 (20 @ 07:55) (101 - 104)  BP: 137/90 (20 @ 07:55) (137/90 - 155/92)  RR: 22 (20 @ 07:55) (20 - 23)  SpO2: 97% (20 @ 07:55) (88% - 100%)  I&O's Summary      Review of Systems:  Constitutional: [ ] Fever [ ] Chills [ ] Fatigue [ ] Weight change   HEENT: [ ] Blurred vision [ ] Eye Pain [ ] Headache [ ] Runny nose [ ] Sore Throat   Respiratory: [ ] Cough [ ] Wheezing [x ] Shortness of breath  Cardiovascular: [ ] Chest Pain [ ] Palpitations [ ] TRAN [ ] PND [ ] Orthopnea  Gastrointestinal: [ ] Abdominal Pain [ ] Diarrhea [ ] Constipation [ ] Hemorrhoids [ ] Nausea [ ] Vomiting  Genitourinary: [ ] Nocturia [ ] Dysuria [ ] Incontinence  Extremities: [x ] Swelling [ ] Joint Pain  Neurologic: [ ] Focal deficit [ ] Paresthesias [ ] Syncope  Lymphatic: [ ] Swelling [ ] Lymphadenopathy   Skin: [ ] Rash [ ] Ecchymoses [ ] Wounds [ ] Lesions  Psychiatry: [ ] Depression [ ] Suicidal/Homicidal Ideation [ ] Anxiety [ ] Sleep Disturbances  [x ] 10 point review of systems is otherwise negative except as mentioned above            [ ]Unable to obtain    PHYSICAL EXAM:  GENERAL: Alert, NAD  NECK: Supple  CHEST/LUNG: decreased breath sounds bibasilarly.  HEART: S1 S2 normal, RRR,  No murmurs, rubs, or gallops  ABDOMEN: Soft, Nondistended  EXTREMITIES:  +2 LE edema.      LABS:                        9.7    11.48 )-----------( 497      ( 2020 06:11 )             30.9     07-03    140  |  105  |  27<H>  ----------------------------<  143<H>  3.7   |  21<L>  |  1.40<H>    Ca    8.7      2020 06:11    TPro  7.1  /  Alb  3.6  /  TBili  0.6  /  DBili  x   /  AST  19  /  ALT  24  /  AlkPhos  60  07-03    PT/INR - ( 2020 06:11 )   PT: 14.5 sec;   INR: 1.28 ratio         PTT - ( 2020 06:11 )  PTT:29.5 sec      Serum Pro-Brain Natriuretic Peptide: 4753 pg/mL (20 @ 06:11)      Urinalysis Basic - ( 2020 06:17 )    Color: Yellow / Appearance: Clear / S.022 / pH: x  Gluc: x / Ketone: Negative  / Bili: Negative / Urobili: 2 mg/dL   Blood: x / Protein: 30 mg/dL / Nitrite: Negative   Leuk Esterase: Large / RBC: 5 /hpf / WBC 28 /HPF   Sq Epi: x / Non Sq Epi: 2 /hpf / Bacteria: Negative        MEDICATIONS  (STANDING):    MEDICATIONS  (PRN):      Troponin T, High Sensitivity Result: 56 ng/L (20 @ 06:11)      RADIOLOGY & ADDITIONAL TESTS:    Cardiology testing:  Telemetry: afib 100s

## 2020-07-03 NOTE — CONSULT NOTE ADULT - PROBLEM SELECTOR RECOMMENDATION 9
-  -lasix 40 mg iv daily  -daily weights  -check i/os  -creatinine 1.46, close to baseline.  -monitor potassium and mag, keep above 4 and 2 respectively.  -echo 4/2020- normal ef, mild pulmonary hypertension, mild AS
LASIX

## 2020-07-03 NOTE — ED ADULT NURSE NOTE - CHPI ED NUR SYMPTOMS NEG
no dizziness/no pain/no chills/no decreased eating/drinking/no tingling/no fever/no nausea/no vomiting/no weakness

## 2020-07-03 NOTE — ED PROVIDER NOTE - ATTENDING CONTRIBUTION TO CARE
pt presented with sob, BLE, feeling like he might pass out. denies cp, fever, cough. on abx for uti. on exam, pt with diffuse edema on lungs and legs, but without severe distress. labs remarkable for elevated BNP, trop 56, leukocytosis, +UA. pt received abx for uti. awaiting CTA chest to r/o PE and will be admitted for further care of suspected chf, trending trops, further infectious r/o.

## 2020-07-03 NOTE — H&P ADULT - NSHPREVIEWOFSYSTEMS_GEN_ALL_CORE
REVIEW OF SYSTEMS:    CONSTITUTIONAL:  weakness, fevers or chills  EYES/ENT: No visual changes;  No vertigo or throat pain   NECK: No pain or stiffness  RESPIRATORY: No cough, wheezing, hemoptysis; shortness of breath  CARDIOVASCULAR: No chest pain or palpitations, +dizziness  GASTROINTESTINAL: No abdominal or epigastric pain. No nausea, vomiting, or hematemesis; No diarrhea or constipation. No melena or hematochezia.  GENITOURINARY: No dysuria, frequency or hematuria  NEUROLOGICAL: No numbness or weakness  SKIN: No itching, rashes

## 2020-07-03 NOTE — H&P ADULT - NSICDXFAMILYHX_GEN_ALL_CORE_FT
FAMILY HISTORY:  Family history of prostate cancer  No pertinent family history in first degree relatives

## 2020-07-03 NOTE — CHART NOTE - NSCHARTNOTEFT_GEN_A_CORE
[facility] Event Note    Troponin level today 56->63.   Patient asymptomatic without any complaints of CP, SOB, dizziness, palpitations, and pain,  back pain, HA.  VSS    Repeat CE's ordered for 3 hours.  Discussed above with cardiology, Dr. Zelaya.   Continue to monitor patient.  Patient transferred from ED to [facility].  Endorsed to [facility] team.    Sonal Sena PA-C  Dept of Medicine  #[id number]

## 2020-07-03 NOTE — ED ADULT NURSE REASSESSMENT NOTE - NS ED NURSE REASSESS COMMENT FT1
Pt presents to ED with a Stage 2 pressure ulcer on his left buttocks. Wound cleaned and protected with Allevyn dressing.

## 2020-07-03 NOTE — ED ADULT NURSE NOTE - OBJECTIVE STATEMENT
Pt is a AAOx3, 89y male c/o sob for the past "few days". Pt was brought in by EMS from [facility] on a non-rebreather. SPO2 99%. Pt stated he called 911 because he felt like he was "passing out". Pt belly breathing and using accessory muscles upon arrival. Stated he was supposed to start taking Lasix tomorrow but has not received the medication yet. Hx of being Covid+ in April. States to also be taking Kefflex for UTI. Denies cp, palpitations, cough, fever, n/v, diarrhea.

## 2020-07-03 NOTE — ED ADULT NURSE REASSESSMENT NOTE - NS ED NURSE REASSESS COMMENT FT1
Report received from JAMILA Carson at 0700 in [place]. Patient reporting to breathing more comfortably in bed- saturating 97% on 4 L nasal cannula. Started on Zosyn for PNA. Awaiting CT scan of chest. Indwelling urinary catheter drained 900cc of clear yellow urine since insertion.

## 2020-07-03 NOTE — CONSULT NOTE ADULT - SUBJECTIVE AND OBJECTIVE BOX
PULMONARY CONSULT  Hermilo Abebe MD  [phone]    Initial HPI on admission:  HPI:  89 M hx mild dementia,  anemia, MDS, pre-diabetes, high blood pressure, recent dx of AF now on eliquis, BPH, glaucoma presenting with several days of SoB. came in today because he felt like he was "going to pass out".  Endorses chronic LE edema; states he was supposed to start lasix recently but "it never came" and hasn't been taking it. Was recently in [facility] for COVID found to have afib started eliquis. Denies cough, fever, cp, sore throat. States he is on keflex for a UTI, self catheterizes at home.         PAST MEDICAL & SURGICAL HISTORY:  Glaucoma  Anemia  MDS (myelodysplastic syndrome)  BPH (benign prostatic hyperplasia)  CKD (chronic kidney disease) stage 3, GFR 30-59 ml/min  Choledocholithiasis  Osteomyelitis of arm  Nocturia associated with benign prostatic hypertrophy  Gall stones  Chronic constipation  Anemia  Glaucoma  MDS (Myelodysplastic Syndrome)  BPH (Benign Prostatic Hypertrophy)  Benign Hypertension  No significant past surgical history  Mohs defect of nose: &#x27;s  Laceration of finger, left, with tendon:   Osteomyelitis: left humerous- surgery childhood age 13  History of cataract extraction: right eye   S/P TURP  S/P Cholecystectomy    Allergies    Cipro (Swelling)  Cipro (Unknown)  Levaquin (Unknown)  sulfa drugs (Rash)  sulfa drugs (Unknown)    Intolerances      FAMILY HISTORY:  No pertinent family history in first degree relatives  Family history of prostate cancer  SH: Non smoker      Review of Systems: REVIEW OF SYSTEMS:   CONSTITUTIONAL:  weakness, fevers or chills  EYES/ENT: No visual changes;  No vertigo or throat pain   NECK: No pain or stiffness  RESPIRATORY: No cough, wheezing, hemoptysis; shortness of breath  CARDIOVASCULAR: No chest pain or palpitations, +dizziness  GASTROINTESTINAL: No abdominal or epigastric pain. No nausea, vomiting, or hematemesis; No diarrhea or constipation. No melena or hematochezia.  GENITOURINARY: No dysuria, frequency or hematuria  NEUROLOGICAL: No numbness or weakness SKIN: No itching, rashes	      Allergies and Intolerances:        Allergies:  	sulfa drugs: Drug Category, Rash  	Cipro: Drug, Swelling  	Cipro: Drug, Unknown  	Levaquin: Drug, Unknown  	sulfa drugs: Drug Category, Unknown        Medications:  MEDICATIONS  (STANDING):  amLODIPine   Tablet 5 milliGRAM(s) Oral daily  apixaban 2.5 milliGRAM(s) Oral every 12 hours  brimonidine 0.2% Ophthalmic Solution 1 Drop(s) Both EYES every 8 hours  dorzolamide 2%/timolol 0.5% Ophthalmic Solution 1 Drop(s) Both EYES every 12 hours  finasteride 5 milliGRAM(s) Oral daily  furosemide   Injectable 40 milliGRAM(s) IV Push daily  latanoprost 0.005% Ophthalmic Solution 1 Drop(s) Left EYE at bedtime  metoprolol tartrate 12.5 milliGRAM(s) Oral every 12 hours  piperacillin/tazobactam IVPB.. 3.375 Gram(s) IV Intermittent every 12 hours  tamsulosin 0.8 milliGRAM(s) Oral at bedtime  ursodiol Capsule 300 milliGRAM(s) Oral two times a day    MEDICATIONS  (PRN):  acetaminophen   Tablet .. 650 milliGRAM(s) Oral every 6 hours PRN Temp greater or equal to 38.5C (101.3F), Moderate Pain (4 - 6)    Vital Signs Last 24 Hrs  T(C): 36.5 (2020 06:07), Max: 37.5 (2020 05:25)  T(F): 97.7 (2020 06:07), Max: 99.5 (2020 05:25)  HR: 102 (2020 11:48) (99 - 104)  BP: 156/88 (2020 11:48) (128/85 - 156/88)  BP(mean): --  RR: 18 (2020 11:48) (18 - 23)  SpO2: 97% (2020 11:48) (88% - 100%)      LABS:                        9.7    11.48 )-----------( 497      ( 2020 06:11 )             30.9     07-03    140  |  105  |  27<H>  ----------------------------<  143<H>  3.7   |  21<L>  |  1.40<H>    Ca    8.7      2020 06:11    TPro  7.1  /  Alb  3.6  /  TBili  0.6  /  DBili  x   /  AST  19  /  ALT  24  /  AlkPhos  60  07-      PT/INR - ( 2020 06:11 )   PT: 14.5 sec;   INR: 1.28 ratio         PTT - ( 2020 06:11 )  PTT:29.5 sec  Urinalysis Basic - ( 2020 06:17 )    Color: Yellow / Appearance: Clear / S.022 / pH: x  Gluc: x / Ketone: Negative  / Bili: Negative / Urobili: 2 mg/dL   Blood: x / Protein: 30 mg/dL / Nitrite: Negative   Leuk Esterase: Large / RBC: 5 /hpf / WBC 28 /HPF   Sq Epi: x / Non Sq Epi: 2 /hpf / Bacteria: Negative      Procalcitonin, Serum: 0.09 ng/mL (20 @ 06:11)    Serum Pro-Brain Natriuretic Peptide: 4753 pg/mL (20 @ 06:11)    Physical Examination:    General: No acute distress.      HEENT: Pupils equal, reactive to light.  Symmetric.    PULM: Clear to auscultation bilaterally, no significant sputum production    CVS: Regular rate and rhythm, no murmurs, rubs, or gallops    ABD: Soft, nondistended, nontender, normoactive bowel sounds, no masses    EXT: No edema, nontender    SKIN: Warm and well perfused, no rashes noted.    NEURO: Alert, oriented, interactive, nonfocal    RADIOLOGY REVIEWED PERSONALLY  CXR:    PROCEDURE DATE:  2020        INTERPRETATION:  CLINICAL INFORMATION: Shortness of Breath, Cough,  Fever    EXAM: Frontal radiograph of the chest    COMPARISON: Chest x-ray 2020    FINDINGS:    Redemonstration of increased bilateral lung markings with scattered multifocal indistinct opacities, increased from prior.  Small bilateral pleural effusions. No pneumothorax.  Heart size cannot be accurately assessed in this projection.    IMPRESSION:    Redemonstration of increased bilateral lung markings with scattered multifocal indistinct opacities, increased from prior.      CTA chest:    PROCEDURE:   CT Angiography of the Chest.  90 ml of Omnipaque 350 was injected intravenously. 10 ml were discarded.  Sagittal and coronal reformats were performed as well as 3D (MIP) reconstructions.    FINDINGS:    LUNGS AND AIRWAYS: Patent centralairways.  Bilateral lower lobe partial atelectasis. Groundglass opacity left upper lobe consistent with pneumonia.  PLEURA: Small bilateral pleural effusions.  MEDIASTINUM AND ALEXI: No lymphadenopathy.  VESSELS: No pulmonary embolus.  HEART: Heart size is borderline. No pericardial effusion.  CHEST WALL AND LOWER NECK: Within normal limits.  VISUALIZED UPPER ABDOMEN: Cholecystectomy. Bilateral indeterminant renal lesions. 10 mm nonobstructing left renal calculus.   BONES: Degenerative change. Kyphosis.    IMPRESSION:   No pulmonary embolus.  Small bilateral pleural effusions and bilateral compressive atelectasis.  Left upper lobe pneumonia.  Bilateral indeterminant renal lesions    TTE:    PROCEDURE: Transthoracic echocardiogram with 2-D, M-Mode  and complete spectral and color flow Doppler.  INDICATION: Unspecified atrial fibrillation (I48.91)  ------------------------------------------------------------------------  DIMENSIONS:  Dimensions:     Normal Values:  LA:     3.1 cm    2.0 - 4.0 cm  Ao:     3.3 cm    2.0 - 3.8 cm  SEPTUM: 1.0 cm    0.6 - 1.2 cm  PWT:    1.0 cm    0.6 - 1.1 cm  LVIDd:  4.2 cm    3.0 - 5.6 cm  LVIDs:  2.9 cm    1.8 - 4.0 cm  Derived Variables:  LVMI: 67 g/m2  RWT: 0.47  Fractional short: 31 %  Ejection Fraction (Teicholtz): 59 %  ------------------------------------------------------------------------  OBSERVATIONS:  Mitral Valve: Mitral annular calcification, otherwise  normal mitral valve. Mild mitral regurgitation.  Aortic Root: Normal aortic root.  Aortic Valve: Calcified trileaflet aortic valve with mildly  decreased opening. Minimal aortic regurgitation.  Left Atrium: Normal left atrium.  Left Ventricle: Normal left ventricular systolic function.  No segmental wall motion abnormalities. Increased relative  wall thickness with normal left ventricular mass index,  consistent with concentric left ventricular remodeling.  Right Heart: Normal right atrium. Normal right ventricular  size and function. Normal tricuspid valve. Mild tricuspid  regurgitation. Normal pulmonic valve. Minimal pulmonic  regurgitation.  Pericardium/PleuraNormal pericardium with no pericardial  effusion.  Hemodynamic: Estimated right ventricular systolic pressure  equals 41 mm Hg, assuming right atrial pressure equals 10  mm Hg, consistent with mild pulmonary hypertension.  ------------------------------------------------------------------------  CONCLUSIONS:  1. Calcified trileaflet aortic valve with mildly decreased  opening.  2. Normal left ventricular systolic function. No segmental  wall motion abnormalities.  3. Normal right ventricular size and function.  4. Normal tricuspid valve. Mild tricuspid regurgitation.  5. Estimated pulmonary artery systolic pressure equals 41  mm Hg, assuming right atrial pressure equals 10  mm Hg, PULMONARY CONSULT  Hermilo Abebe MD  [phone]    Initial HPI on admission:  HPI:  89 M hx mild dementia,  anemia, MDS, pre-diabetes, high blood pressure, recent dx of AF now on eliquis, BPH, glaucoma presenting with several days of SoB. came in today because he felt like he was "going to pass out".  Endorses chronic LE edema; states he was supposed to start lasix recently but "it never came" and hasn't been taking it. Was recently in [facility] for COVID found to have afib started eliquis. Denies cough, fever, cp, sore throat. States he is on keflex for a UTI, self catheterizes at home.     Patient was admitted to [facility] on  COVID + with patchy bilateral opacities on chest xray. No CT scan done at time. Patient developed AF/RVR  and was started on oral AC and metoprolol  He was discharged  to [facility]  On day of admission he noticed dyspnea with activity, then visited PMD and after retunring home, noted increased dyspnea at rest. He endorses chronic LE edema which might have increased recently.  TTE prior with preserved LVF    PAST MEDICAL & SURGICAL HISTORY:  Glaucoma  Anemia  MDS (myelodysplastic syndrome)  BPH (benign prostatic hyperplasia)  CKD (chronic kidney disease) stage 3, GFR 30-59 ml/min  Choledocholithiasis  Osteomyelitis of arm  Nocturia associated with benign prostatic hypertrophy  Gall stones  Chronic constipation  Anemia  Glaucoma  MDS (Myelodysplastic Syndrome)  BPH (Benign Prostatic Hypertrophy)  Benign Hypertension  No significant past surgical history  Mohs defect of nose: &#x27;s  Laceration of finger, left, with tendon:   Osteomyelitis: left humerous- surgery childhood age 13  History of cataract extraction: right eye   S/P TURP  S/P Cholecystectomy    Allergies    Cipro (Swelling)  Cipro (Unknown)  Levaquin (Unknown)  sulfa drugs (Rash)  sulfa drugs (Unknown)    Intolerances      FAMILY HISTORY:  No pertinent family history in first degree relatives  Family history of prostate cancer  SH: Non smoker      Review of Systems: REVIEW OF SYSTEMS:   CONSTITUTIONAL:  weakness, fevers or chills  EYES/ENT: No visual changes;  No vertigo or throat pain   NECK: No pain or stiffness  RESPIRATORY: No cough, wheezing, hemoptysis; shortness of breath  CARDIOVASCULAR: No chest pain or palpitations, +dizziness  GASTROINTESTINAL: No abdominal or epigastric pain. No nausea, vomiting, or hematemesis; No diarrhea or constipation. No melena or hematochezia.  GENITOURINARY: No dysuria, frequency or hematuria  NEUROLOGICAL: No numbness or weakness SKIN: No itching, rashes	      Allergies and Intolerances:        Allergies:  	sulfa drugs: Drug Category, Rash  	Cipro: Drug, Swelling  	Cipro: Drug, Unknown  	Levaquin: Drug, Unknown  	sulfa drugs: Drug Category, Unknown        Medications:  MEDICATIONS  (STANDING):  amLODIPine   Tablet 5 milliGRAM(s) Oral daily  apixaban 2.5 milliGRAM(s) Oral every 12 hours  brimonidine 0.2% Ophthalmic Solution 1 Drop(s) Both EYES every 8 hours  dorzolamide 2%/timolol 0.5% Ophthalmic Solution 1 Drop(s) Both EYES every 12 hours  finasteride 5 milliGRAM(s) Oral daily  furosemide   Injectable 40 milliGRAM(s) IV Push daily  latanoprost 0.005% Ophthalmic Solution 1 Drop(s) Left EYE at bedtime  metoprolol tartrate 12.5 milliGRAM(s) Oral every 12 hours  piperacillin/tazobactam IVPB.. 3.375 Gram(s) IV Intermittent every 12 hours  tamsulosin 0.8 milliGRAM(s) Oral at bedtime  ursodiol Capsule 300 milliGRAM(s) Oral two times a day    MEDICATIONS  (PRN):  acetaminophen   Tablet .. 650 milliGRAM(s) Oral every 6 hours PRN Temp greater or equal to 38.5C (101.3F), Moderate Pain (4 - 6)    Vital Signs Last 24 Hrs  T(C): 36.5 (2020 06:07), Max: 37.5 (2020 05:25)  T(F): 97.7 (2020 06:07), Max: 99.5 (2020 05:25)  HR: 102 (2020 11:48) (99 - 104)  BP: 156/88 (2020 11:48) (128/85 - 156/88)  BP(mean): --  RR: 18 (2020 11:48) (18 - 23)  SpO2: 97% (2020 11:48) (88% - 100%)      LABS:                        9.7    11.48 )-----------( 497      ( 2020 06:11 )             30.9     -    140  |  105  |  27<H>  ----------------------------<  143<H>  3.7   |  21<L>  |  1.40<H>    Ca    8.7      2020 06:11    TPro  7.1  /  Alb  3.6  /  TBili  0.6  /  DBili  x   /  AST  19  /  ALT  24  /  AlkPhos  60        PT/INR - ( 2020 06:11 )   PT: 14.5 sec;   INR: 1.28 ratio         PTT - ( 2020 06:11 )  PTT:29.5 sec  Urinalysis Basic - ( 2020 06:17 )    Color: Yellow / Appearance: Clear / S.022 / pH: x  Gluc: x / Ketone: Negative  / Bili: Negative / Urobili: 2 mg/dL   Blood: x / Protein: 30 mg/dL / Nitrite: Negative   Leuk Esterase: Large / RBC: 5 /hpf / WBC 28 /HPF   Sq Epi: x / Non Sq Epi: 2 /hpf / Bacteria: Negative      Procalcitonin, Serum: 0.09 ng/mL (20 @ 06:11)    Serum Pro-Brain Natriuretic Peptide: 4753 pg/mL (20 @ 06:11)    Physical Examination:    General: Non toxic,  No acute distress.  Breathing comfortably on nasal oxygen in bed    HEENT: Pupils equal, reactive to light.  Symmetric.    PULM: Dullness 1/4 bibasilar; no wheeze or rhonchi    CVS: Regular rate and rhythm, no murmurs, rubs, or gallops    ABD: Soft, nondistended, nontender, normoactive bowel sounds, no masses    EXT: 2-3+ bilateral LE edema    SKIN: Warm and well perfused, no rashes noted.    NEURO: Alert, oriented, interactive, nonfocal    RADIOLOGY REVIEWED PERSONALLY  CXR:    PROCEDURE DATE:  2020        INTERPRETATION:  CLINICAL INFORMATION: Shortness of Breath, Cough,  Fever    EXAM: Frontal radiograph of the chest    COMPARISON: Chest x-ray 2020    FINDINGS:    Redemonstration of increased bilateral lung markings with scattered multifocal indistinct opacities, increased from prior.  Small bilateral pleural effusions. No pneumothorax.  Heart size cannot be accurately assessed in this projection.    IMPRESSION:    Redemonstration of increased bilateral lung markings with scattered multifocal indistinct opacities, increased from prior.      CTA chest:    PROCEDURE:   CT Angiography of the Chest.  90 ml of Omnipaque 350 was injected intravenously. 10 ml were discarded.  Sagittal and coronal reformats were performed as well as 3D (MIP) reconstructions.    FINDINGS:    LUNGS AND AIRWAYS: Patent centralairways.  Bilateral lower lobe partial atelectasis. Groundglass opacity left upper lobe consistent with pneumonia.  PLEURA: Small bilateral pleural effusions.  MEDIASTINUM AND ALEXI: No lymphadenopathy.  VESSELS: No pulmonary embolus.  HEART: Heart size is borderline. No pericardial effusion.  CHEST WALL AND LOWER NECK: Within normal limits.  VISUALIZED UPPER ABDOMEN: Cholecystectomy. Bilateral indeterminant renal lesions. 10 mm nonobstructing left renal calculus.   BONES: Degenerative change. Kyphosis.    IMPRESSION:   No pulmonary embolus.  Small bilateral pleural effusions and bilateral compressive atelectasis.  Left upper lobe pneumonia.  Bilateral indeterminant renal lesions    TTE:    PROCEDURE: Transthoracic echocardiogram with 2-D, M-Mode  and complete spectral and color flow Doppler.  INDICATION: Unspecified atrial fibrillation (I48.91)  ------------------------------------------------------------------------  DIMENSIONS:  Dimensions:     Normal Values:  LA:     3.1 cm    2.0 - 4.0 cm  Ao:     3.3 cm    2.0 - 3.8 cm  SEPTUM: 1.0 cm    0.6 - 1.2 cm  PWT:    1.0 cm    0.6 - 1.1 cm  LVIDd:  4.2 cm    3.0 - 5.6 cm  LVIDs:  2.9 cm    1.8 - 4.0 cm  Derived Variables:  LVMI: 67 g/m2  RWT: 0.47  Fractional short: 31 %  Ejection Fraction (Teicholtz): 59 %  ------------------------------------------------------------------------  OBSERVATIONS:  Mitral Valve: Mitral annular calcification, otherwise  normal mitral valve. Mild mitral regurgitation.  Aortic Root: Normal aortic root.  Aortic Valve: Calcified trileaflet aortic valve with mildly  decreased opening. Minimal aortic regurgitation.  Left Atrium: Normal left atrium.  Left Ventricle: Normal left ventricular systolic function.  No segmental wall motion abnormalities. Increased relative  wall thickness with normal left ventricular mass index,  consistent with concentric left ventricular remodeling.  Right Heart: Normal right atrium. Normal right ventricular  size and function. Normal tricuspid valve. Mild tricuspid  regurgitation. Normal pulmonic valve. Minimal pulmonic  regurgitation.  Pericardium/PleuraNormal pericardium with no pericardial  effusion.  Hemodynamic: Estimated right ventricular systolic pressure  equals 41 mm Hg, assuming right atrial pressure equals 10  mm Hg, consistent with mild pulmonary hypertension.  ------------------------------------------------------------------------  CONCLUSIONS:  1. Calcified trileaflet aortic valve with mildly decreased  opening.  2. Normal left ventricular systolic function. No segmental  wall motion abnormalities.  3. Normal right ventricular size and function.  4. Normal tricuspid valve. Mild tricuspid regurgitation.  5. Estimated pulmonary artery systolic pressure equals 41  mm Hg, assuming right atrial pressure equals 10  mm Hg,

## 2020-07-03 NOTE — H&P ADULT - ASSESSMENT
89 M hx mild dementia,  anemia, MDS, pre-diabetes, high blood pressure, recent dx of AF now on eliquis, BPH, glaucoma, chr le edema ,survived covid pneumonia in april presenting with several days of SoB.   ct chest reviewed as above    #CHF exac  lasix, i/o, daily wts  TTE reveiwed from 4/20  cards cs fu    #afib on eliquis, HR in 100s  resume bblockers at 12.5 bid and titrate as needed  ekg reveiwed    #PNA: ct reviewed w dr Abebe  low suspicion for pna, GGO on ct ?residual from past covid pna  vs vol overload  fu procalcitonin  empiric abx    #UTI: ua +, ucx pending  zosyn empirically- renally dosed    #CKD-cr at baseline  monitor on lasix    #dvt ppx-eliquis    [facility]  [phone]

## 2020-07-03 NOTE — ED ADULT NURSE REASSESSMENT NOTE - NS ED NURSE REASSESS COMMENT FT1
Beckman catheter inserted using sterile technique. Second RN present to confirm sterility. Bedside drainage to gravity. Stat lock in place.

## 2020-07-03 NOTE — ED PROVIDER NOTE - PROGRESS NOTE DETAILS
Parmjit PGY-3:  Signout from [facility]:  89yo M here w/ SOB, concern for CHF exacerbation vs less likely PE, awaiting CTA then TBA Parmjit PGY-3:  Made Janee NP on [facility] admitting team aware of uptrending Troponin, states aware and will address elevated value.

## 2020-07-03 NOTE — CONSULT NOTE ADULT - ASSESSMENT
89 M hx mild dementia,  anemia, MDS, pre-diabetes, htn, AF (eliquis), BPH, recent covid infection, glaucoma presenting with sob.

## 2020-07-03 NOTE — H&P ADULT - HISTORY OF PRESENT ILLNESS
89 M hx mild dementia,  anemia, MDS, pre-diabetes, high blood pressure, recent dx of AF now on eliquis, BPH, glaucoma presenting with several days of SoB. came in today because he felt like he was "going to pass out".  Endorses chronic LE edema; states he was supposed to start lasix recently but "it never came" and hasn't been taking it. Was recently in [facility] for COVID found to have afib started eliquis. Denies cough, fever, cp, sore throat. States he is on keflex for a UTI, self catheterizes at home. 89 M hx mild dementia,  anemia, MDS, pre-diabetes, high blood pressure, recent dx of AF now on eliquis, BPH, glaucoma presenting with several days of SoB. came in today because he felt like he was "going to pass out".  Endorses chronic LE edema; states he was supposed to start lasix recently but "it never came" and hasn't been taking it. Was recently in [facility] for COVID found to have afib started eliquis. Denies cough, fever, cp, sore throat. States he is on keflex for a UTI, self catheterizes at home. chr le edema

## 2020-07-03 NOTE — ED ADULT NURSE NOTE - NSIMPLEMENTINTERV_GEN_ALL_ED
Implemented All Fall with Harm Risk Interventions:  [place] to call system. Call bell, personal items and telephone within reach. Instruct patient to call for assistance. Room bathroom lighting operational. Non-slip footwear when patient is off stretcher. Physically safe environment: no spills, clutter or unnecessary equipment. Stretcher in lowest position, wheels locked, appropriate side rails in place. Provide visual cue, wrist band, yellow gown, etc. Monitor gait and stability. Monitor for mental status changes and reorient to person, place, and time. Review medications for side effects contributing to fall risk. Reinforce activity limits and safety measures with patient and family. Provide visual clues: red socks.

## 2020-07-03 NOTE — H&P ADULT - NSHPLABSRESULTS_GEN_ALL_CORE
LABS:                        9.7    11.48 )-----------( 497      ( 2020 06:11 )             30.9     07-03    140  |  105  |  27<H>  ----------------------------<  143<H>  3.7   |  21<L>  |  1.40<H>    Ca    8.7      2020 06:11    TPro  7.1  /  Alb  3.6  /  TBili  0.6  /  DBili  x   /  AST  19  /  ALT  24  /  AlkPhos  60  07-03    PT/INR - ( 2020 06:11 )   PT: 14.5 sec;   INR: 1.28 ratio         PTT - ( 2020 06:11 )  PTT:29.5 sec  CAPILLARY BLOOD GLUCOSE        CARDIAC MARKERS ( 2020 08:51 )  x     / x     / 26 U/L / x     / 3.0 ng/mL      Urinalysis Basic - ( 2020 06:17 )    Color: Yellow / Appearance: Clear / S.022 / pH: x  Gluc: x / Ketone: Negative  / Bili: Negative / Urobili: 2 mg/dL   Blood: x / Protein: 30 mg/dL / Nitrite: Negative   Leuk Esterase: Large / RBC: 5 /hpf / WBC 28 /HPF   Sq Epi: x / Non Sq Epi: 2 /hpf / Bacteria: Negative        RADIOLOGY & ADDITIONAL TESTS:      < from: CT Angio Chest w/ IV Cont (20 @ 09:20) >    No pulmonary embolus.  Small bilateral pleural effusions and bilateral compressive atelectasis.  Left upper lobe pneumonia.  Bilateral indeterminant renal lesions    < end of copied text >    ekg reviewed afib at 100

## 2020-07-03 NOTE — CONSULT NOTE ADULT - PROBLEM SELECTOR RECOMMENDATION 2
-  -rate controlled  -bradycardic last admission. resolved with holding av node blockers. continue to hold for now.  -eliquis 2.5 mg bid  -tele monitoring  -obtain ecg  -tte as noted.    Mikhail Zelaya D.O.  [phone]
Neno montemayor
Stable.

## 2020-07-03 NOTE — ED PROVIDER NOTE - OBJECTIVE STATEMENT
89 M hx mild dementia,  anemia, MDS, pre-diabetes, high blood pressure, BPH, glaucoma presenting with several days of SoB. came in today because he felt like he was "going to pass out".  Endorses chronic LE edema; states he was supposed to start lasix recently but "it never came" and hasn't been taking it. Denies cough, fever, cp, sore throat. States he is on keflex for a UTI, self catheterizes at home.    PMD halio  Card Hakimian 89 M hx mild dementia,  anemia, MDS, pre-diabetes, high blood pressure, recent dx of AF now on eliquis, BPH, glaucoma presenting with several days of SoB. came in today because he felt like he was "going to pass out".  Endorses chronic LE edema; states he was supposed to start lasix recently but "it never came" and hasn't been taking it. Was recently in [facility] for COVID found to have afib started eliquis. Denies cough, fever, cp, sore throat. States he is on keflex for a UTI, self catheterizes at home.    PMD halio  Card Hakimian

## 2020-07-03 NOTE — H&P ADULT - NSICDXPASTSURGICALHX_GEN_ALL_CORE_FT
PAST SURGICAL HISTORY:  History of cataract extraction right eye 6/11    Laceration of finger, left, with tendon 1976    Mohs defect of nose 1990's    No significant past surgical history     Osteomyelitis left humerous- surgery childhood age 13    S/P Cholecystectomy     S/P TURP

## 2020-07-03 NOTE — H&P ADULT - NSHPPHYSICALEXAM_GEN_ALL_CORE
PHYSICAL EXAM:  GENERAL: mild resp[I distress, well-groomed, well-developed  HEAD:  Atraumatic, Normocephalic  EYES: EOMI, PERRLA, conjunctiva and sclera clear  ENMT: No tonsillar erythema, exudates, or enlargement; Moist mucous membranes  NECK: Supple, No JVD, Normal thyroid  HEART: Regular rate and rhythm; No murmurs, rubs, or gallops  RESPIRATORY: decreased BS bases  ABDOMEN: Soft, Nontender, Nondistended; Bowel sounds present  NEUROLOGY: A&Ox3, nonfocal, moving all extremities  EXTREMITIES:  2+  edema  SKIN: warm, dry, normal color, no rash or abnormal lesions

## 2020-07-04 LAB
ALBUMIN SERPL ELPH-MCNC: 3.2 G/DL — LOW (ref 3.3–5)
ALP SERPL-CCNC: 52 U/L — SIGNIFICANT CHANGE UP (ref 40–120)
ALT FLD-CCNC: 20 U/L — SIGNIFICANT CHANGE UP (ref 10–45)
ANION GAP SERPL CALC-SCNC: 12 MMOL/L — SIGNIFICANT CHANGE UP (ref 5–17)
AST SERPL-CCNC: 14 U/L — SIGNIFICANT CHANGE UP (ref 10–40)
BASOPHILS # BLD AUTO: 0.06 K/UL — SIGNIFICANT CHANGE UP (ref 0–0.2)
BASOPHILS NFR BLD AUTO: 0.6 % — SIGNIFICANT CHANGE UP (ref 0–2)
BILIRUB SERPL-MCNC: 0.6 MG/DL — SIGNIFICANT CHANGE UP (ref 0.2–1.2)
BUN SERPL-MCNC: 27 MG/DL — HIGH (ref 7–23)
CALCIUM SERPL-MCNC: 8.3 MG/DL — LOW (ref 8.4–10.5)
CHLORIDE SERPL-SCNC: 101 MMOL/L — SIGNIFICANT CHANGE UP (ref 96–108)
CO2 SERPL-SCNC: 27 MMOL/L — SIGNIFICANT CHANGE UP (ref 22–31)
CREAT SERPL-MCNC: 1.54 MG/DL — HIGH (ref 0.5–1.3)
EOSINOPHIL # BLD AUTO: 0.12 K/UL — SIGNIFICANT CHANGE UP (ref 0–0.5)
EOSINOPHIL NFR BLD AUTO: 1.2 % — SIGNIFICANT CHANGE UP (ref 0–6)
GLUCOSE SERPL-MCNC: 111 MG/DL — HIGH (ref 70–99)
HCT VFR BLD CALC: 28.4 % — LOW (ref 39–50)
HGB BLD-MCNC: 8.9 G/DL — LOW (ref 13–17)
IMM GRANULOCYTES NFR BLD AUTO: 0.5 % — SIGNIFICANT CHANGE UP (ref 0–1.5)
LYMPHOCYTES # BLD AUTO: 1.37 K/UL — SIGNIFICANT CHANGE UP (ref 1–3.3)
LYMPHOCYTES # BLD AUTO: 13.7 % — SIGNIFICANT CHANGE UP (ref 13–44)
MAGNESIUM SERPL-MCNC: 1.5 MG/DL — LOW (ref 1.6–2.6)
MCHC RBC-ENTMCNC: 31.3 GM/DL — LOW (ref 32–36)
MCHC RBC-ENTMCNC: 31.6 PG — SIGNIFICANT CHANGE UP (ref 27–34)
MCV RBC AUTO: 100.7 FL — HIGH (ref 80–100)
MONOCYTES # BLD AUTO: 0.94 K/UL — HIGH (ref 0–0.9)
MONOCYTES NFR BLD AUTO: 9.4 % — SIGNIFICANT CHANGE UP (ref 2–14)
NEUTROPHILS # BLD AUTO: 7.47 K/UL — HIGH (ref 1.8–7.4)
NEUTROPHILS NFR BLD AUTO: 74.6 % — SIGNIFICANT CHANGE UP (ref 43–77)
NRBC # BLD: 0 /100 WBCS — SIGNIFICANT CHANGE UP (ref 0–0)
PHOSPHATE SERPL-MCNC: 4.1 MG/DL — SIGNIFICANT CHANGE UP (ref 2.5–4.5)
PLATELET # BLD AUTO: 409 K/UL — HIGH (ref 150–400)
POTASSIUM SERPL-MCNC: 3.6 MMOL/L — SIGNIFICANT CHANGE UP (ref 3.5–5.3)
POTASSIUM SERPL-SCNC: 3.6 MMOL/L — SIGNIFICANT CHANGE UP (ref 3.5–5.3)
PROT SERPL-MCNC: 6.5 G/DL — SIGNIFICANT CHANGE UP (ref 6–8.3)
RBC # BLD: 2.82 M/UL — LOW (ref 4.2–5.8)
RBC # FLD: 19.9 % — HIGH (ref 10.3–14.5)
SODIUM SERPL-SCNC: 140 MMOL/L — SIGNIFICANT CHANGE UP (ref 135–145)
TROPONIN T, HIGH SENSITIVITY RESULT: 56 NG/L — HIGH (ref 0–51)
WBC # BLD: 10.01 K/UL — SIGNIFICANT CHANGE UP (ref 3.8–10.5)
WBC # FLD AUTO: 10.01 K/UL — SIGNIFICANT CHANGE UP (ref 3.8–10.5)

## 2020-07-04 RX ORDER — MAGNESIUM SULFATE 500 MG/ML
2 VIAL (ML) INJECTION ONCE
Refills: 0 | Status: COMPLETED | OUTPATIENT
Start: 2020-07-04 | End: 2020-07-04

## 2020-07-04 RX ORDER — POTASSIUM CHLORIDE 20 MEQ
40 PACKET (EA) ORAL ONCE
Refills: 0 | Status: COMPLETED | OUTPATIENT
Start: 2020-07-04 | End: 2020-07-04

## 2020-07-04 RX ORDER — IPRATROPIUM/ALBUTEROL SULFATE 18-103MCG
3 AEROSOL WITH ADAPTER (GRAM) INHALATION ONCE
Refills: 0 | Status: COMPLETED | OUTPATIENT
Start: 2020-07-04 | End: 2020-07-04

## 2020-07-04 RX ADMIN — URSODIOL 300 MILLIGRAM(S): 250 TABLET, FILM COATED ORAL at 17:11

## 2020-07-04 RX ADMIN — AMLODIPINE BESYLATE 5 MILLIGRAM(S): 2.5 TABLET ORAL at 05:22

## 2020-07-04 RX ADMIN — Medication 12.5 MILLIGRAM(S): at 17:10

## 2020-07-04 RX ADMIN — Medication 50 GRAM(S): at 12:31

## 2020-07-04 RX ADMIN — APIXABAN 2.5 MILLIGRAM(S): 2.5 TABLET, FILM COATED ORAL at 17:11

## 2020-07-04 RX ADMIN — BRIMONIDINE TARTRATE 1 DROP(S): 2 SOLUTION/ DROPS OPHTHALMIC at 21:53

## 2020-07-04 RX ADMIN — DORZOLAMIDE HYDROCHLORIDE TIMOLOL MALEATE 1 DROP(S): 20; 5 SOLUTION/ DROPS OPHTHALMIC at 05:21

## 2020-07-04 RX ADMIN — Medication 3 MILLILITER(S): at 21:51

## 2020-07-04 RX ADMIN — Medication 40 MILLIEQUIVALENT(S): at 12:28

## 2020-07-04 RX ADMIN — DORZOLAMIDE HYDROCHLORIDE TIMOLOL MALEATE 1 DROP(S): 20; 5 SOLUTION/ DROPS OPHTHALMIC at 17:10

## 2020-07-04 RX ADMIN — Medication 40 MILLIGRAM(S): at 05:21

## 2020-07-04 RX ADMIN — Medication 12.5 MILLIGRAM(S): at 05:22

## 2020-07-04 RX ADMIN — TAMSULOSIN HYDROCHLORIDE 0.8 MILLIGRAM(S): 0.4 CAPSULE ORAL at 21:52

## 2020-07-04 RX ADMIN — PIPERACILLIN AND TAZOBACTAM 25 GRAM(S): 4; .5 INJECTION, POWDER, LYOPHILIZED, FOR SOLUTION INTRAVENOUS at 08:34

## 2020-07-04 RX ADMIN — URSODIOL 300 MILLIGRAM(S): 250 TABLET, FILM COATED ORAL at 05:22

## 2020-07-04 RX ADMIN — PIPERACILLIN AND TAZOBACTAM 25 GRAM(S): 4; .5 INJECTION, POWDER, LYOPHILIZED, FOR SOLUTION INTRAVENOUS at 21:54

## 2020-07-04 RX ADMIN — FINASTERIDE 5 MILLIGRAM(S): 5 TABLET, FILM COATED ORAL at 08:34

## 2020-07-04 RX ADMIN — BRIMONIDINE TARTRATE 1 DROP(S): 2 SOLUTION/ DROPS OPHTHALMIC at 12:28

## 2020-07-04 RX ADMIN — BRIMONIDINE TARTRATE 1 DROP(S): 2 SOLUTION/ DROPS OPHTHALMIC at 05:22

## 2020-07-04 RX ADMIN — LATANOPROST 1 DROP(S): 0.05 SOLUTION/ DROPS OPHTHALMIC; TOPICAL at 21:54

## 2020-07-04 RX ADMIN — APIXABAN 2.5 MILLIGRAM(S): 2.5 TABLET, FILM COATED ORAL at 05:22

## 2020-07-05 LAB
-  AMIKACIN: SIGNIFICANT CHANGE UP
-  AMIKACIN: SIGNIFICANT CHANGE UP
-  AMPICILLIN/SULBACTAM: SIGNIFICANT CHANGE UP
-  AMPICILLIN/SULBACTAM: SIGNIFICANT CHANGE UP
-  AMPICILLIN: SIGNIFICANT CHANGE UP
-  AMPICILLIN: SIGNIFICANT CHANGE UP
-  AZTREONAM: SIGNIFICANT CHANGE UP
-  AZTREONAM: SIGNIFICANT CHANGE UP
-  CEFAZOLIN: SIGNIFICANT CHANGE UP
-  CEFAZOLIN: SIGNIFICANT CHANGE UP
-  CEFEPIME: SIGNIFICANT CHANGE UP
-  CEFEPIME: SIGNIFICANT CHANGE UP
-  CEFOXITIN: SIGNIFICANT CHANGE UP
-  CEFOXITIN: SIGNIFICANT CHANGE UP
-  CEFTRIAXONE: SIGNIFICANT CHANGE UP
-  CEFTRIAXONE: SIGNIFICANT CHANGE UP
-  CIPROFLOXACIN: SIGNIFICANT CHANGE UP
-  CIPROFLOXACIN: SIGNIFICANT CHANGE UP
-  ERTAPENEM: SIGNIFICANT CHANGE UP
-  ERTAPENEM: SIGNIFICANT CHANGE UP
-  GENTAMICIN: SIGNIFICANT CHANGE UP
-  GENTAMICIN: SIGNIFICANT CHANGE UP
-  IMIPENEM: SIGNIFICANT CHANGE UP
-  IMIPENEM: SIGNIFICANT CHANGE UP
-  LEVOFLOXACIN: SIGNIFICANT CHANGE UP
-  LEVOFLOXACIN: SIGNIFICANT CHANGE UP
-  MEROPENEM: SIGNIFICANT CHANGE UP
-  MEROPENEM: SIGNIFICANT CHANGE UP
-  NITROFURANTOIN: SIGNIFICANT CHANGE UP
-  NITROFURANTOIN: SIGNIFICANT CHANGE UP
-  PIPERACILLIN/TAZOBACTAM: SIGNIFICANT CHANGE UP
-  PIPERACILLIN/TAZOBACTAM: SIGNIFICANT CHANGE UP
-  TIGECYCLINE: SIGNIFICANT CHANGE UP
-  TIGECYCLINE: SIGNIFICANT CHANGE UP
-  TOBRAMYCIN: SIGNIFICANT CHANGE UP
-  TOBRAMYCIN: SIGNIFICANT CHANGE UP
-  TRIMETHOPRIM/SULFAMETHOXAZOLE: SIGNIFICANT CHANGE UP
-  TRIMETHOPRIM/SULFAMETHOXAZOLE: SIGNIFICANT CHANGE UP
ANION GAP SERPL CALC-SCNC: 10 MMOL/L — SIGNIFICANT CHANGE UP (ref 5–17)
BUN SERPL-MCNC: 25 MG/DL — HIGH (ref 7–23)
CALCIUM SERPL-MCNC: 8.3 MG/DL — LOW (ref 8.4–10.5)
CHLORIDE SERPL-SCNC: 101 MMOL/L — SIGNIFICANT CHANGE UP (ref 96–108)
CO2 SERPL-SCNC: 28 MMOL/L — SIGNIFICANT CHANGE UP (ref 22–31)
CREAT SERPL-MCNC: 1.63 MG/DL — HIGH (ref 0.5–1.3)
CULTURE RESULTS: SIGNIFICANT CHANGE UP
GLUCOSE SERPL-MCNC: 117 MG/DL — HIGH (ref 70–99)
HCT VFR BLD CALC: 29.3 % — LOW (ref 39–50)
HGB BLD-MCNC: 9.4 G/DL — LOW (ref 13–17)
MAGNESIUM SERPL-MCNC: 1.8 MG/DL — SIGNIFICANT CHANGE UP (ref 1.6–2.6)
MCHC RBC-ENTMCNC: 32.1 GM/DL — SIGNIFICANT CHANGE UP (ref 32–36)
MCHC RBC-ENTMCNC: 32.3 PG — SIGNIFICANT CHANGE UP (ref 27–34)
MCV RBC AUTO: 100.7 FL — HIGH (ref 80–100)
METHOD TYPE: SIGNIFICANT CHANGE UP
METHOD TYPE: SIGNIFICANT CHANGE UP
NRBC # BLD: 0 /100 WBCS — SIGNIFICANT CHANGE UP (ref 0–0)
ORGANISM # SPEC MICROSCOPIC CNT: SIGNIFICANT CHANGE UP
PLATELET # BLD AUTO: 385 K/UL — SIGNIFICANT CHANGE UP (ref 150–400)
POTASSIUM SERPL-MCNC: 3.5 MMOL/L — SIGNIFICANT CHANGE UP (ref 3.5–5.3)
POTASSIUM SERPL-SCNC: 3.5 MMOL/L — SIGNIFICANT CHANGE UP (ref 3.5–5.3)
RBC # BLD: 2.91 M/UL — LOW (ref 4.2–5.8)
RBC # FLD: 20 % — HIGH (ref 10.3–14.5)
SODIUM SERPL-SCNC: 139 MMOL/L — SIGNIFICANT CHANGE UP (ref 135–145)
SPECIMEN SOURCE: SIGNIFICANT CHANGE UP
WBC # BLD: 9.98 K/UL — SIGNIFICANT CHANGE UP (ref 3.8–10.5)
WBC # FLD AUTO: 9.98 K/UL — SIGNIFICANT CHANGE UP (ref 3.8–10.5)

## 2020-07-05 PROCEDURE — 71045 X-RAY EXAM CHEST 1 VIEW: CPT | Mod: 26

## 2020-07-05 RX ORDER — FUROSEMIDE 40 MG
40 TABLET ORAL
Refills: 0 | Status: DISCONTINUED | OUTPATIENT
Start: 2020-07-05 | End: 2020-07-08

## 2020-07-05 RX ADMIN — Medication 12.5 MILLIGRAM(S): at 05:39

## 2020-07-05 RX ADMIN — URSODIOL 300 MILLIGRAM(S): 250 TABLET, FILM COATED ORAL at 17:46

## 2020-07-05 RX ADMIN — BRIMONIDINE TARTRATE 1 DROP(S): 2 SOLUTION/ DROPS OPHTHALMIC at 21:48

## 2020-07-05 RX ADMIN — APIXABAN 2.5 MILLIGRAM(S): 2.5 TABLET, FILM COATED ORAL at 17:46

## 2020-07-05 RX ADMIN — Medication 12.5 MILLIGRAM(S): at 17:46

## 2020-07-05 RX ADMIN — LATANOPROST 1 DROP(S): 0.05 SOLUTION/ DROPS OPHTHALMIC; TOPICAL at 21:48

## 2020-07-05 RX ADMIN — DORZOLAMIDE HYDROCHLORIDE TIMOLOL MALEATE 1 DROP(S): 20; 5 SOLUTION/ DROPS OPHTHALMIC at 05:39

## 2020-07-05 RX ADMIN — Medication 40 MILLIGRAM(S): at 21:47

## 2020-07-05 RX ADMIN — Medication 40 MILLIGRAM(S): at 10:29

## 2020-07-05 RX ADMIN — DORZOLAMIDE HYDROCHLORIDE TIMOLOL MALEATE 1 DROP(S): 20; 5 SOLUTION/ DROPS OPHTHALMIC at 17:47

## 2020-07-05 RX ADMIN — URSODIOL 300 MILLIGRAM(S): 250 TABLET, FILM COATED ORAL at 05:39

## 2020-07-05 RX ADMIN — TAMSULOSIN HYDROCHLORIDE 0.8 MILLIGRAM(S): 0.4 CAPSULE ORAL at 21:47

## 2020-07-05 RX ADMIN — PIPERACILLIN AND TAZOBACTAM 25 GRAM(S): 4; .5 INJECTION, POWDER, LYOPHILIZED, FOR SOLUTION INTRAVENOUS at 21:48

## 2020-07-05 RX ADMIN — APIXABAN 2.5 MILLIGRAM(S): 2.5 TABLET, FILM COATED ORAL at 05:40

## 2020-07-05 RX ADMIN — BRIMONIDINE TARTRATE 1 DROP(S): 2 SOLUTION/ DROPS OPHTHALMIC at 14:10

## 2020-07-05 RX ADMIN — PIPERACILLIN AND TAZOBACTAM 25 GRAM(S): 4; .5 INJECTION, POWDER, LYOPHILIZED, FOR SOLUTION INTRAVENOUS at 09:21

## 2020-07-05 RX ADMIN — FINASTERIDE 5 MILLIGRAM(S): 5 TABLET, FILM COATED ORAL at 09:22

## 2020-07-05 RX ADMIN — AMLODIPINE BESYLATE 5 MILLIGRAM(S): 2.5 TABLET ORAL at 05:39

## 2020-07-05 RX ADMIN — BRIMONIDINE TARTRATE 1 DROP(S): 2 SOLUTION/ DROPS OPHTHALMIC at 05:39

## 2020-07-05 NOTE — PHYSICAL THERAPY INITIAL EVALUATION ADULT - PHYSICAL ASSIST/NONPHYSICAL ASSIST: SIT/SUPINE, REHAB EVAL
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 Surgery postponed. 1 person assist

## 2020-07-05 NOTE — CHART NOTE - NSCHARTNOTEFT_GEN_A_CORE
Notified by RN that patient complaining of shortness of breath when attempting to wean O2. During the day (7/4/2020), patient was weaned from 4LNC to room air however overnight patient stated he felt short of breath and felt better with 2LNC. Weaning of oxygen was attempted overnight however patient complained of shortness of breath with weaning of O2. Patient denies supplemental O2 use at home. This AM, patient still requiring 2LNC. Patient seen and assessed at bedside, NAD, alert and awake. He denied headache, dizziness, chest pain, cough, nausea, vomiting, or abdominal pain.         Vital Signs Last 24 Hrs  T(C): 36.4 (04 Jul 2020 20:46), Max: 36.4 (04 Jul 2020 20:46)  T(F): 97.6 (04 Jul 2020 20:46), Max: 97.6 (04 Jul 2020 20:46)  HR: 97 (05 Jul 2020 04:25) (82 - 97)  BP: 134/73 (05 Jul 2020 04:25) (133/81 - 148/88)  RR: 20 (05 Jul 2020 04:25) (18 - 22)  SpO2: 95% (05 Jul 2020 04:25) (95% - 97%)      Physical Exam:  General: Elderly male laying in bed, NAD, AOx3, nontoxic appearing  Head:  NC/AT  CV: (+) irregularly irregular, S1S2   Respiratory: (+) decreased breath sounds at bilateral bases, otherwise CTA B/L, nonlabored, (+) on 2LNC  MSK: (+) 2+ BLLE edema to shins, + peripheral pulses, FROM all 4 extremity  Skin: (+) warm, dry         Labs:                        9.4    9.98  )-----------( 385      ( 05 Jul 2020 05:25 )             29.3     07-05    139  |  101  |  25<H>  ----------------------------<  117<H>  3.5   |  28  |  1.63<H>    Ca    8.3<L>      05 Jul 2020 05:25  Phos  4.1     07-04  Mg     1.8     07-05    TPro  6.5  /  Alb  3.2<L>  /  TBili  0.6  /  DBili  x   /  AST  14  /  ALT  20  /  AlkPhos  52  07-04      Urinalysis Basic - ( 07-03 @ 06:17 )  Color: Negative / Appearance: Negative / SG: -- / pH: Negative  Gluc: Negative / Ketone: Yellow  / Bili: -- / Urobili: 3   Blood: 2 / Protein: -- / Nitrite: Negative   Leuk Esterase: Large / RBC: 1.022 / WBC --   Sq Epi: 30 mg/dL / Non Sq Epi: 5 / Bacteria: 6.0            Radiology:  < from: CT Angio Chest w/ IV Cont (07.03.20 @ 09:20) >  IMPRESSION:   No pulmonary embolus.  Small bilateral pleural effusions and bilateral compressive atelectasis.  Left upper lobe pneumonia.  Bilateral indeterminant renal lesions  < end of copied text >      < from: Xray Chest 1 View- PORTABLE-Urgent (07.03.20 @ 06:14) >  IMPRESSION:  Redemonstration of increased bilateral lung markings with scattered multifocal indistinct opacities, increased from prior.  < end of copied text >            Assessment & Plan:  89 M hx mild dementia,  anemia, MDS, pre-diabetes, high blood pressure, recent dx of AF now on eliquis, BPH, glaucoma presenting with several days of SoB. came in today because he felt like he was "going to pass out".  Endorses chronic LE edema; states he was supposed to start lasix recently but "it never came" and hasn't been taking it. Was recently in [facility] for COVID found to have afib started eliquis. Denies cough, fever, cp, sore throat. States he is on keflex for a UTI, self catheterizes at home. chr le edema (03 Jul 2020 12:58)      #Shortness of breath- ?secondary to pulmonary edema vs pneumonia vs effect of previous COVID infection  -CTA chest (7/3) with "Small bilateral pleural effusions and bilateral compressive atelectasis. Left upper lobe pneumonia."  -No evidence of leukocytosis, procalcitonin normal  -Repeat CXR ordered this AM  -Patient currently on 2LNC, wean as tolerated  -Patient received duoneb x1 overnight  -Continue Zosyn for empiric treatment of presumed pneumonia  -Consider TTE (?underlying valvular disease)  -Consider continuation of Lasix given LE edema on exam and pleural effusions on imaging  -Will continue to closely monitor patient/vitals  -Primary Team to follow up in AM, attending to follow       Regan De La O PA-C  Dept of Medicine  [phone]

## 2020-07-05 NOTE — PHYSICAL THERAPY INITIAL EVALUATION ADULT - ADDITIONAL COMMENTS
Pt lives at the [facility] facility. PTA pt reports being independent w/ most ADL's but uses assistance prn. Pt ambulates short distances w/ RW, for longer distances pt utilizes a wheelchair.

## 2020-07-05 NOTE — PHYSICAL THERAPY INITIAL EVALUATION ADULT - PERTINENT HX OF CURRENT PROBLEM, REHAB EVAL
89 y.o. M PMH mild dementia,  anemia, MDS, pre-diabetes, high blood pressure, recent dx of AF, BPH, glaucoma p/w several days of SOB. came in today because he felt like he was "going to pass out".  Endorses chronic LE edema; states he was supposed to start lasix recently but "it never came" and hasn't been taking it. Was recently in [facility] for COVID found to have afib started eliquis. States he is on keflex for a UTI, self catheterizes at home

## 2020-07-06 DIAGNOSIS — R00.1 BRADYCARDIA, UNSPECIFIED: ICD-10-CM

## 2020-07-06 LAB
ANION GAP SERPL CALC-SCNC: 13 MMOL/L — SIGNIFICANT CHANGE UP (ref 5–17)
BUN SERPL-MCNC: 29 MG/DL — HIGH (ref 7–23)
CALCIUM SERPL-MCNC: 8.8 MG/DL — SIGNIFICANT CHANGE UP (ref 8.4–10.5)
CHLORIDE SERPL-SCNC: 96 MMOL/L — SIGNIFICANT CHANGE UP (ref 96–108)
CO2 SERPL-SCNC: 30 MMOL/L — SIGNIFICANT CHANGE UP (ref 22–31)
CREAT SERPL-MCNC: 1.8 MG/DL — HIGH (ref 0.5–1.3)
GLUCOSE SERPL-MCNC: 157 MG/DL — HIGH (ref 70–99)
HCT VFR BLD CALC: 31.5 % — LOW (ref 39–50)
HGB BLD-MCNC: 10 G/DL — LOW (ref 13–17)
MCHC RBC-ENTMCNC: 31.7 GM/DL — LOW (ref 32–36)
MCHC RBC-ENTMCNC: 31.7 PG — SIGNIFICANT CHANGE UP (ref 27–34)
MCV RBC AUTO: 100 FL — SIGNIFICANT CHANGE UP (ref 80–100)
NRBC # BLD: 0 /100 WBCS — SIGNIFICANT CHANGE UP (ref 0–0)
PLATELET # BLD AUTO: 458 K/UL — HIGH (ref 150–400)
POTASSIUM SERPL-MCNC: 4 MMOL/L — SIGNIFICANT CHANGE UP (ref 3.5–5.3)
POTASSIUM SERPL-SCNC: 4 MMOL/L — SIGNIFICANT CHANGE UP (ref 3.5–5.3)
RBC # BLD: 3.15 M/UL — LOW (ref 4.2–5.8)
RBC # FLD: 19.8 % — HIGH (ref 10.3–14.5)
SODIUM SERPL-SCNC: 139 MMOL/L — SIGNIFICANT CHANGE UP (ref 135–145)
WBC # BLD: 11.16 K/UL — HIGH (ref 3.8–10.5)
WBC # FLD AUTO: 11.16 K/UL — HIGH (ref 3.8–10.5)

## 2020-07-06 RX ADMIN — APIXABAN 2.5 MILLIGRAM(S): 2.5 TABLET, FILM COATED ORAL at 18:22

## 2020-07-06 RX ADMIN — URSODIOL 300 MILLIGRAM(S): 250 TABLET, FILM COATED ORAL at 05:10

## 2020-07-06 RX ADMIN — DORZOLAMIDE HYDROCHLORIDE TIMOLOL MALEATE 1 DROP(S): 20; 5 SOLUTION/ DROPS OPHTHALMIC at 18:23

## 2020-07-06 RX ADMIN — BRIMONIDINE TARTRATE 1 DROP(S): 2 SOLUTION/ DROPS OPHTHALMIC at 05:10

## 2020-07-06 RX ADMIN — Medication 40 MILLIGRAM(S): at 22:05

## 2020-07-06 RX ADMIN — AMLODIPINE BESYLATE 5 MILLIGRAM(S): 2.5 TABLET ORAL at 05:10

## 2020-07-06 RX ADMIN — Medication 40 MILLIGRAM(S): at 09:35

## 2020-07-06 RX ADMIN — PIPERACILLIN AND TAZOBACTAM 25 GRAM(S): 4; .5 INJECTION, POWDER, LYOPHILIZED, FOR SOLUTION INTRAVENOUS at 09:35

## 2020-07-06 RX ADMIN — LATANOPROST 1 DROP(S): 0.05 SOLUTION/ DROPS OPHTHALMIC; TOPICAL at 21:45

## 2020-07-06 RX ADMIN — APIXABAN 2.5 MILLIGRAM(S): 2.5 TABLET, FILM COATED ORAL at 05:10

## 2020-07-06 RX ADMIN — Medication 12.5 MILLIGRAM(S): at 05:10

## 2020-07-06 RX ADMIN — BRIMONIDINE TARTRATE 1 DROP(S): 2 SOLUTION/ DROPS OPHTHALMIC at 13:00

## 2020-07-06 RX ADMIN — URSODIOL 300 MILLIGRAM(S): 250 TABLET, FILM COATED ORAL at 18:22

## 2020-07-06 RX ADMIN — Medication 12.5 MILLIGRAM(S): at 18:22

## 2020-07-06 RX ADMIN — DORZOLAMIDE HYDROCHLORIDE TIMOLOL MALEATE 1 DROP(S): 20; 5 SOLUTION/ DROPS OPHTHALMIC at 05:10

## 2020-07-06 RX ADMIN — FINASTERIDE 5 MILLIGRAM(S): 5 TABLET, FILM COATED ORAL at 13:00

## 2020-07-06 RX ADMIN — TAMSULOSIN HYDROCHLORIDE 0.8 MILLIGRAM(S): 0.4 CAPSULE ORAL at 21:46

## 2020-07-06 RX ADMIN — PIPERACILLIN AND TAZOBACTAM 25 GRAM(S): 4; .5 INJECTION, POWDER, LYOPHILIZED, FOR SOLUTION INTRAVENOUS at 22:10

## 2020-07-06 RX ADMIN — BRIMONIDINE TARTRATE 1 DROP(S): 2 SOLUTION/ DROPS OPHTHALMIC at 21:44

## 2020-07-06 NOTE — CHART NOTE - NSCHARTNOTEFT_GEN_A_CORE
Notified this AM at approximately 05:00 that patient had an episode of HR down to 42 for <2 sec at 02:27. Patient was sleeping at bed at the time of the episode. He denied any episodes of headache, dizziness, chest pain, acute dyspnea, nausea, vomiting, or abdominal pain overnight. Prior to this, lowest HR noted on telemetry was 60. Currently, telemetry with Afib 80-90s.       Vital Signs Last 24 Hrs  T(C): 36.7 (06 Jul 2020 04:24), Max: 36.7 (06 Jul 2020 04:24)  T(F): 98 (06 Jul 2020 04:24), Max: 98 (06 Jul 2020 04:24)  HR: 78 (06 Jul 2020 04:24) (76 - 106)  BP: 154/86 (06 Jul 2020 04:24) (114/76 - 165/75)  RR: 19 (06 Jul 2020 04:24) (18 - 20)  SpO2: 95% (06 Jul 2020 04:24) (88% - 97%)      Physical Exam:  General: Elderly male laying in bed, NAD, AOx3, nontoxic appearing  Head:  NC/AT  CV: (+) irregularly irregular, S1S2   Respiratory: (+) decreased breath sounds at bilateral bases, otherwise CTA B/L, nonlabored      89 M hx mild dementia,  anemia, MDS, pre-diabetes, htn, AF (eliquis), BPH, recent covid infection, glaucoma presenting with sob.  Patient now presenting acutely with episode of bradycardia with HR down to 42 for <2 seconds while he was sleeping.     #Bradycardia- likely vagally mediated  -Vital signs hemodynamically stable, patient asymptomatic  -Telemetry with Afib 80-90s; continue to monitor on telemetry  -Patient currently receiving Lopressor 12.5mg BID; consider decreasing dose if persistent episodes of bradycardia  -Cardiology is following patient  -Will continue to closely monitor patient/vitals  -Primary Team to follow up in AM, attending to follow       Regan De La O PA-C  Dept of Medicine  [phone]

## 2020-07-07 DIAGNOSIS — R06.00 DYSPNEA, UNSPECIFIED: ICD-10-CM

## 2020-07-07 LAB
ANION GAP SERPL CALC-SCNC: 11 MMOL/L — SIGNIFICANT CHANGE UP (ref 5–17)
BUN SERPL-MCNC: 28 MG/DL — HIGH (ref 7–23)
CALCIUM SERPL-MCNC: 8.8 MG/DL — SIGNIFICANT CHANGE UP (ref 8.4–10.5)
CHLORIDE SERPL-SCNC: 94 MMOL/L — LOW (ref 96–108)
CO2 SERPL-SCNC: 32 MMOL/L — HIGH (ref 22–31)
CREAT SERPL-MCNC: 1.56 MG/DL — HIGH (ref 0.5–1.3)
GLUCOSE SERPL-MCNC: 115 MG/DL — HIGH (ref 70–99)
HCT VFR BLD CALC: 33.3 % — LOW (ref 39–50)
HGB BLD-MCNC: 10.5 G/DL — LOW (ref 13–17)
MCHC RBC-ENTMCNC: 31.2 PG — SIGNIFICANT CHANGE UP (ref 27–34)
MCHC RBC-ENTMCNC: 31.5 GM/DL — LOW (ref 32–36)
MCV RBC AUTO: 98.8 FL — SIGNIFICANT CHANGE UP (ref 80–100)
NRBC # BLD: 0 /100 WBCS — SIGNIFICANT CHANGE UP (ref 0–0)
PLATELET # BLD AUTO: 440 K/UL — HIGH (ref 150–400)
POTASSIUM SERPL-MCNC: 3.3 MMOL/L — LOW (ref 3.5–5.3)
POTASSIUM SERPL-SCNC: 3.3 MMOL/L — LOW (ref 3.5–5.3)
RBC # BLD: 3.37 M/UL — LOW (ref 4.2–5.8)
RBC # FLD: 19.7 % — HIGH (ref 10.3–14.5)
SODIUM SERPL-SCNC: 137 MMOL/L — SIGNIFICANT CHANGE UP (ref 135–145)
WBC # BLD: 9 K/UL — SIGNIFICANT CHANGE UP (ref 3.8–10.5)
WBC # FLD AUTO: 9 K/UL — SIGNIFICANT CHANGE UP (ref 3.8–10.5)

## 2020-07-07 RX ORDER — POTASSIUM CHLORIDE 20 MEQ
20 PACKET (EA) ORAL
Refills: 0 | Status: COMPLETED | OUTPATIENT
Start: 2020-07-07 | End: 2020-07-07

## 2020-07-07 RX ADMIN — Medication 12.5 MILLIGRAM(S): at 17:34

## 2020-07-07 RX ADMIN — Medication 12.5 MILLIGRAM(S): at 06:13

## 2020-07-07 RX ADMIN — Medication 40 MILLIGRAM(S): at 09:05

## 2020-07-07 RX ADMIN — LATANOPROST 1 DROP(S): 0.05 SOLUTION/ DROPS OPHTHALMIC; TOPICAL at 21:44

## 2020-07-07 RX ADMIN — PIPERACILLIN AND TAZOBACTAM 25 GRAM(S): 4; .5 INJECTION, POWDER, LYOPHILIZED, FOR SOLUTION INTRAVENOUS at 21:42

## 2020-07-07 RX ADMIN — URSODIOL 300 MILLIGRAM(S): 250 TABLET, FILM COATED ORAL at 17:34

## 2020-07-07 RX ADMIN — Medication 40 MILLIGRAM(S): at 19:43

## 2020-07-07 RX ADMIN — AMLODIPINE BESYLATE 5 MILLIGRAM(S): 2.5 TABLET ORAL at 06:14

## 2020-07-07 RX ADMIN — APIXABAN 2.5 MILLIGRAM(S): 2.5 TABLET, FILM COATED ORAL at 06:15

## 2020-07-07 RX ADMIN — BRIMONIDINE TARTRATE 1 DROP(S): 2 SOLUTION/ DROPS OPHTHALMIC at 21:44

## 2020-07-07 RX ADMIN — FINASTERIDE 5 MILLIGRAM(S): 5 TABLET, FILM COATED ORAL at 09:06

## 2020-07-07 RX ADMIN — URSODIOL 300 MILLIGRAM(S): 250 TABLET, FILM COATED ORAL at 06:12

## 2020-07-07 RX ADMIN — Medication 20 MILLIEQUIVALENT(S): at 19:44

## 2020-07-07 RX ADMIN — Medication 20 MILLIEQUIVALENT(S): at 21:42

## 2020-07-07 RX ADMIN — PIPERACILLIN AND TAZOBACTAM 25 GRAM(S): 4; .5 INJECTION, POWDER, LYOPHILIZED, FOR SOLUTION INTRAVENOUS at 09:05

## 2020-07-07 RX ADMIN — DORZOLAMIDE HYDROCHLORIDE TIMOLOL MALEATE 1 DROP(S): 20; 5 SOLUTION/ DROPS OPHTHALMIC at 06:24

## 2020-07-07 RX ADMIN — BRIMONIDINE TARTRATE 1 DROP(S): 2 SOLUTION/ DROPS OPHTHALMIC at 06:14

## 2020-07-07 RX ADMIN — APIXABAN 2.5 MILLIGRAM(S): 2.5 TABLET, FILM COATED ORAL at 17:35

## 2020-07-07 RX ADMIN — BRIMONIDINE TARTRATE 1 DROP(S): 2 SOLUTION/ DROPS OPHTHALMIC at 13:33

## 2020-07-07 RX ADMIN — TAMSULOSIN HYDROCHLORIDE 0.8 MILLIGRAM(S): 0.4 CAPSULE ORAL at 21:43

## 2020-07-07 RX ADMIN — DORZOLAMIDE HYDROCHLORIDE TIMOLOL MALEATE 1 DROP(S): 20; 5 SOLUTION/ DROPS OPHTHALMIC at 17:35

## 2020-07-07 NOTE — PROVIDER CONTACT NOTE (OTHER) - ASSESSMENT
Pt aox4, 95% on RA. HR 80s. States feels better with 2L NC.
Pt aox4, pt placed on 2L of NC overnight because of c/o sob. RN tried weaning pt off to RA but pt c/o of shortness of breath on RA.
pt aox4, vss. denie pain, sob or discomfort. pt rivera catheter leaking with balloon inflated. Pt requests taking out rivera catheter and trying tov.
Patient Aox4. urinating in small increments. Patient complaining of feeling the urge to urinate and is unable to urinate fully. Patient denies bladder pain.

## 2020-07-07 NOTE — PROVIDER CONTACT NOTE (OTHER) - BACKGROUND
pt 89 year old male admitted with heart failure
Dx - Heart failure, UTI, PNA  Hx - Afib, CKD

## 2020-07-07 NOTE — PROGRESS NOTE ADULT - ATTENDING COMMENTS
[facility]  [phone]
[facility]  [phone]
[facility]  [phone]
[facility]  [phone]

## 2020-07-07 NOTE — PROVIDER CONTACT NOTE (OTHER) - SITUATION
urinary retention. Bladder scan of 416 ml
Pt rivera catheter leaking
pt 89% on RA
pt complain of sob.

## 2020-07-07 NOTE — PROVIDER CONTACT NOTE (OTHER) - REASON
urinary retention. Bladder scan of 416 ml
pt 89% on RA
pt complain of SOB
pt rivera catheter leaking

## 2020-07-07 NOTE — PROVIDER CONTACT NOTE (OTHER) - ACTION/TREATMENT ORDERED:
PA notified, continue to monitor spo2, try to wean pt off of o2 to room air.
PA will order xray test, continue to monitor.
ok to remove rivera catheter and trying tov. bladder scan pt, continue to monitor.
NP made aware. s/p 400 ml straight cath' d  Will continue to monitor patient.

## 2020-07-08 ENCOUNTER — TRANSCRIPTION ENCOUNTER (OUTPATIENT)
Age: 85
End: 2020-07-08

## 2020-07-08 VITALS
OXYGEN SATURATION: 97 % | SYSTOLIC BLOOD PRESSURE: 131 MMHG | RESPIRATION RATE: 18 BRPM | TEMPERATURE: 97 F | HEART RATE: 81 BPM | DIASTOLIC BLOOD PRESSURE: 86 MMHG

## 2020-07-08 LAB
ANION GAP SERPL CALC-SCNC: 10 MMOL/L — SIGNIFICANT CHANGE UP (ref 5–17)
BUN SERPL-MCNC: 32 MG/DL — HIGH (ref 7–23)
CALCIUM SERPL-MCNC: 9 MG/DL — SIGNIFICANT CHANGE UP (ref 8.4–10.5)
CHLORIDE SERPL-SCNC: 94 MMOL/L — LOW (ref 96–108)
CO2 SERPL-SCNC: 32 MMOL/L — HIGH (ref 22–31)
CREAT SERPL-MCNC: 1.69 MG/DL — HIGH (ref 0.5–1.3)
GLUCOSE SERPL-MCNC: 118 MG/DL — HIGH (ref 70–99)
POTASSIUM SERPL-MCNC: 3.5 MMOL/L — SIGNIFICANT CHANGE UP (ref 3.5–5.3)
POTASSIUM SERPL-SCNC: 3.5 MMOL/L — SIGNIFICANT CHANGE UP (ref 3.5–5.3)
SODIUM SERPL-SCNC: 136 MMOL/L — SIGNIFICANT CHANGE UP (ref 135–145)

## 2020-07-08 PROCEDURE — 80053 COMPREHEN METABOLIC PANEL: CPT

## 2020-07-08 PROCEDURE — 96375 TX/PRO/DX INJ NEW DRUG ADDON: CPT | Mod: XU

## 2020-07-08 PROCEDURE — 84145 PROCALCITONIN (PCT): CPT

## 2020-07-08 PROCEDURE — 94640 AIRWAY INHALATION TREATMENT: CPT

## 2020-07-08 PROCEDURE — 83735 ASSAY OF MAGNESIUM: CPT

## 2020-07-08 PROCEDURE — 82565 ASSAY OF CREATININE: CPT

## 2020-07-08 PROCEDURE — 97116 GAIT TRAINING THERAPY: CPT

## 2020-07-08 PROCEDURE — 84295 ASSAY OF SERUM SODIUM: CPT

## 2020-07-08 PROCEDURE — 82553 CREATINE MB FRACTION: CPT

## 2020-07-08 PROCEDURE — 85610 PROTHROMBIN TIME: CPT

## 2020-07-08 PROCEDURE — 71045 X-RAY EXAM CHEST 1 VIEW: CPT

## 2020-07-08 PROCEDURE — 96374 THER/PROPH/DIAG INJ IV PUSH: CPT | Mod: XU

## 2020-07-08 PROCEDURE — 87186 SC STD MICRODIL/AGAR DIL: CPT

## 2020-07-08 PROCEDURE — 87086 URINE CULTURE/COLONY COUNT: CPT

## 2020-07-08 PROCEDURE — 99285 EMERGENCY DEPT VISIT HI MDM: CPT | Mod: 25

## 2020-07-08 PROCEDURE — 93005 ELECTROCARDIOGRAM TRACING: CPT | Mod: XU

## 2020-07-08 PROCEDURE — 82435 ASSAY OF BLOOD CHLORIDE: CPT

## 2020-07-08 PROCEDURE — 82947 ASSAY GLUCOSE BLOOD QUANT: CPT

## 2020-07-08 PROCEDURE — 86140 C-REACTIVE PROTEIN: CPT

## 2020-07-08 PROCEDURE — 80048 BASIC METABOLIC PNL TOTAL CA: CPT

## 2020-07-08 PROCEDURE — 82550 ASSAY OF CK (CPK): CPT

## 2020-07-08 PROCEDURE — 83605 ASSAY OF LACTIC ACID: CPT

## 2020-07-08 PROCEDURE — 82728 ASSAY OF FERRITIN: CPT

## 2020-07-08 PROCEDURE — 51702 INSERT TEMP BLADDER CATH: CPT

## 2020-07-08 PROCEDURE — 85730 THROMBOPLASTIN TIME PARTIAL: CPT

## 2020-07-08 PROCEDURE — 83880 ASSAY OF NATRIURETIC PEPTIDE: CPT

## 2020-07-08 PROCEDURE — 84484 ASSAY OF TROPONIN QUANT: CPT

## 2020-07-08 PROCEDURE — 84132 ASSAY OF SERUM POTASSIUM: CPT

## 2020-07-08 PROCEDURE — 97161 PT EVAL LOW COMPLEX 20 MIN: CPT

## 2020-07-08 PROCEDURE — 84100 ASSAY OF PHOSPHORUS: CPT

## 2020-07-08 PROCEDURE — 81001 URINALYSIS AUTO W/SCOPE: CPT

## 2020-07-08 PROCEDURE — 82803 BLOOD GASES ANY COMBINATION: CPT

## 2020-07-08 PROCEDURE — 71275 CT ANGIOGRAPHY CHEST: CPT

## 2020-07-08 PROCEDURE — 85027 COMPLETE CBC AUTOMATED: CPT

## 2020-07-08 PROCEDURE — 85014 HEMATOCRIT: CPT

## 2020-07-08 PROCEDURE — 86769 SARS-COV-2 COVID-19 ANTIBODY: CPT

## 2020-07-08 PROCEDURE — 85379 FIBRIN DEGRADATION QUANT: CPT

## 2020-07-08 PROCEDURE — 82330 ASSAY OF CALCIUM: CPT

## 2020-07-08 RX ORDER — AMLODIPINE BESYLATE 2.5 MG/1
1 TABLET ORAL
Qty: 0 | Refills: 0 | DISCHARGE

## 2020-07-08 RX ORDER — METOPROLOL TARTRATE 50 MG
1 TABLET ORAL
Qty: 0 | Refills: 0 | DISCHARGE

## 2020-07-08 RX ORDER — FUROSEMIDE 40 MG
1 TABLET ORAL
Qty: 0 | Refills: 0 | DISCHARGE

## 2020-07-08 RX ORDER — AMLODIPINE BESYLATE 2.5 MG/1
1 TABLET ORAL
Qty: 0 | Refills: 0 | DISCHARGE
Start: 2020-07-08

## 2020-07-08 RX ORDER — AMLODIPINE BESYLATE 2.5 MG/1
1 TABLET ORAL
Qty: 30 | Refills: 0
Start: 2020-07-08 | End: 2020-08-06

## 2020-07-08 RX ORDER — CEPHALEXIN 500 MG
1 CAPSULE ORAL
Qty: 0 | Refills: 0 | DISCHARGE

## 2020-07-08 RX ORDER — FUROSEMIDE 40 MG
1 TABLET ORAL
Qty: 60 | Refills: 0
Start: 2020-07-08 | End: 2020-08-06

## 2020-07-08 RX ORDER — FUROSEMIDE 40 MG
40 TABLET ORAL
Refills: 0 | Status: DISCONTINUED | OUTPATIENT
Start: 2020-07-08 | End: 2020-07-08

## 2020-07-08 RX ORDER — METOPROLOL TARTRATE 50 MG
0.5 TABLET ORAL
Qty: 30 | Refills: 0
Start: 2020-07-08 | End: 2020-08-06

## 2020-07-08 RX ADMIN — URSODIOL 300 MILLIGRAM(S): 250 TABLET, FILM COATED ORAL at 06:46

## 2020-07-08 RX ADMIN — FINASTERIDE 5 MILLIGRAM(S): 5 TABLET, FILM COATED ORAL at 08:57

## 2020-07-08 RX ADMIN — AMLODIPINE BESYLATE 5 MILLIGRAM(S): 2.5 TABLET ORAL at 05:38

## 2020-07-08 RX ADMIN — APIXABAN 2.5 MILLIGRAM(S): 2.5 TABLET, FILM COATED ORAL at 05:38

## 2020-07-08 RX ADMIN — DORZOLAMIDE HYDROCHLORIDE TIMOLOL MALEATE 1 DROP(S): 20; 5 SOLUTION/ DROPS OPHTHALMIC at 05:39

## 2020-07-08 RX ADMIN — Medication 40 MILLIGRAM(S): at 08:58

## 2020-07-08 RX ADMIN — PIPERACILLIN AND TAZOBACTAM 25 GRAM(S): 4; .5 INJECTION, POWDER, LYOPHILIZED, FOR SOLUTION INTRAVENOUS at 08:58

## 2020-07-08 RX ADMIN — BRIMONIDINE TARTRATE 1 DROP(S): 2 SOLUTION/ DROPS OPHTHALMIC at 12:07

## 2020-07-08 RX ADMIN — BRIMONIDINE TARTRATE 1 DROP(S): 2 SOLUTION/ DROPS OPHTHALMIC at 05:39

## 2020-07-08 RX ADMIN — Medication 12.5 MILLIGRAM(S): at 05:39

## 2020-07-08 NOTE — PROGRESS NOTE ADULT - PROBLEM SELECTOR PROBLEM 2
Bradycardia
Atrial fibrillation, unspecified type
Acute on chronic congestive heart failure, unspecified heart failure type

## 2020-07-08 NOTE — PROGRESS NOTE ADULT - PROBLEM SELECTOR PROBLEM 4
Atrial fibrillation, unspecified type
Acute on chronic congestive heart failure, unspecified heart failure type
CKD (chronic kidney disease) stage 3, GFR 30-59 ml/min

## 2020-07-08 NOTE — PROGRESS NOTE ADULT - PROBLEM SELECTOR PROBLEM 1
Acute on chronic congestive heart failure, unspecified heart failure type
Dyspnea
Acute on chronic congestive heart failure, unspecified heart failure type
Atrial fibrillation, unspecified type
Dyspnea

## 2020-07-08 NOTE — DISCHARGE NOTE PROVIDER - HOSPITAL COURSE
89 year old M with mild dementia, MDS, AF on Eliquis, BPH who self catheterizes, recent COVID infection, CKD admitted with Acute decompensated diastolic CHF. Rxed wth IV Lasix ; noted to have Af with RVR; controlled on beta blockers; Urine clx positive for E. Cloaceae. Rxed with 5 days of Zosyn; Symptoms improved; Discharged home on PO Lasix bid back to assisted living. 89 year old M with mild dementia, MDS, AF on Eliquis, BPH who self catheterizes, recent COVID infection, CKD admitted with Acute decompensated diastolic CHF. Rxed wth IV Lasix ; noted to have Af with RVR; controlled on beta blockers; Urine clx positive for E. Cloaceae. Rxed with 5 days of Zosyn; Symptoms improved; Discharged home on PO Lasix 40 mg bid back to assisted living. 89 year old M with mild dementia, MDS, AF on Eliquis, BPH who self catheterizes, recent COVID infection, CKD admitted with Acute decompensated diastolic CHF. Rxed wth IV Lasix ; noted to have Af with RVR; controlled on beta blockers; Urine clx positive for E. Cloaceae. Rxed with 5 days of Zosyn; Symptoms improved; Discharged home on PO Lasix 40 mg bid back to assisted living.         patient seen and examined. agree with the above.     discussed with patient     medication reconciliation reviewed    30 minutes was spent coordinating care on day of discharge

## 2020-07-08 NOTE — DISCHARGE NOTE NURSING/CASE MANAGEMENT/SOCIAL WORK - NSDCPEPTCAREGIVEDUMATLIST _GEN_ALL_CORE
Apixaban/Eliquis/Heart Failure Apixaban/Eliquis/Coronavirus/COVID19/MI/Influenza Vaccination/Heart Failure

## 2020-07-08 NOTE — DISCHARGE NOTE NURSING/CASE MANAGEMENT/SOCIAL WORK - PATIENT PORTAL LINK FT
You can access the FollowMyHealth Patient Portal offered by [facility] by registering at the following website: [email or web address]. By joining [facility]’s FollowMyHealth portal, you will also be able to view your health information using other applications (apps) compatible with our system.

## 2020-07-08 NOTE — PROGRESS NOTE ADULT - PROVIDER SPECIALTY LIST ADULT
Cardiology
Cardiology
Geriatrics
Internal Medicine
Internal Medicine
Pulmonology
Pulmonology
Internal Medicine
Cardiology
Internal Medicine
Pulmonology
Cardiology
Cardiology
Geriatrics
Geriatrics

## 2020-07-08 NOTE — PROGRESS NOTE ADULT - PROBLEM SELECTOR PLAN 2
-  -Continue metoprolol for rate control  -Continue Eliquis 2.5 mg bid for AC  -Tele monitoring    Mikhail Zelaya D.O.  [phone]
-  -Continue metoprolol for rate control  -Continue Eliquis 2.5 mg bid for AC  -Tele monitoring
not  on bblkr, if recurs I qwill stop amlodipine first, Hx AFRVR
improved no dyspnea, lungs clear better
-  -Continue metoprolol for rate control  -Continue Eliquis 2.5 mg bid for AC  -Tele monitoring
-  -Continue metoprolol for rate control  -Continue Eliquis 2.5 mg bid for AC  -Tele monitoring    ambulate today. wean o2.    Mikhail Zelaya D.O.  [phone]
-  -Continue metoprolol for rate control  -Continue Eliquis 2.5 mg bid for AC  -Tele monitoring    ok with discharge planning.    can follow up with dr sanders ([facility] cardiology) in 1 week.    Mikhail Zelaya D.O.  [phone]
Tachy/abril  meds have not been changed. see Cardiio

## 2020-07-08 NOTE — PROGRESS NOTE ADULT - PROBLEM SELECTOR PLAN 1
res, watch for incr reyna, edema at home
-  -Patient dyspnea has improved  - He is neg 6L since admission  - Was given Lasix 40mg IV this am will continue to dose daily  - Continue daily weights and I+Os  - Creatinine 1.46, close to baseline.  - Monitor potassium and mag, keep above 4 and 2 respectively.  - Echo 4/2020- normal ef, mild pulmonary hypertension, mild AS.
abril montemayor   rec dc norvasc
-  - Patient dyspnea has improved but he remains significantly volume overloaded  - Lasix 40mg IV q12  - Continue daily weights and I+Os  - Creatinine close to baseline.  - Monitor potassium and mag, keep above 4 and 2 respectively.  - Echo 4/2020- normal ef, mild pulmonary hypertension, mild AS.
-  - Patient dyspnea has improved but he remains significantly volume overloaded  - Lasix 40mg IV q12  - Continue daily weights and I+Os  - Creatinine close to baseline.  - Monitor potassium and mag, keep above 4 and 2 respectively.  - Echo 4/2020- normal ef, mild pulmonary hypertension, mild AS.
-  - Patient dyspnea has improved but he remains significantly volume overloaded  - Lasix 40mg po q12  - Continue daily weights and I+Os  - Creatinine close to baseline.  - Monitor potassium and mag, keep above 4 and 2 respectively.  - Echo 4/2020- normal ef, mild pulmonary hypertension, mild AS.
-  - Patient dyspnea has improved but he remains significantly volume overloaded  - Will increase lasix to 40mg IV q12  - Continue daily weights and I+Os  - Creatinine 1.46, close to baseline.  - Monitor potassium and mag, keep above 4 and 2 respectively.  - Echo 4/2020- normal ef, mild pulmonary hypertension, mild AS.
still has reyna but he says better  see meds

## 2020-07-08 NOTE — PROGRESS NOTE ADULT - PROBLEM SELECTOR PLAN 5
chronic, urinating, occ str cath. Beckman out, abx
I will repeat cbc next week re Aranesp
h/h >10, without procrit

## 2020-07-08 NOTE — DISCHARGE NOTE PROVIDER - CARE PROVIDER_API CALL
Shahzad Julio  CARDIOVASCULAR DISEASE  [address]  [place] [address]  Phone: [phone]  Fax: [phone]  Scheduled Appointment: 07/16/2020

## 2020-07-08 NOTE — PROGRESS NOTE ADULT - NSHPATTENDINGPLANDISCUSS_GEN_ALL_CORE
pt, medicine np, pulm
pt, staff
pt, RN, KELSEY staff
pt, RN

## 2020-07-08 NOTE — PROGRESS NOTE ADULT - PROBLEM SELECTOR PROBLEM 3
Acute on chronic congestive heart failure, unspecified heart failure type
CKD (chronic kidney disease) stage 3, GFR 30-59 ml/min
MDS (myelodysplastic syndrome)

## 2020-07-08 NOTE — DISCHARGE NOTE PROVIDER - NSDCMRMEDTOKEN_GEN_ALL_CORE_FT
acetaminophen 325 mg oral tablet: 2 tab(s) orally every 6 hours, As Needed  Alphagan P 0.1% ophthalmic solution: 1 drop(s) to each affected eye every 8 hours  amLODIPine 5 mg oral tablet: 1 tab(s) orally once a day  Cosopt 2.23%-0.68% ophthalmic solution: 1 drop(s) to each affected eye 2 times a day  Eliquis 2.5 mg oral tablet: 1 tab(s) orally 2 times a day  finasteride 5 mg oral tablet: 1 tab(s) orally once a day  furosemide 40 mg oral tablet: 1 tab(s) orally 2 times a day  Linzess 145 mcg oral capsule: 1 cap(s) orally once a day  Lumigan 0.03% ophthalmic solution: 1 drop(s) in the left eye 3 times a day  Metoprolol Tartrate 25 mg oral tablet: 0.5 tab(s) orally 2 times a day   Rhopressa 0.02% ophthalmic solution: 1 drop(s) in the left eye once a day  tamsulosin 0.4 mg oral capsule: 1 cap(s) orally 2 times a day  ursodiol 300 mg oral capsule: 1 cap(s) orally 2 times a day

## 2020-07-08 NOTE — PROGRESS NOTE ADULT - SUBJECTIVE AND OBJECTIVE BOX
Follow-up Pulm Progress Note  Hermilo Abebe MD  [phone]    AFebrile on day # 4 zosyn for possible L pneumonia  PCT normal  Leukocytosis resolved  Feels well today: no resp complaints,alert    Medications:  Vital Signs Last 24 Hrs  T(C): 36.4 (06 Jul 2020 09:27), Max: 36.7 (06 Jul 2020 04:24)  T(F): 97.6 (06 Jul 2020 09:27), Max: 98 (06 Jul 2020 04:24)  HR: 82 (06 Jul 2020 09:27) (78 - 106)  BP: 126/81 (06 Jul 2020 09:27) (126/81 - 165/75)  BP(mean): --  RR: 18 (06 Jul 2020 09:27) (18 - 20)  SpO2: 95% (06 Jul 2020 09:27) (88% - 95%)      07-05 @ 07:01  -  07-06 @ 07:00  --------------------------------------------------------  IN: 720 mL / OUT: 2800 mL / NET: -2080 mL      LABS:                        9.4    9.98  )-----------( 385      ( 05 Jul 2020 05:25 )             29.3     07-05    139  |  101  |  25<H>  ----------------------------<  117<H>  3.5   |  28  |  1.63<H>    Ca    8.3<L>      05 Jul 2020 05:25  Mg     1.8     07-05    Procalcitonin, Serum: 0.10 ng/mL (07-03-20 @ 20:12)      CULTURES:  Culture Results:   Multiple Morphological Strains  50,000 - 99,000 CFU/mL Enterobacter cloacae  10,000 - 49,000 CFU/mL Enterobacter cloacae #2 (07-03 @ 10:11)    Most recent blood culture -- 07-03 @ 10:11   Enterobacter cloacae Enterobacter cloacae  Enterobacter cloacae .Urine Catheterized 07-03 @ 10:11      Physical Examination:  PULM:   CVS: Regular rate and rhythm, no murmurs, rubs, or gallops  ABD: Soft, non-tender  EXT:  No clubbing, cyanosis, or edema    RADIOLOGY REVIEWED  CXR:    CT chest:    TTE:
Follow-up Pulm Progress Note  Hermilo Abebe MD  [phone]    AFebrile day # 5/5 zosyn for possible L pneumonia  Sat 96% on 2 liters nasal oxygen  No resp complaints      Vital Signs Last 24 Hrs  T(C): 36.4 (07 Jul 2020 04:10), Max: 36.4 (06 Jul 2020 13:08)  T(F): 97.6 (07 Jul 2020 04:10), Max: 97.6 (06 Jul 2020 13:08)  HR: 70 (07 Jul 2020 09:04) (70 - 87)  BP: 151/80 (07 Jul 2020 09:04) (126/73 - 151/80)  BP(mean): --  RR: 18 (07 Jul 2020 04:10) (18 - 18)  SpO2: 97% (07 Jul 2020 04:10) (95% - 97%)                        10.5   9.00  )-----------( 440      ( 07 Jul 2020 06:32 )             33.3     07-07    137  |  94<L>  |  28<H>  ----------------------------<  115<H>  3.3<L>   |  32<H>  |  1.56<H>    Ca    8.8      07 Jul 2020 06:32      CULTURES:  Culture Results:   Multiple Morphological Strains  50,000 - 99,000 CFU/mL Enterobacter cloacae  10,000 - 49,000 CFU/mL Enterobacter cloacae #2 (07-03 @ 10:11)    Most recent blood culture -- 07-03 @ 10:11   Enterobacter cloacae Enterobacter cloacae  Enterobacter cloacae .Urine Catheterized 07-03 @ 10:11      Physical Examination:  PULM: No wheeze or rhonchi  CVS: Regular rate and rhythm, no murmurs, rubs, or gallops  ABD: Soft, non-tender  EXT:  No clubbing, cyanosis, or edema    RADIOLOGY REVIEWED  CXR:    CT chest:    TTE:
Follow-up Pulm Progress Note  Hermilo Abebe MD  [phone]    AFebrile off antibiotivcs  No resp complaints      Vital Signs Last 24 Hrs  T(C): 36.3 (08 Jul 2020 11:48), Max: 36.6 (08 Jul 2020 04:51)  T(F): 97.4 (08 Jul 2020 11:48), Max: 97.8 (08 Jul 2020 04:51)  HR: 81 (08 Jul 2020 11:48) (70 - 99)  BP: 131/86 (08 Jul 2020 11:48) (113/75 - 146/91)  BP(mean): --  RR: 18 (08 Jul 2020 11:48) (18 - 18)  SpO2: 97% (08 Jul 2020 11:48) (94% - 97%)                          10.5   9.00  )-----------( 440      ( 07 Jul 2020 06:32 )             33.3     07-08    136  |  94<L>  |  32<H>  ----------------------------<  118<H>  3.5   |  32<H>  |  1.69<H>    Ca    9.0      08 Jul 2020 06:44      CULTURES:  Culture Results:   Multiple Morphological Strains  50,000 - 99,000 CFU/mL Enterobacter cloacae  10,000 - 49,000 CFU/mL Enterobacter cloacae #2 (07-03 @ 10:11)    Most recent blood culture -- 07-03 @ 10:11   Enterobacter cloacae Enterobacter cloacae  Enterobacter cloacae .Urine Catheterized 07-03 @ 10:11      Physical Examination:  PULM: No wheeze or rhonchi  CVS: Regular rate and rhythm, no murmurs, rubs, or gallops  ABD: Soft, non-tender  EXT:  No clubbing, cyanosis, or edema    RADIOLOGY REVIEWED  CXR:    CT chest:    TTE:
HPI:  89 M hx mild dementia,  anemia, MDS, pre-diabetes, high blood pressure, recent dx of AF now on eliquis, BPH, glaucoma presenting with several days of SoB. came in today because he felt like he was "going to pass out".  Endorses chronic LE edema; states he was supposed to start lasix recently but "it never came" and hasn't been taking it. Was recently in [facility] for COVID found to have afib started eliquis. Denies cough, fever, cp, sore throat. States he is on keflex for a UTI, self catheterizes at home. chr le edema (03 Jul 2020 12:58)      Interval Events  getting IV Lasix, feels better, less sob still some reyna    MEDICATIONS  (STANDING):  amLODIPine   Tablet 5 milliGRAM(s) Oral daily  apixaban 2.5 milliGRAM(s) Oral every 12 hours  brimonidine 0.2% Ophthalmic Solution 1 Drop(s) Both EYES every 8 hours  dorzolamide 2%/timolol 0.5% Ophthalmic Solution 1 Drop(s) Both EYES every 12 hours  finasteride 5 milliGRAM(s) Oral daily  furosemide   Injectable 40 milliGRAM(s) IV Push two times a day  latanoprost 0.005% Ophthalmic Solution 1 Drop(s) Left EYE at bedtime  metoprolol tartrate 12.5 milliGRAM(s) Oral every 12 hours  piperacillin/tazobactam IVPB.. 3.375 Gram(s) IV Intermittent every 12 hours  tamsulosin 0.8 milliGRAM(s) Oral at bedtime  ursodiol Capsule 300 milliGRAM(s) Oral two times a day    MEDICATIONS  (PRN):  acetaminophen   Tablet .. 650 milliGRAM(s) Oral every 6 hours PRN Temp greater or equal to 38.5C (101.3F), Moderate Pain (4 - 6)      Patient is a 89y old  Male who presents with a chief complaint of Dyspnea (06 Jul 2020 12:28)      REVIEW OF SYSTEMS    General:nad alert wants to amb and wants to go home  Skin/Breast:  Ophthalmologic:no co no ch sees ok  ENMT:	no ch no co hears swallows eats ok  Respiratory and Thorax:no cough no sp no sob  Cardiovascular:no cp no [palp  Gastrointestinal:no nvcd  Genitourinary:F incr p lasix, no d/i  Musculoskeletal:	no a no p  Neurological:	no co no ch  Psychiatric:	  Hematology/Lymphatics:	  Endocrine:	no poly udd  Allergic/Immunologic:  AOSN	y      Vital Signs Last 24 Hrs  T(C): 36.4 (07 Jul 2020 04:10), Max: 36.4 (06 Jul 2020 09:27)  T(F): 97.6 (07 Jul 2020 04:10), Max: 97.6 (06 Jul 2020 09:27)  HR: 87 (07 Jul 2020 04:10) (81 - 87)  BP: 144/78 (07 Jul 2020 04:10) (126/73 - 144/78)  BP(mean): --  RR: 18 (07 Jul 2020 04:10) (18 - 18)  SpO2: 97% (07 Jul 2020 04:10) (95% - 97%)    PHYSICAL EXAM:    Constitutional:nad no co  H+N ncat nop cb no tm  Eyes:martell cwnl  ENMT:hears swallows ok  Neck:no cb no tm  Breasts:  Back:  Respiratory:cta r, rales L base less and finer  Cardiovascular:rrr right now, m  Gastrointestinal:round soft nt  Genitourinary:  Rectal:  Extremities:no cce, supple  Vascular:  Neurological:speech clear, can amb  Skin:cdi  Lymph Nodes:  Musculoskeletal:  Psychiatric:    LABS  CBC Full  -  ( 07 Jul 2020 06:32 )  WBC Count : 9.00 K/uL  RBC Count : 3.37 M/uL  Hemoglobin : 10.5 g/dL  Hematocrit : 33.3 %  Platelet Count - Automated : 440 K/uL  Mean Cell Volume : 98.8 fl  Mean Cell Hemoglobin : 31.2 pg  Mean Cell Hemoglobin Concentration : 31.5 gm/dL  Auto Neutrophil # : x  Auto Lymphocyte # : x  Auto Monocyte # : x  Auto Eosinophil # : x  Auto Basophil # : x  Auto Neutrophil % : x  Auto Lymphocyte % : x  Auto Monocyte % : x  Auto Eosinophil % : x  Auto Basophil % : x      07-07    137  |  94<L>  |  28<H>  ----------------------------<  115<H>  3.3<L>   |  32<H>  |  1.56<H>    Ca    8.8      07 Jul 2020 06:32            Imaging:    Xray:    Echo:    CT:    MRI:    Tele:    Orders:    DAVID Lemus [phone]
[facility] Cardiology Progress Note  _______________________________    Pt. seen and examined. No new cardiac-related complaints.    Telemetry -afib 70-90s    T(C): 36.6 (07-08-20 @ 04:51), Max: 36.6 (07-08-20 @ 04:51)  HR: 86 (07-08-20 @ 08:54) (70 - 99)  BP: 113/75 (07-08-20 @ 08:54) (113/75 - 146/91)  RR: 18 (07-08-20 @ 07:00) (18 - 18)  SpO2: 97% (07-08-20 @ 07:00) (94% - 97%)  I&O's Summary    07 Jul 2020 07:01  -  08 Jul 2020 07:00  --------------------------------------------------------  IN: 840 mL / OUT: 2635 mL / NET: -1795 mL    08 Jul 2020 07:01  -  08 Jul 2020 09:50  --------------------------------------------------------  IN: 120 mL / OUT: 200 mL / NET: -80 mL        PHYSICAL EXAM:  GENERAL: Alert, NAD.  NECK: Supple  CHEST/LUNG: Clear to auscultation bilaterally; No wheezes, rales, or rhonchi.  HEART: S1 S2 normal, irregular.   ABDOMEN: Soft, Nondistended  EXTREMITIES:  +2 LE edema.      LABS:                        10.5   9.00  )-----------( 440      ( 07 Jul 2020 06:32 )             33.3     07-08    136  |  94<L>  |  32<H>  ----------------------------<  118<H>  3.5   |  32<H>  |  1.69<H>    Ca    9.0      08 Jul 2020 06:44        CARDIAC MARKERS ( 03 Jul 2020 12:47 )  x     / x     / 25 U/L / x     / 3.2 ng/mL  CARDIAC MARKERS ( 03 Jul 2020 08:51 )  x     / x     / 26 U/L / x     / 3.0 ng/mL              MEDICATIONS  (STANDING):  amLODIPine   Tablet 5 milliGRAM(s) Oral daily  apixaban 2.5 milliGRAM(s) Oral every 12 hours  brimonidine 0.2% Ophthalmic Solution 1 Drop(s) Both EYES every 8 hours  dorzolamide 2%/timolol 0.5% Ophthalmic Solution 1 Drop(s) Both EYES every 12 hours  finasteride 5 milliGRAM(s) Oral daily  furosemide   Injectable 40 milliGRAM(s) IV Push two times a day  latanoprost 0.005% Ophthalmic Solution 1 Drop(s) Left EYE at bedtime  metoprolol tartrate 12.5 milliGRAM(s) Oral every 12 hours  tamsulosin 0.8 milliGRAM(s) Oral at bedtime  ursodiol Capsule 300 milliGRAM(s) Oral two times a day    MEDICATIONS  (PRN):  acetaminophen   Tablet .. 650 milliGRAM(s) Oral every 6 hours PRN Temp greater or equal to 38.5C (101.3F), Moderate Pain (4 - 6)        RADIOLOGY & ADDITIONAL TESTS:
Patient is a 89y old  Male who presents with a chief complaint of Dyspnea (06 Jul 2020 12:28)      INTERVAL HPI/OVERNIGHT EVENTS: noted  pt seen and examined  improved sob    Vital Signs Last 24 Hrs  T(C): 36.4 (06 Jul 2020 13:08), Max: 36.7 (06 Jul 2020 04:24)  T(F): 97.6 (06 Jul 2020 13:08), Max: 98 (06 Jul 2020 04:24)  HR: 82 (06 Jul 2020 18:21) (78 - 82)  BP: 126/73 (06 Jul 2020 18:21) (126/73 - 154/86)  BP(mean): --  RR: 18 (06 Jul 2020 13:08) (18 - 20)  SpO2: 95% (06 Jul 2020 13:08) (95% - 95%)    acetaminophen   Tablet .. 650 milliGRAM(s) Oral every 6 hours PRN  amLODIPine   Tablet 5 milliGRAM(s) Oral daily  apixaban 2.5 milliGRAM(s) Oral every 12 hours  brimonidine 0.2% Ophthalmic Solution 1 Drop(s) Both EYES every 8 hours  dorzolamide 2%/timolol 0.5% Ophthalmic Solution 1 Drop(s) Both EYES every 12 hours  finasteride 5 milliGRAM(s) Oral daily  furosemide   Injectable 40 milliGRAM(s) IV Push two times a day  latanoprost 0.005% Ophthalmic Solution 1 Drop(s) Left EYE at bedtime  metoprolol tartrate 12.5 milliGRAM(s) Oral every 12 hours  piperacillin/tazobactam IVPB.. 3.375 Gram(s) IV Intermittent every 12 hours  tamsulosin 0.8 milliGRAM(s) Oral at bedtime  ursodiol Capsule 300 milliGRAM(s) Oral two times a day      PHYSICAL EXAM:  GENERAL: NAD,   EYES: conjunctiva and sclera clear  ENMT: Moist mucous membranes  NECK: Supple, No JVD, Normal thyroid  CHEST/LUNG: non labored, cta b/l  HEART: Regular rate and rhythm; No murmurs, rubs, or gallops  ABDOMEN: Soft, Nontender, Nondistended; Bowel sounds present  EXTREMITIES:  2+ Peripheral Pulses, No clubbing, cyanosis, or edema  LYMPH: No lymphadenopathy noted  SKIN: No rashes or lesions    Consultant(s) Notes Reviewed:  [x ] YES  [ ] NO  Care Discussed with Consultants/Other Providers [ x] YES  [ ] NO    LABS:                        10.0   11.16 )-----------( 458      ( 06 Jul 2020 14:05 )             31.5     07-06    139  |  96  |  29<H>  ----------------------------<  157<H>  4.0   |  30  |  1.80<H>    Ca    8.8      06 Jul 2020 14:05  Mg     1.8     07-05          CAPILLARY BLOOD GLUCOSE                  RADIOLOGY & ADDITIONAL TESTS:    Imaging Personally Reviewed:  [x ] YES  [ ] NO
Patient is a 89y old  Male who presents with a chief complaint of Dyspnea (07 Jul 2020 12:52)      INTERVAL HPI/OVERNIGHT EVENTS: noted  pt seen and examined  pt c/o incomplete bladder emptying and increased frequency      Vital Signs Last 24 Hrs  T(C): 36.4 (07 Jul 2020 04:10), Max: 36.4 (06 Jul 2020 13:08)  T(F): 97.6 (07 Jul 2020 04:10), Max: 97.6 (06 Jul 2020 13:08)  HR: 70 (07 Jul 2020 09:04) (70 - 87)  BP: 151/80 (07 Jul 2020 09:04) (126/73 - 151/80)  BP(mean): --  RR: 18 (07 Jul 2020 04:10) (18 - 18)  SpO2: 97% (07 Jul 2020 04:10) (95% - 97%)    acetaminophen   Tablet .. 650 milliGRAM(s) Oral every 6 hours PRN  amLODIPine   Tablet 5 milliGRAM(s) Oral daily  apixaban 2.5 milliGRAM(s) Oral every 12 hours  brimonidine 0.2% Ophthalmic Solution 1 Drop(s) Both EYES every 8 hours  dorzolamide 2%/timolol 0.5% Ophthalmic Solution 1 Drop(s) Both EYES every 12 hours  finasteride 5 milliGRAM(s) Oral daily  furosemide   Injectable 40 milliGRAM(s) IV Push two times a day  latanoprost 0.005% Ophthalmic Solution 1 Drop(s) Left EYE at bedtime  metoprolol tartrate 12.5 milliGRAM(s) Oral every 12 hours  piperacillin/tazobactam IVPB.. 3.375 Gram(s) IV Intermittent every 12 hours  tamsulosin 0.8 milliGRAM(s) Oral at bedtime  ursodiol Capsule 300 milliGRAM(s) Oral two times a day      PHYSICAL EXAM:  GENERAL: NAD,   EYES: conjunctiva and sclera clear  ENMT: Moist mucous membranes  NECK: Supple, No JVD, Normal thyroid  CHEST/LUNG: non labored, decreased bs at bases  HEART: Regular rate and rhythm; No murmurs, rubs, or gallops  ABDOMEN: Soft, Nontender, Nondistended; Bowel sounds present  EXTREMITIES:  2+ Peripheral Pulses, No clubbing, cyanosis, or edema  LYMPH: No lymphadenopathy noted  SKIN: No rashes or lesions    Consultant(s) Notes Reviewed:  [x ] YES  [ ] NO  Care Discussed with Consultants/Other Providers [ x] YES  [ ] NO    LABS:                        10.5   9.00  )-----------( 440      ( 07 Jul 2020 06:32 )             33.3     07-07    137  |  94<L>  |  28<H>  ----------------------------<  115<H>  3.3<L>   |  32<H>  |  1.56<H>    Ca    8.8      07 Jul 2020 06:32          CAPILLARY BLOOD GLUCOSE                  RADIOLOGY & ADDITIONAL TESTS:    Imaging Personally Reviewed:  [x ] YES  [ ] NO
[facility] Cardiology Progress Note  _______________________________    Pt. seen and examined. No new cardiac-related complaints.    Telemetry -afib 70-90s    T(C): 36.4 (07-07-20 @ 04:10), Max: 36.4 (07-06-20 @ 13:08)  HR: 70 (07-07-20 @ 09:04) (70 - 87)  BP: 151/80 (07-07-20 @ 09:04) (126/73 - 151/80)  RR: 18 (07-07-20 @ 04:10) (18 - 18)  SpO2: 97% (07-07-20 @ 04:10) (95% - 97%)  I&O's Summary    06 Jul 2020 07:01  -  07 Jul 2020 07:00  --------------------------------------------------------  IN: 540 mL / OUT: 2750 mL / NET: -2210 mL    07 Jul 2020 07:01  -  07 Jul 2020 09:48  --------------------------------------------------------  IN: 0 mL / OUT: 250 mL / NET: -250 mL        PHYSICAL EXAM:  GENERAL: Alert, NAD.  NECK: Supple  CHEST/LUNG: Clear to auscultation bilaterally; No wheezes, rales, or rhonchi.  HEART: S1 S2 normal, RRR; No murmurs, rubs, or gallops  ABDOMEN: Soft, Nondistended  EXTREMITIES:  +2 LE edema.      LABS:                        10.5   9.00  )-----------( 440      ( 07 Jul 2020 06:32 )             33.3     07-07    137  |  94<L>  |  28<H>  ----------------------------<  115<H>  3.3<L>   |  32<H>  |  1.56<H>    Ca    8.8      07 Jul 2020 06:32        CARDIAC MARKERS ( 03 Jul 2020 12:47 )  x     / x     / 25 U/L / x     / 3.2 ng/mL  CARDIAC MARKERS ( 03 Jul 2020 08:51 )  x     / x     / 26 U/L / x     / 3.0 ng/mL              MEDICATIONS  (STANDING):  amLODIPine   Tablet 5 milliGRAM(s) Oral daily  apixaban 2.5 milliGRAM(s) Oral every 12 hours  brimonidine 0.2% Ophthalmic Solution 1 Drop(s) Both EYES every 8 hours  dorzolamide 2%/timolol 0.5% Ophthalmic Solution 1 Drop(s) Both EYES every 12 hours  finasteride 5 milliGRAM(s) Oral daily  furosemide   Injectable 40 milliGRAM(s) IV Push two times a day  latanoprost 0.005% Ophthalmic Solution 1 Drop(s) Left EYE at bedtime  metoprolol tartrate 12.5 milliGRAM(s) Oral every 12 hours  piperacillin/tazobactam IVPB.. 3.375 Gram(s) IV Intermittent every 12 hours  tamsulosin 0.8 milliGRAM(s) Oral at bedtime  ursodiol Capsule 300 milliGRAM(s) Oral two times a day    MEDICATIONS  (PRN):  acetaminophen   Tablet .. 650 milliGRAM(s) Oral every 6 hours PRN Temp greater or equal to 38.5C (101.3F), Moderate Pain (4 - 6)        RADIOLOGY & ADDITIONAL TESTS:
Patient is a 89y old  Male who presents with a chief complaint of sob (05 Jul 2020 10:05)      INTERVAL HPI/OVERNIGHT EVENTS: noted  pt seen and examined  feels well, no cp/sob      Vital Signs Last 24 Hrs  T(C): 36.4 (05 Jul 2020 12:12), Max: 36.4 (05 Jul 2020 12:12)  T(F): 97.5 (05 Jul 2020 12:12), Max: 97.5 (05 Jul 2020 12:12)  HR: 80 (05 Jul 2020 20:44) (76 - 106)  BP: 130/79 (05 Jul 2020 20:44) (114/76 - 165/75)  BP(mean): --  RR: 20 (05 Jul 2020 20:44) (18 - 20)  SpO2: 95% (05 Jul 2020 20:44) (88% - 97%)    acetaminophen   Tablet .. 650 milliGRAM(s) Oral every 6 hours PRN  amLODIPine   Tablet 5 milliGRAM(s) Oral daily  apixaban 2.5 milliGRAM(s) Oral every 12 hours  brimonidine 0.2% Ophthalmic Solution 1 Drop(s) Both EYES every 8 hours  dorzolamide 2%/timolol 0.5% Ophthalmic Solution 1 Drop(s) Both EYES every 12 hours  finasteride 5 milliGRAM(s) Oral daily  furosemide   Injectable 40 milliGRAM(s) IV Push two times a day  latanoprost 0.005% Ophthalmic Solution 1 Drop(s) Left EYE at bedtime  metoprolol tartrate 12.5 milliGRAM(s) Oral every 12 hours  piperacillin/tazobactam IVPB.. 3.375 Gram(s) IV Intermittent every 12 hours  tamsulosin 0.8 milliGRAM(s) Oral at bedtime  ursodiol Capsule 300 milliGRAM(s) Oral two times a day      PHYSICAL EXAM:  GENERAL: NAD,   EYES: conjunctiva and sclera clear  ENMT: Moist mucous membranes  NECK: Supple, No JVD, Normal thyroid  CHEST/LUNG: non labored, cta b/l  HEART: Regular rate and rhythm; No murmurs, rubs, or gallops  ABDOMEN: Soft, Nontender, Nondistended; Bowel sounds present  EXTREMITIES:  2+ Peripheral Pulses, No clubbing, cyanosis, or edema  LYMPH: No lymphadenopathy noted  SKIN: No rashes or lesions    Consultant(s) Notes Reviewed:  [x ] YES  [ ] NO  Care Discussed with Consultants/Other Providers [ x] YES  [ ] NO    LABS:                        9.4    9.98  )-----------( 385      ( 05 Jul 2020 05:25 )             29.3     07-05    139  |  101  |  25<H>  ----------------------------<  117<H>  3.5   |  28  |  1.63<H>    Ca    8.3<L>      05 Jul 2020 05:25  Phos  4.1     07-04  Mg     1.8     07-05    TPro  6.5  /  Alb  3.2<L>  /  TBili  0.6  /  DBili  x   /  AST  14  /  ALT  20  /  AlkPhos  52  07-04        CAPILLARY BLOOD GLUCOSE                Culture - Urine (collected 03 Jul 2020 10:11)  Source: .Urine Catheterized  Final Report (05 Jul 2020 10:37):    Multiple Morphological Strains    50,000 - 99,000 CFU/mL Enterobacter cloacae    10,000 - 49,000 CFU/mL Enterobacter cloacae #2  Organism: Enterobacter cloacae  Enterobacter cloacae (05 Jul 2020 10:37)  Organism: Enterobacter cloacae (05 Jul 2020 10:37)  Organism: Enterobacter cloacae (05 Jul 2020 10:37)        RADIOLOGY & ADDITIONAL TESTS:    Imaging Personally Reviewed:  [x ] YES  [ ] NO
Cardiology Dr. Vallejo [phone]    SUBJECTIVE / OVERNIGHT EVENTS:    Patient seen and examined, SOB last night    T(C): 36.4 (20 @ 20:46), Max: 36.4 (20 @ 20:46)  HR: 76 (20 @ 09:01) (76 - 97)  BP: 155/91 (20 @ 09:01) (133/81 - 155/91)  RR: 20 (20 @ 04:25) (18 - 22)  SpO2: 97% (20 @ 09:01) (95% - 97%)  Wt(kg): --  Daily     Daily Weight in k.2 (2020 04:25)  I&O's Summary    2020 07:01  -  2020 07:00  --------------------------------------------------------  IN: 620 mL / OUT: 1950 mL / NET: -1330 mL        Telemetry: afib    PHYSICAL EXAM:  GENERAL: A+Ox3, NAD  HEAD:  Atraumatic, Normocephalic  NECK: Supple, No JVD  CHEST/LUNG: decreased bs 1/2 b/l lung fields  HEART: S1S2, RR, No murmurs, rubs, or gallops  ABDOMEN: Soft, Nontender, Nondistended; Bowel sounds present  EXTREMITIES: b/l le edema  NEUROLOGY: non-focal      LABS:                        9.4    9.98  )-----------( 385      ( 2020 05:25 )             29.3     07-05    139  |  101  |  25<H>  ----------------------------<  117<H>  3.5   |  28  |  1.63<H>    Ca    8.3<L>      2020 05:25  Phos  4.1     07-04  Mg     1.8     07-05    TPro  6.5  /  Alb  3.2<L>  /  TBili  0.6  /  DBili  x   /  AST  14  /  ALT  20  /  AlkPhos  52  07-04      CARDIAC MARKERS ( 2020 12:47 )  x     / x     / 25 U/L / x     / 3.2 ng/mL  CARDIAC MARKERS ( 2020 08:51 )  x     / x     / 26 U/L / x     / 3.0 ng/mL          MEDICATIONS  (STANDING):  amLODIPine   Tablet 5 milliGRAM(s) Oral daily  apixaban 2.5 milliGRAM(s) Oral every 12 hours  brimonidine 0.2% Ophthalmic Solution 1 Drop(s) Both EYES every 8 hours  dorzolamide 2%/timolol 0.5% Ophthalmic Solution 1 Drop(s) Both EYES every 12 hours  finasteride 5 milliGRAM(s) Oral daily  furosemide   Injectable 40 milliGRAM(s) IV Push two times a day  latanoprost 0.005% Ophthalmic Solution 1 Drop(s) Left EYE at bedtime  metoprolol tartrate 12.5 milliGRAM(s) Oral every 12 hours  piperacillin/tazobactam IVPB.. 3.375 Gram(s) IV Intermittent every 12 hours  tamsulosin 0.8 milliGRAM(s) Oral at bedtime  ursodiol Capsule 300 milliGRAM(s) Oral two times a day    MEDICATIONS  (PRN):  acetaminophen   Tablet .. 650 milliGRAM(s) Oral every 6 hours PRN Temp greater or equal to 38.5C (101.3F), Moderate Pain (4 - 6)      RADIOLOGY & ADDITIONAL TESTS:
Patient is a 89y old  Male who presents with a chief complaint of SOB (2020 10:09)      INTERVAL HPI/OVERNIGHT EVENTS: noted  pt seen and examined  feels well, no chest pain/sob      Vital Signs Last 24 Hrs  T(C): 36.3 (2020 12:11), Max: 36.8 (2020 20:29)  T(F): 97.4 (2020 12:11), Max: 98.2 (2020 20:29)  HR: 90 (2020 17:08) (82 - 92)  BP: 145/84 (2020 17:08) (133/81 - 152/80)  BP(mean): --  RR: 18 (2020 12:11) (18 - 18)  SpO2: 97% (2020 12:11) (97% - 98%)    acetaminophen   Tablet .. 650 milliGRAM(s) Oral every 6 hours PRN  amLODIPine   Tablet 5 milliGRAM(s) Oral daily  apixaban 2.5 milliGRAM(s) Oral every 12 hours  brimonidine 0.2% Ophthalmic Solution 1 Drop(s) Both EYES every 8 hours  dorzolamide 2%/timolol 0.5% Ophthalmic Solution 1 Drop(s) Both EYES every 12 hours  finasteride 5 milliGRAM(s) Oral daily  latanoprost 0.005% Ophthalmic Solution 1 Drop(s) Left EYE at bedtime  metoprolol tartrate 12.5 milliGRAM(s) Oral every 12 hours  piperacillin/tazobactam IVPB.. 3.375 Gram(s) IV Intermittent every 12 hours  tamsulosin 0.8 milliGRAM(s) Oral at bedtime  ursodiol Capsule 300 milliGRAM(s) Oral two times a day      PHYSICAL EXAM:  GENERAL: NAD,   EYES: conjunctiva and sclera clear  ENMT: Moist mucous membranes  NECK: Supple, No JVD, Normal thyroid  CHEST/LUNG: non labored, cta b/l  HEART: Regular rate and rhythm; No murmurs, rubs, or gallops  ABDOMEN: Soft, Nontender, Nondistended; Bowel sounds present  EXTREMITIES:  2+ Peripheral Pulses, No clubbing, cyanosis, or edema  LYMPH: No lymphadenopathy noted  SKIN: No rashes or lesions    Consultant(s) Notes Reviewed:  [x ] YES  [ ] NO  Care Discussed with Consultants/Other Providers [ x] YES  [ ] NO    LABS:                        8.9    10.01 )-----------( 409      ( 2020 05:55 )             28.4     07-04    140  |  101  |  27<H>  ----------------------------<  111<H>  3.6   |  27  |  1.54<H>    Ca    8.3<L>      2020 05:55  Phos  4.1     07-04  Mg     1.5     07-04    TPro  6.5  /  Alb  3.2<L>  /  TBili  0.6  /  DBili  x   /  AST  14  /  ALT  20  /  AlkPhos  52  07-04    PT/INR - ( 2020 06:11 )   PT: 14.5 sec;   INR: 1.28 ratio         PTT - ( 2020 06:11 )  PTT:29.5 sec  Urinalysis Basic - ( 2020 06:17 )    Color: Yellow / Appearance: Clear / S.022 / pH: x  Gluc: x / Ketone: Negative  / Bili: Negative / Urobili: 2 mg/dL   Blood: x / Protein: 30 mg/dL / Nitrite: Negative   Leuk Esterase: Large / RBC: 5 /hpf / WBC 28 /HPF   Sq Epi: x / Non Sq Epi: 2 /hpf / Bacteria: Negative      CAPILLARY BLOOD GLUCOSE            Urinalysis Basic - ( 2020 06:17 )    Color: Yellow / Appearance: Clear / S.022 / pH: x  Gluc: x / Ketone: Negative  / Bili: Negative / Urobili: 2 mg/dL   Blood: x / Protein: 30 mg/dL / Nitrite: Negative   Leuk Esterase: Large / RBC: 5 /hpf / WBC 28 /HPF   Sq Epi: x / Non Sq Epi: 2 /hpf / Bacteria: Negative        Culture - Urine (collected 2020 10:11)  Source: .Urine Catheterized  Preliminary Report (2020 07:43):    50,000 - 99,000 CFU/mL Gram Negative Rods        RADIOLOGY & ADDITIONAL TESTS:    Imaging Personally Reviewed:  [x ] YES  [ ] NO
[facility] Cardiology Progress Note  _______________________________    Pt. seen and examined. No new cardiac-related complaints.    Telemetry -afib 60-100s    T(C): 36.7 (07-06-20 @ 04:24), Max: 36.7 (07-06-20 @ 04:24)  HR: 78 (07-06-20 @ 04:24) (78 - 106)  BP: 154/86 (07-06-20 @ 04:24) (114/76 - 165/75)  RR: 19 (07-06-20 @ 04:24) (18 - 20)  SpO2: 95% (07-06-20 @ 04:24) (88% - 95%)  I&O's Summary    05 Jul 2020 07:01  -  06 Jul 2020 07:00  --------------------------------------------------------  IN: 720 mL / OUT: 2800 mL / NET: -2080 mL    06 Jul 2020 07:01  -  06 Jul 2020 09:20  --------------------------------------------------------  IN: 0 mL / OUT: 200 mL / NET: -200 mL        PHYSICAL EXAM:  GENERAL: Alert, NAD.  NECK: Supple  CHEST/LUNG: decreased breath sounds bibasilarly.  HEART: S1 S2 normal, irregular.   ABDOMEN: Soft, Nondistended  EXTREMITIES:  +1 LE edema.      LABS:                        9.4    9.98  )-----------( 385      ( 05 Jul 2020 05:25 )             29.3     07-05    139  |  101  |  25<H>  ----------------------------<  117<H>  3.5   |  28  |  1.63<H>    Ca    8.3<L>      05 Jul 2020 05:25  Mg     1.8     07-05        CARDIAC MARKERS ( 03 Jul 2020 12:47 )  x     / x     / 25 U/L / x     / 3.2 ng/mL  CARDIAC MARKERS ( 03 Jul 2020 08:51 )  x     / x     / 26 U/L / x     / 3.0 ng/mL              MEDICATIONS  (STANDING):  amLODIPine   Tablet 5 milliGRAM(s) Oral daily  apixaban 2.5 milliGRAM(s) Oral every 12 hours  brimonidine 0.2% Ophthalmic Solution 1 Drop(s) Both EYES every 8 hours  dorzolamide 2%/timolol 0.5% Ophthalmic Solution 1 Drop(s) Both EYES every 12 hours  finasteride 5 milliGRAM(s) Oral daily  furosemide   Injectable 40 milliGRAM(s) IV Push two times a day  latanoprost 0.005% Ophthalmic Solution 1 Drop(s) Left EYE at bedtime  metoprolol tartrate 12.5 milliGRAM(s) Oral every 12 hours  piperacillin/tazobactam IVPB.. 3.375 Gram(s) IV Intermittent every 12 hours  tamsulosin 0.8 milliGRAM(s) Oral at bedtime  ursodiol Capsule 300 milliGRAM(s) Oral two times a day    MEDICATIONS  (PRN):  acetaminophen   Tablet .. 650 milliGRAM(s) Oral every 6 hours PRN Temp greater or equal to 38.5C (101.3F), Moderate Pain (4 - 6)        RADIOLOGY & ADDITIONAL TESTS:
Cardiology Dr. Vallejo [phone]    SUBJECTIVE / OVERNIGHT EVENTS:    Patient seen and examined, dyspnea improved    T(C): 36.4 (20 @ 04:30), Max: 36.8 (20 @ 20:29)  HR: 88 (20 @ 04:30) (81 - 102)  BP: 146/84 (20 @ 04:30) (128/85 - 156/88)  RR: 18 (20 @ 04:30) (18 - 22)  SpO2: 98% (20 @ 04:30) (97% - 98%)  Wt(kg): --  Daily     Daily Weight in k.4 (2020 06:05)  I&O's Summary    2020 07:01  -  2020 07:00  --------------------------------------------------------  IN: 400 mL / OUT: 6450 mL / NET: -6050 mL        Telemetry:    PHYSICAL EXAM:  GENERAL: A+Ox3, NAD  HEAD:  Atraumatic, Normocephalic  NECK: Supple, No JVD  CHEST/LUNG: Clear to auscultation bilaterally; No wheeze, rhonchi or rales  HEART: S1S2, irregular  ABDOMEN: Soft, Nontender, Nondistended; Bowel sounds present  EXTREMITIES: pedal edema  NEUROLOGY: non-focal      LABS:                        8.9    10.01 )-----------( 409      ( 2020 05:55 )             28.4     07-04    140  |  101  |  27<H>  ----------------------------<  111<H>  3.6   |  27  |  1.54<H>    Ca    8.3<L>      2020 05:55  Phos  4.1     07-04  Mg     1.5     07-04    TPro  6.5  /  Alb  3.2<L>  /  TBili  0.6  /  DBili  x   /  AST  14  /  ALT  20  /  AlkPhos  52  07-04    PT/INR - ( 2020 06:11 )   PT: 14.5 sec;   INR: 1.28 ratio         PTT - ( 2020 06:11 )  PTT:29.5 sec  CARDIAC MARKERS ( 2020 12:47 )  x     / x     / 25 U/L / x     / 3.2 ng/mL  CARDIAC MARKERS ( 2020 08:51 )  x     / x     / 26 U/L / x     / 3.0 ng/mL      Urinalysis Basic - ( 2020 06:17 )    Color: Yellow / Appearance: Clear / S.022 / pH: x  Gluc: x / Ketone: Negative  / Bili: Negative / Urobili: 2 mg/dL   Blood: x / Protein: 30 mg/dL / Nitrite: Negative   Leuk Esterase: Large / RBC: 5 /hpf / WBC 28 /HPF   Sq Epi: x / Non Sq Epi: 2 /hpf / Bacteria: Negative        MEDICATIONS  (STANDING):  amLODIPine   Tablet 5 milliGRAM(s) Oral daily  apixaban 2.5 milliGRAM(s) Oral every 12 hours  brimonidine 0.2% Ophthalmic Solution 1 Drop(s) Both EYES every 8 hours  dorzolamide 2%/timolol 0.5% Ophthalmic Solution 1 Drop(s) Both EYES every 12 hours  finasteride 5 milliGRAM(s) Oral daily  latanoprost 0.005% Ophthalmic Solution 1 Drop(s) Left EYE at bedtime  magnesium sulfate  IVPB 2 Gram(s) IV Intermittent once  metoprolol tartrate 12.5 milliGRAM(s) Oral every 12 hours  piperacillin/tazobactam IVPB.. 3.375 Gram(s) IV Intermittent every 12 hours  potassium chloride    Tablet ER 40 milliEquivalent(s) Oral once  tamsulosin 0.8 milliGRAM(s) Oral at bedtime  ursodiol Capsule 300 milliGRAM(s) Oral two times a day    MEDICATIONS  (PRN):  acetaminophen   Tablet .. 650 milliGRAM(s) Oral every 6 hours PRN Temp greater or equal to 38.5C (101.3F), Moderate Pain (4 - 6)      RADIOLOGY & ADDITIONAL TESTS:  < from: Transthoracic Echocardiogram (20 @ 17:12) >  PROCEDURE: Transthoracic echocardiogram with 2-D, M-Mode  and complete spectral and color flow Doppler.  INDICATION: Unspecified atrial fibrillation (I48.91)  ------------------------------------------------------------------------  DIMENSIONS:  Dimensions:     Normal Values:  LA:     3.1 cm    2.0 - 4.0 cm  Ao:     3.3 cm    2.0 - 3.8 cm  SEPTUM: 1.0 cm    0.6 - 1.2 cm  PWT:    1.0 cm    0.6 - 1.1 cm  LVIDd:  4.2 cm    3.0 - 5.6 cm  LVIDs:  2.9 cm    1.8 - 4.0 cm  Derived Variables:  LVMI: 67 g/m2  RWT: 0.47  Fractional short: 31 %  Ejection Fraction (Teicholtz): 59 %  ------------------------------------------------------------------------  OBSERVATIONS:  Mitral Valve: Mitral annular calcification, otherwise  normal mitral valve. Mild mitral regurgitation.  Aortic Root: Normal aortic root.  Aortic Valve: Calcified trileaflet aortic valve with mildly  decreased opening. Minimal aortic regurgitation.  Left Atrium: Normal left atrium.  Left Ventricle: Normal left ventricular systolic function.  No segmental wall motion abnormalities. Increased relative  wall thickness with normal left ventricular mass index,  consistent with concentric left ventricular remodeling.  Right Heart: Normal right atrium. Normal right ventricular  size and function. Normal tricuspid valve. Mild tricuspid  regurgitation. Normal pulmonic valve. Minimal pulmonic  regurgitation.  Pericardium/PleuraNormal pericardium with no pericardial  effusion.  Hemodynamic: Estimated right ventricular systolic pressure  equals 41 mm Hg, assuming right atrial pressure equals 10  mm Hg, consistent with mild pulmonary hypertension.  ------------------------------------------------------------------------  CONCLUSIONS:  1. Calcified trileaflet aortic valve with mildly decreased  opening.  2. Normal left ventricular systolic function. No segmental  wall motion abnormalities.  3. Normal right ventricular size and function.  4. Normal tricuspid valve. Mild tricuspid regurgitation.  5. Estimated pulmonary artery systolic pressure equals 41  mm Hg, assuming right atrial pressure equals 10  mm Hg,  consistent with mild pulmonary hypertension.  ------------------------------------------------------------------------  Confirmed on  2020 - 13:20:08 by SOLANGE Issa  ------------------------------------------------------------------------    < end of copied text >
HPI:  89 M hx mild dementia,  anemia, MDS, pre-diabetes, high blood pressure, recent dx of AF now on eliquis, BPH, glaucoma presenting with several days of SoB. came in today because he felt like he was "going to pass out".  Endorses chronic LE edema; states he was supposed to start lasix recently but "it never came" and hasn't been taking it. Was recently in [facility] for COVID found to have afib started eliquis. Denies cough, fever, cp, sore throat. States he is on keflex for a UTI, self catheterizes at home. chr le edema (03 Jul 2020 12:58)      Interval Events  see meds, rivera out, U ok, feels ok , wants to go home  see notes  abril thios am on lower dose BBLkr but also on CCB norvasc    MEDICATIONS  (STANDING):  amLODIPine   Tablet 5 milliGRAM(s) Oral daily  apixaban 2.5 milliGRAM(s) Oral every 12 hours  brimonidine 0.2% Ophthalmic Solution 1 Drop(s) Both EYES every 8 hours  dorzolamide 2%/timolol 0.5% Ophthalmic Solution 1 Drop(s) Both EYES every 12 hours  finasteride 5 milliGRAM(s) Oral daily  furosemide   Injectable 40 milliGRAM(s) IV Push two times a day  latanoprost 0.005% Ophthalmic Solution 1 Drop(s) Left EYE at bedtime  metoprolol tartrate 12.5 milliGRAM(s) Oral every 12 hours  piperacillin/tazobactam IVPB.. 3.375 Gram(s) IV Intermittent every 12 hours  tamsulosin 0.8 milliGRAM(s) Oral at bedtime  ursodiol Capsule 300 milliGRAM(s) Oral two times a day    MEDICATIONS  (PRN):  acetaminophen   Tablet .. 650 milliGRAM(s) Oral every 6 hours PRN Temp greater or equal to 38.5C (101.3F), Moderate Pain (4 - 6)      Patient is a 89y old  Male who presents with a chief complaint of sob (05 Jul 2020 10:05)      REVIEW OF SYSTEMS    General:nad no co feels ok  Skin/Breast:no ch no co  Ophthalmologic:sees, no ch no co  ENMT:	no co hears ok, eats ok  Respiratory and Thorax:no co no sob no sp  Cardiovascular:no co no palp,. "better"  Gastrointestinal:no nvcd  Genitourinary:urinating "all nite" no dysuria  Musculoskeletal:	no a no p  Neurological:	no co  Psychiatric:	no co  Hematology/Lymphatics:	  Endocrine:no poly udd	  Allergic/Immunologic:  AOSN	      Vital Signs Last 24 Hrs  T(C): 36.7 (06 Jul 2020 04:24), Max: 36.7 (06 Jul 2020 04:24)  T(F): 98 (06 Jul 2020 04:24), Max: 98 (06 Jul 2020 04:24)  HR: 78 (06 Jul 2020 04:24) (78 - 106)  BP: 154/86 (06 Jul 2020 04:24) (114/76 - 165/75)  BP(mean): --  RR: 19 (06 Jul 2020 04:24) (18 - 20)  SpO2: 95% (06 Jul 2020 04:24) (88% - 95%)    PHYSICAL EXAM:    Constitutional:vss nad, tele abril 40's  H+N mn a t  Eyes:martell cwnl  ENMT:hears swallows eats ok  Neck:no cb no tm  Breasts:  Back:  Respiratory:ctab no rrw , decr bs L but no rales, improved  Cardiovascular:irreg irreg  Gastrointestinal:round, soft nt  Genitourinary:  Rectal:  Extremities:no cce  Vascular:  Neurological:alert nad nf atmae  Skin:cdi  Lymph Nodes:  Musculoskeletal:  Psychiatric:    LABS  CBC Full  -  ( 05 Jul 2020 05:25 )  WBC Count : 9.98 K/uL  RBC Count : 2.91 M/uL  Hemoglobin : 9.4 g/dL  Hematocrit : 29.3 %  Platelet Count - Automated : 385 K/uL  Mean Cell Volume : 100.7 fl  Mean Cell Hemoglobin : 32.3 pg  Mean Cell Hemoglobin Concentration : 32.1 gm/dL  Auto Neutrophil # : x  Auto Lymphocyte # : x  Auto Monocyte # : x  Auto Eosinophil # : x  Auto Basophil # : x  Auto Neutrophil % : x  Auto Lymphocyte % : x  Auto Monocyte % : x  Auto Eosinophil % : x  Auto Basophil % : x      07-05    139  |  101  |  25<H>  ----------------------------<  117<H>  3.5   |  28  |  1.63<H>    Ca    8.3<L>      05 Jul 2020 05:25  Mg     1.8     07-05            Imaging:    Xray:    Echo:    CT:    MRI:    Tele:    Orders:    DAVID Lemus [phone]
HPI:  89 M hx mild dementia,  anemia, MDS, pre-diabetes, high blood pressure, recent dx of AF now on eliquis, BPH, glaucoma presenting with several days of SoB. came in today because he felt like he was "going to pass out".  Endorses chronic LE edema; states he was supposed to start lasix recently but "it never came" and hasn't been taking it. Was recently in [facility] for COVID found to have afib started eliquis. Denies cough, fever, cp, sore throat. States he is on keflex for a UTI, self catheterizes at home. chr le edema (03 Jul 2020 12:58)      Interval Events  IV Lasix this am, no further tachy/abril, meds no change  pt says he is going home today, RN confirms    MEDICATIONS  (STANDING):  amLODIPine   Tablet 5 milliGRAM(s) Oral daily  apixaban 2.5 milliGRAM(s) Oral every 12 hours  brimonidine 0.2% Ophthalmic Solution 1 Drop(s) Both EYES every 8 hours  dorzolamide 2%/timolol 0.5% Ophthalmic Solution 1 Drop(s) Both EYES every 12 hours  finasteride 5 milliGRAM(s) Oral daily  furosemide   Injectable 40 milliGRAM(s) IV Push two times a day  latanoprost 0.005% Ophthalmic Solution 1 Drop(s) Left EYE at bedtime  metoprolol tartrate 12.5 milliGRAM(s) Oral every 12 hours  tamsulosin 0.8 milliGRAM(s) Oral at bedtime  ursodiol Capsule 300 milliGRAM(s) Oral two times a day    MEDICATIONS  (PRN):  acetaminophen   Tablet .. 650 milliGRAM(s) Oral every 6 hours PRN Temp greater or equal to 38.5C (101.3F), Moderate Pain (4 - 6)      Patient is a 89y old  Male who presents with a chief complaint of Dyspnea (07 Jul 2020 12:52)      REVIEW OF SYSTEMS    General:nad, no co feelks better  Skin/Breast:  Ophthalmologic:no ch  no co  ENMT:	no ch , no co   Respiratory and Thorax:no copugh no sp no sob  Cardiovascular:no cp no palp  Gastrointestinal:no nvcd  Genitourinary:no fdi  Musculoskeletal:	no a no p  Neurological:	no ch no co  Psychiatric:	  Hematology/Lymphatics:	  Endocrine:no poly udd	  Allergic/Immunologic:  AOSN	y      Vital Signs Last 24 Hrs  T(C): 36.6 (08 Jul 2020 04:51), Max: 36.6 (08 Jul 2020 04:51)  T(F): 97.8 (08 Jul 2020 04:51), Max: 97.8 (08 Jul 2020 04:51)  HR: 86 (08 Jul 2020 08:54) (70 - 99)  BP: 113/75 (08 Jul 2020 08:54) (113/75 - 146/91)  BP(mean): --  RR: 18 (08 Jul 2020 07:00) (18 - 18)  SpO2: 97% (08 Jul 2020 07:00) (94% - 97%)    PHYSICAL EXAM:    Constitutional:vss nad better, hungry now  H+N ncat  Eyes:martell cwnl sees ok  ENMT:hears swallows eats ok  Neck:no cb no tm  Breasts:  Back:  Respiratory:cta R , L base fine rales , less  Cardiovascular:irreg, not rapid , m  Gastrointestinal:sofr nt  Genitourinary:  Rectal:  Extremities:r nocce , L supple, no pittiong  Vascular:hm-, vv-  Neurological:bno ch, nf  Skin:cdi  Lymph Nodes:  Musculoskeletal:  Psychiatric:    LABS  CBC Full  -  ( 07 Jul 2020 06:32 )  WBC Count : 9.00 K/uL  RBC Count : 3.37 M/uL  Hemoglobin : 10.5 g/dL  Hematocrit : 33.3 %  Platelet Count - Automated : 440 K/uL  Mean Cell Volume : 98.8 fl  Mean Cell Hemoglobin : 31.2 pg  Mean Cell Hemoglobin Concentration : 31.5 gm/dL  Auto Neutrophil # : x  Auto Lymphocyte # : x  Auto Monocyte # : x  Auto Eosinophil # : x  Auto Basophil # : x  Auto Neutrophil % : x  Auto Lymphocyte % : x  Auto Monocyte % : x  Auto Eosinophil % : x  Auto Basophil % : x      07-08    136  |  94<L>  |  32<H>  ----------------------------<  118<H>  3.5   |  32<H>  |  1.69<H>    Ca    9.0      08 Jul 2020 06:44            Imaging:    Xray:    Echo:    CT:    MRI:    Tele:    [place]awnya:    DAVID Lemus [phone]

## 2020-07-08 NOTE — PROGRESS NOTE ADULT - ASSESSMENT
89 M hx mild dementia,  anemia, MDS, pre-diabetes, high blood pressure, recent dx of AF now on eliquis, BPH, glaucoma, chr le edema ,survived covid pneumonia in april presenting with several days of SoB.   ct chest reviewed as above    #CHF exac  lasix, i/o, daily wts  TTE reveiwed from 4/20  cards cs fu    #afib on eliquis, HR in 100s  resume bblockers at 12.5 bid and titrate as needed  ekg reveiwed    #PNA: ct reviewed w dr Abebe  low suspicion for pna, GGO on ct ?residual from past covid pna  vs vol overload  fu procalcitonin  empiric abx    #UTI: ua +, ucx pending  zosyn empirically- renally dosed    #CKD-cr at baseline  monitor on lasix    #dvt ppx-eliquis    [facility]  [phone]
89 M hx mild dementia,  anemia, MDS, pre-diabetes, high blood pressure, recent dx of AF now on eliquis, BPH, glaucoma, chr le edema ,survived covid pneumonia in april presenting with several days of SoB.   ct chest reviewed as above    #CHF exac  lasix, i/o, daily wts  TTE reveiwed from 4/20  cards cs fu    #afib on eliquis, HR in 100s  resume bblockers at 12.5 bid and titrate as needed  ekg reveiwed    #PNA: ct reviewed w dr Abebe  low suspicion for pna, GGO on ct ?residual from past covid pna  vs vol overload  fu procalcitonin  empiric abx    #UTI: ua +, ucx pending  zosyn empirically- renally dosed    #CKD-cr at baseline  monitor on lasix    #dvt ppx-eliquis    [facility]  [phone]
89 M hx mild dementia,  anemia, MDS, pre-diabetes, htn, AF (eliquis), BPH, recent covid infection, glaucoma presenting with sob.
89 M hx mild dementia,  anemia, MDS, pre-diabetes, htn, AF (eliquis), BPH, recent covid infection, glaucoma presenting with sob.
improving, cxr okj, wants to walk wants to go mhome [facility]  needs cardio FU re meds, I would dc norvasc before considering PPM  needs PT
89 M hx mild dementia,  anemia, MDS, pre-diabetes, high blood pressure, recent dx of AF now on eliquis, BPH, glaucoma, chr le edema ,survived covid pneumonia in april presenting with several days of SoB.   ct chest reviewed as above    #CHF exac  lasix iv, i/o, daily wts  TTE reveiwed from 4/20  cards cs fu    #afib on eliquis, HR in 100s  resume bblockers at 12.5 bid and titrate as needed  ekg reveiwed    #PNA: ct reviewed w dr Abebe  low suspicion for pna, GGO on ct ?residual from past covid pna  vs vol overload  fu procalcitonin  empiric abx    #UTI: ua +, ucx pending  zosyn empirically- renally dosed    #CKD-cr at baseline  monitor on lasix    #dvt ppx-eliquis    [facility]  [phone]
89 M hx mild dementia,  anemia, MDS, pre-diabetes, high blood pressure, recent dx of AF now on eliquis, BPH, glaucoma, chr le edema ,survived covid pneumonia in april presenting with several days of SoB.   ct chest reviewed as above    #CHF exac  lasix iv, i/o, daily wts  TTE reveiwed from 4/20  cards cs fu    #afib on eliquis, HR in 100s  resume bblockers at 12.5 bid and titrate as needed  ekg reviewed    #PNA: ct reviewed w dr Abebe  low suspicion for pna, GGO on ct ?residual from past covid pna  vs vol overload  empiric abx- zosyn for 5d- 4/5    #UTI: ua +, ucx pending  zosyn empirically- renally dosed    #CKD-cr at baseline  monitor on lasix    #dvt ppx-eliquis    [facility]  [phone]
89 M hx mild dementia,  anemia, MDS, pre-diabetes, htn, AF (eliquis), BPH, recent covid infection, glaucoma presenting with sob.
89 M hx mild dementia,  anemia, MDS, pre-diabetes, htn, AF (eliquis), BPH, recent covid infection, glaucoma presenting with sob.
S/P Covid 19 infection with multi-loabr pneumonia  Acute  on chronic CHF with pleural effusions  AF with RVR  Residual L sided opacity: doubt acute pneumonia, likely residua from prior COVID    REC    Complete Zosyn 7/7 - DC  Diuresis per primary team  Check RA sats
S/P Covid 19 infection with multi-loabr pneumonia  Acute  on chronic CHF with pleural effusions  AF with RVR  Residual L sided opacity: doubt acute pneumonia, likely residua from prior COVID    REC    DC antibiotics  Check RA sats  DC planning primary team
S/P Covid 19 infection with multi-loabr pneumonia  Acute  on chronic CHF with pleural effusions  AF with RVR  Residual L sided opacity: doubt acute pneumonia, likely residua from prior COVID    REC    DC planning primary team
getting lasix IV, gained wt, K is lo  needs to be oob, amb and PT   dc plan is home to [facility]-lives with wife
89 M hx mild dementia,  anemia, MDS, pre-diabetes, htn, AF (eliquis), BPH, recent covid infection, glaucoma presenting with sob.
labs are ok  will get po lasix home, I will fu meds at home p dc  amb 5' c PT but prefers home c wife ay [facility] not rehab  I

## 2020-07-08 NOTE — DISCHARGE NOTE PROVIDER - NSDCCPCAREPLAN_GEN_ALL_CORE_FT
PRINCIPAL DISCHARGE DIAGNOSIS  Diagnosis: Acute on chronic congestive heart failure, unspecified heart failure type  Assessment and Plan of Treatment: Weigh yourself daily.  If you gain 3lbs in 3 days, or 5lbs in a week call your Health Care Provider.  Do not eat or drink foods containing more than 2000mg of salt (sodium) in your diet every day.  Call your Health Care Provider if you have any swelling or increased swelling in your feet, ankles, and/or stomach.  Take all of your medication as directed.  If you become dizzy call your Health Care Provider.        SECONDARY DISCHARGE DIAGNOSES  Diagnosis: Atrial fibrillation with RVR  Assessment and Plan of Treatment: Atrial fibrillation is the most common heart rhythm problem.  The condition puts you at risk for has stroke and heart attack  It helps if you control your blood pressure, not drink more than 1-2 alcohol drinks per day, cut down on caffeine, getting treatment for over active thyroid gland, and get regular exercise  Call your doctor if you feel your heart racing or beating unusually, chest tightness or pain, lightheaded, faint, shortness of breath especially with exercise  It is important to take your heart medication as prescribed  You may be on anticoagulation which is very important to take as directed -       Diagnosis: CKD (chronic kidney disease) stage 3, GFR 30-59 ml/min  Assessment and Plan of Treatment: Avoid taking (NSAIDs) - (ex: Ibuprofen, Advil, Celebrex, Naprosyn)  Avoid taking any nephrotoxic agents (can harm kidneys) - Intravenous contrast for diagnostic testing, combination cold medications.  Have all medications adjusted for your renal function by your Health Care Provider.  Blood pressure control is important.  Take all medication as prescribed.      Diagnosis: BPH (benign prostatic hyperplasia)  Assessment and Plan of Treatment: continue current medications  cotinue straight cath as needed    Diagnosis: Urinary tract infection without hematuria, site unspecified  Assessment and Plan of Treatment: completed antibiotics

## 2021-04-09 ENCOUNTER — INPATIENT (INPATIENT)
Facility: HOSPITAL | Age: 86
LOS: 3 days | Discharge: NURSING, REHAB OR LONG-TERM CARE | DRG: 698 | End: 2021-04-13
Attending: INTERNAL MEDICINE | Admitting: INTERNAL MEDICINE
Payer: MEDICARE

## 2021-04-09 VITALS
HEART RATE: 58 BPM | DIASTOLIC BLOOD PRESSURE: 74 MMHG | WEIGHT: 220.02 LBS | SYSTOLIC BLOOD PRESSURE: 120 MMHG | HEIGHT: 66 IN | RESPIRATION RATE: 18 BRPM | OXYGEN SATURATION: 98 %

## 2021-04-09 DIAGNOSIS — M95.0 ACQUIRED DEFORMITY OF NOSE: Chronic | ICD-10-CM

## 2021-04-09 DIAGNOSIS — R39.198 OTHER DIFFICULTIES WITH MICTURITION: ICD-10-CM

## 2021-04-09 DIAGNOSIS — S61.412A LACERATION WITHOUT FOREIGN BODY OF LEFT HAND, INITIAL ENCOUNTER: Chronic | ICD-10-CM

## 2021-04-09 LAB
ALBUMIN SERPL ELPH-MCNC: 3.6 G/DL — SIGNIFICANT CHANGE UP (ref 3.3–5)
ALP SERPL-CCNC: 79 U/L — SIGNIFICANT CHANGE UP (ref 40–120)
ALT FLD-CCNC: 14 U/L — SIGNIFICANT CHANGE UP (ref 10–45)
ANION GAP SERPL CALC-SCNC: 15 MMOL/L — SIGNIFICANT CHANGE UP (ref 5–17)
ANISOCYTOSIS BLD QL: SLIGHT — SIGNIFICANT CHANGE UP
APPEARANCE UR: CLEAR — SIGNIFICANT CHANGE UP
APTT BLD: 37.6 SEC — HIGH (ref 27.5–35.5)
AST SERPL-CCNC: 17 U/L — SIGNIFICANT CHANGE UP (ref 10–40)
BACTERIA # UR AUTO: ABNORMAL
BASE EXCESS BLDV CALC-SCNC: 0.2 MMOL/L — SIGNIFICANT CHANGE UP (ref -2–2)
BASOPHILS # BLD AUTO: 0.1 K/UL — SIGNIFICANT CHANGE UP (ref 0–0.2)
BASOPHILS NFR BLD AUTO: 0.9 % — SIGNIFICANT CHANGE UP (ref 0–2)
BILIRUB SERPL-MCNC: 0.4 MG/DL — SIGNIFICANT CHANGE UP (ref 0.2–1.2)
BILIRUB UR-MCNC: NEGATIVE — SIGNIFICANT CHANGE UP
BUN SERPL-MCNC: 76 MG/DL — HIGH (ref 7–23)
CA-I SERPL-SCNC: 1.13 MMOL/L — SIGNIFICANT CHANGE UP (ref 1.12–1.3)
CALCIUM SERPL-MCNC: 9.3 MG/DL — SIGNIFICANT CHANGE UP (ref 8.4–10.5)
CHLORIDE BLDV-SCNC: 110 MMOL/L — HIGH (ref 96–108)
CHLORIDE SERPL-SCNC: 104 MMOL/L — SIGNIFICANT CHANGE UP (ref 96–108)
CO2 BLDV-SCNC: 27 MMOL/L — SIGNIFICANT CHANGE UP (ref 22–30)
CO2 SERPL-SCNC: 22 MMOL/L — SIGNIFICANT CHANGE UP (ref 22–31)
COLOR SPEC: SIGNIFICANT CHANGE UP
CREAT SERPL-MCNC: 2.23 MG/DL — HIGH (ref 0.5–1.3)
DIFF PNL FLD: NEGATIVE — SIGNIFICANT CHANGE UP
ELLIPTOCYTES BLD QL SMEAR: SLIGHT — SIGNIFICANT CHANGE UP
EOSINOPHIL # BLD AUTO: 0 K/UL — SIGNIFICANT CHANGE UP (ref 0–0.5)
EOSINOPHIL NFR BLD AUTO: 0 % — SIGNIFICANT CHANGE UP (ref 0–6)
EPI CELLS # UR: 1 /HPF — SIGNIFICANT CHANGE UP
GAS PNL BLDV: 138 MMOL/L — SIGNIFICANT CHANGE UP (ref 135–145)
GAS PNL BLDV: SIGNIFICANT CHANGE UP
GAS PNL BLDV: SIGNIFICANT CHANGE UP
GIANT PLATELETS BLD QL SMEAR: PRESENT — SIGNIFICANT CHANGE UP
GLUCOSE BLDV-MCNC: 136 MG/DL — HIGH (ref 70–99)
GLUCOSE SERPL-MCNC: 139 MG/DL — HIGH (ref 70–99)
GLUCOSE UR QL: NEGATIVE — SIGNIFICANT CHANGE UP
HCO3 BLDV-SCNC: 26 MMOL/L — SIGNIFICANT CHANGE UP (ref 21–29)
HCT VFR BLD CALC: 31.9 % — LOW (ref 39–50)
HCT VFR BLDA CALC: 32 % — LOW (ref 39–50)
HGB BLD CALC-MCNC: 10.5 G/DL — LOW (ref 13–17)
HGB BLD-MCNC: 10.1 G/DL — LOW (ref 13–17)
HYALINE CASTS # UR AUTO: 1 /LPF — SIGNIFICANT CHANGE UP (ref 0–2)
INR BLD: 1.41 RATIO — HIGH (ref 0.88–1.16)
KETONES UR-MCNC: NEGATIVE — SIGNIFICANT CHANGE UP
LACTATE BLDV-MCNC: 1.4 MMOL/L — SIGNIFICANT CHANGE UP (ref 0.7–2)
LEUKOCYTE ESTERASE UR-ACNC: ABNORMAL
LYMPHOCYTES # BLD AUTO: 1.39 K/UL — SIGNIFICANT CHANGE UP (ref 1–3.3)
LYMPHOCYTES # BLD AUTO: 13 % — SIGNIFICANT CHANGE UP (ref 13–44)
MACROCYTES BLD QL: SLIGHT — SIGNIFICANT CHANGE UP
MANUAL SMEAR VERIFICATION: SIGNIFICANT CHANGE UP
MCHC RBC-ENTMCNC: 31.3 PG — SIGNIFICANT CHANGE UP (ref 27–34)
MCHC RBC-ENTMCNC: 31.7 GM/DL — LOW (ref 32–36)
MCV RBC AUTO: 98.8 FL — SIGNIFICANT CHANGE UP (ref 80–100)
MONOCYTES # BLD AUTO: 1.21 K/UL — HIGH (ref 0–0.9)
MONOCYTES NFR BLD AUTO: 11.3 % — SIGNIFICANT CHANGE UP (ref 2–14)
NEUTROPHILS # BLD AUTO: 8.02 K/UL — HIGH (ref 1.8–7.4)
NEUTROPHILS NFR BLD AUTO: 74.8 % — SIGNIFICANT CHANGE UP (ref 43–77)
NITRITE UR-MCNC: NEGATIVE — SIGNIFICANT CHANGE UP
PCO2 BLDV: 49 MMHG — SIGNIFICANT CHANGE UP (ref 42–55)
PH BLDV: 7.34 — LOW (ref 7.35–7.45)
PH UR: 6 — SIGNIFICANT CHANGE UP (ref 5–8)
PLAT MORPH BLD: NORMAL — SIGNIFICANT CHANGE UP
PLATELET # BLD AUTO: 207 K/UL — SIGNIFICANT CHANGE UP (ref 150–400)
PO2 BLDV: 47 MMHG — HIGH (ref 25–45)
POIKILOCYTOSIS BLD QL AUTO: SLIGHT — SIGNIFICANT CHANGE UP
POLYCHROMASIA BLD QL SMEAR: SLIGHT — SIGNIFICANT CHANGE UP
POTASSIUM BLDV-SCNC: 3.6 MMOL/L — SIGNIFICANT CHANGE UP (ref 3.5–5.3)
POTASSIUM SERPL-MCNC: 3.8 MMOL/L — SIGNIFICANT CHANGE UP (ref 3.5–5.3)
POTASSIUM SERPL-SCNC: 3.8 MMOL/L — SIGNIFICANT CHANGE UP (ref 3.5–5.3)
PROT SERPL-MCNC: 7.1 G/DL — SIGNIFICANT CHANGE UP (ref 6–8.3)
PROT UR-MCNC: NEGATIVE — SIGNIFICANT CHANGE UP
PROTHROM AB SERPL-ACNC: 16.6 SEC — HIGH (ref 10.6–13.6)
RBC # BLD: 3.23 M/UL — LOW (ref 4.2–5.8)
RBC # FLD: 21.8 % — HIGH (ref 10.3–14.5)
RBC BLD AUTO: ABNORMAL
RBC CASTS # UR COMP ASSIST: 1 /HPF — SIGNIFICANT CHANGE UP (ref 0–4)
SAO2 % BLDV: 78 % — SIGNIFICANT CHANGE UP (ref 67–88)
SODIUM SERPL-SCNC: 141 MMOL/L — SIGNIFICANT CHANGE UP (ref 135–145)
SP GR SPEC: 1.01 — SIGNIFICANT CHANGE UP (ref 1.01–1.02)
SPHEROCYTES BLD QL SMEAR: SLIGHT — SIGNIFICANT CHANGE UP
TARGETS BLD QL SMEAR: SLIGHT — SIGNIFICANT CHANGE UP
UROBILINOGEN FLD QL: NEGATIVE — SIGNIFICANT CHANGE UP
WBC # BLD: 10.72 K/UL — HIGH (ref 3.8–10.5)
WBC # FLD AUTO: 10.72 K/UL — HIGH (ref 3.8–10.5)
WBC UR QL: 5 /HPF — SIGNIFICANT CHANGE UP (ref 0–5)

## 2021-04-09 PROCEDURE — 99284 EMERGENCY DEPT VISIT MOD MDM: CPT | Mod: CS

## 2021-04-09 PROCEDURE — 99223 1ST HOSP IP/OBS HIGH 75: CPT

## 2021-04-09 PROCEDURE — 93010 ELECTROCARDIOGRAM REPORT: CPT

## 2021-04-09 RX ORDER — PIPERACILLIN AND TAZOBACTAM 4; .5 G/20ML; G/20ML
3.38 INJECTION, POWDER, LYOPHILIZED, FOR SOLUTION INTRAVENOUS ONCE
Refills: 0 | Status: COMPLETED | OUTPATIENT
Start: 2021-04-09 | End: 2021-04-09

## 2021-04-09 RX ORDER — SODIUM CHLORIDE 9 MG/ML
250 INJECTION INTRAMUSCULAR; INTRAVENOUS; SUBCUTANEOUS ONCE
Refills: 0 | Status: COMPLETED | OUTPATIENT
Start: 2021-04-09 | End: 2021-04-09

## 2021-04-09 RX ADMIN — PIPERACILLIN AND TAZOBACTAM 200 GRAM(S): 4; .5 INJECTION, POWDER, LYOPHILIZED, FOR SOLUTION INTRAVENOUS at 19:30

## 2021-04-09 RX ADMIN — SODIUM CHLORIDE 250 MILLILITER(S): 9 INJECTION INTRAMUSCULAR; INTRAVENOUS; SUBCUTANEOUS at 18:43

## 2021-04-09 NOTE — H&P ADULT - HISTORY OF PRESENT ILLNESS
This patient is a 89yo gentleman with PMH of MDS, CKD stage __ atrial fibrillation on eliquis, BPH with intermittent self-catheterization and CHF who presents to the ED due to urinary retention.  This patient is a 89yo gentleman with PMH of MDS, CKD stage 3, atrial fibrillation on eliquis, BPH with intermittent self-catheterization, hx COVID19 infection in July 2020, htn, and CHF who presents to the ED due to urinary retention. The patient states that he had difficulty urinating last night. He attempted to void with self-catheterization which yielded little urine output. Today, the patient took his wife to a neurologist visit. While at the doctor's office, he felt unwell and was dyspneic while at rest. He notified his doctor, who expressed concern that the patient may have worsening of his heart failure, and recommended that the patient come to the ED.  He denies any fever, chills, cough, CP, palpitations, dysuria, hematuria, abdominal pain, or back pain. He has had lower extremity edema, which has remained stable. The patient reports he takes lasix at home, and has not missed any doses. Currently he feels comfortable while lying in the stretcher.  The patient also complains of having redness around his eyes. He notes having venous occlusion of the right eye with poor vision.

## 2021-04-09 NOTE — H&P ADULT - NSICDXFAMILYHX_GEN_ALL_CORE_FT
FAMILY HISTORY:  Family history of prostate cancer  No pertinent family history in first degree relatives     FAMILY HISTORY:  Family history of prostate cancer  FH: arthritis, mother  FH: CVA (cerebrovascular accident), father

## 2021-04-09 NOTE — ED PROVIDER NOTE - CHIEF COMPLAINT
Date: 2/13/2023    Patient Name: Dominic Benoit          To Whom it may concern: This letter has been written at the patient's request. The above patient was seen virtually at the [facility] last 1/24/2023, as I am 2 hours away in [place], [place]. At that time he was struggling with behavioral health issues regarding increased anxiety and mood liability directly related to returning to work, and the psychosocial stressors that go along with this change. In addition,  he was hopeful that a closer commute would help. Today he has asked for a return to work 2/20/2023. He has a support network in the local area, but no local physician or specialist to evaluate his mental status. I have encouraged him to do this on several occasion, but he has some trust issues and inflexible on this issue. I am hopeful that if the environment has changed, he will be able to call on some coping strategies to help him get through the day. He may return to work without restriction 2/20/2023.          Sincerely,    Claire Reed MD
The patient is a 89y Male complaining of

## 2021-04-09 NOTE — ED PROVIDER NOTE - CARE PLAN
Principal Discharge DX:	Difficulty urinating   Principal Discharge DX:	Difficulty urinating  Secondary Diagnosis:	Sepsis

## 2021-04-09 NOTE — H&P ADULT - PROBLEM SELECTOR PLAN 1
The patient has known CKD stage 3 based on previous labs.   SCr jean pierre from 1.69 to 2.23.  BUN is elevated 76.  MICH is likely due to obstructive uropathy. Beckman catheter is now draining clear yellow urine. Monitor UO.  Trend BMP.  UA is positive leukocyte esterase and bacteria. Pt was also hypothermic to 94.3F. Pt UCx and blood cultures were sent.  Will continue zosyn for now for suspected UTI.

## 2021-04-09 NOTE — H&P ADULT - PROBLEM SELECTOR PLAN 5
Start home dose of finasteride and tamsulosin Start home dose of finasteride. Check if pt can tolerate tamsulosin (he has known sulfa allergy).

## 2021-04-09 NOTE — ED PROVIDER NOTE - CLINICAL SUMMARY MEDICAL DECISION MAKING FREE TEXT BOX
89yo male p/w difficulty urinating, 70 cc's on bedside US. NT, soft abdomen. Noted to have rectal T 94. R/o urosepsis vs bacteremia. Cultures, UA, rivera, labs, antibiotics and likely admit.

## 2021-04-09 NOTE — H&P ADULT - NSHPREVIEWOFSYSTEMS_GEN_ALL_CORE
REVIEW OF SYSTEMS  CONSTITUTIONAL: No fever, no chills  EYES: No eye pain, + facial itching and redness, + poor vision in right eye, loss of vision in left eye  ENMT:  No difficulty hearing, no throat pain  RESPIRATORY: No cough, no wheezing, no sputum production; + shortness of breath now improving  CARDIOVASCULAR: No chest pain, no palpitations, + leg swelling  GASTROINTESTINAL: No abdominal pain, no nausea, no vomiting, no diarrhea, no constipation, no bloody stool  GENITOURINARY: No dysuria, no hematuria, + urinary retention  NEUROLOGICAL: No headaches, no loss of strength, no numbness  SKIN: no open lesions   MUSCULOSKELETAL: No joint pain, no joint swelling; No muscle pain  HEME/LYMPH: No easy bruising, bleeding

## 2021-04-09 NOTE — ED PROVIDER NOTE - ATTENDING CONTRIBUTION TO CARE
attending Enrique: 89yM h/o MDS on therapy, CKD, Afib on Eliquis, BPH with intermittent self-catheterization, CHF p/w difficulty urinating since last night. Last urinated 1 hour PTA. Denies fever, N/V/D, abdominal pain, chest pain, dyspnea. Pt hypothermic rectally, approx 70cc on bladder scan. Concern for sepsis given hypothermia. Will obtain labs including vbg with lactate and blood cultures, IVF, will review prior urine cultures to determine abx therapy, cxr, UA/Ucx, diego sheppard, admit.

## 2021-04-09 NOTE — ED ADULT NURSE NOTE - PSH
History of cataract extraction  right eye 6/11  Laceration of finger, left, with tendon  1976  Mohs defect of nose  1990's  No significant past surgical history    Osteomyelitis  left humerous- surgery childhood age 13  S/P Cholecystectomy    S/P TURP

## 2021-04-09 NOTE — ED PROVIDER NOTE - OBJECTIVE STATEMENT
89yo male MDS on therapy, CKD, a fib on eliquis, BPH with intermittent self-catheterization, CHF p/w difficulty urinating since last night. Last urinated 1 hour PTA. Denies fever, N/V/D, abdominal pain, chest pain, dyspnea, fever.

## 2021-04-09 NOTE — H&P ADULT - NSHPPHYSICALEXAM_GEN_ALL_CORE
T(C): 34.6 (04-09-21 @ 22:59), Max: 34.8 (04-09-21 @ 18:24)  HR: 54 (04-09-21 @ 22:59) (52 - 58)  BP: 126/72 (04-09-21 @ 22:59) (114/63 - 129/66)  RR: 18 (04-09-21 @ 22:59) (16 - 20)  SpO2: 97% (04-09-21 @ 22:59) (97% - 100%)  Wt(kg): --    PHYSICAL EXAM:  GENERAL: NAD, well-groomed, well-developed  HEAD:  Atraumatic, Normocephalic  EYES: mild nontender erythema around right eye, conjunctival erythema bilaterally, poor vision in R eye  ENMT: No oropharyngeal exudates, erythema or lesions,  Moist mucous membranes  NECK: Supple, no cervical lymphadenopathy, no visible JVD  NERVOUS SYSTEM:  Alert & Oriented X 3 (name, location, date), CN II-XII intact, 5/5 BUE and BLE motor strength, full sensation to light touch   CHEST/LUNG: Clear to auscultation bilaterally; No rhonchi,  2+ pitting pedal edema to knees  HEART: irregularly irregular rhythm, No murmurs  ABDOMEN: Soft, Nontender, Nondistended  EXTREMITIES:  2+ radial pulses, No cyanosis, warm and well perfused  SKIN: warm, dry

## 2021-04-09 NOTE — ED ADULT NURSE REASSESSMENT NOTE - NS ED NURSE REASSESS COMMENT FT1
Beckman catheter placed using sterile technique. Second RN present to confirm sterility. Draining to gravity. Secured w/ stat lock. Pt tolerated procedure well. Will cont to monitor.

## 2021-04-09 NOTE — H&P ADULT - NSICDXPASTMEDICALHX_GEN_ALL_CORE_FT
PAST MEDICAL HISTORY:  Anemia     Anemia     Benign Hypertension     BPH (benign prostatic hyperplasia)     BPH (Benign Prostatic Hypertrophy)     Choledocholithiasis     Chronic constipation     CKD (chronic kidney disease) stage 3, GFR 30-59 ml/min     Gall stones     Glaucoma     Glaucoma     MDS (myelodysplastic syndrome)     MDS (Myelodysplastic Syndrome)     Nocturia associated with benign prostatic hypertrophy     Osteomyelitis of arm

## 2021-04-09 NOTE — H&P ADULT - PROBLEM SELECTOR PLAN 6
The patient cannot recall all of his home medications. He states his wife would not be able to assist since she has memory deficits.  The patient uses [facility], which is not currently open.  Will request pharmacy tech to assist with medication reconciliation in the AM.

## 2021-04-09 NOTE — H&P ADULT - ASSESSMENT
This patient is a 89yo gentleman with PMH of MDS, CKD stage 3, atrial fibrillation on eliquis, BPH with intermittent self-catheterization and CHF who presents to the ED due to urinary retention. The patient states that he had difficulty urinating last night. He attempted to void with self-catheterization which yielded little urine output. Today, the patient took his wife to a neurologist visit. While at the doctor's office, he felt unwell and was dyspneic while at rest. He notified his doctor, who expressed concern that the patient may have worsening of his heart failure, and recommended that the patient come to the ED.  He denies any fever, chills, cough, CP, palpitations, dysuria, hematuria, abdominal pain, or back pain. He has had lower extremity edema, which has remained stable. The patient reports he takes lasix at home, and has not missed any doses. Currently he feels comfortable while lying in the stretcher.  The patient also complains of having redness around his eyes. He notes having venous occlusion of the right eye with poor vision.     This patient is a 89yo gentleman with PMH of MDS, CKD stage 3, atrial fibrillation on eliquis, BPH with intermittent self-catheterization and CHF who presents to the ED due to urinary retention and dyspnea, found to have MICH likely 2/2 obstructive uropathy, now s/p rivera catheter placement, also being evaluated for possible heart failure exacerbation.

## 2021-04-09 NOTE — ED ADULT TRIAGE NOTE - IDEAL BODY WEIGHT(KG)
05/21/20 1443   Encounter Summary   Services provided to: Patient   Support System Family members   Continue Visiting   (05/21/2020)   Complexity of Encounter Moderate   Length of Encounter 15 minutes   Spiritual Assessment Completed Yes   Routine   Type Initial   Spiritual/Mandaen   Type Spiritual support   Assessment Calm; Approachable; Hopeful   Intervention Active listening;Prayer;Nurtured hope;Empowerment   Outcome Expressed gratitude 64

## 2021-04-09 NOTE — ED ADULT NURSE NOTE - OBJECTIVE STATEMENT
89yo male pmh anemia, frequent utis, htn, glaucoma, retention per pt occasionally straight caths BIB EMS from the [facility] for urinary retention. Cullen CR at bedside PVR showing 72ml in bladder. Pt a&ox3 but lethargic, 94.3 rectally, Enrique CR aware. Sepsis order set initiated.  Denies CP, SOB, n/v/d, fevers, chills, abdominal pain,  fatigue, numbness, tingling in upper and lower extremities, HA, blurry vision. Updated on plan of care.

## 2021-04-09 NOTE — H&P ADULT - PROBLEM SELECTOR PLAN 2
The patient appears to have significant pedal edema and had complained of dyspnea earlier today, despite being compliant with his home dose of lasix. He is currently lying supine comfortably. Will assess for heart failure exacerbation.   Obtain CXR   Monitor on telemetry.  Continue metoprolol   Will give lasix 40mg IVP x 2. Monitor I&Os and daily weights. Keep K 4, Mg 2.   On [place], last TTE performed on 4/23/2020 found EF 59%, normal LV and RV function, mild tricuspid regurgitation. Pt also found to have mild pulmonary htn.   Will check repeat TTE. The patient appears to have significant pedal edema and had complained of dyspnea earlier today, despite being compliant with his home dose of lasix. He is currently lying supine comfortably. Will assess for heart failure exacerbation.   Obtain CXR.  Continue metoprolol   Will give lasix 40mg IVP x 2. Monitor I&Os and daily weights. Keep K 4, Mg 2.   On [place], last TTE performed on 4/23/2020 found EF 59%, normal LV and RV function, mild tricuspid regurgitation. Pt also found to have mild pulmonary htn.   Will check repeat TTE.

## 2021-04-09 NOTE — H&P ADULT - PROBLEM SELECTOR PLAN 4
The patient states he had visited his hematologist and had recent Hgb over 10, thus did not receive aranesp.   He denies any overt bleeding or bruising. His Hgb appears stable compared to labs from July 2020.   Monitor CBC.

## 2021-04-09 NOTE — H&P ADULT - NSHPLABSRESULTS_GEN_ALL_CORE
Labs, imaging  personally reviewed by me.     CBC with elevated 10.72, anemia with Hgb of 10.1.  Abnormal RBC morphology noted.  INR elevated at 1.41. PT 16.6.  CMP found elevated SCr of 2.23, elevated BUN of 76.   Lactate is 1.4 WNL.   UA found moderate leukocyte esterase, bacteria     LABS:                        10.1   10.72 )-----------( 207      ( 2021 18:56 )             31.9     Hgb Trend: 10.1<--      141  |  104  |  76<H>  ----------------------------<  139<H>  3.8   |  22  |  2.23<H>    Ca    9.3      2021 18:56    TPro  7.1  /  Alb  3.6  /  TBili  0.4  /  DBili  x   /  AST  17  /  ALT  14  /  AlkPhos  79      Creatinine Trend: 2.23<--  PT/INR - ( 2021 18:56 )   PT: 16.6 sec;   INR: 1.41 ratio         PTT - ( 2021 18:56 )  PTT:37.6 sec  Urinalysis Basic - ( 2021 19:09 )    Color: Light Yellow / Appearance: Clear / S.013 / pH: x  Gluc: x / Ketone: Negative  / Bili: Negative / Urobili: Negative   Blood: x / Protein: Negative / Nitrite: Negative   Leuk Esterase: Moderate / RBC: 1 /hpf / WBC 5 /HPF   Sq Epi: x / Non Sq Epi: 1 /hpf / Bacteria: Few        Venous Blood Gas:   @ 19:16  7.34/49/47//78  VBG Lactate: 1.4 Labs, imaging  personally reviewed by me.     CBC with elevated 10.72, anemia with Hgb of 10.1.  Abnormal RBC morphology noted.  INR elevated at 1.41. PT 16.6.  CMP found elevated SCr of 2.23, elevated BUN of 76.   Lactate is 1.4 WNL.   UA found moderate leukocyte esterase, bacteria     EKG- atrial fibrillation HR 56, TWI in V1,V2, Q waves in II, III, aVF    LABS:                        10.1   10.72 )-----------( 207      ( 2021 18:56 )             31.9     Hgb Trend: 10.1<--      141  |  104  |  76<H>  ----------------------------<  139<H>  3.8   |  22  |  2.23<H>    Ca    9.3      2021 18:56    TPro  7.1  /  Alb  3.6  /  TBili  0.4  /  DBili  x   /  AST  17  /  ALT  14  /  AlkPhos  79      Creatinine Trend: 2.23<--  PT/INR - ( 2021 18:56 )   PT: 16.6 sec;   INR: 1.41 ratio         PTT - ( 2021 18:56 )  PTT:37.6 sec  Urinalysis Basic - ( 2021 19:09 )    Color: Light Yellow / Appearance: Clear / S.013 / pH: x  Gluc: x / Ketone: Negative  / Bili: Negative / Urobili: Negative   Blood: x / Protein: Negative / Nitrite: Negative   Leuk Esterase: Moderate / RBC: 1 /hpf / WBC 5 /HPF   Sq Epi: x / Non Sq Epi: 1 /hpf / Bacteria: Few    Venous Blood Gas:   @ 19:16                  7.34/49/47/26/78                   VBG Lactate: 1.4

## 2021-04-09 NOTE — ED ADULT NURSE NOTE - PMH
Anemia    Anemia    Benign Hypertension    BPH (benign prostatic hyperplasia)    BPH (Benign Prostatic Hypertrophy)    Choledocholithiasis    Chronic constipation    CKD (chronic kidney disease) stage 3, GFR 30-59 ml/min    Gall stones    Glaucoma    Glaucoma    MDS (myelodysplastic syndrome)    MDS (Myelodysplastic Syndrome)    Nocturia associated with benign prostatic hypertrophy    Osteomyelitis of arm

## 2021-04-09 NOTE — ED ADULT NURSE REASSESSMENT NOTE - NS ED NURSE REASSESS COMMENT FT1
MD Nunez aware of temperature, diego hugger in place with warm blankets, pt mentating well, able to follow commands and move all extremities, MD Nunez reports no further interventions needed at this time

## 2021-04-09 NOTE — ED PROVIDER NOTE - PHYSICAL EXAMINATION
Physical Exam:  Gen: NAD, AOx3, non-toxic appearing, able to ambulate without assistance  Head: NCAT  HEENT: EOMI, PEERLA, normal conjunctiva, tongue midline, oral mucosa moist  Lung: CTAB, no respiratory distress, no wheezes/rhonchi/rales B/L, speaking in full sentences  CV: RRR, no murmurs, rubs or gallops, distal pulses 2+ b/l  Abd: 70 cc's in bladder with recent void, soft, NT, ND, no guarding, no rigidity, no rebound tenderness, no CVA tenderness   Skin: Warm, well perfused, no rash  Psych: normal affect, calm

## 2021-04-10 DIAGNOSIS — I48.20 CHRONIC ATRIAL FIBRILLATION, UNSPECIFIED: ICD-10-CM

## 2021-04-10 DIAGNOSIS — N17.9 ACUTE KIDNEY FAILURE, UNSPECIFIED: ICD-10-CM

## 2021-04-10 DIAGNOSIS — Z29.9 ENCOUNTER FOR PROPHYLACTIC MEASURES, UNSPECIFIED: ICD-10-CM

## 2021-04-10 DIAGNOSIS — D46.9 MYELODYSPLASTIC SYNDROME, UNSPECIFIED: ICD-10-CM

## 2021-04-10 DIAGNOSIS — Z79.899 OTHER LONG TERM (CURRENT) DRUG THERAPY: ICD-10-CM

## 2021-04-10 DIAGNOSIS — T68.XXXA HYPOTHERMIA, INITIAL ENCOUNTER: ICD-10-CM

## 2021-04-10 DIAGNOSIS — N40.0 BENIGN PROSTATIC HYPERPLASIA WITHOUT LOWER URINARY TRACT SYMPTOMS: ICD-10-CM

## 2021-04-10 DIAGNOSIS — R33.9 RETENTION OF URINE, UNSPECIFIED: ICD-10-CM

## 2021-04-10 DIAGNOSIS — I50.33 ACUTE ON CHRONIC DIASTOLIC (CONGESTIVE) HEART FAILURE: ICD-10-CM

## 2021-04-10 LAB
ANION GAP SERPL CALC-SCNC: 14 MMOL/L — SIGNIFICANT CHANGE UP (ref 5–17)
BASOPHILS # BLD AUTO: 0.02 K/UL — SIGNIFICANT CHANGE UP (ref 0–0.2)
BASOPHILS NFR BLD AUTO: 0.2 % — SIGNIFICANT CHANGE UP (ref 0–2)
BUN SERPL-MCNC: 73 MG/DL — HIGH (ref 7–23)
CALCIUM SERPL-MCNC: 8.5 MG/DL — SIGNIFICANT CHANGE UP (ref 8.4–10.5)
CHLORIDE SERPL-SCNC: 106 MMOL/L — SIGNIFICANT CHANGE UP (ref 96–108)
CO2 SERPL-SCNC: 22 MMOL/L — SIGNIFICANT CHANGE UP (ref 22–31)
COVID-19 SPIKE DOMAIN AB INTERP: POSITIVE
COVID-19 SPIKE DOMAIN ANTIBODY RESULT: >250 U/ML — HIGH
CREAT SERPL-MCNC: 1.99 MG/DL — HIGH (ref 0.5–1.3)
D DIMER BLD IA.RAPID-MCNC: 195 NG/ML DDU — SIGNIFICANT CHANGE UP
EOSINOPHIL # BLD AUTO: 0.06 K/UL — SIGNIFICANT CHANGE UP (ref 0–0.5)
EOSINOPHIL NFR BLD AUTO: 0.7 % — SIGNIFICANT CHANGE UP (ref 0–6)
GLUCOSE SERPL-MCNC: 132 MG/DL — HIGH (ref 70–99)
HCT VFR BLD CALC: 31.2 % — LOW (ref 39–50)
HGB BLD-MCNC: 9.8 G/DL — LOW (ref 13–17)
IMM GRANULOCYTES NFR BLD AUTO: 0.5 % — SIGNIFICANT CHANGE UP (ref 0–1.5)
LYMPHOCYTES # BLD AUTO: 1.59 K/UL — SIGNIFICANT CHANGE UP (ref 1–3.3)
LYMPHOCYTES # BLD AUTO: 19 % — SIGNIFICANT CHANGE UP (ref 13–44)
MAGNESIUM SERPL-MCNC: 2 MG/DL — SIGNIFICANT CHANGE UP (ref 1.6–2.6)
MCHC RBC-ENTMCNC: 30.8 PG — SIGNIFICANT CHANGE UP (ref 27–34)
MCHC RBC-ENTMCNC: 31.4 GM/DL — LOW (ref 32–36)
MCV RBC AUTO: 98.1 FL — SIGNIFICANT CHANGE UP (ref 80–100)
MONOCYTES # BLD AUTO: 0.82 K/UL — SIGNIFICANT CHANGE UP (ref 0–0.9)
MONOCYTES NFR BLD AUTO: 9.8 % — SIGNIFICANT CHANGE UP (ref 2–14)
NEUTROPHILS # BLD AUTO: 5.82 K/UL — SIGNIFICANT CHANGE UP (ref 1.8–7.4)
NEUTROPHILS NFR BLD AUTO: 69.8 % — SIGNIFICANT CHANGE UP (ref 43–77)
NRBC # BLD: 0 /100 WBCS — SIGNIFICANT CHANGE UP (ref 0–0)
NT-PROBNP SERPL-SCNC: 2343 PG/ML — HIGH (ref 0–300)
PLATELET # BLD AUTO: 208 K/UL — SIGNIFICANT CHANGE UP (ref 150–400)
POTASSIUM SERPL-MCNC: 3.8 MMOL/L — SIGNIFICANT CHANGE UP (ref 3.5–5.3)
POTASSIUM SERPL-SCNC: 3.8 MMOL/L — SIGNIFICANT CHANGE UP (ref 3.5–5.3)
RBC # BLD: 3.18 M/UL — LOW (ref 4.2–5.8)
RBC # FLD: 21.6 % — HIGH (ref 10.3–14.5)
SARS-COV-2 IGG+IGM SERPL QL IA: >250 U/ML — HIGH
SARS-COV-2 IGG+IGM SERPL QL IA: POSITIVE
SARS-COV-2 RNA SPEC QL NAA+PROBE: SIGNIFICANT CHANGE UP
SODIUM SERPL-SCNC: 142 MMOL/L — SIGNIFICANT CHANGE UP (ref 135–145)
TROPONIN T, HIGH SENSITIVITY RESULT: 59 NG/L — HIGH (ref 0–51)
TSH SERPL-MCNC: 2.65 UIU/ML — SIGNIFICANT CHANGE UP (ref 0.27–4.2)
WBC # BLD: 8.35 K/UL — SIGNIFICANT CHANGE UP (ref 3.8–10.5)
WBC # FLD AUTO: 8.35 K/UL — SIGNIFICANT CHANGE UP (ref 3.8–10.5)

## 2021-04-10 PROCEDURE — 71045 X-RAY EXAM CHEST 1 VIEW: CPT | Mod: 26

## 2021-04-10 RX ORDER — PIPERACILLIN AND TAZOBACTAM 4; .5 G/20ML; G/20ML
3.38 INJECTION, POWDER, LYOPHILIZED, FOR SOLUTION INTRAVENOUS EVERY 8 HOURS
Refills: 0 | Status: DISCONTINUED | OUTPATIENT
Start: 2021-04-10 | End: 2021-04-12

## 2021-04-10 RX ORDER — ERYTHROMYCIN BASE 5 MG/GRAM
1 OINTMENT (GRAM) OPHTHALMIC (EYE)
Refills: 0 | Status: DISCONTINUED | OUTPATIENT
Start: 2021-04-10 | End: 2021-04-13

## 2021-04-10 RX ORDER — NYSTATIN CREAM 100000 [USP'U]/G
1 CREAM TOPICAL
Refills: 0 | Status: DISCONTINUED | OUTPATIENT
Start: 2021-04-10 | End: 2021-04-13

## 2021-04-10 RX ORDER — BIMATOPROST 0.3 MG/ML
1 SOLUTION/ DROPS OPHTHALMIC
Qty: 0 | Refills: 0 | DISCHARGE

## 2021-04-10 RX ORDER — FINASTERIDE 5 MG/1
5 TABLET, FILM COATED ORAL DAILY
Refills: 0 | Status: DISCONTINUED | OUTPATIENT
Start: 2021-04-10 | End: 2021-04-13

## 2021-04-10 RX ORDER — METOPROLOL TARTRATE 50 MG
12.5 TABLET ORAL
Refills: 0 | Status: DISCONTINUED | OUTPATIENT
Start: 2021-04-10 | End: 2021-04-13

## 2021-04-10 RX ORDER — BRIMONIDINE TARTRATE 2 MG/MG
1 SOLUTION/ DROPS OPHTHALMIC EVERY 8 HOURS
Refills: 0 | Status: DISCONTINUED | OUTPATIENT
Start: 2021-04-10 | End: 2021-04-13

## 2021-04-10 RX ORDER — NETARSUDIL 0.2 MG/ML
1 SOLUTION/ DROPS OPHTHALMIC; TOPICAL
Qty: 0 | Refills: 0 | DISCHARGE

## 2021-04-10 RX ORDER — ERYTHROMYCIN BASE 5 MG/GRAM
1 OINTMENT (GRAM) OPHTHALMIC (EYE)
Qty: 0 | Refills: 0 | DISCHARGE

## 2021-04-10 RX ORDER — LATANOPROST 0.05 MG/ML
1 SOLUTION/ DROPS OPHTHALMIC; TOPICAL AT BEDTIME
Refills: 0 | Status: DISCONTINUED | OUTPATIENT
Start: 2021-04-10 | End: 2021-04-13

## 2021-04-10 RX ORDER — LINACLOTIDE 145 UG/1
1 CAPSULE, GELATIN COATED ORAL
Qty: 0 | Refills: 0 | DISCHARGE

## 2021-04-10 RX ORDER — APIXABAN 2.5 MG/1
2.5 TABLET, FILM COATED ORAL
Refills: 0 | Status: DISCONTINUED | OUTPATIENT
Start: 2021-04-10 | End: 2021-04-13

## 2021-04-10 RX ORDER — AMLODIPINE BESYLATE 2.5 MG/1
5 TABLET ORAL DAILY
Refills: 0 | Status: DISCONTINUED | OUTPATIENT
Start: 2021-04-10 | End: 2021-04-13

## 2021-04-10 RX ORDER — DORZOLAMIDE HYDROCHLORIDE TIMOLOL MALEATE 20; 5 MG/ML; MG/ML
1 SOLUTION/ DROPS OPHTHALMIC
Qty: 0 | Refills: 0 | DISCHARGE

## 2021-04-10 RX ADMIN — LATANOPROST 1 DROP(S): 0.05 SOLUTION/ DROPS OPHTHALMIC; TOPICAL at 21:41

## 2021-04-10 RX ADMIN — NYSTATIN CREAM 1 APPLICATION(S): 100000 CREAM TOPICAL at 17:31

## 2021-04-10 RX ADMIN — BRIMONIDINE TARTRATE 1 DROP(S): 2 SOLUTION/ DROPS OPHTHALMIC at 12:46

## 2021-04-10 RX ADMIN — PIPERACILLIN AND TAZOBACTAM 25 GRAM(S): 4; .5 INJECTION, POWDER, LYOPHILIZED, FOR SOLUTION INTRAVENOUS at 09:05

## 2021-04-10 RX ADMIN — APIXABAN 2.5 MILLIGRAM(S): 2.5 TABLET, FILM COATED ORAL at 17:32

## 2021-04-10 RX ADMIN — BRIMONIDINE TARTRATE 1 DROP(S): 2 SOLUTION/ DROPS OPHTHALMIC at 05:37

## 2021-04-10 RX ADMIN — Medication 1 APPLICATION(S): at 17:31

## 2021-04-10 RX ADMIN — AMLODIPINE BESYLATE 5 MILLIGRAM(S): 2.5 TABLET ORAL at 11:22

## 2021-04-10 RX ADMIN — PIPERACILLIN AND TAZOBACTAM 25 GRAM(S): 4; .5 INJECTION, POWDER, LYOPHILIZED, FOR SOLUTION INTRAVENOUS at 02:05

## 2021-04-10 RX ADMIN — FINASTERIDE 5 MILLIGRAM(S): 5 TABLET, FILM COATED ORAL at 11:22

## 2021-04-10 RX ADMIN — Medication 1 APPLICATION(S): at 12:03

## 2021-04-10 RX ADMIN — Medication 12.5 MILLIGRAM(S): at 17:32

## 2021-04-10 RX ADMIN — BRIMONIDINE TARTRATE 1 DROP(S): 2 SOLUTION/ DROPS OPHTHALMIC at 21:37

## 2021-04-10 RX ADMIN — APIXABAN 2.5 MILLIGRAM(S): 2.5 TABLET, FILM COATED ORAL at 05:40

## 2021-04-10 RX ADMIN — Medication 12.5 MILLIGRAM(S): at 05:40

## 2021-04-10 RX ADMIN — Medication 1 APPLICATION(S): at 06:54

## 2021-04-10 RX ADMIN — PIPERACILLIN AND TAZOBACTAM 25 GRAM(S): 4; .5 INJECTION, POWDER, LYOPHILIZED, FOR SOLUTION INTRAVENOUS at 17:32

## 2021-04-10 NOTE — CONSULT NOTE ADULT - PROBLEM SELECTOR RECOMMENDATION 9
temp now normal  pt nontoxic  f/u blood cx   ua -- no pyuria  cxr no focal infiltrates-- reviewed - vascular congestion  abd exam benign  no skin/soft tissue infection  sars cov 2 neg   empiric zosyn til cx back  trend temp

## 2021-04-10 NOTE — PROGRESS NOTE ADULT - ASSESSMENT
This patient is a 89yo gentleman with PMH of MDS, CKD stage 3, atrial fibrillation on eliquis, BPH with intermittent self-catheterization and CHF who presents to the ED due to urinary retention and dyspnea, found to have MICH likely 2/2 obstructive uropathy, now s/p rivera catheter placement, also being evaluated for possible heart failure exacerbation.

## 2021-04-11 DIAGNOSIS — H40.9 UNSPECIFIED GLAUCOMA: ICD-10-CM

## 2021-04-11 DIAGNOSIS — K80.20 CALCULUS OF GALLBLADDER WITHOUT CHOLECYSTITIS WITHOUT OBSTRUCTION: ICD-10-CM

## 2021-04-11 DIAGNOSIS — N18.30 CHRONIC KIDNEY DISEASE, STAGE 3 UNSPECIFIED: ICD-10-CM

## 2021-04-11 DIAGNOSIS — N39.0 URINARY TRACT INFECTION, SITE NOT SPECIFIED: ICD-10-CM

## 2021-04-11 DIAGNOSIS — I10 ESSENTIAL (PRIMARY) HYPERTENSION: ICD-10-CM

## 2021-04-11 LAB
ANION GAP SERPL CALC-SCNC: 14 MMOL/L — SIGNIFICANT CHANGE UP (ref 5–17)
BUN SERPL-MCNC: 72 MG/DL — HIGH (ref 7–23)
CALCIUM SERPL-MCNC: 8.8 MG/DL — SIGNIFICANT CHANGE UP (ref 8.4–10.5)
CHLORIDE SERPL-SCNC: 104 MMOL/L — SIGNIFICANT CHANGE UP (ref 96–108)
CO2 SERPL-SCNC: 22 MMOL/L — SIGNIFICANT CHANGE UP (ref 22–31)
CREAT SERPL-MCNC: 2.4 MG/DL — HIGH (ref 0.5–1.3)
GLUCOSE SERPL-MCNC: 152 MG/DL — HIGH (ref 70–99)
HCT VFR BLD CALC: 30.5 % — LOW (ref 39–50)
HGB BLD-MCNC: 9.6 G/DL — LOW (ref 13–17)
MCHC RBC-ENTMCNC: 31.2 PG — SIGNIFICANT CHANGE UP (ref 27–34)
MCHC RBC-ENTMCNC: 31.5 GM/DL — LOW (ref 32–36)
MCV RBC AUTO: 99 FL — SIGNIFICANT CHANGE UP (ref 80–100)
NRBC # BLD: 0 /100 WBCS — SIGNIFICANT CHANGE UP (ref 0–0)
PLATELET # BLD AUTO: 189 K/UL — SIGNIFICANT CHANGE UP (ref 150–400)
POTASSIUM SERPL-MCNC: 3.5 MMOL/L — SIGNIFICANT CHANGE UP (ref 3.5–5.3)
POTASSIUM SERPL-SCNC: 3.5 MMOL/L — SIGNIFICANT CHANGE UP (ref 3.5–5.3)
RBC # BLD: 3.08 M/UL — LOW (ref 4.2–5.8)
RBC # FLD: 22.1 % — HIGH (ref 10.3–14.5)
SODIUM SERPL-SCNC: 140 MMOL/L — SIGNIFICANT CHANGE UP (ref 135–145)
WBC # BLD: 11.3 K/UL — HIGH (ref 3.8–10.5)
WBC # FLD AUTO: 11.3 K/UL — HIGH (ref 3.8–10.5)

## 2021-04-11 RX ORDER — TAMSULOSIN HYDROCHLORIDE 0.4 MG/1
0.4 CAPSULE ORAL
Refills: 0 | Status: DISCONTINUED | OUTPATIENT
Start: 2021-04-11 | End: 2021-04-13

## 2021-04-11 RX ADMIN — LATANOPROST 1 DROP(S): 0.05 SOLUTION/ DROPS OPHTHALMIC; TOPICAL at 21:59

## 2021-04-11 RX ADMIN — PIPERACILLIN AND TAZOBACTAM 25 GRAM(S): 4; .5 INJECTION, POWDER, LYOPHILIZED, FOR SOLUTION INTRAVENOUS at 01:49

## 2021-04-11 RX ADMIN — BRIMONIDINE TARTRATE 1 DROP(S): 2 SOLUTION/ DROPS OPHTHALMIC at 17:27

## 2021-04-11 RX ADMIN — PIPERACILLIN AND TAZOBACTAM 25 GRAM(S): 4; .5 INJECTION, POWDER, LYOPHILIZED, FOR SOLUTION INTRAVENOUS at 19:25

## 2021-04-11 RX ADMIN — NYSTATIN CREAM 1 APPLICATION(S): 100000 CREAM TOPICAL at 19:25

## 2021-04-11 RX ADMIN — APIXABAN 2.5 MILLIGRAM(S): 2.5 TABLET, FILM COATED ORAL at 05:13

## 2021-04-11 RX ADMIN — TAMSULOSIN HYDROCHLORIDE 0.4 MILLIGRAM(S): 0.4 CAPSULE ORAL at 12:02

## 2021-04-11 RX ADMIN — FINASTERIDE 5 MILLIGRAM(S): 5 TABLET, FILM COATED ORAL at 11:47

## 2021-04-11 RX ADMIN — APIXABAN 2.5 MILLIGRAM(S): 2.5 TABLET, FILM COATED ORAL at 17:30

## 2021-04-11 RX ADMIN — Medication 1 APPLICATION(S): at 23:02

## 2021-04-11 RX ADMIN — Medication 1 APPLICATION(S): at 17:30

## 2021-04-11 RX ADMIN — NYSTATIN CREAM 1 APPLICATION(S): 100000 CREAM TOPICAL at 05:13

## 2021-04-11 RX ADMIN — Medication 1 APPLICATION(S): at 05:13

## 2021-04-11 RX ADMIN — AMLODIPINE BESYLATE 5 MILLIGRAM(S): 2.5 TABLET ORAL at 05:13

## 2021-04-11 RX ADMIN — Medication 1 APPLICATION(S): at 00:37

## 2021-04-11 RX ADMIN — BRIMONIDINE TARTRATE 1 DROP(S): 2 SOLUTION/ DROPS OPHTHALMIC at 21:59

## 2021-04-11 RX ADMIN — Medication 1 APPLICATION(S): at 11:47

## 2021-04-11 RX ADMIN — Medication 12.5 MILLIGRAM(S): at 05:13

## 2021-04-11 RX ADMIN — BRIMONIDINE TARTRATE 1 DROP(S): 2 SOLUTION/ DROPS OPHTHALMIC at 05:13

## 2021-04-11 RX ADMIN — PIPERACILLIN AND TAZOBACTAM 25 GRAM(S): 4; .5 INJECTION, POWDER, LYOPHILIZED, FOR SOLUTION INTRAVENOUS at 11:50

## 2021-04-11 RX ADMIN — Medication 12.5 MILLIGRAM(S): at 17:30

## 2021-04-11 RX ADMIN — TAMSULOSIN HYDROCHLORIDE 0.4 MILLIGRAM(S): 0.4 CAPSULE ORAL at 23:04

## 2021-04-11 NOTE — PHYSICAL THERAPY INITIAL EVALUATION ADULT - PRECAUTIONS/LIMITATIONS, REHAB EVAL
CXR 4/10: Thoracic aortic atheromatous changes and ectasia are stable. Stable cardiomegaly. Mild interstitial edema changes are present. No focal consolidation. No large pleural effusion. No pneumothorax./fall precautions/vision precautions

## 2021-04-11 NOTE — CONSULT NOTE ADULT - PROBLEM SELECTOR RECOMMENDATION 9
chronic, self cath at [facility]  will always have uti  restart flomax, dc rivera > tov proscar and flomax  JAZMINEPO Dr Arshad

## 2021-04-11 NOTE — PHYSICAL THERAPY INITIAL EVALUATION ADULT - ADDITIONAL COMMENTS
Pt admitted from the [facility]. Per care coordination note, pt lives with wife in [facility]. Previously req assist with all ADLs and functional mobility. Pt lives at the [facility]. Previously req assist with all ADLs and amb with RW. Pt states he sleeps on the sofa and hasn't been ambulating much. Pt able to push W/C with B LE's and some UE's. DME: [facility]

## 2021-04-11 NOTE — CONSULT NOTE ADULT - SUBJECTIVE AND OBJECTIVE BOX
[facility], Division of Infectious Diseases  GARRETT Doty, ROBSON Montero  [phone]  after hours and weekends [phone]    DAVID MCKENZIE  89y, Male  [id number]    HPI: alert oriented X 3   89M PMH of MDS, CKD stage 3, atrial fibrillation on eliquis, BPH with intermittent self-catheterization, hx COVID19 infection in July 2020, htn, and CHF who presents to the ED due to urinary retention.   Pt state he was having some breathing difficulty and low urine output.  He had a rivera catheter placed in ED  Denies nausea, vomiting, no headache, no cough, no chest pain.  per chart:  the patient took his wife to a neurologist visit. While at the doctor's office, he felt unwell and was dyspneic while at rest. He notified his doctor, who expressed concern that the patient may have worsening of his heart failure, and recommended that the patient come to the ED.  no diarrhea, no headache, no known sick contacts  states no dypnea now.    in ED with hypothermia      PMH/PSH--  Benign Hypertension  BPH (Benign Prostatic Hypertrophy)  MDS (Myelodysplastic Syndrome)  Glaucoma  Anemia  Chronic constipation  Gall stones  Nocturia associated with benign prostatic hypertrophy  Osteomyelitis of arm  Choledocholithiasis  CKD (chronic kidney disease) stage 3, GFR 30-59 ml/min  S/P Cholecystectomy  S/P TURP  S/P Hernia Surgery  History of cataract extraction        Allergies--sulf and cipro       Medications--  Antibiotics: piperacillin/tazobactam IVPB.. 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     Pertinent positives and negatives--  Constitutional: No fevers. No Chills. No Rigors.   Eyes: poor vision  ENMT: no sore throat   Cardiovascular: No chest pain. No palpitations.  Respiratory: + shortness of breath. No cough.  Gastrointestinal: No nausea or vomiting. No diarrhea or constipation.   Genitourinary: decreased urine outputl  Musculoskeletal: no myalgia   Skin: no rash  Neurologic: + bladder neurogenic  Psychiatric: no anxiety   Review of systems otherwise negative except as previously noted.    Physical Exam--  Vital Signs: T(F): 98.5 (04-10-21 @ 05:35), Max: 98.5 (04-10-21 @ 05:35)  HR: 73 (04-10-21 @ 05:35)  BP: 145/79 (04-10-21 @ 05:35)  RR: 18 (04-10-21 @ 05:35)  SpO2: 95% (04-10-21 @ 05:35)  Wt(kg): --  General: Nontoxic-appearing Male in no acute distress.  HEENT: AT/NC.. Anicteric. Conjunctiva pink and moist.  Neck: Not rigid. No sense of mass.  Nodes: None palpable.  Lungs: Clear bilaterally without rales, wheezing or rhonchi  Heart: Regular rate and rhythm. No Murmur.   Abdomen: Bowel sounds present and normoactive. Soft. Nondistended. Nontender.   Back: No spinal tenderness. No costovertebral angle tenderness.   Extremities: No cyanosis or clubbing.+  edema.   gu rivera   Skin: Warm. Dry. Good turgor. No rash. No vasculitic stigmata.  Psychiatric: Appropriate affect and mood for situation.         Laboratory & Imaging Data--  CBC                        9.8    8.35  )-----------( 208      ( 10 Apr 2021 01:53 )             31.2       Chemistries  04-10    142  |  106  |  73<H>  ----------------------------<  132<H>  3.8   |  22  |  1.99<H>    Ca    8.5      10 Apr 2021 01:53  Mg     2.0     04-10    TPro  7.1  /  Alb  3.6  /  TBili  0.4  /  DBili  x   /  AST  17  /  ALT  14  /  AlkPhos  79  04-09      Culture Data      < from: Xray Chest 1 View- PORTABLE-Urgent (Xray Chest 1 View- PORTABLE-Urgent .) (04.10.21 @ 09:53) >    EXAM:  XR CHEST PORTABLE URGENT 1V                            PROCEDURE DATE:  04/10/2021            INTERPRETATION:  DATE OF STUDY: 4/10/21    PRIOR:7/5/20    CLINICAL INDICATION: Dyspnea    TECHNIQUE: portable chest.    FINDINGS/  IMPRESSION:  Thoracic aortic atheromatous changes and ectasia are stable.  Stable cardiomegaly.  Mild interstitial edema changes are present.  No focal consolidation. No large pleural effusion. No pneumothorax.      < end of copied text >  Urine Microscopic-Add On (NC) (04.09.21 @ 19:09)   Red Blood Cell - Urine: 1 /hpf   White Blood Cell - Urine: 5 /HPF   Hyaline Casts: 1 /lpf   Bacteria: Few   Epithelial Cells: 1 /hpf COVID-19 PCR . (04.09.21 @ 19:54)     
Patient is a 89y old  Male who presents with a chief complaint of urinary retention (10 Apr 2021 11:40)    HPI:  This patient is a 89yo gentleman with PMH of MDS, CKD stage 3, atrial fibrillation on eliquis, BPH with intermittent self-catheterization, hx COVID19 infection in July 2020, htn, and CHF who presents to the ED due to urinary retention. The patient states that he had difficulty urinating last night. He attempted to void with self-catheterization which yielded little urine output. Today, the patient took his wife to a neurologist visit. While at the doctor's office, he felt unwell and was dyspneic while at rest. He notified his doctor, who expressed concern that the patient may have worsening of his heart failure, and recommended that the patient come to the ED.  He denies any fever, chills, cough, CP, palpitations, dysuria, hematuria, abdominal pain, or back pain. He has had lower extremity edema, which has remained stable. The patient reports he takes lasix at home, and has not missed any doses. Currently he feels comfortable while lying in the stretcher.  The patient also complains of having redness around his eyes. He notes having venous occlusion of the right eye with poor vision.   (09 Apr 2021 22:37)    PAST MEDICAL & SURGICAL HISTORY:  Benign Hypertension  BPH (Benign Prostatic Hypertrophy)  MDS (Myelodysplastic Syndrome)  Glaucoma  Anemia  Chronic constipation  Gall stones  Nocturia associated with benign prostatic hypertrophy  Osteomyelitis of arm  Choledocholithiasis  CKD (chronic kidney disease) stage 3, GFR 30-59 ml/min  S/P Cholecystectomy  S/P TURP  History of cataract extraction  right eye 6/11  Osteomyelitis  left humerous- surgery childhood age 13  Laceration of finger, left, with tendon  1976  Mohs defect of nose  1990&#x27;s  Covid 8/2020    Medications:  amLODIPine   Tablet 5 milliGRAM(s) Oral daily  apixaban 2.5 milliGRAM(s) Oral two times a day  brimonidine 0.2% Ophthalmic Solution 1 Drop(s) Both EYES every 8 hours  erythromycin   Ointment 1 Application(s) Both EYES four times a day  finasteride 5 milliGRAM(s) Oral daily  latanoprost 0.005% Ophthalmic Solution 1 Drop(s) Left EYE at bedtime  metoprolol tartrate 12.5 milliGRAM(s) Oral two times a day  nystatin Powder 1 Application(s) Topical two times a day  piperacillin/tazobactam IVPB.. 3.375 Gram(s) IV Intermittent every 8 hours  tamsulosin 0.4 milliGRAM(s) Oral two times a day    FAMILY HISTORY:  Family history of prostate cancer  wife3 demented , 2 dtrs a&w  FH: CVA (cerebrovascular accident)  father  FH: arthritis    Social History  non smoker no etoh, lives in [facility] with wife (Annamarie)  self med    REVIEW OF SYSTEMS  General:nad, oob , wqants to go home	  Skin/Breast:no co no ch  Ophthalmologic:	lo vision, getting worse  ENMT:	[place]. eats swallows speaks no co  Respiratory and Thorax:	no cough no sp no sob  Cardiovascular:	no cp no p[alp  Gastrointestinal:	no nvcd  Genitourinary:	no fdi pta x decr output hich is frequent - does self cath [facility]  Musculoskeletal:	no a no p  Neurological:	no co no ch  Psychiatric:	  Hematology/Lymphatics:	  Endocrine:	no poly udd  Allergic/Immunologic:  AOSN y    Vital Signs Last 24 Hrs  T(C): 36.6 (11 Apr 2021 04:47), Max: 36.8 (10 Apr 2021 11:10)  T(F): 97.9 (11 Apr 2021 04:47), Max: 98.3 (10 Apr 2021 23:37)  HR: 64 (11 Apr 2021 04:47) (51 - 93)  BP: 133/71 (11 Apr 2021 04:47) (114/71 - 133/71)  BP(mean): --  RR: 18 (11 Apr 2021 04:47) (18 - 18)  SpO2: 96% (11 Apr 2021 04:47) (95% - 99%)    Physical Exam:   H&N: ncat  no cb no tm  martell cwnl lo v [place]  CV:rrr now m  Pulm:ctab norrw  GI:bs+ sft nt  :rivera  Extrem:always heavy no ch, no pitting of feet  Skin:cdi  Vasc:hm-, v-  Neuro:Speechclear               Affect:approp               Memory:ok               Judgment: usu ok , probably didnt needd to come to ED but didnt answer phione when i calkled him back               Orientation:y               Cognition:int               Sensory:gr nl               Motor:nfatmae oob ambulated needs walker               Gait:as above               CN:lo v [place]  Psych:na  Other:    Labs:  CBC Full  -  ( 11 Apr 2021 07:09 )  WBC Count : 11.30 K/uL  RBC Count : 3.08 M/uL  Hemoglobin : 9.6 g/dL  Hematocrit : 30.5 %  Platelet Count - Automated : 189 K/uL  Mean Cell Volume : 99.0 fl  Mean Cell Hemoglobin : 31.2 pg  Mean Cell Hemoglobin Concentration : 31.5 gm/dL  Auto Neutrophil # : x  Auto Lymphocyte # : x  Auto Monocyte # : x  Auto Eosinophil # : x  Auto Basophil # : x  Auto Neutrophil % : x  Auto Lymphocyte % : x  Auto Monocyte % : x  Auto Eosinophil % : x  Auto Basophil % : x      04-11    140  |  104  |  72<H>  ----------------------------<  152<H>  3.5   |  22  |  2.40<H>    Ca    8.8      11 Apr 2021 07:06  Mg     2.0     04-10    TPro  7.1  /  Alb  3.6  /  TBili  0.4  /  DBili  x   /  AST  17  /  ALT  14  /  AlkPhos  79  04-09      LIVER FUNCTIONS - ( 09 Apr 2021 18:56 )  Alb: 3.6 g/dL / Pro: 7.1 g/dL / ALK PHOS: 79 U/L / ALT: 14 U/L / AST: 17 U/L / GGT: x             PT/INR - ( 09 Apr 2021 18:56 )   PT: 16.6 sec;   INR: 1.41 ratio         PTT - ( 09 Apr 2021 18:56 )  PTT:37.6 sec    HEALTH ISSUES - PROBLEM Dx:  Acute kidney injury superimposed on chronic kidney disease  Acute kidney injury superimposed on chronic kidney disease    Acute on chronic diastolic congestive heart failure  Acute on chronic diastolic congestive heart failure    Atrial fibrillation, chronic  Atrial fibrillation, chronic    MDS (myelodysplastic syndrome)  MDS (myelodysplastic syndrome)    BPH (benign prostatic hyperplasia)  BPH (benign prostatic hyperplasia)    Medication management  Medication management    Prophylactic measure  Prophylactic measure    Hypothermia  Hypothermia    Urinary retention  Urinary retention    MICH (acute kidney injury)  MICH (acute kidney injury)

## 2021-04-11 NOTE — CONSULT NOTE ADULT - PROBLEM SELECTOR RECOMMENDATION 3
trend creat  etiology is likely obstructive  management per primary team
sees Dr Drummond   gets procrit

## 2021-04-11 NOTE — PHYSICAL THERAPY INITIAL EVALUATION ADULT - PERTINENT HX OF CURRENT PROBLEM, REHAB EVAL
89M w/ PMHx of MDS, CKD stage 3, atrial fibrillation on eliquis, BPH with intermittent self-catheterization, hx COVID19 infection in July 2020, htn, and CHF p/w urinary retention and dyspnea, found to have MICH likely 2/2 obstructive uropathy, now s/p rivera catheter placement, also being evaluated for possible heart failure exacerbation. CONT'D BELOW:

## 2021-04-11 NOTE — PROGRESS NOTE ADULT - ASSESSMENT
This patient is a 89yo gentleman with PMH of MDS, CKD stage 3, atrial fibrillation on eliquis, BPH with intermittent self-catheterization and CHF who presents to the ED due to urinary retention and dyspnea, found to have MICH likely 2/2 obstructive uropathy, now s/p rivera catheter placement, also being evaluated for possible heart failure exacerbation.

## 2021-04-11 NOTE — PHYSICAL THERAPY INITIAL EVALUATION ADULT - IMPAIRMENTS FOUND, PT EVAL
aerobic capacity/endurance/decreased midline orientation/gait, locomotion, and balance/muscle strength/poor safety awareness/posture

## 2021-04-11 NOTE — CONSULT NOTE ADULT - ASSESSMENT
disc c pt, needs to go home but now has rivera  no evidence CHF, seems usoh  I did restart flomax and dc rivera now>TOV  needs to ambulate  patient does self cath at home  had reyna>hosp rec by uro, not me  see orders  I dont see sepsis  on abx. rivera bag is on the floor. will always have uti 2/2 rtntn and self cath    90'
89m with multiple medical problems as above  admitted for urinary retention-- rivera  tee on ckd  hypothermia - r/o infectious etiology

## 2021-04-11 NOTE — PHYSICAL THERAPY INITIAL EVALUATION ADULT - DISCHARGE DISPOSITION, PT EVAL
Return to [facility] with Home PT to improve strength, balance and gait. RW for ambulation. Weekend CM Beraliza aware./home w/ home PT

## 2021-04-12 ENCOUNTER — TRANSCRIPTION ENCOUNTER (OUTPATIENT)
Age: 86
End: 2021-04-12

## 2021-04-12 DIAGNOSIS — E87.6 HYPOKALEMIA: ICD-10-CM

## 2021-04-12 LAB
-  AMIKACIN: SIGNIFICANT CHANGE UP
-  AMOXICILLIN/CLAVULANIC ACID: SIGNIFICANT CHANGE UP
-  AMPICILLIN/SULBACTAM: SIGNIFICANT CHANGE UP
-  AMPICILLIN: SIGNIFICANT CHANGE UP
-  AZTREONAM: SIGNIFICANT CHANGE UP
-  CEFAZOLIN: SIGNIFICANT CHANGE UP
-  CEFEPIME: SIGNIFICANT CHANGE UP
-  CEFOXITIN: SIGNIFICANT CHANGE UP
-  CEFTRIAXONE: SIGNIFICANT CHANGE UP
-  CIPROFLOXACIN: SIGNIFICANT CHANGE UP
-  ERTAPENEM: SIGNIFICANT CHANGE UP
-  GENTAMICIN: SIGNIFICANT CHANGE UP
-  IMIPENEM: SIGNIFICANT CHANGE UP
-  LEVOFLOXACIN: SIGNIFICANT CHANGE UP
-  MEROPENEM: SIGNIFICANT CHANGE UP
-  NITROFURANTOIN: SIGNIFICANT CHANGE UP
-  PIPERACILLIN/TAZOBACTAM: SIGNIFICANT CHANGE UP
-  TIGECYCLINE: SIGNIFICANT CHANGE UP
-  TOBRAMYCIN: SIGNIFICANT CHANGE UP
-  TRIMETHOPRIM/SULFAMETHOXAZOLE: SIGNIFICANT CHANGE UP
ANION GAP SERPL CALC-SCNC: 12 MMOL/L — SIGNIFICANT CHANGE UP (ref 5–17)
BUN SERPL-MCNC: 63 MG/DL — HIGH (ref 7–23)
CALCIUM SERPL-MCNC: 8.3 MG/DL — LOW (ref 8.4–10.5)
CHLORIDE SERPL-SCNC: 105 MMOL/L — SIGNIFICANT CHANGE UP (ref 96–108)
CO2 SERPL-SCNC: 24 MMOL/L — SIGNIFICANT CHANGE UP (ref 22–31)
CREAT SERPL-MCNC: 2.11 MG/DL — HIGH (ref 0.5–1.3)
CULTURE RESULTS: SIGNIFICANT CHANGE UP
GLUCOSE SERPL-MCNC: 128 MG/DL — HIGH (ref 70–99)
HCT VFR BLD CALC: 29.7 % — LOW (ref 39–50)
HGB BLD-MCNC: 9.4 G/DL — LOW (ref 13–17)
MCHC RBC-ENTMCNC: 31 PG — SIGNIFICANT CHANGE UP (ref 27–34)
MCHC RBC-ENTMCNC: 31.6 GM/DL — LOW (ref 32–36)
MCV RBC AUTO: 98 FL — SIGNIFICANT CHANGE UP (ref 80–100)
METHOD TYPE: SIGNIFICANT CHANGE UP
NRBC # BLD: 0 /100 WBCS — SIGNIFICANT CHANGE UP (ref 0–0)
ORGANISM # SPEC MICROSCOPIC CNT: SIGNIFICANT CHANGE UP
ORGANISM # SPEC MICROSCOPIC CNT: SIGNIFICANT CHANGE UP
PLATELET # BLD AUTO: 199 K/UL — SIGNIFICANT CHANGE UP (ref 150–400)
POTASSIUM SERPL-MCNC: 3.1 MMOL/L — LOW (ref 3.5–5.3)
POTASSIUM SERPL-SCNC: 3.1 MMOL/L — LOW (ref 3.5–5.3)
RBC # BLD: 3.03 M/UL — LOW (ref 4.2–5.8)
RBC # FLD: 21.8 % — HIGH (ref 10.3–14.5)
SARS-COV-2 RNA SPEC QL NAA+PROBE: SIGNIFICANT CHANGE UP
SODIUM SERPL-SCNC: 141 MMOL/L — SIGNIFICANT CHANGE UP (ref 135–145)
SPECIMEN SOURCE: SIGNIFICANT CHANGE UP
WBC # BLD: 9.25 K/UL — SIGNIFICANT CHANGE UP (ref 3.8–10.5)
WBC # FLD AUTO: 9.25 K/UL — SIGNIFICANT CHANGE UP (ref 3.8–10.5)

## 2021-04-12 RX ORDER — CEFTRIAXONE 500 MG/1
1000 INJECTION, POWDER, FOR SOLUTION INTRAMUSCULAR; INTRAVENOUS EVERY 24 HOURS
Refills: 0 | Status: DISCONTINUED | OUTPATIENT
Start: 2021-04-12 | End: 2021-04-13

## 2021-04-12 RX ORDER — POTASSIUM CHLORIDE 20 MEQ
20 PACKET (EA) ORAL ONCE
Refills: 0 | Status: COMPLETED | OUTPATIENT
Start: 2021-04-12 | End: 2021-04-12

## 2021-04-12 RX ADMIN — NYSTATIN CREAM 1 APPLICATION(S): 100000 CREAM TOPICAL at 05:16

## 2021-04-12 RX ADMIN — FINASTERIDE 5 MILLIGRAM(S): 5 TABLET, FILM COATED ORAL at 12:04

## 2021-04-12 RX ADMIN — Medication 20 MILLIEQUIVALENT(S): at 16:14

## 2021-04-12 RX ADMIN — Medication 12.5 MILLIGRAM(S): at 17:59

## 2021-04-12 RX ADMIN — APIXABAN 2.5 MILLIGRAM(S): 2.5 TABLET, FILM COATED ORAL at 17:58

## 2021-04-12 RX ADMIN — Medication 1 APPLICATION(S): at 17:58

## 2021-04-12 RX ADMIN — Medication 1 APPLICATION(S): at 12:03

## 2021-04-12 RX ADMIN — AMLODIPINE BESYLATE 5 MILLIGRAM(S): 2.5 TABLET ORAL at 05:16

## 2021-04-12 RX ADMIN — Medication 12.5 MILLIGRAM(S): at 05:16

## 2021-04-12 RX ADMIN — CEFTRIAXONE 100 MILLIGRAM(S): 500 INJECTION, POWDER, FOR SOLUTION INTRAMUSCULAR; INTRAVENOUS at 16:14

## 2021-04-12 RX ADMIN — BRIMONIDINE TARTRATE 1 DROP(S): 2 SOLUTION/ DROPS OPHTHALMIC at 21:03

## 2021-04-12 RX ADMIN — APIXABAN 2.5 MILLIGRAM(S): 2.5 TABLET, FILM COATED ORAL at 05:16

## 2021-04-12 RX ADMIN — Medication 20 MILLIEQUIVALENT(S): at 12:02

## 2021-04-12 RX ADMIN — LATANOPROST 1 DROP(S): 0.05 SOLUTION/ DROPS OPHTHALMIC; TOPICAL at 21:03

## 2021-04-12 RX ADMIN — TAMSULOSIN HYDROCHLORIDE 0.4 MILLIGRAM(S): 0.4 CAPSULE ORAL at 23:53

## 2021-04-12 RX ADMIN — BRIMONIDINE TARTRATE 1 DROP(S): 2 SOLUTION/ DROPS OPHTHALMIC at 05:17

## 2021-04-12 RX ADMIN — Medication 1 APPLICATION(S): at 05:16

## 2021-04-12 RX ADMIN — Medication 1 APPLICATION(S): at 23:53

## 2021-04-12 RX ADMIN — BRIMONIDINE TARTRATE 1 DROP(S): 2 SOLUTION/ DROPS OPHTHALMIC at 12:07

## 2021-04-12 RX ADMIN — PIPERACILLIN AND TAZOBACTAM 25 GRAM(S): 4; .5 INJECTION, POWDER, LYOPHILIZED, FOR SOLUTION INTRAVENOUS at 01:13

## 2021-04-12 RX ADMIN — TAMSULOSIN HYDROCHLORIDE 0.4 MILLIGRAM(S): 0.4 CAPSULE ORAL at 12:04

## 2021-04-12 NOTE — DISCHARGE NOTE PROVIDER - HOSPITAL COURSE
89yo gentleman with PMH of MDS, CKD stage 3, atrial fibrillation on eliquis, HTN, CHF, COVID19 infection in July 2020, BPH with intermittent self-catheterization and CHF who presents to the ED due to urinary retention and dyspnea, found to have MICH likely 2/2 obstructive uropathy, now s/p rivera catheter placement, also was  evaluated for possible heart failure exacerbation.     Acute kidney injury superimposed on chronic kidney disease: The patient has known CKD stage 3 based on previous labs.   SCr jean pierre from 1.69 to 2.23.  BUN is elevated 76.  MICH is likely due to obstructive uropathy.   chronic urinary retention- pt self caths  UA is positive leukocyte esterase and bacteria.    suspected UTI. completed abx course  ID cs fu.   Acute on chronic diastolic congestive heart failure: has chronic LE edema, doubt chf will monitor  Continue metoprolol   last TTE performed on 4/23/2020 found EF 59%, normal LV and RV function, mild tricuspid regurgitation. Pt also found to have mild pulmonary htn.   Atrial fibrillation, chronic: Started metoprolol tartrate.  Continue eliquis.   MDS (myelodysplastic syndrome): The patient states he had visited his hematologist and had recent Hgb over 10, thus did not receive aranesp.   He denies any overt bleeding or bruising. His Hgb appears stable compared to labs from July 2020.   Monitor CBC.   BPH (benign prostatic hyperplasia): Start home dose of finasteride. Check if pt can tolerate tamsulosin (he has known sulfa allergy).   Prophylactic measure.  Plan: VTE ppx: on eliquis  Activity: OOB with assistance, fall precautions  Diet: DASH,     Medication management.     89yo gentleman with PMH of MDS, CKD stage 3, atrial fibrillation on eliquis, HTN, CHF, COVID19 infection in July 2020, BPH with intermittent self-catheterization and CHF who presented  to the ED due to urinary retention and dyspnea; found to have MICH likely 2/2 obstructive uropathy; now s/p Beckman catheter placement. Pt  also was  evaluated for possible heart failure exacerbation.     Acute kidney injury superimposed on chronic kidney disease: The patient has known CKD stage 3 based on previous labs.   SCr jean pierre from 1.69 to 2.23.  BUN is elevated 76.  MICH is likely due to obstructive uropathy.   chronic urinary retention- pt self caths  UA is positive leukocyte esterase and bacteria.   Suspected UTI. Completed abx course.  ID cs fu. No further antibiotics as per Dr lincoln.   Acute on chronic diastolic congestive heart failure: has chronic LE edema, doubt chf. Monitored.   Continued Metoprolol.  Last TTE performed on 4/23/2020 found EF 59%, normal LV and RV function, mild tricuspid regurgitation. Pt also found to have mild pulmonary htn.   Atrial fibrillation, chronic: Started metoprolol tartrate.  Continued eliquis.   MDS (myelodysplastic syndrome): The patient states he had visited his hematologist and had recent Hgb over 10, thus did not receive aranesp.   Hgb appears stable compared to labs from July 2020.   Monitored CBC.   BPH (benign prostatic hyperplasia): Started  home dose of finasteride. Checked  if pt can tolerate tamsulosin (he has known sulfa allergy). Pt tolerating Tamsulosin without adverse effect.   Prophylactic measure: VTE ppx: on eliquis  Activity: OOB with assistance. Fall precautions  Diet: DASH.   Medication management.  Pt medically cleared for [facility] back to [facility] per Dr Lincoln who will follow up with pt tomorrow, Wednesday 4/14.

## 2021-04-12 NOTE — DISCHARGE NOTE PROVIDER - NSDCMRMEDTOKEN_GEN_ALL_CORE_FT
Alphagan P 0.1% ophthalmic solution: 1 drop(s) to each affected eye every 8 hours  amLODIPine 2.5 mg oral tablet: 1 tab(s) orally once a day  bimatoprost 0.01% ophthalmic solution: 1 drop(s) to both eyes once a day (in the evening)  Cosopt PF 2%-0.5% ophthalmic solution: 1 drop(s) to each affected eye 2 times a day  Eliquis 2.5 mg oral tablet: 1 tab(s) orally 2 times a day  erythromycin 0.5% ophthalmic ointment: 1 application to both eyes once a day (at bedtime)  febuxostat 40 mg oral tablet: 1 tab(s) orally once a day  finasteride 5 mg oral tablet: 1 tab(s) orally once a day  furosemide 40 mg oral tablet: 2 tab(s) orally 2 times a day  Linzess 145 mcg oral capsule: 1 cap(s) orally once a day  Metoprolol Tartrate 25 mg oral tablet: 1 tab(s) orally 2 times a day  pilocarpine 4% ophthalmic gel: 1 day(s) to each affected eye 3 times a day  Rhopressa 0.02% ophthalmic solution: 1 drop(s) in the right eye once a day  tamsulosin 0.4 mg oral capsule: 1 cap(s) orally 2 times a day  ursodiol 300 mg oral capsule: 1 cap(s) orally 2 times a day  Xanax 0.25 mg oral tablet: 1 tab(s) orally once a day (at bedtime)   Alphagan P 0.1% ophthalmic solution: 1 drop(s) to each affected eye every 8 hours  amLODIPine 2.5 mg oral tablet: 1 tab(s) orally once a day  bimatoprost 0.01% ophthalmic solution: 1 drop(s) to both eyes once a day (in the evening)  Cosopt PF 2%-0.5% ophthalmic solution: 1 drop(s) to each affected eye 2 times a day  Eliquis 2.5 mg oral tablet: 1 tab(s) orally 2 times a day  erythromycin 0.5% ophthalmic ointment: 1 application to both eyes once a day (at bedtime)  febuxostat 40 mg oral tablet: 1 tab(s) orally once a day  finasteride 5 mg oral tablet: 1 tab(s) orally once a day  furosemide 40 mg oral tablet: 2 tab(s) orally 2 times a day  latanoprost 0.005% ophthalmic solution: 1 drop(s) to each affected eye once a day (at bedtime)  Metoprolol Tartrate 25 mg oral tablet: 1 tab(s) orally 2 times a day  pilocarpine 4% ophthalmic gel: 1 day(s) to each affected eye 3 times a day  Rhopressa 0.02% ophthalmic solution: 1 drop(s) in the right eye once a day  tamsulosin 0.4 mg oral capsule: 1 cap(s) orally 2 times a day  ursodiol 300 mg oral capsule: 1 cap(s) orally 2 times a day  Xanax 0.25 mg oral tablet: 1 tab(s) orally once a day (at bedtime)

## 2021-04-12 NOTE — DISCHARGE NOTE PROVIDER - NSDCCPCAREPLAN_GEN_ALL_CORE_FT
PRINCIPAL DISCHARGE DIAGNOSIS  Diagnosis: Difficulty urinating  Assessment and Plan of Treatment: Resolved.  Follow up with Dr Mariah leungorraretha, 4/13.      SECONDARY DISCHARGE DIAGNOSES  Diagnosis: Sepsis  Assessment and Plan of Treatment: Completed antibiotics. Resolved.  Follow up with Dr lincoln tomorrow.    Diagnosis: UTI (urinary tract infection)  Assessment and Plan of Treatment: Completed antibiotics. Resolved.  Follow up with Dr lincoln tomorrow.    Diagnosis: Essential hypertension  Assessment and Plan of Treatment: Take your medication as prescribed.  Follow up with your medical doctor for routine blood pressure monitoring, and to establish long term blood pressure treatment goals.  Low salt diet  Activity as tolerated.  Notify your doctor if you have any of the following symptoms:   Dizziness, Lightheadedness, Blurry vision, Headache, Chest pain, Shortness of breath      Diagnosis: MDS (myelodysplastic syndrome)  Assessment and Plan of Treatment: Follow up with Dr Lincoln tomorraretha.    Diagnosis: BPH (benign prostatic hyperplasia)  Assessment and Plan of Treatment: Take your medication as prescribed.   Follow up with your medical doctor for routine blood  work monitoring, and to establish long term treatment goals.      Diagnosis: Acute kidney injury superimposed on chronic kidney disease  Assessment and Plan of Treatment: Follow up with Dr Lincoln tomorraretha.

## 2021-04-12 NOTE — PROGRESS NOTE ADULT - ASSESSMENT
Pt is a 89M w/ PMHx of MDS, CKD stage 3, Afib, BPH with intermittent self-catheterization and CHF who presents to the ED due to urinary retention and dyspnea, found to have MICH likely 2/2 obstructive uropathy, now s/p rivera catheter placement, also being evaluated for possible heart failure exacerbation.     Acute cystitis  Hypothermia  Leukocytosis  -UCx +K. pneumo, susceptibilities reviewed  -trend temps/WBC  --hypothermia resolved, leukocytosis resolved  -de-escalate from zosyn to ceftriaxone to complete total x7 day course for UTI  When ready for d/c can switch to PO cefpodoxime 200mg BID to complete course.     MICH on CKD  -likely mutlifactorial 2/2 acute infection and new onset HF    Acute on chronic CHF exacerbation  -management per primary team  -management per cards    Infectious Diseases will continue to follow. Please call with any questions.   Joceline Blackmon M.D.  [facility], Division of Infectious Diseases [phone]  For over the weekend and after hours, please call [phone]   Pt is a 89M w/ PMHx of MDS, CKD stage 3, Afib, BPH with intermittent self-catheterization and CHF who presents to the ED due to urinary retention and dyspnea, found to have MICH likely 2/2 obstructive uropathy, now s/p rivera catheter placement, also being evaluated for possible heart failure exacerbation.     Acute cystitis  Hypothermia  Leukocytosis  -UCx +K. pneumo, susceptibilities reviewed  -trend temps/WBC  --hypothermia resolved, leukocytosis resolved  -de-escalate from zosyn to ceftriaxone to complete total x7 day course for UTI until 4/15/2021  When ready for d/c can switch to PO cefpodoxime 200mg daily to complete course.     MICH on CKD  -likely mutlifactorial 2/2 acute infection and new onset HF    Acute on chronic CHF exacerbation  -management per primary team  -management per cards    Infectious Diseases will continue to follow. Please call with any questions.   Joceline Blackmon M.D.  [facility], Division of Infectious Diseases [phone]  For over the weekend and after hours, please call [phone]   Pt is a 89M w/ PMHx of MDS, CKD stage 3, Afib, BPH with intermittent self-catheterization and CHF who presents to the ED due to urinary retention and dyspnea, found to have MICH likely 2/2 obstructive uropathy, now s/p rivera catheter placement, also being evaluated for possible heart failure exacerbation.     Acute cystitis  Hypothermia  Leukocytosis  -UCx +K. pneumo, susceptibilities reviewed  possibly chronic, however pt came w/ hypothermia and slight leukocytosis concerning for sepsis  -trend temps/WBC  --hypothermia resolved, leukocytosis resolved  -s/p zosyn 4 days; can  de-escalate to PO cefpodoxime 200mg daily to complete x7 day course for UTI if concern for true infection; will defer to primary team.    MICH on CKD  -likely mutlifactorial 2/2 acute infection and new onset HF    Acute on chronic CHF exacerbation  -management per primary team  -management per cards    Infectious Diseases will s/o at this time, please call back with any questions  Joceline Blackmon M.D.  [facility], Division of Infectious Diseases [phone]  For over the weekend and after hours, please call [phone]

## 2021-04-12 NOTE — PROGRESS NOTE ADULT - ASSESSMENT
k lo, can get one dose npo mnow  dc abx, disch home to [facility] now, I will fu an see pt wed dai ua c/s tho likely to always have uti 2/2 rtn and str cath prn  disc c team on rounds, NP, pt. [facility] later  35'

## 2021-04-12 NOTE — DISCHARGE NOTE PROVIDER - CARE PROVIDER_API CALL
Rocky Lemus  [address] [address]  Phone: [phone]  Fax: [phone]  Established Patient  Follow Up Time: 1-3 days

## 2021-04-13 ENCOUNTER — TRANSCRIPTION ENCOUNTER (OUTPATIENT)
Age: 86
End: 2021-04-13

## 2021-04-13 VITALS
TEMPERATURE: 98 F | DIASTOLIC BLOOD PRESSURE: 80 MMHG | HEART RATE: 85 BPM | RESPIRATION RATE: 18 BRPM | OXYGEN SATURATION: 98 % | SYSTOLIC BLOOD PRESSURE: 139 MMHG

## 2021-04-13 DIAGNOSIS — K80.50 CALCULUS OF BILE DUCT WITHOUT CHOLANGITIS OR CHOLECYSTITIS WITHOUT OBSTRUCTION: ICD-10-CM

## 2021-04-13 LAB
ANION GAP SERPL CALC-SCNC: 15 MMOL/L — SIGNIFICANT CHANGE UP (ref 5–17)
BUN SERPL-MCNC: 57 MG/DL — HIGH (ref 7–23)
CALCIUM SERPL-MCNC: 9.1 MG/DL — SIGNIFICANT CHANGE UP (ref 8.4–10.5)
CHLORIDE SERPL-SCNC: 103 MMOL/L — SIGNIFICANT CHANGE UP (ref 96–108)
CO2 SERPL-SCNC: 23 MMOL/L — SIGNIFICANT CHANGE UP (ref 22–31)
CREAT SERPL-MCNC: 1.88 MG/DL — HIGH (ref 0.5–1.3)
GLUCOSE SERPL-MCNC: 122 MG/DL — HIGH (ref 70–99)
HCT VFR BLD CALC: 32.2 % — LOW (ref 39–50)
HGB BLD-MCNC: 10.3 G/DL — LOW (ref 13–17)
MCHC RBC-ENTMCNC: 31.2 PG — SIGNIFICANT CHANGE UP (ref 27–34)
MCHC RBC-ENTMCNC: 32 GM/DL — SIGNIFICANT CHANGE UP (ref 32–36)
MCV RBC AUTO: 97.6 FL — SIGNIFICANT CHANGE UP (ref 80–100)
NRBC # BLD: 0 /100 WBCS — SIGNIFICANT CHANGE UP (ref 0–0)
PHOSPHATE SERPL-MCNC: 2.5 MG/DL — SIGNIFICANT CHANGE UP (ref 2.5–4.5)
PLATELET # BLD AUTO: 228 K/UL — SIGNIFICANT CHANGE UP (ref 150–400)
POTASSIUM SERPL-MCNC: 3.5 MMOL/L — SIGNIFICANT CHANGE UP (ref 3.5–5.3)
POTASSIUM SERPL-SCNC: 3.5 MMOL/L — SIGNIFICANT CHANGE UP (ref 3.5–5.3)
RBC # BLD: 3.3 M/UL — LOW (ref 4.2–5.8)
RBC # FLD: 21.8 % — HIGH (ref 10.3–14.5)
SODIUM SERPL-SCNC: 141 MMOL/L — SIGNIFICANT CHANGE UP (ref 135–145)
WBC # BLD: 11.22 K/UL — HIGH (ref 3.8–10.5)
WBC # FLD AUTO: 11.22 K/UL — HIGH (ref 3.8–10.5)

## 2021-04-13 PROCEDURE — 83735 ASSAY OF MAGNESIUM: CPT

## 2021-04-13 PROCEDURE — 97161 PT EVAL LOW COMPLEX 20 MIN: CPT

## 2021-04-13 PROCEDURE — 85027 COMPLETE CBC AUTOMATED: CPT

## 2021-04-13 PROCEDURE — 85018 HEMOGLOBIN: CPT

## 2021-04-13 PROCEDURE — 84484 ASSAY OF TROPONIN QUANT: CPT

## 2021-04-13 PROCEDURE — 84132 ASSAY OF SERUM POTASSIUM: CPT

## 2021-04-13 PROCEDURE — 85730 THROMBOPLASTIN TIME PARTIAL: CPT

## 2021-04-13 PROCEDURE — 84100 ASSAY OF PHOSPHORUS: CPT

## 2021-04-13 PROCEDURE — 85610 PROTHROMBIN TIME: CPT

## 2021-04-13 PROCEDURE — 85025 COMPLETE CBC W/AUTO DIFF WBC: CPT

## 2021-04-13 PROCEDURE — 84295 ASSAY OF SERUM SODIUM: CPT

## 2021-04-13 PROCEDURE — 87040 BLOOD CULTURE FOR BACTERIA: CPT

## 2021-04-13 PROCEDURE — U0003: CPT

## 2021-04-13 PROCEDURE — 81001 URINALYSIS AUTO W/SCOPE: CPT

## 2021-04-13 PROCEDURE — 87086 URINE CULTURE/COLONY COUNT: CPT

## 2021-04-13 PROCEDURE — 80053 COMPREHEN METABOLIC PANEL: CPT

## 2021-04-13 PROCEDURE — 82947 ASSAY GLUCOSE BLOOD QUANT: CPT

## 2021-04-13 PROCEDURE — 82330 ASSAY OF CALCIUM: CPT

## 2021-04-13 PROCEDURE — 86769 SARS-COV-2 COVID-19 ANTIBODY: CPT

## 2021-04-13 PROCEDURE — 82435 ASSAY OF BLOOD CHLORIDE: CPT

## 2021-04-13 PROCEDURE — 85379 FIBRIN DEGRADATION QUANT: CPT

## 2021-04-13 PROCEDURE — 84443 ASSAY THYROID STIM HORMONE: CPT

## 2021-04-13 PROCEDURE — U0005: CPT

## 2021-04-13 PROCEDURE — 71045 X-RAY EXAM CHEST 1 VIEW: CPT

## 2021-04-13 PROCEDURE — 82803 BLOOD GASES ANY COMBINATION: CPT

## 2021-04-13 PROCEDURE — 87186 SC STD MICRODIL/AGAR DIL: CPT

## 2021-04-13 PROCEDURE — 87077 CULTURE AEROBIC IDENTIFY: CPT

## 2021-04-13 PROCEDURE — 99285 EMERGENCY DEPT VISIT HI MDM: CPT | Mod: 25

## 2021-04-13 PROCEDURE — 83880 ASSAY OF NATRIURETIC PEPTIDE: CPT

## 2021-04-13 PROCEDURE — 83605 ASSAY OF LACTIC ACID: CPT

## 2021-04-13 PROCEDURE — 80048 BASIC METABOLIC PNL TOTAL CA: CPT

## 2021-04-13 PROCEDURE — 85014 HEMATOCRIT: CPT

## 2021-04-13 PROCEDURE — 96374 THER/PROPH/DIAG INJ IV PUSH: CPT

## 2021-04-13 RX ORDER — LATANOPROST 0.05 MG/ML
1 SOLUTION/ DROPS OPHTHALMIC; TOPICAL
Qty: 0 | Refills: 0 | DISCHARGE
Start: 2021-04-13

## 2021-04-13 RX ORDER — LINACLOTIDE 145 UG/1
1 CAPSULE, GELATIN COATED ORAL
Qty: 0 | Refills: 0 | DISCHARGE

## 2021-04-13 RX ADMIN — TAMSULOSIN HYDROCHLORIDE 0.4 MILLIGRAM(S): 0.4 CAPSULE ORAL at 12:58

## 2021-04-13 RX ADMIN — NYSTATIN CREAM 1 APPLICATION(S): 100000 CREAM TOPICAL at 05:07

## 2021-04-13 RX ADMIN — Medication 12.5 MILLIGRAM(S): at 05:07

## 2021-04-13 RX ADMIN — BRIMONIDINE TARTRATE 1 DROP(S): 2 SOLUTION/ DROPS OPHTHALMIC at 05:08

## 2021-04-13 RX ADMIN — FINASTERIDE 5 MILLIGRAM(S): 5 TABLET, FILM COATED ORAL at 12:57

## 2021-04-13 RX ADMIN — Medication 1 APPLICATION(S): at 12:57

## 2021-04-13 RX ADMIN — APIXABAN 2.5 MILLIGRAM(S): 2.5 TABLET, FILM COATED ORAL at 05:08

## 2021-04-13 RX ADMIN — AMLODIPINE BESYLATE 5 MILLIGRAM(S): 2.5 TABLET ORAL at 05:07

## 2021-04-13 RX ADMIN — Medication 1 APPLICATION(S): at 05:07

## 2021-04-13 RX ADMIN — BRIMONIDINE TARTRATE 1 DROP(S): 2 SOLUTION/ DROPS OPHTHALMIC at 14:31

## 2021-04-13 NOTE — PROGRESS NOTE ADULT - PROBLEM SELECTOR PROBLEM 3
BPH (benign prostatic hyperplasia)
BPH (benign prostatic hyperplasia)
Atrial fibrillation, chronic

## 2021-04-13 NOTE — PROGRESS NOTE ADULT - PROBLEM SELECTOR PLAN 7
TRAN not unusual nbut dis abling
VTE ppx: on eliquis  Activity: OOB with assistance, fall precautions  Diet: DASH,
res

## 2021-04-13 NOTE — PROGRESS NOTE ADULT - REASON FOR ADMISSION
urinary retention

## 2021-04-13 NOTE — PROGRESS NOTE ADULT - PROBLEM SELECTOR PROBLEM 4
MDS (myelodysplastic syndrome)
CKD (chronic kidney disease) stage 3, GFR 30-59 ml/min
MDS (myelodysplastic syndrome)
MDS (myelodysplastic syndrome)
Essential hypertension

## 2021-04-13 NOTE — PROGRESS NOTE ADULT - PROBLEM SELECTOR PROBLEM 5
BPH (benign prostatic hyperplasia)
BPH (benign prostatic hyperplasia)
Essential hypertension
BPH (benign prostatic hyperplasia)
MDS (myelodysplastic syndrome)

## 2021-04-13 NOTE — DISCHARGE NOTE NURSING/CASE MANAGEMENT/SOCIAL WORK - PATIENT PORTAL LINK FT
You can access the FollowMyHealth Patient Portal offered by [facility] by registering at the following website: [email or web address]. By joining [employer]’s FollowMyHealth portal, you will also be able to view your health information using other applications (apps) compatible with our system.

## 2021-04-13 NOTE — PROGRESS NOTE ADULT - PROBLEM SELECTOR PROBLEM 7
Hypokalemia
Prophylactic measure
Prophylactic measure
Acute on chronic diastolic congestive heart failure
Prophylactic measure

## 2021-04-13 NOTE — PROGRESS NOTE ADULT - PROBLEM SELECTOR PROBLEM 1
Acute kidney injury superimposed on chronic kidney disease
UTI (urinary tract infection)
Acute kidney injury superimposed on chronic kidney disease
Acute kidney injury superimposed on chronic kidney disease
UTI (urinary tract infection)

## 2021-04-13 NOTE — PROGRESS NOTE ADULT - PROBLEM SELECTOR PLAN 4
The patient states he had visited his hematologist and had recent Hgb over 10, thus did not receive aranesp.   He denies any overt bleeding or bruising. His Hgb appears stable compared to labs from July 2020.   Monitor CBC.
controlled
The patient states he had visited his hematologist and had recent Hgb over 10, thus did not receive aranesp.   He denies any overt bleeding or bruising. His Hgb appears stable compared to labs from July 2020.   Monitor CBC.
The patient states he had visited his hematologist and had recent Hgb over 10, thus did not receive aranesp.   He denies any overt bleeding or bruising. His Hgb appears stable compared to labs from July 2020.   Monitor CBC.
no change

## 2021-04-13 NOTE — PROVIDER CONTACT NOTE (CRITICAL VALUE NOTIFICATION) - BACKGROUND
Pt admitted for urinary retention, hypothermia
MDS, BPH, Anemia, Glaucoma, CKD, GFR 30-59 ml/min, osteomyelitis, benign HTN

## 2021-04-13 NOTE — PROGRESS NOTE ADULT - PROBLEM SELECTOR PLAN 1
The patient has known CKD stage 3 based on previous labs.   SCr jean pierre from 1.69 to 2.23.  BUN is elevated 76.  MICH is likely due to obstructive uropathy. Beckman catheter is now draining clear yellow urine. Monitor UO.  Trend BMP.  UA is positive leukocyte esterase and bacteria. Pt was also hypothermic to 94.3F. Pt UCx and blood cultures were sent.  Will continue zosyn for now for suspected UTI.  ID cs fu
The patient has known CKD stage 3 based on previous labs.   SCr jean pierre from 1.69 to 2.23.  BUN is elevated 76.  MICH is likely due to obstructive uropathy.   chronic urinary retention- pt self caths  UA is positive leukocyte esterase and bacteria.    suspected UTI. completed abx course  ID cs fu
The patient has known CKD stage 3 based on previous labs.   SCr jean pierre from 1.69 to 2.23.  BUN is elevated 76.  MICH is likely due to obstructive uropathy. Beckman catheter is now draining clear yellow urine. Monitor UO. tov   UA is positive leukocyte esterase and bacteria. Pt was also hypothermic to 94.3F. Pt UCx and blood cultures were sent.  Will continue zosyn for now for suspected UTI.  ID cs fu
gus abx and dai ua c/s tomorrow
gus avbx and I will dai ua c/s wed, ck meds at home tomorrow  troday is monday

## 2021-04-13 NOTE — PROGRESS NOTE ADULT - PROBLEM SELECTOR PROBLEM 2
Urinary retention
Urinary retention
Acute on chronic diastolic congestive heart failure

## 2021-04-13 NOTE — PROGRESS NOTE ADULT - PROBLEM SELECTOR PLAN 5
Start home dose of finasteride. Check if pt can tolerate tamsulosin (he has known sulfa allergy).
bblkr and amlodepine
juan alberto Drummond  gets procrit

## 2021-04-13 NOTE — PROGRESS NOTE ADULT - PROBLEM SELECTOR PLAN 6
The patient cannot recall all of his home medications. He states his wife would not be able to assist since she has memory deficits.  The patient uses [facility], which is not currently open.  Will request pharmacy tech to assist with medication reconciliation
eliquis
The patient cannot recall all of his home medications. He states his wife would not be able to assist since she has memory deficits.  The patient uses [facility], which is not currently open.  Will request pharmacy tech to assist with medication reconciliation
better III

## 2021-04-13 NOTE — PROGRESS NOTE ADULT - ASSESSMENT
hypo k res  rivera urinating indep ok no prob  see id, disc c NP again  can be dc off abx and I will see pt again tomorrow at home and rechk ua/s  disc c pt, NP, KELSEY  45'

## 2021-04-13 NOTE — PROGRESS NOTE ADULT - SUBJECTIVE AND OBJECTIVE BOX
[facility], Division of Infectious Diseases  GARRETT Doty Y. Patel, S. Shah  [phone]    Name: DAVID MCKENZIE  Age: 89y  Gender: Male  MRN: [id number]  Note Date: 04-12-21    Interval History:  Patient seen and examined at bedside this morning  No acute overnight events. Afebrile  Sleeping comfortably  Notes reviewed    Antibiotics:      Medications:  amLODIPine   Tablet 5 milliGRAM(s) Oral daily  apixaban 2.5 milliGRAM(s) Oral two times a day  brimonidine 0.2% Ophthalmic Solution 1 Drop(s) Both EYES every 8 hours  erythromycin   Ointment 1 Application(s) Both EYES four times a day  finasteride 5 milliGRAM(s) Oral daily  latanoprost 0.005% Ophthalmic Solution 1 Drop(s) Left EYE at bedtime  metoprolol tartrate 12.5 milliGRAM(s) Oral two times a day  nystatin Powder 1 Application(s) Topical two times a day  potassium chloride    Tablet ER 20 milliEquivalent(s) Oral once  tamsulosin 0.4 milliGRAM(s) Oral two times a day      Review of Systems:  as above    Allergies: Cipro (Swelling)  Cipro (Unknown)  Levaquin (Unknown)  sulfa drugs (Rash)  sulfa drugs (Unknown)    For details regarding the patient's past medical history, social history, family history, and other miscellaneous elements, please refer the initial infectious diseases consultation and/or the admitting history and physical examination for this admission.    Objective:  Vitals:   T(C): 36.7 (04-12-21 @ 05:15), Max: 36.7 (04-12-21 @ 05:15)  HR: 64 (04-12-21 @ 09:26) (60 - 80)  BP: 108/67 (04-12-21 @ 09:26) (108/67 - 146/72)  RR: 18 (04-12-21 @ 09:26) (18 - 19)  SpO2: 98% (04-12-21 @ 09:26) (96% - 98%)    Physical Examination:  General: no acute distress  HEENT: NC/AT, EOMI, anicteric, no oral lesions  Neck: supple, no palpable LAD  Cardio: S1, S2 heard, RRR, no murmurs  Resp: breath sounds heard bilaterally, no rales, wheezes or rhonchi  Abd: soft, NT, ND, + bowel sounds  Ext: no edema or cyanosis  Skin: warm, dry, no visible rash    Laboratory Studies:  CBC:                       9.4    9.25  )-----------( 199      ( 12 Apr 2021 07:12 )             29.7     CMP: 04-12    141  |  105  |  63<H>  ----------------------------<  128<H>  3.1<L>   |  24  |  2.11<H>    Ca    8.3<L>      12 Apr 2021 07:26          Microbiology: reviewed    Culture - Blood (collected 04-09-21 @ 23:03)  Source: .Blood Blood-Peripheral  Preliminary Report (04-11-21 @ 01:02):    No growth to date.    Culture - Blood (collected 04-09-21 @ 23:03)  Source: .Blood Blood-Peripheral  Preliminary Report (04-11-21 @ 01:02):    No growth to date.    Culture - Urine (collected 04-09-21 @ 22:58)  Source: .Urine Clean Catch (Midstream)  Final Report (04-12-21 @ 10:05):    >100,000 CFU/ml Klebsiella pneumoniae  Organism: Klebsiella pneumoniae (04-12-21 @ 10:05)  Organism: Klebsiella pneumoniae (04-12-21 @ 10:05)      -  Amikacin: S <=16      -  Amoxicillin/Clavulanic Acid: S <=8/4      -  Ampicillin: R 16 These ampicillin results predict results for amoxicillin      -  Ampicillin/Sulbactam: S <=4/2 Enterobacter, Citrobacter, and Serratia may develop resistance during prolonged therapy (3-4 days)      -  Aztreonam: S <=4      -  Cefazolin: S <=2 (MIC_CL_[id number]_[id number]) For uncomplicated UTI with K. pneumoniae, E. coli, or P. mirablis: VERONIQUE <=16 is sensitive and VERONIQUE >=32 is resistant. This also predicts results for oral agents cefaclor, cefdinir, cefpodoxime, cefprozil, cefuroxime axetil, cephalexin and locarbef for uncomplicated UTI. Note that some isolates may be susceptible to these agents while testing resistant to cefazolin.      -  Cefepime: S <=2      -  Cefoxitin: S <=8      -  Ceftriaxone: S <=1 Enterobacter, Citrobacter, and Serratia may develop resistance during prolonged therapy      -  Ciprofloxacin: S <=0.25      -  Ertapenem: S <=0.5      -  Gentamicin: S <=2      -  Imipenem: S <=1      -  Levofloxacin: S <=0.5      -  Meropenem: S <=1      -  Nitrofurantoin: I 64 Should not be used to treat pyelonephritis      -  Piperacillin/Tazobactam: S <=8      -  Tigecycline: S <=2      -  Tobramycin: S <=2      -  Trimethoprim/Sulfamethoxazole: S <=0.5/9.5      Method Type: VERONIQUE        Radiology: reviewed      
DATE OF SERVICE: 04-13-21 @ 09:42    HPI:  This patient is a 89yo gentleman with PMH of MDS, CKD stage 3, atrial fibrillation on eliquis, BPH with intermittent self-catheterization, hx COVID19 infection in July 2020, htn, and CHF who presents to the ED due to urinary retention. The patient states that he had difficulty urinating last night. He attempted to void with self-catheterization which yielded little urine output. Today, the patient took his wife to a neurologist visit. While at the doctor's office, he felt unwell and was dyspneic while at rest. He notified his doctor, who expressed concern that the patient may have worsening of his heart failure, and recommended that the patient come to the ED.  He denies any fever, chills, cough, CP, palpitations, dysuria, hematuria, abdominal pain, or back pain. He has had lower extremity edema, which has remained stable. The patient reports he takes lasix at home, and has not missed any doses. Currently he feels comfortable while lying in the stretcher.  The patient also complains of having redness around his eyes. He notes having venous occlusion of the right eye with poor vision.   (09 Apr 2021 22:37)      Interval Events  wants to go home, no co usor, urinated bm, ambulated, no co  disc c NP yesterday, dc held dfor covid test(neg)  can go today  should be dc s abx for a day or two, I will see pt tomorrow and repeat ua c/s at home    MEDICATIONS  (STANDING):  amLODIPine   Tablet 5 milliGRAM(s) Oral daily  apixaban 2.5 milliGRAM(s) Oral two times a day  brimonidine 0.2% Ophthalmic Solution 1 Drop(s) Both EYES every 8 hours  cefTRIAXone   IVPB 1000 milliGRAM(s) IV Intermittent every 24 hours  erythromycin   Ointment 1 Application(s) Both EYES four times a day  finasteride 5 milliGRAM(s) Oral daily  latanoprost 0.005% Ophthalmic Solution 1 Drop(s) Left EYE at bedtime  metoprolol tartrate 12.5 milliGRAM(s) Oral two times a day  nystatin Powder 1 Application(s) Topical two times a day  tamsulosin 0.4 milliGRAM(s) Oral two times a day    MEDICATIONS  (PRN):      Patient is a 89y old  Male who presents with a chief complaint of urinary retention (12 Apr 2021 14:18)      REVIEW OF SYSTEMS    General:nad nousoh "I feel fine"  Skin/Breast:  Ophthalmologic:no co no ch  ENMT:	no co no ch, ate  Respiratory and Thorax:no cough no sp no sob  Cardiovascular:no cp no palp  Gastrointestinal:no nvcd  Genitourinary:no fdi ur ok  Musculoskeletal:	no a no p  Neurological:	nio co no ch  Psychiatric:	  Hematology/Lymphatics:	  Endocrine:no poly udd	  Allergic/Immunologic:  AOSN	y      Vital Signs Last 24 Hrs  T(C): 36.4 (13 Apr 2021 09:22), Max: 36.6 (12 Apr 2021 19:31)  T(F): 97.6 (13 Apr 2021 09:22), Max: 97.9 (12 Apr 2021 19:31)  HR: 75 (13 Apr 2021 09:22) (63 - 75)  BP: 145/76 (13 Apr 2021 09:22) (124/71 - 145/76)  BP(mean): --  RR: 18 (13 Apr 2021 09:22) (18 - 18)  SpO2: 98% (13 Apr 2021 09:22) (96% - 98%)    PHYSICAL EXAM:    Constitutional:vss nad not ill  H+N ncat   Eyes:martell cwnl  ENMT:hears swallows eats speaks ok  Neck:no cb no tm  Breasts:  Back:  Respiratory:crtab no rrw  Cardiovascular:irreg irreg m  Gastrointestinal:soft nt no g  Genitourinary:  Rectal:  Extremities:mild pitting feet, same as always  Vascular:hm-,vv-  Neurological:nfatmae bed mob goog  Skin:  Lymph Nodes:  Musculoskeletal:  Psychiatric:    LABS  CBC Full  -  ( 13 Apr 2021 07:16 )  WBC Count : 11.22 K/uL  RBC Count : 3.30 M/uL  Hemoglobin : 10.3 g/dL  Hematocrit : 32.2 %  Platelet Count - Automated : 228 K/uL  Mean Cell Volume : 97.6 fl  Mean Cell Hemoglobin : 31.2 pg  Mean Cell Hemoglobin Concentration : 32.0 gm/dL  Auto Neutrophil # : x  Auto Lymphocyte # : x  Auto Monocyte # : x  Auto Eosinophil # : x  Auto Basophil # : x  Auto Neutrophil % : x  Auto Lymphocyte % : x  Auto Monocyte % : x  Auto Eosinophil % : x  Auto Basophil % : x      04-13    141  |  103  |  57<H>  ----------------------------<  122<H>  3.5   |  23  |  1.88<H>    Ca    9.1      13 Apr 2021 07:16  Phos  2.5     04-13            Imaging:    Xray:    Echo:    CT:    MRI:    Tele:    Orders:    DAVID Lemus [phone]
Patient is a 89y old  Male who presents with a chief complaint of urinary retention (12 Apr 2021 14:18)      INTERVAL HPI/OVERNIGHT EVENTS: noted  pt seen and examined this am   events noted  no new events/complaints      Vital Signs Last 24 Hrs  T(C): 36.6 (12 Apr 2021 19:31), Max: 36.7 (12 Apr 2021 05:15)  T(F): 97.9 (12 Apr 2021 19:31), Max: 98 (12 Apr 2021 05:15)  HR: 65 (12 Apr 2021 19:31) (63 - 71)  BP: 124/71 (12 Apr 2021 19:31) (108/67 - 139/71)  BP(mean): --  RR: 18 (12 Apr 2021 19:31) (18 - 19)  SpO2: 98% (12 Apr 2021 09:26) (98% - 98%)    amLODIPine   Tablet 5 milliGRAM(s) Oral daily  apixaban 2.5 milliGRAM(s) Oral two times a day  brimonidine 0.2% Ophthalmic Solution 1 Drop(s) Both EYES every 8 hours  cefTRIAXone   IVPB 1000 milliGRAM(s) IV Intermittent every 24 hours  erythromycin   Ointment 1 Application(s) Both EYES four times a day  finasteride 5 milliGRAM(s) Oral daily  latanoprost 0.005% Ophthalmic Solution 1 Drop(s) Left EYE at bedtime  metoprolol tartrate 12.5 milliGRAM(s) Oral two times a day  nystatin Powder 1 Application(s) Topical two times a day  tamsulosin 0.4 milliGRAM(s) Oral two times a day      PHYSICAL EXAM:  GENERAL: NAD,   EYES: conjunctiva and sclera clear  ENMT: Moist mucous membranes  NECK: Supple, No JVD, Normal thyroid  CHEST/LUNG: non labored, cta b/l  HEART: Regular rate and rhythm; No murmurs, rubs, or gallops  ABDOMEN: Soft, Nontender, Nondistended; Bowel sounds present  EXTREMITIES:  2+ Peripheral Pulses, No clubbing, cyanosis, or edema  LYMPH: No lymphadenopathy noted  SKIN: No rashes or lesions    Consultant(s) Notes Reviewed:  [x ] YES  [ ] NO  Care Discussed with Consultants/Other Providers [ x] YES  [ ] NO    LABS:                        9.4    9.25  )-----------( 199      ( 12 Apr 2021 07:12 )             29.7     04-12    141  |  105  |  63<H>  ----------------------------<  128<H>  3.1<L>   |  24  |  2.11<H>    Ca    8.3<L>      12 Apr 2021 07:26          CAPILLARY BLOOD GLUCOSE                  RADIOLOGY & ADDITIONAL TESTS:    Imaging Personally Reviewed:  [x ] YES  [ ] NO
Patient is a 89y old  Male who presents with a chief complaint of urinary retention (11 Apr 2021 10:55)      INTERVAL HPI/OVERNIGHT EVENTS: noted  pt seen and examined this am   events noted  feels well  sp guadalupe whaley in progress      Vital Signs Last 24 Hrs  T(C): 36.2 (11 Apr 2021 20:44), Max: 36.8 (10 Apr 2021 23:37)  T(F): 97.2 (11 Apr 2021 20:44), Max: 98.3 (10 Apr 2021 23:37)  HR: 60 (11 Apr 2021 20:44) (60 - 80)  BP: 115/76 (11 Apr 2021 20:44) (114/71 - 146/72)  BP(mean): --  RR: 18 (11 Apr 2021 20:44) (18 - 18)  SpO2: 96% (11 Apr 2021 20:44) (96% - 99%)    amLODIPine   Tablet 5 milliGRAM(s) Oral daily  apixaban 2.5 milliGRAM(s) Oral two times a day  brimonidine 0.2% Ophthalmic Solution 1 Drop(s) Both EYES every 8 hours  erythromycin   Ointment 1 Application(s) Both EYES four times a day  finasteride 5 milliGRAM(s) Oral daily  latanoprost 0.005% Ophthalmic Solution 1 Drop(s) Left EYE at bedtime  metoprolol tartrate 12.5 milliGRAM(s) Oral two times a day  nystatin Powder 1 Application(s) Topical two times a day  piperacillin/tazobactam IVPB.. 3.375 Gram(s) IV Intermittent every 8 hours  tamsulosin 0.4 milliGRAM(s) Oral two times a day      PHYSICAL EXAM:  GENERAL: NAD,   EYES: conjunctiva and sclera clear  ENMT: Moist mucous membranes  NECK: Supple, No JVD, Normal thyroid  CHEST/LUNG: non labored, cta b/l  HEART: Regular rate and rhythm; No murmurs, rubs, or gallops  ABDOMEN: Soft, Nontender, Nondistended; Bowel sounds present  EXTREMITIES:  2+ Peripheral Pulses, No clubbing, cyanosis, or edema  LYMPH: No lymphadenopathy noted  SKIN: No rashes or lesions    Consultant(s) Notes Reviewed:  [x ] YES  [ ] NO  Care Discussed with Consultants/Other Providers [ x] YES  [ ] NO    LABS:                        9.6    11.30 )-----------( 189      ( 11 Apr 2021 07:09 )             30.5     04-11    140  |  104  |  72<H>  ----------------------------<  152<H>  3.5   |  22  |  2.40<H>    Ca    8.8      11 Apr 2021 07:06  Mg     2.0     04-10          CAPILLARY BLOOD GLUCOSE                Culture - Blood (collected 09 Apr 2021 23:03)  Source: .Blood Blood-Peripheral  Preliminary Report (11 Apr 2021 01:02):    No growth to date.    Culture - Blood (collected 09 Apr 2021 23:03)  Source: .Blood Blood-Peripheral  Preliminary Report (11 Apr 2021 01:02):    No growth to date.    Culture - Urine (collected 09 Apr 2021 22:58)  Source: .Urine Clean Catch (Midstream)  Preliminary Report (11 Apr 2021 08:58):    >100,000 CFU/ml Klebsiella pneumoniae        RADIOLOGY & ADDITIONAL TESTS:    Imaging Personally Reviewed:  [x ] YES  [ ] NO
Patient is a 89y old  Male who presents with a chief complaint of urinary retention (10 Apr 2021 11:17)      INTERVAL HPI/OVERNIGHT EVENTS: noted  pt seen and examined this am   events noted  deneis any cp/sob      Vital Signs Last 24 Hrs  T(C): 36.5 (10 Apr 2021 16:10), Max: 36.9 (10 Apr 2021 05:35)  T(F): 97.7 (10 Apr 2021 16:10), Max: 98.5 (10 Apr 2021 05:35)  HR: 62 (10 Apr 2021 16:10) (51 - 86)  BP: 116/72 (10 Apr 2021 16:10) (114/63 - 145/79)  BP(mean): 80 (2021 21:43) (80 - 87)  RR: 18 (10 Apr 2021 16:10) (16 - 20)  SpO2: 96% (10 Apr 2021 16:10) (95% - 100%)    amLODIPine   Tablet 5 milliGRAM(s) Oral daily  apixaban 2.5 milliGRAM(s) Oral two times a day  brimonidine 0.2% Ophthalmic Solution 1 Drop(s) Both EYES every 8 hours  erythromycin   Ointment 1 Application(s) Both EYES four times a day  finasteride 5 milliGRAM(s) Oral daily  latanoprost 0.005% Ophthalmic Solution 1 Drop(s) Left EYE at bedtime  metoprolol tartrate 12.5 milliGRAM(s) Oral two times a day  nystatin Powder 1 Application(s) Topical two times a day  piperacillin/tazobactam IVPB.. 3.375 Gram(s) IV Intermittent every 8 hours      PHYSICAL EXAM:  GENERAL: NAD,   EYES: conjunctiva and sclera clear  ENMT: Moist mucous membranes  NECK: Supple, No JVD, Normal thyroid  CHEST/LUNG: non labored, cta b/l  HEART: Regular rate and rhythm; No murmurs, rubs, or gallops  ABDOMEN: Soft, Nontender, Nondistended; Bowel sounds present  EXTREMITIES:  2+ Peripheral Pulses, No clubbing, cyanosis, or edema  LYMPH: No lymphadenopathy noted  SKIN: No rashes or lesions    Consultant(s) Notes Reviewed:  [x ] YES  [ ] NO  Care Discussed with Consultants/Other Providers [ x] YES  [ ] NO    LABS:                        9.8    8.35  )-----------( 208      ( 10 Apr 2021 01:53 )             31.2     04-10    142  |  106  |  73<H>  ----------------------------<  132<H>  3.8   |  22  |  1.99<H>    Ca    8.5      10 Apr 2021 01:53  Mg     2.0     04-10    TPro  7.1  /  Alb  3.6  /  TBili  0.4  /  DBili  x   /  AST  17  /  ALT  14  /  AlkPhos  79  04-09    PT/INR - ( 2021 18:56 )   PT: 16.6 sec;   INR: 1.41 ratio         PTT - ( 2021 18:56 )  PTT:37.6 sec  Urinalysis Basic - ( 2021 19:09 )    Color: Light Yellow / Appearance: Clear / S.013 / pH: x  Gluc: x / Ketone: Negative  / Bili: Negative / Urobili: Negative   Blood: x / Protein: Negative / Nitrite: Negative   Leuk Esterase: Moderate / RBC: 1 /hpf / WBC 5 /HPF   Sq Epi: x / Non Sq Epi: 1 /hpf / Bacteria: Few      CAPILLARY BLOOD GLUCOSE            Urinalysis Basic - ( 2021 19:09 )    Color: Light Yellow / Appearance: Clear / S.013 / pH: x  Gluc: x / Ketone: Negative  / Bili: Negative / Urobili: Negative   Blood: x / Protein: Negative / Nitrite: Negative   Leuk Esterase: Moderate / RBC: 1 /hpf / WBC 5 /HPF   Sq Epi: x / Non Sq Epi: 1 /hpf / Bacteria: Few          RADIOLOGY & ADDITIONAL TESTS:    Imaging Personally Reviewed:  [x ] YES  [ ] NO
DATE OF SERVICE: 04-12-21 @ 09:05    HPI:  This patient is a 89yo gentleman with PMH of MDS, CKD stage 3, atrial fibrillation on eliquis, BPH with intermittent self-catheterization, hx COVID19 infection in July 2020, htn, and CHF who presents to the ED due to urinary retention. The patient states that he had difficulty urinating last night. He attempted to void with self-catheterization which yielded little urine output. Today, the patient took his wife to a neurologist visit. While at the doctor's office, he felt unwell and was dyspneic while at rest. He notified his doctor, who expressed concern that the patient may have worsening of his heart failure, and recommended that the patient come to the ED.  He denies any fever, chills, cough, CP, palpitations, dysuria, hematuria, abdominal pain, or back pain. He has had lower extremity edema, which has remained stable. The patient reports he takes lasix at home, and has not missed any doses. Currently he feels comfortable while lying in the stretcher.  The patient also complains of having redness around his eyes. He notes having venous occlusion of the right eye with poor vision.   (09 Apr 2021 22:37)      Interval Events  Rivera removed, urinating "a lot" per pt  wants to go home, getting anxious  AOSN  labs ok x K lo, can get 1 dose PO    MEDICATIONS  (STANDING):  amLODIPine   Tablet 5 milliGRAM(s) Oral daily  apixaban 2.5 milliGRAM(s) Oral two times a day  brimonidine 0.2% Ophthalmic Solution 1 Drop(s) Both EYES every 8 hours  erythromycin   Ointment 1 Application(s) Both EYES four times a day  finasteride 5 milliGRAM(s) Oral daily  latanoprost 0.005% Ophthalmic Solution 1 Drop(s) Left EYE at bedtime  metoprolol tartrate 12.5 milliGRAM(s) Oral two times a day  nystatin Powder 1 Application(s) Topical two times a day  piperacillin/tazobactam IVPB.. 3.375 Gram(s) IV Intermittent every 8 hours  tamsulosin 0.4 milliGRAM(s) Oral two times a day    MEDICATIONS  (PRN):      Patient is a 89y old  Male who presents with a chief complaint of urinary retention (11 Apr 2021 12:33)      REVIEW OF SYSTEMS  General:no co nad wants out  Skin/Breast:  Ophthalmologic:no co no ch  ENMT:	no co no ch  Respiratory and Thorax:no cough no sp no sob  Cardiovascular:no cp no palp  Gastrointestinal:no  nvcd  Genitourinary:some freq, no d no i now  Musculoskeletal:	no a no p  Neurological:	no co  Psychiatric:	  Hematology/Lymphatics:	  Endocrine:	no polyudd  Allergic/Immunologic:  AOSN	y      Vital Signs Last 24 Hrs  T(C): 36.7 (12 Apr 2021 05:15), Max: 36.7 (12 Apr 2021 05:15)  T(F): 98 (12 Apr 2021 05:15), Max: 98 (12 Apr 2021 05:15)  HR: 71 (12 Apr 2021 05:15) (60 - 80)  BP: 139/71 (12 Apr 2021 05:15) (115/76 - 146/72)  BP(mean): --  RR: 19 (12 Apr 2021 05:15) (18 - 19)  SpO2: 98% (12 Apr 2021 05:15) (96% - 98%)    PHYSICAL EXAM:    Constitutional:vss nad no co oob chair ambuklated already  H+N ncat  Eyes:martell cwnl  ENMT:hears swallows eats speakas ok  Neck:no cb no tm  Breasts:  Back:  Respiratory:ctab no rrw  Cardiovascular:irre irreg m distant  Gastrointestinal:bsa+ soft  Genitourinary:rivera out  Rectal:  Extremities:always heavy, soft no pitting  Vascular:hgm- vv-  Neurological:nfatmae in chair ambulated already  Skin:cdi  Lymph Nodes:  Musculoskeletal:  Psychiatric:    LABS  CBC Full  -  ( 12 Apr 2021 07:12 )  WBC Count : 9.25 K/uL  RBC Count : 3.03 M/uL  Hemoglobin : 9.4 g/dL  Hematocrit : 29.7 %  Platelet Count - Automated : 199 K/uL  Mean Cell Volume : 98.0 fl  Mean Cell Hemoglobin : 31.0 pg  Mean Cell Hemoglobin Concentration : 31.6 gm/dL  Auto Neutrophil # : x  Auto Lymphocyte # : x  Auto Monocyte # : x  Auto Eosinophil # : x  Auto Basophil # : x  Auto Neutrophil % : x  Auto Lymphocyte % : x  Auto Monocyte % : x  Auto Eosinophil % : x  Auto Basophil % : x      04-12    141  |  105  |  63<H>  ----------------------------<  128<H>  3.1<L>   |  24  |  2.11<H>    Ca    8.3<L>      12 Apr 2021 07:26            Imaging:    Xray:    Echo:    CT:    MRI:    Tele:    Orders:    DAVID Lemus [phone]

## 2021-04-13 NOTE — PROGRESS NOTE ADULT - PROBLEM SELECTOR PLAN 2
straight cath prn - self  cont meds
has chronic LE edema, doubt chf will monitor  Continue metoprolol    last TTE performed on 4/23/2020 found EF 59%, normal LV and RV function, mild tricuspid regurgitation. Pt also found to have mild pulmonary htn.
The patient appears to have significant pedal edema and had complained of dyspnea earlier today, despite being compliant with his home dose of lasix. He is currently lying supine comfortably.   has chronic LE edema, doubt chf will monitor  Continue metoprolol   Will give lasix 40mg IVP x 2. Monitor I&Os and daily weights. Keep K 4, Mg 2.   On [place], last TTE performed on 4/23/2020 found EF 59%, normal LV and RV function, mild tricuspid regurgitation. Pt also found to have mild pulmonary htn.   Will check repeat TTE.
The patient appears to have significant pedal edema and had complained of dyspnea earlier today, despite being compliant with his home dose of lasix. He is currently lying supine comfortably.   has chronic LE edema, doubt chf will monitor  Continue metoprolol    last TTE performed on 4/23/2020 found EF 59%, normal LV and RV function, mild tricuspid regurgitation. Pt also found to have mild pulmonary htn.
chronic, does self cath prn

## 2021-04-13 NOTE — PROGRESS NOTE ADULT - PROBLEM SELECTOR PROBLEM 6
CKD (chronic kidney disease) stage 3, GFR 30-59 ml/min
Medication management
Atrial fibrillation, chronic

## 2021-04-15 LAB
CULTURE RESULTS: SIGNIFICANT CHANGE UP
CULTURE RESULTS: SIGNIFICANT CHANGE UP
SPECIMEN SOURCE: SIGNIFICANT CHANGE UP
SPECIMEN SOURCE: SIGNIFICANT CHANGE UP

## 2021-04-19 ENCOUNTER — TRANSCRIPTION ENCOUNTER (OUTPATIENT)
Age: 86
End: 2021-04-19

## 2021-04-20 ENCOUNTER — INPATIENT (INPATIENT)
Facility: HOSPITAL | Age: 86
LOS: 9 days | Discharge: NURSING, REHAB OR LONG-TERM CARE | DRG: 326 | End: 2021-04-30
Attending: SURGERY | Admitting: SURGERY
Payer: MEDICARE

## 2021-04-20 ENCOUNTER — RESULT REVIEW (OUTPATIENT)
Age: 86
End: 2021-04-20

## 2021-04-20 VITALS
TEMPERATURE: 96 F | HEART RATE: 50 BPM | SYSTOLIC BLOOD PRESSURE: 101 MMHG | OXYGEN SATURATION: 98 % | RESPIRATION RATE: 18 BRPM | HEIGHT: 66 IN | DIASTOLIC BLOOD PRESSURE: 57 MMHG | WEIGHT: 225.09 LBS

## 2021-04-20 DIAGNOSIS — M95.0 ACQUIRED DEFORMITY OF NOSE: Chronic | ICD-10-CM

## 2021-04-20 DIAGNOSIS — R10.9 UNSPECIFIED ABDOMINAL PAIN: ICD-10-CM

## 2021-04-20 DIAGNOSIS — S61.412A LACERATION WITHOUT FOREIGN BODY OF LEFT HAND, INITIAL ENCOUNTER: Chronic | ICD-10-CM

## 2021-04-20 LAB
ALBUMIN SERPL ELPH-MCNC: 3.1 G/DL — LOW (ref 3.3–5)
ALP SERPL-CCNC: 87 U/L — SIGNIFICANT CHANGE UP (ref 40–120)
ALT FLD-CCNC: 17 U/L — SIGNIFICANT CHANGE UP (ref 10–45)
ANION GAP SERPL CALC-SCNC: 13 MMOL/L — SIGNIFICANT CHANGE UP (ref 5–17)
ANION GAP SERPL CALC-SCNC: 14 MMOL/L — SIGNIFICANT CHANGE UP (ref 5–17)
APPEARANCE UR: CLEAR — SIGNIFICANT CHANGE UP
APTT BLD: 32.9 SEC — SIGNIFICANT CHANGE UP (ref 27.5–35.5)
APTT BLD: 38.6 SEC — HIGH (ref 27.5–35.5)
AST SERPL-CCNC: 20 U/L — SIGNIFICANT CHANGE UP (ref 10–40)
BACTERIA # UR AUTO: 0 — SIGNIFICANT CHANGE UP
BASE EXCESS BLDV CALC-SCNC: 2.4 MMOL/L — HIGH (ref -2–2)
BASOPHILS # BLD AUTO: 0.01 K/UL — SIGNIFICANT CHANGE UP (ref 0–0.2)
BASOPHILS # BLD AUTO: 0.01 K/UL — SIGNIFICANT CHANGE UP (ref 0–0.2)
BASOPHILS NFR BLD AUTO: 0.1 % — SIGNIFICANT CHANGE UP (ref 0–2)
BASOPHILS NFR BLD AUTO: 0.1 % — SIGNIFICANT CHANGE UP (ref 0–2)
BILIRUB SERPL-MCNC: 0.3 MG/DL — SIGNIFICANT CHANGE UP (ref 0.2–1.2)
BILIRUB UR-MCNC: NEGATIVE — SIGNIFICANT CHANGE UP
BLD GP AB SCN SERPL QL: NEGATIVE — SIGNIFICANT CHANGE UP
BUN SERPL-MCNC: 55 MG/DL — HIGH (ref 7–23)
BUN SERPL-MCNC: 56 MG/DL — HIGH (ref 7–23)
CA-I SERPL-SCNC: 1.12 MMOL/L — SIGNIFICANT CHANGE UP (ref 1.12–1.3)
CALCIUM SERPL-MCNC: 8.5 MG/DL — SIGNIFICANT CHANGE UP (ref 8.4–10.5)
CALCIUM SERPL-MCNC: 8.5 MG/DL — SIGNIFICANT CHANGE UP (ref 8.4–10.5)
CHLORIDE BLDV-SCNC: 104 MMOL/L — SIGNIFICANT CHANGE UP (ref 96–108)
CHLORIDE SERPL-SCNC: 102 MMOL/L — SIGNIFICANT CHANGE UP (ref 96–108)
CHLORIDE SERPL-SCNC: 105 MMOL/L — SIGNIFICANT CHANGE UP (ref 96–108)
CO2 BLDV-SCNC: 29 MMOL/L — SIGNIFICANT CHANGE UP (ref 22–30)
CO2 SERPL-SCNC: 20 MMOL/L — LOW (ref 22–31)
CO2 SERPL-SCNC: 23 MMOL/L — SIGNIFICANT CHANGE UP (ref 22–31)
COLOR SPEC: SIGNIFICANT CHANGE UP
CREAT SERPL-MCNC: 1.69 MG/DL — HIGH (ref 0.5–1.3)
CREAT SERPL-MCNC: 1.78 MG/DL — HIGH (ref 0.5–1.3)
DIFF PNL FLD: NEGATIVE — SIGNIFICANT CHANGE UP
EOSINOPHIL # BLD AUTO: 0 K/UL — SIGNIFICANT CHANGE UP (ref 0–0.5)
EOSINOPHIL # BLD AUTO: 0.03 K/UL — SIGNIFICANT CHANGE UP (ref 0–0.5)
EOSINOPHIL NFR BLD AUTO: 0 % — SIGNIFICANT CHANGE UP (ref 0–6)
EOSINOPHIL NFR BLD AUTO: 0.4 % — SIGNIFICANT CHANGE UP (ref 0–6)
EPI CELLS # UR: 1 /HPF — SIGNIFICANT CHANGE UP
GAS PNL BLDA: SIGNIFICANT CHANGE UP
GAS PNL BLDV: 138 MMOL/L — SIGNIFICANT CHANGE UP (ref 135–145)
GAS PNL BLDV: SIGNIFICANT CHANGE UP
GLUCOSE BLDV-MCNC: 126 MG/DL — HIGH (ref 70–99)
GLUCOSE SERPL-MCNC: 124 MG/DL — HIGH (ref 70–99)
GLUCOSE SERPL-MCNC: 136 MG/DL — HIGH (ref 70–99)
GLUCOSE UR QL: NEGATIVE — SIGNIFICANT CHANGE UP
HCO3 BLDV-SCNC: 28 MMOL/L — SIGNIFICANT CHANGE UP (ref 21–29)
HCT VFR BLD CALC: 24.9 % — LOW (ref 39–50)
HCT VFR BLD CALC: 26.1 % — LOW (ref 39–50)
HCT VFR BLD CALC: 30 % — LOW (ref 39–50)
HCT VFR BLDA CALC: 27 % — LOW (ref 39–50)
HGB BLD CALC-MCNC: 8.8 G/DL — LOW (ref 13–17)
HGB BLD-MCNC: 7.9 G/DL — LOW (ref 13–17)
HGB BLD-MCNC: 8.3 G/DL — LOW (ref 13–17)
HGB BLD-MCNC: 9.6 G/DL — LOW (ref 13–17)
HYALINE CASTS # UR AUTO: 2 /LPF — SIGNIFICANT CHANGE UP (ref 0–2)
IMM GRANULOCYTES NFR BLD AUTO: 0.5 % — SIGNIFICANT CHANGE UP (ref 0–1.5)
IMM GRANULOCYTES NFR BLD AUTO: 0.8 % — SIGNIFICANT CHANGE UP (ref 0–1.5)
INR BLD: 1.46 RATIO — HIGH (ref 0.88–1.16)
INR BLD: 1.5 RATIO — HIGH (ref 0.88–1.16)
KETONES UR-MCNC: NEGATIVE — SIGNIFICANT CHANGE UP
LACTATE BLDV-MCNC: 1 MMOL/L — SIGNIFICANT CHANGE UP (ref 0.7–2)
LEUKOCYTE ESTERASE UR-ACNC: NEGATIVE — SIGNIFICANT CHANGE UP
LIDOCAIN IGE QN: 10 U/L — SIGNIFICANT CHANGE UP (ref 7–60)
LYMPHOCYTES # BLD AUTO: 0.57 K/UL — LOW (ref 1–3.3)
LYMPHOCYTES # BLD AUTO: 2 K/UL — SIGNIFICANT CHANGE UP (ref 1–3.3)
LYMPHOCYTES # BLD AUTO: 25 % — SIGNIFICANT CHANGE UP (ref 13–44)
LYMPHOCYTES # BLD AUTO: 6.7 % — LOW (ref 13–44)
MAGNESIUM SERPL-MCNC: 1.8 MG/DL — SIGNIFICANT CHANGE UP (ref 1.6–2.6)
MAGNESIUM SERPL-MCNC: 2 MG/DL — SIGNIFICANT CHANGE UP (ref 1.6–2.6)
MCHC RBC-ENTMCNC: 30.3 PG — SIGNIFICANT CHANGE UP (ref 27–34)
MCHC RBC-ENTMCNC: 30.9 PG — SIGNIFICANT CHANGE UP (ref 27–34)
MCHC RBC-ENTMCNC: 31.1 PG — SIGNIFICANT CHANGE UP (ref 27–34)
MCHC RBC-ENTMCNC: 31.7 GM/DL — LOW (ref 32–36)
MCHC RBC-ENTMCNC: 31.8 GM/DL — LOW (ref 32–36)
MCHC RBC-ENTMCNC: 32 GM/DL — SIGNIFICANT CHANGE UP (ref 32–36)
MCV RBC AUTO: 94.6 FL — SIGNIFICANT CHANGE UP (ref 80–100)
MCV RBC AUTO: 97.3 FL — SIGNIFICANT CHANGE UP (ref 80–100)
MCV RBC AUTO: 97.8 FL — SIGNIFICANT CHANGE UP (ref 80–100)
MONOCYTES # BLD AUTO: 0.37 K/UL — SIGNIFICANT CHANGE UP (ref 0–0.9)
MONOCYTES # BLD AUTO: 0.61 K/UL — SIGNIFICANT CHANGE UP (ref 0–0.9)
MONOCYTES NFR BLD AUTO: 4.3 % — SIGNIFICANT CHANGE UP (ref 2–14)
MONOCYTES NFR BLD AUTO: 7.6 % — SIGNIFICANT CHANGE UP (ref 2–14)
NEUTROPHILS # BLD AUTO: 5.29 K/UL — SIGNIFICANT CHANGE UP (ref 1.8–7.4)
NEUTROPHILS # BLD AUTO: 7.55 K/UL — HIGH (ref 1.8–7.4)
NEUTROPHILS NFR BLD AUTO: 66.1 % — SIGNIFICANT CHANGE UP (ref 43–77)
NEUTROPHILS NFR BLD AUTO: 88.4 % — HIGH (ref 43–77)
NITRITE UR-MCNC: NEGATIVE — SIGNIFICANT CHANGE UP
NRBC # BLD: 0 /100 WBCS — SIGNIFICANT CHANGE UP (ref 0–0)
NT-PROBNP SERPL-SCNC: 2955 PG/ML — HIGH (ref 0–300)
PCO2 BLDV: 51 MMHG — SIGNIFICANT CHANGE UP (ref 42–55)
PH BLDV: 7.36 — SIGNIFICANT CHANGE UP (ref 7.35–7.45)
PH UR: 5.5 — SIGNIFICANT CHANGE UP (ref 5–8)
PHOSPHATE SERPL-MCNC: 4.3 MG/DL — SIGNIFICANT CHANGE UP (ref 2.5–4.5)
PLATELET # BLD AUTO: 266 K/UL — SIGNIFICANT CHANGE UP (ref 150–400)
PLATELET # BLD AUTO: 277 K/UL — SIGNIFICANT CHANGE UP (ref 150–400)
PLATELET # BLD AUTO: 306 K/UL — SIGNIFICANT CHANGE UP (ref 150–400)
PO2 BLDV: 36 MMHG — SIGNIFICANT CHANGE UP (ref 25–45)
POTASSIUM BLDV-SCNC: 3.6 MMOL/L — SIGNIFICANT CHANGE UP (ref 3.5–5.3)
POTASSIUM SERPL-MCNC: 3.8 MMOL/L — SIGNIFICANT CHANGE UP (ref 3.5–5.3)
POTASSIUM SERPL-MCNC: 4.2 MMOL/L — SIGNIFICANT CHANGE UP (ref 3.5–5.3)
POTASSIUM SERPL-SCNC: 3.8 MMOL/L — SIGNIFICANT CHANGE UP (ref 3.5–5.3)
POTASSIUM SERPL-SCNC: 4.2 MMOL/L — SIGNIFICANT CHANGE UP (ref 3.5–5.3)
PROCALCITONIN SERPL-MCNC: 0.12 NG/ML — HIGH (ref 0.02–0.1)
PROT SERPL-MCNC: 6.4 G/DL — SIGNIFICANT CHANGE UP (ref 6–8.3)
PROT UR-MCNC: NEGATIVE — SIGNIFICANT CHANGE UP
PROTHROM AB SERPL-ACNC: 17.2 SEC — HIGH (ref 10.6–13.6)
PROTHROM AB SERPL-ACNC: 17.6 SEC — HIGH (ref 10.6–13.6)
RBC # BLD: 2.56 M/UL — LOW (ref 4.2–5.8)
RBC # BLD: 2.67 M/UL — LOW (ref 4.2–5.8)
RBC # BLD: 3.17 M/UL — LOW (ref 4.2–5.8)
RBC # FLD: 21.5 % — HIGH (ref 10.3–14.5)
RBC # FLD: 21.7 % — HIGH (ref 10.3–14.5)
RBC # FLD: 21.9 % — HIGH (ref 10.3–14.5)
RBC CASTS # UR COMP ASSIST: 0 /HPF — SIGNIFICANT CHANGE UP (ref 0–4)
RH IG SCN BLD-IMP: POSITIVE — SIGNIFICANT CHANGE UP
SAO2 % BLDV: 62 % — LOW (ref 67–88)
SARS-COV-2 RNA SPEC QL NAA+PROBE: SIGNIFICANT CHANGE UP
SODIUM SERPL-SCNC: 138 MMOL/L — SIGNIFICANT CHANGE UP (ref 135–145)
SODIUM SERPL-SCNC: 140 MMOL/L — SIGNIFICANT CHANGE UP (ref 135–145)
SP GR SPEC: 1.01 — SIGNIFICANT CHANGE UP (ref 1.01–1.02)
TROPONIN T, HIGH SENSITIVITY RESULT: 48 NG/L — SIGNIFICANT CHANGE UP (ref 0–51)
TROPONIN T, HIGH SENSITIVITY RESULT: 49 NG/L — SIGNIFICANT CHANGE UP (ref 0–51)
UROBILINOGEN FLD QL: NEGATIVE — SIGNIFICANT CHANGE UP
WBC # BLD: 8 K/UL — SIGNIFICANT CHANGE UP (ref 3.8–10.5)
WBC # BLD: 8.54 K/UL — SIGNIFICANT CHANGE UP (ref 3.8–10.5)
WBC # BLD: 9.69 K/UL — SIGNIFICANT CHANGE UP (ref 3.8–10.5)
WBC # FLD AUTO: 8 K/UL — SIGNIFICANT CHANGE UP (ref 3.8–10.5)
WBC # FLD AUTO: 8.54 K/UL — SIGNIFICANT CHANGE UP (ref 3.8–10.5)
WBC # FLD AUTO: 9.69 K/UL — SIGNIFICANT CHANGE UP (ref 3.8–10.5)
WBC UR QL: 0 /HPF — SIGNIFICANT CHANGE UP (ref 0–5)

## 2021-04-20 PROCEDURE — 93010 ELECTROCARDIOGRAM REPORT: CPT

## 2021-04-20 PROCEDURE — 71045 X-RAY EXAM CHEST 1 VIEW: CPT | Mod: 26

## 2021-04-20 PROCEDURE — 43840 GSTRRPHY SUTR DUOL/GSTR ULCR: CPT

## 2021-04-20 PROCEDURE — 88302 TISSUE EXAM BY PATHOLOGIST: CPT | Mod: 26

## 2021-04-20 PROCEDURE — 99291 CRITICAL CARE FIRST HOUR: CPT

## 2021-04-20 PROCEDURE — 99223 1ST HOSP IP/OBS HIGH 75: CPT | Mod: 57

## 2021-04-20 PROCEDURE — 43820 GASTROJEJUNOSTOMY WO VAGOTMY: CPT

## 2021-04-20 PROCEDURE — 74177 CT ABD & PELVIS W/CONTRAST: CPT | Mod: 26,MA

## 2021-04-20 PROCEDURE — 99285 EMERGENCY DEPT VISIT HI MDM: CPT | Mod: CS

## 2021-04-20 PROCEDURE — 49905 OMENTAL FLAP INTRA-ABDOM: CPT

## 2021-04-20 RX ORDER — SODIUM CHLORIDE 9 MG/ML
1000 INJECTION, SOLUTION INTRAVENOUS ONCE
Refills: 0 | Status: COMPLETED | OUTPATIENT
Start: 2021-04-20 | End: 2021-04-20

## 2021-04-20 RX ORDER — SODIUM CHLORIDE 9 MG/ML
1000 INJECTION, SOLUTION INTRAVENOUS
Refills: 0 | Status: DISCONTINUED | OUTPATIENT
Start: 2021-04-20 | End: 2021-04-22

## 2021-04-20 RX ORDER — ACETAMINOPHEN 500 MG
1000 TABLET ORAL ONCE
Refills: 0 | Status: COMPLETED | OUTPATIENT
Start: 2021-04-20 | End: 2021-04-20

## 2021-04-20 RX ORDER — PIPERACILLIN AND TAZOBACTAM 4; .5 G/20ML; G/20ML
3.38 INJECTION, POWDER, LYOPHILIZED, FOR SOLUTION INTRAVENOUS EVERY 8 HOURS
Refills: 0 | Status: DISCONTINUED | OUTPATIENT
Start: 2021-04-20 | End: 2021-04-25

## 2021-04-20 RX ORDER — CHLORHEXIDINE GLUCONATE 213 G/1000ML
15 SOLUTION TOPICAL EVERY 12 HOURS
Refills: 0 | Status: DISCONTINUED | OUTPATIENT
Start: 2021-04-20 | End: 2021-04-21

## 2021-04-20 RX ORDER — MAGNESIUM SULFATE 500 MG/ML
2 VIAL (ML) INJECTION ONCE
Refills: 0 | Status: COMPLETED | OUTPATIENT
Start: 2021-04-20 | End: 2021-04-20

## 2021-04-20 RX ORDER — PANTOPRAZOLE SODIUM 20 MG/1
80 TABLET, DELAYED RELEASE ORAL ONCE
Refills: 0 | Status: COMPLETED | OUTPATIENT
Start: 2021-04-20 | End: 2021-04-20

## 2021-04-20 RX ORDER — SODIUM CHLORIDE 9 MG/ML
500 INJECTION INTRAMUSCULAR; INTRAVENOUS; SUBCUTANEOUS ONCE
Refills: 0 | Status: COMPLETED | OUTPATIENT
Start: 2021-04-20 | End: 2021-04-20

## 2021-04-20 RX ORDER — PIPERACILLIN AND TAZOBACTAM 4; .5 G/20ML; G/20ML
3.38 INJECTION, POWDER, LYOPHILIZED, FOR SOLUTION INTRAVENOUS ONCE
Refills: 0 | Status: COMPLETED | OUTPATIENT
Start: 2021-04-20 | End: 2021-04-20

## 2021-04-20 RX ORDER — HYDROMORPHONE HYDROCHLORIDE 2 MG/ML
0.25 INJECTION INTRAMUSCULAR; INTRAVENOUS; SUBCUTANEOUS
Refills: 0 | Status: DISCONTINUED | OUTPATIENT
Start: 2021-04-20 | End: 2021-04-23

## 2021-04-20 RX ORDER — PANTOPRAZOLE SODIUM 20 MG/1
8 TABLET, DELAYED RELEASE ORAL
Qty: 80 | Refills: 0 | Status: DISCONTINUED | OUTPATIENT
Start: 2021-04-20 | End: 2021-04-21

## 2021-04-20 RX ORDER — PHENYLEPHRINE HYDROCHLORIDE 10 MG/ML
0.4 INJECTION INTRAVENOUS
Qty: 40 | Refills: 0 | Status: DISCONTINUED | OUTPATIENT
Start: 2021-04-20 | End: 2021-04-21

## 2021-04-20 RX ORDER — SODIUM CHLORIDE 9 MG/ML
1000 INJECTION, SOLUTION INTRAVENOUS
Refills: 0 | Status: DISCONTINUED | OUTPATIENT
Start: 2021-04-20 | End: 2021-04-20

## 2021-04-20 RX ORDER — PROPOFOL 10 MG/ML
20 INJECTION, EMULSION INTRAVENOUS
Qty: 1000 | Refills: 0 | Status: DISCONTINUED | OUTPATIENT
Start: 2021-04-20 | End: 2021-04-21

## 2021-04-20 RX ORDER — ACETAMINOPHEN 500 MG
750 TABLET ORAL EVERY 6 HOURS
Refills: 0 | Status: COMPLETED | OUTPATIENT
Start: 2021-04-20 | End: 2021-04-21

## 2021-04-20 RX ADMIN — PHENYLEPHRINE HYDROCHLORIDE 15.3 MICROGRAM(S)/KG/MIN: 10 INJECTION INTRAVENOUS at 22:19

## 2021-04-20 RX ADMIN — SODIUM CHLORIDE 125 MILLILITER(S): 9 INJECTION, SOLUTION INTRAVENOUS at 22:19

## 2021-04-20 RX ADMIN — PIPERACILLIN AND TAZOBACTAM 25 GRAM(S): 4; .5 INJECTION, POWDER, LYOPHILIZED, FOR SOLUTION INTRAVENOUS at 22:19

## 2021-04-20 RX ADMIN — PANTOPRAZOLE SODIUM 80 MILLIGRAM(S): 20 TABLET, DELAYED RELEASE ORAL at 22:45

## 2021-04-20 RX ADMIN — Medication 50 GRAM(S): at 22:46

## 2021-04-20 RX ADMIN — PANTOPRAZOLE SODIUM 10 MG/HR: 20 TABLET, DELAYED RELEASE ORAL at 22:46

## 2021-04-20 RX ADMIN — PIPERACILLIN AND TAZOBACTAM 200 GRAM(S): 4; .5 INJECTION, POWDER, LYOPHILIZED, FOR SOLUTION INTRAVENOUS at 16:29

## 2021-04-20 RX ADMIN — Medication 1000 MILLIGRAM(S): at 15:47

## 2021-04-20 RX ADMIN — Medication 400 MILLIGRAM(S): at 14:57

## 2021-04-20 RX ADMIN — SODIUM CHLORIDE 1000 MILLILITER(S): 9 INJECTION, SOLUTION INTRAVENOUS at 21:25

## 2021-04-20 RX ADMIN — SODIUM CHLORIDE 75 MILLILITER(S): 9 INJECTION, SOLUTION INTRAVENOUS at 13:37

## 2021-04-20 NOTE — ED ADULT NURSE REASSESSMENT NOTE - NS ED NURSE REASSESS COMMENT FT1
pt BP 88/63, HR 46, CT scan resulted perforated duodenal ulcer. pt requires emergency surgery as per MD. 1 unit of blood administered emergently under trauma protocol as per MD order. blood consent in chart. administered with 2 RN's at bedside. Beckman catheter inserted as per MD order using sterile technique with 2 RN's at bedside. NG tube inserted by surgical resident and patent. pt transported to operating room on cardiac monitor with ED Tech, RN, and MD.

## 2021-04-20 NOTE — H&P ADULT - ATTENDING COMMENTS
Pt seen and examined. Agree with A/P. Taken to OR for ex lap, found 3 cm perforated duodenal ulcer, performed omental patch, pyloric exclusion, gastrojejunostomy, washout. Tolerated procedure well. Admit to ACS, SICU. Vent wean, continue resuscitation, wean pressors, PPI gtt, abx, NGT LIWS, pain control, check H pylori.

## 2021-04-20 NOTE — ED PROVIDER NOTE - PHYSICAL EXAMINATION
GEN: Pt elderly in NAD, A&Ox3.  PSYCH: Affect and mood appropriate.  EYES: Sclera white w/o injection, EOMI, PERRLA.   ENT: MMM. Neck supple FROM. Airway patent.  RESP: CTA b/l, no wheezes, rales, or rhonchi.   CARDIAC: Chau 53, clear distinct S1, S2.  ABD: Abdomen soft, diffuse ttp L>R side. No CVAT b/l.  MSK: Moving all extremities.  NEURO: No focal motor or sensory deficits.  VASC: Radial pulses 2+ b/l. B/L LE pitting edema. No calf tenderness.  SKIN: No rashes or lesions.

## 2021-04-20 NOTE — BRIEF OPERATIVE NOTE - OPERATION/FINDINGS
exploratory laparotomy   extensive washout with purulence   ~2.5cm hole in the duodenum noted   pyloric exclusion with retrocolic gastrojejunostomy performed  ethan patch performed  umbilical hernia repair performed  YOLIE epps left in place

## 2021-04-20 NOTE — ED PROVIDER NOTE - SHIFT CHANGE DETAILS
Received signout from Dr. Hammond. Pt with perforated duodenal ulcer on CT. Ordered type and screen, repeat cbc. Nursing noted patient to by hypotensive, will give small fluid bolus, low threshold for blood transfusion.

## 2021-04-20 NOTE — PRE-ANESTHESIA EVALUATION ADULT - NSANTHPMHFT_GEN_ALL_CORE
H&P -   89 yo gentleman  PMHX  MDS, CKD stage 3, atrial fibrillation on eliquis, BPH with intermittent self-catheterization, hx COVID19 infection in July 2020, htn, and CHF.  P/w abd pain, concern for perforated ulcer    TTE  CONCLUSIONS:  1. Calcified trileaflet aortic valve with mildly decreased  opening.  2. Normal left ventricular systolic function. No segmental  wall motion abnormalities.  3. Normal right ventricular size and function.  4. Normal tricuspid valve. Mild tricuspid regurgitation.  5. Estimated pulmonary artery systolic pressure equals 41  mm Hg, assuming right atrial pressure equals 10  mm Hg,  consistent with mild pulmonary hypertension.

## 2021-04-20 NOTE — ED ADULT NURSE NOTE - TEMPLATE LIST FOR HEAD TO TOE ASSESSMENT
Abdominal Pain, N/V/D Interpolation Flap Text: A decision was made to reconstruct the defect utilizing an interpolation axial flap and a staged reconstruction.  A telfa template was made of the defect.  This telfa template was then used to outline the interpolation flap.  The donor area for the pedicle flap was then injected with anesthesia.  The flap was excised through the skin and subcutaneous tissue down to the layer of the underlying musculature.  The interpolation flap was carefully excised within this deep plane to maintain its blood supply.  The edges of the donor site were undermined.   The donor site was closed in a primary fashion.  The pedicle was then rotated into position and sutured.  Once the tube was sutured into place, adequate blood supply was confirmed with blanching and refill.  The pedicle was then wrapped with xeroform gauze and dressed appropriately with a telfa and gauze bandage to ensure continued blood supply and protect the attached pedicle.

## 2021-04-20 NOTE — H&P ADULT - NSHPREVIEWOFSYSTEMS_GEN_ALL_CORE
· CONSTITUTIONAL: no fever and no chills.  · CARDIOVASCULAR: no chest pain and no edema.  · RESPIRATORY: no chest pain, no cough, and no shortness of breath.  · Gastrointestinal [+]: ABDOMINAL PAIN, nausea, no vomiting  · GENITOURINARY: no dysuria, no frequency, and no hematuria.  · ROS STATEMENT: all other ROS negative except as per HPI no

## 2021-04-20 NOTE — ED ADULT NURSE NOTE - NS ED NURSE TRANSPORT WITH
Cardiac Monitor/Defib/ACLS/Rescue Kit/O2/BVM/IV pump/Blood Products NG tube in place/Cardiac Monitor/Defib/ACLS/Rescue Kit/O2/BVM/IV pump/suction/Blood Products

## 2021-04-20 NOTE — H&P ADULT - NSICDXFAMILYHX_GEN_ALL_CORE_FT
FAMILY HISTORY:  Family history of prostate cancer  FH: arthritis, mother  FH: CVA (cerebrovascular accident), father

## 2021-04-20 NOTE — ED ADULT NURSE REASSESSMENT NOTE - NS ED NURSE REASSESS COMMENT FT1
patient belongings secured in safe (gold wedding band, med alert bracelet), clothing secured in purple

## 2021-04-20 NOTE — CONSULT NOTE ADULT - ATTENDING COMMENTS
- 89 yo with perforated duodenal ulcer, peritonitis in septic shock. S/p exploratory laparotomy, abdominal washout, Jordi patch, pyloric exclusion and gastrojejunostomy.  - N Precedex gtt. Multimodal pain management.  - P On mechanically assisted ventilation. PRVC 18/450/5/40, monitor ABG. Wean as tolerated.  - C Septic shock on norepinephrine gtt for MAP goal >65, add vasopressin. Continue fluid resuscitation, monitor for worsening CHF. POCUS.  - G NPO, PPI gtt. H. pylori serology sent.  - R CKD III, monitor CMP and UOP.  - H Serial CBC goal Hgb >7.0. S/p 1U PRBC in ER.  - I Continue Zosyn for intraabdominal sepsis.  - DVT SCDs, holding chemical AC.  - E Monitor  glycemia.    - Has life threatening condition requiring SICU evaluation and management. Restless Leg Syndrome

## 2021-04-20 NOTE — H&P ADULT - ASSESSMENT
89y M MDS, CKD, Afib on eliquis, BPH with intermittent self-catheterization, CHF, [facility]o diffuse, moderate, abdominal pain since this morning. Patient reports having 1 day hx of abdominal pain presents with perforated duodenal ulcer on CT and hemodynamically unstable.    Recommendations  -Admit patient to ACS with Dr. Ordonez  -Patient is being taken to the OR emergently with Dr. Ordonez for exploratory laparotomy, possible bowel resection, possible ethan patch, possible placement of abthera     ACS [id number]       89y M MDS, CKD, Afib on eliquis, BPH with intermittent self-catheterization, CHF[facility]/o diffuse, moderate, abdominal pain since this morning. Patient reports having 1 day hx of abdominal pain presents with perforated duodenal ulcer on CT and hemodynamically unstable consistent with peritonitis    Recommendations  -Admit patient to ACS with Dr. Ordonez  -Patient is being taken to the OR emergently with Dr. Ordonez for exploratory laparotomy, possible bowel resection, possible ethan patch, possible placement of abthera     ACS 90

## 2021-04-20 NOTE — ED ADULT NURSE NOTE - NSIMPLEMENTINTERV_GEN_ALL_ED
Implemented All Fall with Harm Risk Interventions:  [place] to call system. Call bell, personal items and telephone within reach. Instruct patient to call for assistance. Room bathroom lighting operational. Non-slip footwear when patient is off stretcher. Physically safe environment: no spills, clutter or unnecessary equipment. Stretcher in lowest position, wheels locked, appropriate side rails in place. Provide visual cue, wrist band, yellow gown, etc. Monitor gait and stability. Monitor for mental status changes and reorient to person, place, and time. Review medications for side effects contributing to fall risk. Reinforce activity limits and safety measures with patient and family. Provide visual clues: red socks.

## 2021-04-20 NOTE — ED ADULT NURSE NOTE - OBJECTIVE STATEMENT
89 year old male presented to ED via EMS from the [facility] with c/o of sharp epigastric abdominal pain starting today. pt called 911 for sharp pain. hx a-fib. pt denies CP, SOB, nausea/vomiting, numbness/tingling, fever, cough, chills, dizziness, headache, blurred vision, neuro intact. pt a&ox3, lung sounds clear, heart rate abril ~50, on cardiac monitor, EKG performed handed to MD, abdomen soft nontender, distended to palp. skin intact. IV in left forearm 20G and patent. call bell within reach, bed in lowest position. Will continue to monitor and assess while offering support and reassurance.

## 2021-04-20 NOTE — H&P ADULT - NSICDXPASTMEDICALHX_GEN_ALL_CORE_FT
PAST MEDICAL HISTORY:  Anemia     Anemia     Benign Hypertension     BPH (benign prostatic hyperplasia)     BPH (Benign Prostatic Hypertrophy)     Choledocholithiasis     Chronic constipation     CKD (chronic kidney disease) stage 3, GFR 30-59 ml/min     Gall stones     Glaucoma     Glaucoma     MDS (myelodysplastic syndrome)     MDS (Myelodysplastic Syndrome)     Nocturia associated with benign prostatic hypertrophy     Osteomyelitis of arm      PAST MEDICAL HISTORY:  Anemia     Benign Hypertension     BPH (Benign Prostatic Hypertrophy)     Choledocholithiasis     Chronic constipation     CKD (chronic kidney disease) stage 3, GFR 30-59 ml/min     Glaucoma     MDS (Myelodysplastic Syndrome)     Nocturia associated with benign prostatic hypertrophy     Osteomyelitis of arm

## 2021-04-20 NOTE — H&P ADULT - NSICDXPASTSURGICALHX_GEN_ALL_CORE_FT
PAST SURGICAL HISTORY:  History of cataract extraction right eye 6/11    Laceration of finger, left, with tendon 1976    Mohs defect of nose 1990's    Osteomyelitis left humerous- surgery childhood age 13    S/P Cholecystectomy     S/P TURP

## 2021-04-20 NOTE — ED PROVIDER NOTE - ATTENDING CONTRIBUTION TO CARE
------------ATTENDING NOTE------------   pt brought to ED by EMS from [facility] c/o abdominal pain, describes several hours of diffuse moderate abdominal discomfort, worse across lower abdomen, no fevers, no nausea/vomiting, describes normal BM/stools this AM, describes urinating well 1 hr prior to arrival, mentating great, abd w/ mild diffuse tenderness, awaiting labs/imaging and close reassessments -->  - Rik Hammond MD   --------------------------------------------- ------------ATTENDING NOTE------------   pt brought to ED by EMS from [facility] c/o abdominal pain, describes several hours of diffuse moderate abdominal discomfort, worse across lower abdomen, no fevers, no nausea/vomiting, describes normal BM/stools this AM, describes urinating well 1 hr prior to arrival, mentating great, abd w/ mild diffuse tenderness, awaiting labs/imaging and close reassessments --> ED Sign Out 1600 eval for abd pain, just prelim call from radiology for perforated ulcer, HD stable, pending Surgery consult, empiric antibiotics for admission.  - Rik Hammond MD   ---------------------------------------------

## 2021-04-20 NOTE — CONSULT NOTE ADULT - ASSESSMENT
ASSESSMENT:  89y Male with history of CKD, CHF, afib on eliquis and BPH presenting with peritonitis from duodenal perforation s/p ex lap with pyloric excluding gastrojejunostomy and ethan patch on 4/20.     PLAN:   Neurologic:   - Sedated with precedex  - Pain control with tylenol, dilaudid 0.25     Respiratory:   - Intubated   - Mechanical ventilation with 18/450/5/40  - Wean to SBT in AM    Cardiovascular:   - History of afib and CHF  - Echo April 2020 EF 59%  - S/p 1L bolus post op   - On levo for BP control, weaning down    Gastrointestinal/Nutrition:   - NPO  - NGT, do no manipulate  - Protonix gtt at 8    Renal/Genitourinary:   - CKD III (baseline Cr 1.4-1.6)   - Beckman in place  - History of BPH, requiring intermittent catheterization, recent admission for urinary retention  - LR @ 125    Hematologic:   - S/p 1U PRBCs in ED  - Holding home AC  - Holding DVT ppx until repeat CBC    Infectious Disease:   - Zosyn  - Pending H Pylori serologies     Lines/Tubes:  - L rad a line  - PIVs  - Beckman  - ETT    Endocrine:   - No active issues    Disposition: SICU    --------------------------------------------------------------------------------------    Critical Care Diagnoses: ASSESSMENT:  89y Male with history of CKD, CHF, afib on eliquis and BPH presenting with peritonitis from duodenal perforation s/p ex lap with pyloric excluding gastrojejunostomy and ethan patch on 4/20.     PLAN:   Neurologic:   - Sedated with precedex  - Pain control with tylenol, dilaudid 0.25     Respiratory:   - Intubated   - Mechanical ventilation with 18/450/5/40  - Wean to SBT in AM    Cardiovascular:   - History of afib and CHF  - Holding home metoprolol   - Echo April 2020 EF 59%  - S/p 1L bolus post op   - On levo for BP control, weaning down    Gastrointestinal/Nutrition:   - NPO  - NGT to suction, do no manipulate  - Protonix gtt at 8    Renal/Genitourinary:   - CKD III (baseline Cr 1.4-1.6)   - Beckman in place  - History of BPH, requiring intermittent catheterization, recent admission for urinary retention  - LR @ 125  - Holding home lasix, tamsulosin    Hematologic:   - S/p 1U PRBCs in ED  - Holding home AC  - Holding DVT ppx until repeat CBC    Infectious Disease:   - Zosyn  - Pending H Pylori serologies     Lines/Tubes:  - L rad a line  - PIVs  - Beckman  - ETT    Endocrine:   - No active issues    Disposition: SICU    --------------------------------------------------------------------------------------    Critical Care Diagnoses:

## 2021-04-20 NOTE — BRIEF OPERATIVE NOTE - NSICDXBRIEFPROCEDURE_GEN_ALL_CORE_FT
PROCEDURES:  Exploratory laparotomy 20-Apr-2021 20:55:48  Mayra Leary  Gastrojejunostomy with duodenal exclusion 20-Apr-2021 20:57:10  Mayra Leary  Repair, hernia, umbilical, adult 20-Apr-2021 20:57:24  Mayra Leary

## 2021-04-20 NOTE — ED PROVIDER NOTE - PROGRESS NOTE DETAILS
attending Enrique: surgery at bedside. attending Enrique: surgery consenting patient for OR. Pt with drop in H/H, will give emergent unit of blood. Pt remains hypotensive and bradycardic but mentating. Complaining of abdominal pain Sergio Coats PA-C: s/w daughter Lasha, gave consent for transfusion. Advanced directives discussed, pt is FULL CODE, daughter Lasha needs to d/w sister. Sergio Coats PA-C: s/w daughter Kimi who wishes for pt to be FULL CODE. Pt is A&Ox4 and has capacity to make medical decisions. Kimi states pt wished for DNR/DNI to be reversed during a past admission for COVID.

## 2021-04-20 NOTE — H&P ADULT - HISTORY OF PRESENT ILLNESS
89y M MDS, CKD, Afib on eliquis, BPH with intermittent self-catheterization, CHF, presents to ED BIBEMS from assisted living c/o diffuse, moderate, abdominal pain since this morning. Patient reports having 1 day hx of abdominal pain.

## 2021-04-20 NOTE — H&P ADULT - NSHPPHYSICALEXAM_GEN_ALL_CORE
GEN: Pt elderly in NAD, A&Ox3.  PSYCH: Affect and mood appropriate.  EYES: Sclera white w/o injection, EOMI, PERRLA.   ENT: MMM. Neck supple FROM. Airway patent.  RESP: CTA b/l, no wheezes, rales, or rhonchi.   CARDIAC: Chau 53, clear distinct S1, S2.  ABD: Abdomen soft, diffuse ttp L>R side. No CVAT b/l.  MSK: Moving all extremities.  NEURO: No focal motor or sensory deficits.  VASC: Radial pulses 2+ b/l. B/L LE pitting edema. No calf tenderness.  SKIN: No rashes or lesions.    · Temp (F)	96.5  · Temp (C) Temp (C)	35.8  · Temp site Temp Site	oral  · Heart Rate Heart Rate (beats/min)	50  · BP Systolic Systolic	101  · BP Diastolic Diastolic (mm Hg)	57  · Respiration Rate (breaths/min) Respiration Rate (breaths/min)	18  · SpO2 (%) SpO2 (%)	98  · SpO2 (%) SpO2 (%)	98 Vital Signs Last 24 Hrs  T(C): 35.8 (20 Apr 2021 12:41), Max: 35.8 (20 Apr 2021 12:41)  T(F): 96.5 (20 Apr 2021 12:41), Max: 96.5 (20 Apr 2021 12:41)  HR: 40 (20 Apr 2021 17:05) (40 - 53)  BP: 98/61 (20 Apr 2021 17:05) (79/48 - 101/57)  BP(mean): --  RR: 20 (20 Apr 2021 17:05) (18 - 22)  SpO2: 99% (20 Apr 2021 17:05) (98% - 100%)    GEN: Pt elderly in NAD, A&Ox3.  PSYCH: Affect and mood appropriate.  EYES: Sclera white w/o injection, EOMI, PERRLA.   ENT: MMM. Neck supple FROM. Airway patent.  RESP: CTA b/l, no wheezes, rales, or rhonchi.   CARDIAC: Chau 53, clear distinct S1, S2.  ABD: Abdomen soft, diffuse ttp L>R side. No CVAT b/l.  MSK: Moving all extremities.  NEURO: No focal motor or sensory deficits.  VASC: Radial pulses 2+ b/l. B/L LE pitting edema. No calf tenderness.  SKIN: No rashes or lesions.

## 2021-04-20 NOTE — ED PROVIDER NOTE - OBJECTIVE STATEMENT
89y M MDS, CKD, afib on eliquis, BPH with intermittent self-catheterization, CHF, presents to ED BIBEMS from assisted living c/o diffuse, moderate, abdominal pain since this morning. Patient points to RUQ when asked to locate pain, stating pain radiates through upper abdomen. Tolerating PO. Reports normal urination 1hr PTA without catheterization and normal BM 4hrs PTA. Denies f/c, nvd, chest pain.

## 2021-04-20 NOTE — BRIEF OPERATIVE NOTE - NSICDXBRIEFPOSTOP_GEN_ALL_CORE_FT
POST-OP DIAGNOSIS:  Perforated abdominal viscus 20-Apr-2021 20:57:47  Mayra Leary  Umbilical hernia 20-Apr-2021 20:57:59  Mayra Leary

## 2021-04-20 NOTE — H&P ADULT - NSHPLABSRESULTS_GEN_ALL_CORE
EXAM: CT ABDOMEN AND PELVIS IC      PROCEDURE DATE: 04/20/2021    INTERPRETATION: CLINICAL INFORMATION: Diffuse abdominal pain.    COMPARISON: CT pulmonary angiogram dated July 3, 2020.    PROCEDURE:  CT of the Abdomen and Pelvis was performed without oral contrast and with 90 cc of Omnipaque intravenous contrast.  Sagittal and coronal reformats were performed.    FINDINGS:  LOWER CHEST: Within normal limits.    LIVER: Again is noted dystrophic desiccation in the left lobe of the liver laterally. Otherwise, the liver is unremarkable in appearance and enhancement. The hepatic veins and portal veins are patent..  BILE DUCTS: Normal caliber.  GALLBLADDER: Removed.  SPLEEN: Within normal limits.  PANCREAS: There are multiple lobular cystic pancreatic lesions measuring up to 2.9 x 2.5 cm in the tail, 3.3 x 2.1 cm in the uncinate process, and 2.2 x 1.9 cm in the head, favored to represent side branch IPMN. The pancreatic duct is not dilated.  ADRENALS: Within normal limits.  KIDNEYS/URETERS: Again are noted bilateral simple and proteinaceous renal cysts. There is moderate cortical scarring in the posterior midpole of the left kidney. There is a calcified cyst in the lateral mid to upper pole of the left kidney. Otherwise, the kidneys are symmetric in appearance and enhancement with no evidence of hydronephrosis, stone, or enhancing mass.    BLADDER: Minimally distended.  REPRODUCTIVE ORGANS: The prostate is not enlarged.    BOWEL: There is an 11 mm defect in the lateral wall of the hyperenhancing duodenal bulb on image 49 with adjacent 3.8 x 2.4 cm fluid collection with nondependent gas in image 46. There are bubbles of free intraluminal gas along the anterior peritoneal reflection. There is mild fluid tracking in the right upper quadrant, around the liver, and into the pelvis.    There is mild fluid dilatation of proximal and mid small bowel loops, favored to represent ileitis. There is diverticulosis of the colon. The sigmoid colon is redundant. The appendix is unremarkable.  VESSELS: Within normal limits.  RETROPERITONEUM/LYMPH NODES: No lymphadenopathy.  ABDOMINAL WALL: There is a small fat-containing umbilical hernia.  BONES: Degenerative disc disease and spondylosis of the lumbar spine.    IMPRESSION: Perforated duodenal ulcer with free intraperitoneal gas and adjacent fluid. Results discussed with KENNY Coats at time of interpretation.    RODY BARRETT M.D., ATTENDING RADIOLOGIST  This document has been electronically signed. Apr 20 2021 4:14PM 7.9    8.54  )-----------( 306      ( 2021 16:38 )             24.9       04-    140  |  102  |  56<H>  ----------------------------<  124<H>  3.8   |  23  |  1.78<H>    Ca    8.5      2021 13:13  Mg     2.0         TPro  6.4  /  Alb  3.1<L>  /  TBili  0.3  /  DBili  x   /  AST  20  /  ALT  17  /  AlkPhos  87  -              Urinalysis Basic - ( 2021 15:04 )    Color: Light Yellow / Appearance: Clear / S.015 / pH: x  Gluc: x / Ketone: Negative  / Bili: Negative / Urobili: Negative   Blood: x / Protein: Negative / Nitrite: Negative   Leuk Esterase: Negative / RBC: 0 /hpf / WBC 0 /HPF   Sq Epi: x / Non Sq Epi: 1 /hpf / Bacteria: 0.0        PT/INR - ( 2021 13:13 )   PT: 17.2 sec;   INR: 1.46 ratio         PTT - ( 2021 13:13 )  PTT:38.6 sec          CAPILLARY BLOOD GLUCOSE            EXAM: CT ABDOMEN AND PELVIS IC      PROCEDURE DATE: 2021    INTERPRETATION: CLINICAL INFORMATION: Diffuse abdominal pain.    COMPARISON: CT pulmonary angiogram dated July 3, 2020.    PROCEDURE:  CT of the Abdomen and Pelvis was performed without oral contrast and with 90 cc of Omnipaque intravenous contrast.  Sagittal and coronal reformats were performed.    FINDINGS:  LOWER CHEST: Within normal limits.    LIVER: Again is noted dystrophic desiccation in the left lobe of the liver laterally. Otherwise, the liver is unremarkable in appearance and enhancement. The hepatic veins and portal veins are patent..  BILE DUCTS: Normal caliber.  GALLBLADDER: Removed.  SPLEEN: Within normal limits.  PANCREAS: There are multiple lobular cystic pancreatic lesions measuring up to 2.9 x 2.5 cm in the tail, 3.3 x 2.1 cm in the uncinate process, and 2.2 x 1.9 cm in the head, favored to represent side branch IPMN. The pancreatic duct is not dilated.  ADRENALS: Within normal limits.  KIDNEYS/URETERS: Again are noted bilateral simple and proteinaceous renal cysts. There is moderate cortical scarring in the posterior midpole of the left kidney. There is a calcified cyst in the lateral mid to upper pole of the left kidney. Otherwise, the kidneys are symmetric in appearance and enhancement with no evidence of hydronephrosis, stone, or enhancing mass.    BLADDER: Minimally distended.  REPRODUCTIVE ORGANS: The prostate is not enlarged.    BOWEL: There is an 11 mm defect in the lateral wall of the hyperenhancing duodenal bulb on image 49 with adjacent 3.8 x 2.4 cm fluid collection with nondependent gas in image 46. There are bubbles of free intraluminal gas along the anterior peritoneal reflection. There is mild fluid tracking in the right upper quadrant, around the liver, and into the pelvis.    There is mild fluid dilatation of proximal and mid small bowel loops, favored to represent ileitis. There is diverticulosis of the colon. The sigmoid colon is redundant. The appendix is unremarkable.  VESSELS: Within normal limits.  RETROPERITONEUM/LYMPH NODES: No lymphadenopathy.  ABDOMINAL WALL: There is a small fat-containing umbilical hernia.  BONES: Degenerative disc disease and spondylosis of the lumbar spine.    IMPRESSION: Perforated duodenal ulcer with free intraperitoneal gas and adjacent fluid. Results discussed with KENNY Coats at time of interpretation.    RODY BARRETT M.D., ATTENDING RADIOLOGIST  This document has been electronically signed. 2021 4:14PM

## 2021-04-20 NOTE — ED ADULT NURSE REASSESSMENT NOTE - NS ED NURSE REASSESS COMMENT FT1
straight cath performed with 2 RN's present while using sterile technique as per MD order. urine drained 200cc clear yellow urine

## 2021-04-21 DIAGNOSIS — K26.5 CHRONIC OR UNSPECIFIED DUODENAL ULCER WITH PERFORATION: ICD-10-CM

## 2021-04-21 DIAGNOSIS — N18.30 CHRONIC KIDNEY DISEASE, STAGE 3 UNSPECIFIED: ICD-10-CM

## 2021-04-21 DIAGNOSIS — N40.1 BENIGN PROSTATIC HYPERPLASIA WITH LOWER URINARY TRACT SYMPTOMS: ICD-10-CM

## 2021-04-21 DIAGNOSIS — H40.9 UNSPECIFIED GLAUCOMA: ICD-10-CM

## 2021-04-21 DIAGNOSIS — D46.9 MYELODYSPLASTIC SYNDROME, UNSPECIFIED: ICD-10-CM

## 2021-04-21 DIAGNOSIS — I48.0 PAROXYSMAL ATRIAL FIBRILLATION: ICD-10-CM

## 2021-04-21 DIAGNOSIS — K80.50 CALCULUS OF BILE DUCT WITHOUT CHOLANGITIS OR CHOLECYSTITIS WITHOUT OBSTRUCTION: ICD-10-CM

## 2021-04-21 DIAGNOSIS — F41.9 ANXIETY DISORDER, UNSPECIFIED: ICD-10-CM

## 2021-04-21 DIAGNOSIS — I10 ESSENTIAL (PRIMARY) HYPERTENSION: ICD-10-CM

## 2021-04-21 LAB
ANION GAP SERPL CALC-SCNC: 12 MMOL/L — SIGNIFICANT CHANGE UP (ref 5–17)
APTT BLD: 32.4 SEC — SIGNIFICANT CHANGE UP (ref 27.5–35.5)
BUN SERPL-MCNC: 53 MG/DL — HIGH (ref 7–23)
CALCIUM SERPL-MCNC: 8.6 MG/DL — SIGNIFICANT CHANGE UP (ref 8.4–10.5)
CHLORIDE SERPL-SCNC: 105 MMOL/L — SIGNIFICANT CHANGE UP (ref 96–108)
CO2 SERPL-SCNC: 18 MMOL/L — LOW (ref 22–31)
CREAT SERPL-MCNC: 1.74 MG/DL — HIGH (ref 0.5–1.3)
CULTURE RESULTS: SIGNIFICANT CHANGE UP
GAS PNL BLDA: SIGNIFICANT CHANGE UP
GAS PNL BLDA: SIGNIFICANT CHANGE UP
GLUCOSE SERPL-MCNC: 149 MG/DL — HIGH (ref 70–99)
HCT VFR BLD CALC: 30.9 % — LOW (ref 39–50)
HGB BLD-MCNC: 9.9 G/DL — LOW (ref 13–17)
INR BLD: 1.33 RATIO — HIGH (ref 0.88–1.16)
MAGNESIUM SERPL-MCNC: 2.1 MG/DL — SIGNIFICANT CHANGE UP (ref 1.6–2.6)
MCHC RBC-ENTMCNC: 30.5 PG — SIGNIFICANT CHANGE UP (ref 27–34)
MCHC RBC-ENTMCNC: 32 GM/DL — SIGNIFICANT CHANGE UP (ref 32–36)
MCV RBC AUTO: 95.1 FL — SIGNIFICANT CHANGE UP (ref 80–100)
NRBC # BLD: 0 /100 WBCS — SIGNIFICANT CHANGE UP (ref 0–0)
PHOSPHATE SERPL-MCNC: 4.5 MG/DL — SIGNIFICANT CHANGE UP (ref 2.5–4.5)
PLATELET # BLD AUTO: 319 K/UL — SIGNIFICANT CHANGE UP (ref 150–400)
POTASSIUM SERPL-MCNC: 4.3 MMOL/L — SIGNIFICANT CHANGE UP (ref 3.5–5.3)
POTASSIUM SERPL-SCNC: 4.3 MMOL/L — SIGNIFICANT CHANGE UP (ref 3.5–5.3)
PROTHROM AB SERPL-ACNC: 15.7 SEC — HIGH (ref 10.6–13.6)
RBC # BLD: 3.25 M/UL — LOW (ref 4.2–5.8)
RBC # FLD: 22 % — HIGH (ref 10.3–14.5)
SODIUM SERPL-SCNC: 135 MMOL/L — SIGNIFICANT CHANGE UP (ref 135–145)
SPECIMEN SOURCE: SIGNIFICANT CHANGE UP
WBC # BLD: 12.12 K/UL — HIGH (ref 3.8–10.5)
WBC # FLD AUTO: 12.12 K/UL — HIGH (ref 3.8–10.5)

## 2021-04-21 PROCEDURE — 36556 INSERT NON-TUNNEL CV CATH: CPT

## 2021-04-21 PROCEDURE — 99233 SBSQ HOSP IP/OBS HIGH 50: CPT

## 2021-04-21 PROCEDURE — 71045 X-RAY EXAM CHEST 1 VIEW: CPT | Mod: 26,76

## 2021-04-21 PROCEDURE — 93010 ELECTROCARDIOGRAM REPORT: CPT

## 2021-04-21 RX ORDER — NOREPINEPHRINE BITARTRATE/D5W 8 MG/250ML
0.05 PLASTIC BAG, INJECTION (ML) INTRAVENOUS
Qty: 8 | Refills: 0 | Status: DISCONTINUED | OUTPATIENT
Start: 2021-04-21 | End: 2021-04-22

## 2021-04-21 RX ORDER — PROPOFOL 10 MG/ML
20 INJECTION, EMULSION INTRAVENOUS
Qty: 1000 | Refills: 0 | Status: DISCONTINUED | OUTPATIENT
Start: 2021-04-21 | End: 2021-04-21

## 2021-04-21 RX ORDER — CHLORHEXIDINE GLUCONATE 213 G/1000ML
1 SOLUTION TOPICAL
Refills: 0 | Status: DISCONTINUED | OUTPATIENT
Start: 2021-04-21 | End: 2021-04-23

## 2021-04-21 RX ORDER — DEXMEDETOMIDINE HYDROCHLORIDE IN 0.9% SODIUM CHLORIDE 4 UG/ML
0.2 INJECTION INTRAVENOUS
Qty: 200 | Refills: 0 | Status: DISCONTINUED | OUTPATIENT
Start: 2021-04-21 | End: 2021-04-21

## 2021-04-21 RX ORDER — LATANOPROST 0.05 MG/ML
1 SOLUTION/ DROPS OPHTHALMIC; TOPICAL AT BEDTIME
Refills: 0 | Status: DISCONTINUED | OUTPATIENT
Start: 2021-04-21 | End: 2021-04-30

## 2021-04-21 RX ORDER — FLUCONAZOLE 150 MG/1
400 TABLET ORAL ONCE
Refills: 0 | Status: COMPLETED | OUTPATIENT
Start: 2021-04-21 | End: 2021-04-21

## 2021-04-21 RX ORDER — HALOPERIDOL DECANOATE 100 MG/ML
2.5 INJECTION INTRAMUSCULAR EVERY 6 HOURS
Refills: 0 | Status: DISCONTINUED | OUTPATIENT
Start: 2021-04-21 | End: 2021-04-22

## 2021-04-21 RX ORDER — ENOXAPARIN SODIUM 100 MG/ML
40 INJECTION SUBCUTANEOUS EVERY 24 HOURS
Refills: 0 | Status: DISCONTINUED | OUTPATIENT
Start: 2021-04-21 | End: 2021-04-22

## 2021-04-21 RX ORDER — FLUCONAZOLE 150 MG/1
TABLET ORAL
Refills: 0 | Status: DISCONTINUED | OUTPATIENT
Start: 2021-04-21 | End: 2021-04-23

## 2021-04-21 RX ORDER — ENOXAPARIN SODIUM 100 MG/ML
40 INJECTION SUBCUTANEOUS DAILY
Refills: 0 | Status: DISCONTINUED | OUTPATIENT
Start: 2021-04-21 | End: 2021-04-21

## 2021-04-21 RX ORDER — SODIUM CHLORIDE 9 MG/ML
10 INJECTION INTRAMUSCULAR; INTRAVENOUS; SUBCUTANEOUS
Refills: 0 | Status: DISCONTINUED | OUTPATIENT
Start: 2021-04-21 | End: 2021-04-23

## 2021-04-21 RX ORDER — SODIUM CHLORIDE 9 MG/ML
500 INJECTION, SOLUTION INTRAVENOUS ONCE
Refills: 0 | Status: COMPLETED | OUTPATIENT
Start: 2021-04-21 | End: 2021-04-21

## 2021-04-21 RX ORDER — ENOXAPARIN SODIUM 100 MG/ML
30 INJECTION SUBCUTANEOUS EVERY 24 HOURS
Refills: 0 | Status: DISCONTINUED | OUTPATIENT
Start: 2021-04-21 | End: 2021-04-21

## 2021-04-21 RX ORDER — ACETAMINOPHEN 500 MG
500 TABLET ORAL EVERY 6 HOURS
Refills: 0 | Status: DISCONTINUED | OUTPATIENT
Start: 2021-04-21 | End: 2021-04-30

## 2021-04-21 RX ORDER — VASOPRESSIN 20 [USP'U]/ML
0.03 INJECTION INTRAVENOUS
Qty: 50 | Refills: 0 | Status: DISCONTINUED | OUTPATIENT
Start: 2021-04-21 | End: 2021-04-21

## 2021-04-21 RX ORDER — FLUCONAZOLE 150 MG/1
200 TABLET ORAL EVERY 24 HOURS
Refills: 0 | Status: DISCONTINUED | OUTPATIENT
Start: 2021-04-22 | End: 2021-04-23

## 2021-04-21 RX ORDER — PANTOPRAZOLE SODIUM 20 MG/1
40 TABLET, DELAYED RELEASE ORAL EVERY 12 HOURS
Refills: 0 | Status: DISCONTINUED | OUTPATIENT
Start: 2021-04-21 | End: 2021-04-28

## 2021-04-21 RX ADMIN — HYDROMORPHONE HYDROCHLORIDE 0.25 MILLIGRAM(S): 2 INJECTION INTRAMUSCULAR; INTRAVENOUS; SUBCUTANEOUS at 02:08

## 2021-04-21 RX ADMIN — Medication 9.57 MICROGRAM(S)/KG/MIN: at 20:07

## 2021-04-21 RX ADMIN — Medication 300 MILLIGRAM(S): at 06:33

## 2021-04-21 RX ADMIN — Medication 300 MILLIGRAM(S): at 11:22

## 2021-04-21 RX ADMIN — PIPERACILLIN AND TAZOBACTAM 25 GRAM(S): 4; .5 INJECTION, POWDER, LYOPHILIZED, FOR SOLUTION INTRAVENOUS at 20:07

## 2021-04-21 RX ADMIN — HYDROMORPHONE HYDROCHLORIDE 0.25 MILLIGRAM(S): 2 INJECTION INTRAMUSCULAR; INTRAVENOUS; SUBCUTANEOUS at 02:23

## 2021-04-21 RX ADMIN — PIPERACILLIN AND TAZOBACTAM 25 GRAM(S): 4; .5 INJECTION, POWDER, LYOPHILIZED, FOR SOLUTION INTRAVENOUS at 05:24

## 2021-04-21 RX ADMIN — LATANOPROST 1 DROP(S): 0.05 SOLUTION/ DROPS OPHTHALMIC; TOPICAL at 22:00

## 2021-04-21 RX ADMIN — Medication 300 MILLIGRAM(S): at 01:29

## 2021-04-21 RX ADMIN — FLUCONAZOLE 100 MILLIGRAM(S): 150 TABLET ORAL at 10:11

## 2021-04-21 RX ADMIN — ENOXAPARIN SODIUM 40 MILLIGRAM(S): 100 INJECTION SUBCUTANEOUS at 10:10

## 2021-04-21 RX ADMIN — Medication 9.57 MICROGRAM(S)/KG/MIN: at 06:10

## 2021-04-21 RX ADMIN — ENOXAPARIN SODIUM 30 MILLIGRAM(S): 100 INJECTION SUBCUTANEOUS at 06:08

## 2021-04-21 RX ADMIN — CHLORHEXIDINE GLUCONATE 1 APPLICATION(S): 213 SOLUTION TOPICAL at 05:25

## 2021-04-21 RX ADMIN — PANTOPRAZOLE SODIUM 40 MILLIGRAM(S): 20 TABLET, DELAYED RELEASE ORAL at 17:05

## 2021-04-21 RX ADMIN — SODIUM CHLORIDE 125 MILLILITER(S): 9 INJECTION, SOLUTION INTRAVENOUS at 20:07

## 2021-04-21 RX ADMIN — PROPOFOL 12.3 MICROGRAM(S)/KG/MIN: 10 INJECTION, EMULSION INTRAVENOUS at 05:24

## 2021-04-21 RX ADMIN — PIPERACILLIN AND TAZOBACTAM 25 GRAM(S): 4; .5 INJECTION, POWDER, LYOPHILIZED, FOR SOLUTION INTRAVENOUS at 12:36

## 2021-04-21 RX ADMIN — CHLORHEXIDINE GLUCONATE 15 MILLILITER(S): 213 SOLUTION TOPICAL at 05:24

## 2021-04-21 RX ADMIN — Medication 300 MILLIGRAM(S): at 17:05

## 2021-04-21 RX ADMIN — VASOPRESSIN 1.8 UNIT(S)/MIN: 20 INJECTION INTRAVENOUS at 06:10

## 2021-04-21 RX ADMIN — SODIUM CHLORIDE 1000 MILLILITER(S): 9 INJECTION, SOLUTION INTRAVENOUS at 02:00

## 2021-04-21 NOTE — OCCUPATIONAL THERAPY INITIAL EVALUATION ADULT - DIAGNOSIS, OT EVAL
Pt demonstrated decreased strength, balance, endurance impacting pt's ability to participate in functional mobility and ADLs.

## 2021-04-21 NOTE — OCCUPATIONAL THERAPY INITIAL EVALUATION ADULT - ADDITIONAL COMMENTS
CT Abdomen: Perforated duodenal ulcer with free intraperitoneal gas and adjacent fluid. s/p exploratory laparotomy extensive washout with purulence

## 2021-04-21 NOTE — CONSULT NOTE ADULT - ASSESSMENT
labs ok pIOp   in SICU, plan to extubate  disc Hx and meds c RN bedside Olga  spoke c pt yesterday and wife and Daughter Darren (RN) today again, and w [facility] staff  will fu for continuity and fu re meds,   xalatan for now  Cardiologist is Dr Julio     labs ok pOp   in SICU, plan to extubate  disc Hx and meds c RN bedside Sirisha  spoke c pt yesterday and wife and Daughter Darren (RN) today again, and w [facility] staff  will fu for continuity and fu re meds,   xalatan for now  Cardiologist is Dr Julio

## 2021-04-21 NOTE — PHYSICAL THERAPY INITIAL EVALUATION ADULT - GENERAL OBSERVATIONS, REHAB EVAL
Pt is s/p exploratory laparotomy with washout of purulence and pyloric exclusion retrocolic gastrojejunostomy and omental patch 4/20, postop intubated and required pressors, now extubated on 4/21/21. Pt received supine in bed, +ICU monitoring, +L dariusz, +YOLIE drain RLQ, +rivera, +NGT, +levo, +R IJ, A&OX4, +[place], follows commands, difficulty with R eye vision.

## 2021-04-21 NOTE — PROGRESS NOTE ADULT - ASSESSMENT
a/p 89y Male with history of CKD, CHF, afib on eliquis and BPH presenting with peritonitis from duodenal perforation s/p ex lap with pyloric excluding gastrojejunostomy and ethan patch on 4/20.     - Intubated on vent, anticipating extubate in AM  - NPO/IVF  - Pain control: IV tylenol, dilaudid prn   - requiring amnfred for BP support  - DVT ppx: Lovenox  - Appreciate SICU care    ACS  [id number]

## 2021-04-21 NOTE — PROGRESS NOTE ADULT - SUBJECTIVE AND OBJECTIVE BOX
HISTORY  89y M MDS, CKD, Afib on eliquis, BPH with intermittent self-catheterization, CHF, presented to ED [facility] from assisted living c/o diffuse, moderate, abdominal pain since this morning. Patient reports having 1 day hx of abdominal pain. He was found to be peritoneal on exam and a CT scan showing perforated duodenal ulcer. He was borderline hypotensive, bradycardic and hypothermic. He was given 1U of PRBCs and 2L of IVF in the ED and taken to the OR urgently. He is now s/p exploratory laparotomy with washout of purulence and pyloric exclusion retrocolic gastrojejunostomy and omental patch. Post operatively, he came to the SICU, intubated and on manfred.       24 HOUR EVENTS:  - Phenylephrine dose increasing, given 1.5L boluses total, RIJ CVC placed and switched to norepinephrine and vasopressin infusions  - Precedex not sufficient enough for sedation, switched to propofol  - VTE ppx restarted      NEURO  Exam: sedated, moving all extremities  Meds: acetaminophen  IVPB .. 750 milliGRAM(s) IV Intermittent every 6 hours  HYDROmorphone  Injectable 0.25 milliGRAM(s) IV Push every 3 hours PRN breakthrough pain  propofol Infusion 20 MICROgram(s)/kG/Min IV Continuous <Continuous>  [x] Adequacy of sedation and pain control has been assessed and adjusted      RESPIRATORY  RR: 18 (04-21-21 @ 04:30) (16 - 33)  SpO2: 100% (04-21-21 @ 04:30) (95% - 100%)  Exam: intubated, bilateral breath sounds  Mechanical Ventilation: Mode: AC/ CMV (Assist Control/ Continuous Mandatory Ventilation), RR (machine): 18, RR (patient): 18, TV (machine): 450, FiO2: 40, PEEP: 5, ITime: 1, MAP: 8, PIP: 18  ABG - ( 21 Apr 2021 00:43 )  pH: 7.35  /  pCO2: 39    /  pO2: 130   / HCO3: 21    / Base Excess: -3.5  /  SaO2: 98      Meds:       CARDIOVASCULAR  HR: 72 (04-21-21 @ 04:30) (40 - 97)  BP: 75/51 (04-20-21 @ 21:15) (75/51 - 115/58)  BP(mean): 58 (04-20-21 @ 21:15) (58 - 80)  ABP: 106/52 (04-21-21 @ 04:30) (87/43 - 127/58)  ABP(mean): 70 (04-21-21 @ 04:30) (60 - 83)  VBG - ( 20 Apr 2021 16:38 )  pH: 7.35  /  pCO2: 44    /  pO2: 52    / HCO3: 24    / Base Excess: -1.0  /  SaO2: 82     Lactate: 1.2    Exam: regular rate, irregularly irregular rhythm  Cardiac Rhythm: a-fib  Perfusion     [x]Adequate   [ ]Inadequate  Mentation   [x]Normal       [ ]Reduced  Extremities  [x]Warm         [ ]Cool  Volume Status [ ]Hypervolemic [x]Euvolemic [ ]Hypovolemic  Meds: norepinephrine Infusion 0.05 MICROgram(s)/kG/Min IV Continuous <Continuous>      GI/NUTRITION  Exam: soft, nondistended. Drain with serosanguinous output. Dressing clean  Diet: NPO  Meds: pantoprazole Infusion 8 mG/Hr IV Continuous <Continuous>      GENITOURINARY  I&O's Detail    04-20 @ 07:01  -  04-21 @ 04:43  --------------------------------------------------------  IN:    Dexmedetomidine: 84.2 mL    IV PiggyBack: 50 mL    IV PiggyBack: 100 mL    Lactated Ringers: 875 mL    Lactated Ringers Bolus: 1500 mL    Norepinephrine: 15.3 mL    Pantoprazole: 60 mL    Phenylephrine: 371.4 mL    Vasopressin: 3.6 mL  Total IN: 3059.5 mL    OUT:    Bulb (mL): 300 mL    Indwelling Catheter - Urethral (mL): 205 mL    Propofol: 0 mL  Total OUT: 505 mL    Total NET: 2554.5 mL    Weight (kg): 102.1 (04-20 @ 17:05)  04-21    135  |  105  |  53<H>  ----------------------------<  149<H>  4.3   |  18<L>  |  1.74<H>    Ca    8.6      21 Apr 2021 00:43  Phos  4.5     04-21  Mg     2.1     04-21    TPro  6.4  /  Alb  3.1<L>  /  TBili  0.3  /  DBili  x   /  AST  20  /  ALT  17  /  AlkPhos  87  04-20    [x] Beckman catheter, indication: UOP monitoring  Meds: lactated ringers Bolus 500 milliLiter(s) IV Bolus once  lactated ringers. 1000 milliLiter(s) IV Continuous <Continuous>  sodium chloride 0.9% lock flush 10 milliLiter(s) IV Push every 1 hour PRN Pre/post blood products, medications, blood draw, and to maintain line patency      HEMATOLOGIC  Meds: enoxaparin Injectable 30 milliGRAM(s) SubCutaneous every 24 hours    [x] VTE Prophylaxis                        9.9    12.12 )-----------( 319      ( 21 Apr 2021 00:43 )             30.9     PT/INR - ( 21 Apr 2021 00:43 )   PT: 15.7 sec;   INR: 1.33 ratio    PTT - ( 21 Apr 2021 00:43 )  PTT:32.4 sec  Transfusion     [ ] PRBC   [ ] Platelets   [ ] FFP   [ ] Cryoprecipitate      INFECTIOUS DISEASES  T(C): 37.6 (04-21-21 @ 04:00), Max: 37.6 (04-21-21 @ 04:00)  WBC Count: 12.12 K/uL (04-21 @ 00:43)  WBC Count: 9.69 K/uL (04-20 @ 21:18)  WBC Count: 8.54 K/uL (04-20 @ 16:38)  WBC Count: 8.00 K/uL (04-20 @ 13:13)    Recent Cultures:    Meds: piperacillin/tazobactam IVPB.. 3.375 Gram(s) IV Intermittent every 8 hours      ENDOCRINE  Capillary Blood Glucose    Meds: vasopressin Infusion 0.03 Unit(s)/Min IV Continuous <Continuous>      ACCESS DEVICES:  [x] Peripheral IV  [x] Central Venous Line	[x] R	[ ] L	[x] IJ	[ ] Fem	[ ] SC	Placed: 04/21/21  [x] Arterial Line		[ ] R	[x] L	[ ] Fem	[x] Rad	[ ] Ax	Placed: 04/20/21  [ ] PICC:					[ ] Mediport  [x] Urinary Catheter, Date Placed: 04/20/21  [x] Necessity of urinary, arterial, and venous catheters discussed    OTHER MEDICATIONS:  chlorhexidine 0.12% Liquid 15 milliLiter(s) Oral Mucosa every 12 hours  chlorhexidine 4% Liquid 1 Application(s) Topical <User Schedule>    CODE STATUS: full code    IMAGING:

## 2021-04-21 NOTE — CONSULT NOTE ADULT - SUBJECTIVE AND OBJECTIVE BOX
SICU Consultation Note  =====================================================  89y M MDS, CKD, Afib on eliquis, BPH with intermittent self-catheterization, CHF, presented to ED [facility] from assisted living c/o diffuse, moderate, abdominal pain since this morning. Patient reports having 1 day hx of abdominal pain. He was found to be peritoneal on exam and a CT scan showing perforated duodenal ulcer. He was borderline hypotensive, bradycardic and hypothermic. He was given 1U of PRBCs and 2L of IVF in the ED and taken to the OR urgently. He is now s/p exploratory laparotomy with washout of purulence and pyloric exclusion retrocolic gastrojejunostomy and omental patch. Post operatively, he came to the SICU, intubated and on manfred.      Surgery Information  OR time: 3 hours     EBL:  20 mL        IV Fluids:  1.5L      Blood Products: 0  UOP:  200      PAST MEDICAL & SURGICAL HISTORY:  Benign Hypertension  BPH (Benign Prostatic Hypertrophy)  MDS (Myelodysplastic Syndrome)  Glaucoma  Anemia  Chronic constipation  Gall stones  Nocturia associated with benign prostatic hypertrophy  Osteomyelitis of arm  Choledocholithiasis  CKD (chronic kidney disease) stage 3, GFR 30-59 ml/min  BPH (benign prostatic hyperplasia)  MDS (myelodysplastic syndrome)  Anemia  Glaucoma  S/P Cholecystectomy  S/P TURP    History of cataract extraction  right eye     Osteomyelitis  left humerous- surgery childhood age 13    Laceration of finger, left, with tendon  1976    Mohs defect of nose  &#x27;s      Home Meds: Home Medications:  Alphagan P 0.1% ophthalmic solution: 1 drop(s) to each affected eye every 8 hours (10 Apr 2021 17:07)  amLODIPine 2.5 mg oral tablet: 1 tab(s) orally once a day (10 Apr 2021 17:07)  bimatoprost 0.01% ophthalmic solution: 1 drop(s) to both eyes once a day (in the evening) (10 Apr 2021 17:07)  Cosopt PF 2%-0.5% ophthalmic solution: 1 drop(s) to each affected eye 2 times a day (10 Apr 2021 17:07)  Eliquis 2.5 mg oral tablet: 1 tab(s) orally 2 times a day (10 Apr 2021 17:07)  erythromycin 0.5% ophthalmic ointment: 1 application to both eyes once a day (at bedtime) (10 Apr 2021 17:07)  febuxostat 40 mg oral tablet: 1 tab(s) orally once a day (10 Apr 2021 17:07)  finasteride 5 mg oral tablet: 1 tab(s) orally once a day (10 Apr 2021 17:07)  furosemide 40 mg oral tablet: 2 tab(s) orally 2 times a day (10 Apr 2021 17:07)  latanoprost 0.005% ophthalmic solution: 1 drop(s) to each affected eye once a day (at bedtime) (2021 14:25)  Metoprolol Tartrate 25 mg oral tablet: 1 tab(s) orally 2 times a day (10 Apr 2021 17:07)  pilocarpine 4% ophthalmic gel: 1 day(s) to each affected eye 3 times a day (10 Apr 2021 17:07)  Rhopressa 0.02% ophthalmic solution: 1 drop(s) in the right eye once a day (10 Apr 2021 17:07)  tamsulosin 0.4 mg oral capsule: 1 cap(s) orally 2 times a day (10 Apr 2021 17:07)  ursodiol 300 mg oral capsule: 1 cap(s) orally 2 times a day (10 Apr 2021 17:07)  Xanax 0.25 mg oral tablet: 1 tab(s) orally once a day (at bedtime) (10 Apr 2021 17:07)    Allergies: Allergies    Cipro (Swelling)  Cipro (Unknown)  Levaquin (Unknown)  sulfa drugs (Rash)  sulfa drugs (Unknown)    Intolerances      Soc:   Advanced Directives: Presumed Full Code     ROS:    REVIEW OF SYSTEMS    [ ] A ten-point review of systems was otherwise negative except as noted.  [ ] Due to altered mental status/intubation, subjective information were not able to be obtained from the patient. History was obtained, to the extent possible, from review of the chart and collateral sources of information.      CURRENT MEDICATIONS:   --------------------------------------------------------------------------------------  Neurologic Medications  propofol Infusion 20 MICROgram(s)/kG/Min IV Continuous <Continuous>    Respiratory Medications    Cardiovascular Medications  phenylephrine    Infusion 0.4 MICROgram(s)/kG/Min IV Continuous <Continuous>    Gastrointestinal Medications  pantoprazole  Injectable 80 milliGRAM(s) IV Push once  pantoprazole Infusion 8 mG/Hr IV Continuous <Continuous>    Genitourinary Medications    Hematologic/Oncologic Medications    Antimicrobial/Immunologic Medications  piperacillin/tazobactam IVPB.. 3.375 Gram(s) IV Intermittent every 8 hours    Endocrine/Metabolic Medications    Topical/Other Medications    --------------------------------------------------------------------------------------    VITAL SIGNS, INS/OUTS (last 24 hours):  --------------------------------------------------------------------------------------  ICU Vital Signs Last 24 Hrs  T(C): 33.1 (2021 17:36), Max: 35.8 (2021 12:41)  T(F): 91.6 (2021 17:36), Max: 96.5 (2021 12:41)  HR: 51 (2021 17:36) (40 - 53)  BP: 98/60 (2021 17:36) (79/48 - 101/57)  BP(mean): --  ABP: --  ABP(mean): --  RR: 16 (2021 17:36) (16 - 22)  SpO2: 100% (2021 17:36) (98% - 100%)    I&O's Summary    --------------------------------------------------------------------------------------    EXAM:  General/Neuro  Exam: Sedated    Respiratory  Exam: Intubated, minimal vent settings     Cardiovascular  Exam: S1, S2.  Regular rate and rhythm.  Peripheral edema  Cardiac Rhythm: Normal Sinus Rhythm  ECHO: 60% in     GI  Exam: Abdomen soft, Non-tender, Non-distended.    Wound:   clean, dry and intact   Current Diet:  NPO, NGT      Tubes/Lines/Drains  [x] Peripheral IV  [] Central Venous Line     	[] R	[] L	[] IJ	[] Fem	[] SC        Type:	    Date Placed:   [] Arterial Line		[] R	[] L	[] Fem	[] Rad	[] Ax	Date Placed:   [] PICC:         	[] Midline		[] Mediport           [] Urinary Catheter		Date Placed:     Extremities  Exam: Extremities warm, pink, well-perfused.        Derm:  Exam: Good skin turgor, no skin breakdown.      :   Exam: Beckman catheter in place.     LABS  --------------------------------------------------------------------------------------  Labs:  CAPILLARY BLOOD GLUCOSE                              7.9    8.54  )-----------( 306      ( 2021 16:38 )             24.9       Auto Neutrophil %: 88.4 % (21 @ 16:38)  Auto Immature Granulocyte %: 0.5 % (21 @ 16:38)  Auto Neutrophil %: 66.1 % (21 @ 13:13)  Auto Immature Granulocyte %: 0.8 % (21 @ 13:13)        140  |  102  |  56<H>  ----------------------------<  124<H>  3.8   |  23  |  1.78<H>      Calcium, Total Serum: 8.5 mg/dL (21 @ 13:13)      LFTs:             6.4  | 0.3  | 20       ------------------[87      ( 2021 13:13 )  3.1  | x    | 17          Lipase:10     Amylase:x         Blood Gas Arterial, Lactate: 1.1 mmol/L (21 @ 19:13)  Blood Gas Arterial, Lactate: 0.9 mmol/L (21 @ 18:14)  Blood Gas Venous - Lactate: 1.2 mmoL/L (21 @ 16:38)  Blood Gas Venous - Lactate: 1.0 mmoL/L (21 @ 13:12)    ABG - ( 2021 19:13 )  pH: 7.36  /  pCO2: 39    /  pO2: 396   / HCO3: 22    / Base Excess: -3.0  /  SaO2: 99              ABG - ( 2021 18:14 )  pH: 7.37  /  pCO2: 44    /  pO2: 76    / HCO3: 25    / Base Excess: -.2   /  SaO2: 94                Coags:     17.2   ----< 1.46    ( 2021 13:13 )     38.6            Serum Pro-Brain Natriuretic Peptide: 2955 pg/mL (21 @ 13:13)      Urinalysis Basic - ( 2021 15:04 )    Color: Light Yellow / Appearance: Clear / S.015 / pH: x  Gluc: x / Ketone: Negative  / Bili: Negative / Urobili: Negative   Blood: x / Protein: Negative / Nitrite: Negative   Leuk Esterase: Negative / RBC: 0 /hpf / WBC 0 /HPF   Sq Epi: x / Non Sq Epi: 1 /hpf / Bacteria: 0.0          --------------------------------------------------------------------------------------    OTHER LABS    IMAGING RESULTS        
Patient is a 89y old  Male who presents with a chief complaint of Abdominal pain (21 Apr 2021 04:43)      HPI:  89y M MDS, CKD, Afib on eliquis, BPH with intermittent self-catheterization, CHF, presents to ED [facility] from assisted living c/o diffuse, moderate, abdominal pain since this morning. Patient reports having 1 day hx of abdominal pain. (20 Apr 2021 17:12)        PAST MEDICAL & SURGICAL HISTORY:  Benign Hypertension  BPH (Benign Prostatic Hypertrophy)  MDS (Myelodysplastic Syndrome)  Glaucoma  Anemia  Chronic constipation  Gall stones  Nocturia associated with benign prostatic hypertrophy  Osteomyelitis of arm  Choledocholithiasis  CKD (chronic kidney disease) stage 3, GFR 30-59 ml/min  BPH (benign prostatic hyperplasia)  MDS (myelodysplastic syndrome)  Anemia  Glaucoma  GOUT  S/P Cholecystectomy  S/P TURP  History of cataract extraction  right eye 6/11  Osteomyelitis  left humerous- surgery childhood age 13  Laceration of finger, left, with tendon  1976  Mohs defect of nose  1990&#x27;s    Medications:  acetaminophen  IVPB .. 750 milliGRAM(s) IV Intermittent every 6 hours  chlorhexidine 0.12% Liquid 15 milliLiter(s) Oral Mucosa every 12 hours  chlorhexidine 4% Liquid 1 Application(s) Topical <User Schedule>  enoxaparin Injectable 30 milliGRAM(s) SubCutaneous every 24 hours  HYDROmorphone  Injectable 0.25 milliGRAM(s) IV Push every 3 hours PRN  lactated ringers. 1000 milliLiter(s) IV Continuous <Continuous>  norepinephrine Infusion 0.05 MICROgram(s)/kG/Min IV Continuous <Continuous>  pantoprazole Infusion 8 mG/Hr IV Continuous <Continuous>  piperacillin/tazobactam IVPB.. 3.375 Gram(s) IV Intermittent every 8 hours  propofol Infusion 20 MICROgram(s)/kG/Min IV Continuous <Continuous>  sodium chloride 0.9% lock flush 10 milliLiter(s) IV Push every 1 hour PRN  vasopressin Infusion 0.03 Unit(s)/Min IV Continuous <Continuous>        FAMILY HISTORY:  Family history of prostate cancer  wife demented  FH: CVA (cerebrovascular accident)  father  FH: arthritis  mother    Social History  non smoker no etoh, live wit demented wife at [facility]  patient is nit demented    REVIEW OF SYSTEMS  UBNABLE 2/2 intubation  General:	  Skin/Breast:  Ophthalmologic:  ENMT:	  Respiratory and Thorax:  Cardiovascular:	  Gastrointestinal:	  Genitourinary:	  Musculoskeletal:	  Neurological:	  Psychiatric:	  Hematology/Lymphatics:	  Endocrine:	  Allergic/Immunologic:	    Vital Signs Last 24 Hrs  T(C): 37.6 (21 Apr 2021 04:00), Max: 37.6 (21 Apr 2021 04:00)  T(F): 99.7 (21 Apr 2021 04:00), Max: 99.7 (21 Apr 2021 04:00)  HR: 79 (21 Apr 2021 08:23) (40 - 97)  BP: 75/51 (20 Apr 2021 21:15) (75/51 - 115/58)  BP(mean): 58 (20 Apr 2021 21:15) (58 - 80)  RR: 20 (21 Apr 2021 06:45) (16 - 33)  SpO2: 98% (21 Apr 2021 08:23) (95% - 100%)    Physical Exam: awake but lethargic, hears understands nods, denies pain  H&N: ncat, martell cwnl, eyes closed, nearly blind, no cb no tm , intubated  CV:rrrrapid 100  Pulm:ctab norrw but decr L lat  GI:very scant bs, abd wound  :rivera   Extrem:edema as usual bl le  Skin:cdi x chest  Vasc:hm-, vv-  Neuro:Speech unable 2/2 tube               Affect:awake coop               Memory:usu ok, remembered me               Judgment: usu ok               Orientation:y               Cognition:yes               Sensory:gr nl               MOtor: nfatmae               Gait:was indep pta               CN:gr nl  Psych:occ anxiety, takes xanax  at nite at home to sleep   Other:    Labs:  CBC Full  -  ( 21 Apr 2021 00:43 )  WBC Count : 12.12 K/uL  RBC Count : 3.25 M/uL  Hemoglobin : 9.9 g/dL  Hematocrit : 30.9 %  Platelet Count - Automated : 319 K/uL  Mean Cell Volume : 95.1 fl  Mean Cell Hemoglobin : 30.5 pg  Mean Cell Hemoglobin Concentration : 32.0 gm/dL  Auto Neutrophil # : x  Auto Lymphocyte # : x  Auto Monocyte # : x  Auto Eosinophil # : x  Auto Basophil # : x  Auto Neutrophil % : x  Auto Lymphocyte % : x  Auto Monocyte % : x  Auto Eosinophil % : x  Auto Basophil % : x      04-21    135  |  105  |  53<H>  ----------------------------<  149<H>  4.3   |  18<L>  |  1.74<H>    Ca    8.6      21 Apr 2021 00:43  Phos  4.5     04-21  Mg     2.1     04-21    TPro  6.4  /  Alb  3.1<L>  /  TBili  0.3  /  DBili  x   /  AST  20  /  ALT  17  /  AlkPhos  87  04-20      LIVER FUNCTIONS - ( 20 Apr 2021 13:13 )  Alb: 3.1 g/dL / Pro: 6.4 g/dL / ALK PHOS: 87 U/L / ALT: 17 U/L / AST: 20 U/L / GGT: x             PT/INR - ( 21 Apr 2021 00:43 )   PT: 15.7 sec;   INR: 1.33 ratio         PTT - ( 21 Apr 2021 00:43 )  PTT:32.4 sec    HEALTH ISSUES - PROBLEM Dx:

## 2021-04-21 NOTE — OCCUPATIONAL THERAPY INITIAL EVALUATION ADULT - PERTINENT HX OF CURRENT PROBLEM, REHAB EVAL
89y M MDS, CKD, Afib on eliquis, BPH with intermittent self-catheterization, CHF, presents to ED [facility]IBEM[facility] from assisted living c/o diffuse, moderate, abdominal pain since this morning. Patient reports having 1 day hx of abdominal pain. Xray Chest:The heart is slightly enlarged. The left costophrenic angle is not included in this study. The right lung is clear. Endotracheal tube is in good position. NG tube is in the stomach however its tip is not seen on the current study.

## 2021-04-21 NOTE — AIRWAY REMOVAL NOTE  ADULT & PEDS - ARTIFICAL AIRWAY REMOVAL COMMENTS
Written order for extubation verified. The patient was identified by full name and birth date compared to the identification band. Present during the procedure was JAMILA Funes.

## 2021-04-21 NOTE — PHYSICAL THERAPY INITIAL EVALUATION ADULT - ADDITIONAL COMMENTS
pt lives in the [facility] with spouse, no stairs, +elevator access, ambulates with rolling walker, assist for ADLs. difficulty with R eye vision

## 2021-04-21 NOTE — PROGRESS NOTE ADULT - ASSESSMENT
89y Male with history of CKD, CHF, afib on eliquis and BPH presenting with peritonitis from duodenal perforation s/p ex lap with pyloric excluding gastrojejunostomy and ethan patch on 4/20.     PLAN:   Neurologic:   - Sedated with propofol, switch to precedex when SBTing in AM  - Pain control with tylenol, dilaudid prn    Respiratory:   - Intubated   - Wean to SBT in AM    Cardiovascular:   - History of afib and CHF  - Holding home metoprolol while on pressors  - Echo April 2020 EF 59%  - S/p 1L bolus post op, 1.5L in SICU  - On levo and vasopressin for BP control, weaning down  - Lactate cleared    Gastrointestinal/Nutrition:   - NPO  - NGT to suction, do no manipulate  - Protonix gtt    Renal/Genitourinary:   - CKD III (baseline Cr 1.4-1.6)   - Beckman in place  - History of BPH, requiring intermittent catheterization, recent admission for urinary retention  - LR @ 125 while NPO  - Holding home lasix, tamsulosin    Hematologic:   - S/p 1U PRBCs in ED  - Holding home AC  - VTE ppx with lovenox    Infectious Disease:   - Zosyn  - Pending H Pylori serologies     Lines/Tubes:  - L rad a line  - PIVs  - Beckman  - ETT  - RIJ CVC    Endocrine:   - No active issues    Disposition: SICU  Code Status: Full code

## 2021-04-21 NOTE — PROGRESS NOTE ADULT - SUBJECTIVE AND OBJECTIVE BOX
ACS DAILY PROGRESS NOTE:     SUBJECTIVE/ROS: Patient is alert, limited subjective 2/2 intubation    MEDICATIONS  (STANDING):  acetaminophen  IVPB .. 750 milliGRAM(s) IV Intermittent every 6 hours  chlorhexidine 0.12% Liquid 15 milliLiter(s) Oral Mucosa every 12 hours  dexMEDEtomidine Infusion 0.2 MICROgram(s)/kG/Hr (5.11 mL/Hr) IV Continuous <Continuous>  enoxaparin Injectable 30 milliGRAM(s) SubCutaneous every 24 hours  lactated ringers Bolus 500 milliLiter(s) IV Bolus once  lactated ringers. 1000 milliLiter(s) (125 mL/Hr) IV Continuous <Continuous>  pantoprazole Infusion 8 mG/Hr (10 mL/Hr) IV Continuous <Continuous>  phenylephrine    Infusion 0.4 MICROgram(s)/kG/Min (15.3 mL/Hr) IV Continuous <Continuous>  piperacillin/tazobactam IVPB.. 3.375 Gram(s) IV Intermittent every 8 hours    MEDICATIONS  (PRN):  HYDROmorphone  Injectable 0.25 milliGRAM(s) IV Push every 3 hours PRN breakthrough pain      OBJECTIVE:    Vital Signs Last 24 Hrs  T(C): 34.8 (2021 00:00), Max: 35.8 (2021 12:41)  T(F): 94.6 (2021 00:00), Max: 96.5 (2021 12:41)  HR: 94 (2021 02:15) (40 - 97)  BP: 75/51 (2021 21:15) (75/51 - 115/58)  BP(mean): 58 (2021 21:15) (58 - 80)  RR: 25 (2021 02:15) (16 - 29)  SpO2: 96% (2021 02:15) (95% - 100%)    Physical Exam  Gen: NAD, alert  Pulm: intubated on mechanical vent  Heart: RRR  Abd: soft, ND, midline incision with dressing in place, w/o strikethrough bleeding, YOLIE drain at right side abd with ~270 cc serosanguinous fluid post-op   Ext: skin warm to touch      I&O's Detail    2021 07:01  -  2021 02:25  --------------------------------------------------------  IN:    Dexmedetomidine: 23 mL    IV PiggyBack: 125 mL    IV PiggyBack: 50 mL    Lactated Ringers: 625 mL    Lactated Ringers Bolus: 1500 mL    Pantoprazole: 40 mL    Phenylephrine: 271.8 mL  Total IN: 2634.8 mL    OUT:    Bulb (mL): 200 mL    Indwelling Catheter - Urethral (mL): 140 mL    Propofol: 0 mL  Total OUT: 340 mL    Total NET: 2294.8 mL      Daily Height in cm: 167.64 (2021 17:05)    Daily Weight in k.7 (2021 00:26)    LABS:                        9.9    12.12 )-----------( 319      ( 2021 00:43 )             30.9     04-21    135  |  105  |  53<H>  ----------------------------<  149<H>  4.3   |  18<L>  |  1.74<H>    Ca    8.6      2021 00:43  Phos  4.5     04-21  Mg     2.1     04-21    TPro  6.4  /  Alb  3.1<L>  /  TBili  0.3  /  DBili  x   /  AST  20  /  ALT  17  /  AlkPhos  87  04-20    PT/INR - ( 2021 00:43 )   PT: 15.7 sec;   INR: 1.33 ratio         PTT - ( 2021 00:43 )  PTT:32.4 sec  Urinalysis Basic - ( 2021 15:04 )    Color: Light Yellow / Appearance: Clear / S.015 / pH: x  Gluc: x / Ketone: Negative  / Bili: Negative / Urobili: Negative   Blood: x / Protein: Negative / Nitrite: Negative   Leuk Esterase: Negative / RBC: 0 /hpf / WBC 0 /HPF   Sq Epi: x / Non Sq Epi: 1 /hpf / Bacteria: 0.0

## 2021-04-21 NOTE — PROCEDURE NOTE - NSINFORMCONSENT_GEN_A_CORE
Per daughter/Benefits, risks, and possible complications of procedure explained to patient/caregiver who verbalized understanding and gave written consent.

## 2021-04-21 NOTE — PHYSICAL THERAPY INITIAL EVALUATION ADULT - PRECAUTIONS/LIMITATIONS, REHAB EVAL
CT Abdomen: Perforated duodenal ulcer with free intraperitoneal gas and adjacent fluid. s/p exploratory laparotomy extensive washout with purulence CT Abdomen: Perforated duodenal ulcer with free intraperitoneal gas and adjacent fluid. s/p exploratory laparotomy extensive washout with purulence, intubated postop, now extubated on 4/21/21/fall precautions

## 2021-04-21 NOTE — PROGRESS NOTE ADULT - ATTENDING COMMENTS
89y Male with history of CKD, CHF, afib on eliquis and BPH presenting with peritonitis from duodenal perforation s/p ex lap with pyloric excluding gastrojejunostomy and ethan patch on 4/20.  Acute resp insufficiency post op, good gas exchange, met criteria for extubation s/p extubated, tolerating well  Hypotensive in septic shock, titrate Levo, DC Vaso  IVF  Abx Zosyn, add diflucan  HCT stable   Change Lovenox dose to 40 mg, CrCl 35  Stable MICH on CKD 89y Male with history of CKD, CHF, afib on Eliquis and BPH presenting with peritonitis from duodenal perforation s/p ex lap with pyloric excluding gastrojejunostomy and ethan patch on 4/20.  Acute resp insufficiency post op, good gas exchange, met criteria for extubation s/p extubated, tolerating well  Hypotensive in septic shock, titrate Levo, DC Vaso  IVF  Abx Zosyn, add diflucan  HCT stable   Change Lovenox dose to 40 mg, CrCl 35  Stable MICH on CKD  Change Protonix drip to BID IV protonix  Blood H pylori level

## 2021-04-22 DIAGNOSIS — R41.0 DISORIENTATION, UNSPECIFIED: ICD-10-CM

## 2021-04-22 DIAGNOSIS — D64.9 ANEMIA, UNSPECIFIED: ICD-10-CM

## 2021-04-22 LAB
ANION GAP SERPL CALC-SCNC: 11 MMOL/L — SIGNIFICANT CHANGE UP (ref 5–17)
ANION GAP SERPL CALC-SCNC: 15 MMOL/L — SIGNIFICANT CHANGE UP (ref 5–17)
APTT BLD: 39.2 SEC — HIGH (ref 27.5–35.5)
BUN SERPL-MCNC: 59 MG/DL — HIGH (ref 7–23)
BUN SERPL-MCNC: 59 MG/DL — HIGH (ref 7–23)
CALCIUM SERPL-MCNC: 8.2 MG/DL — LOW (ref 8.4–10.5)
CALCIUM SERPL-MCNC: 8.2 MG/DL — LOW (ref 8.4–10.5)
CHLORIDE SERPL-SCNC: 104 MMOL/L — SIGNIFICANT CHANGE UP (ref 96–108)
CHLORIDE SERPL-SCNC: 104 MMOL/L — SIGNIFICANT CHANGE UP (ref 96–108)
CO2 SERPL-SCNC: 20 MMOL/L — LOW (ref 22–31)
CO2 SERPL-SCNC: 20 MMOL/L — LOW (ref 22–31)
CREAT ?TM UR-MCNC: 47 MG/DL — SIGNIFICANT CHANGE UP
CREAT SERPL-MCNC: 2.36 MG/DL — HIGH (ref 0.5–1.3)
CREAT SERPL-MCNC: 2.37 MG/DL — HIGH (ref 0.5–1.3)
GAS PNL BLDA: SIGNIFICANT CHANGE UP
GLUCOSE SERPL-MCNC: 103 MG/DL — HIGH (ref 70–99)
GLUCOSE SERPL-MCNC: 154 MG/DL — HIGH (ref 70–99)
H PYLORI AB SER-ACNC: 9.8 UNITS — SIGNIFICANT CHANGE UP
HCT VFR BLD CALC: 26.5 % — LOW (ref 39–50)
HCT VFR BLD CALC: 27.2 % — LOW (ref 39–50)
HGB BLD-MCNC: 8.8 G/DL — LOW (ref 13–17)
HGB BLD-MCNC: 9 G/DL — LOW (ref 13–17)
INR BLD: 1.79 RATIO — HIGH (ref 0.88–1.16)
MAGNESIUM SERPL-MCNC: 2.2 MG/DL — SIGNIFICANT CHANGE UP (ref 1.6–2.6)
MAGNESIUM SERPL-MCNC: 2.2 MG/DL — SIGNIFICANT CHANGE UP (ref 1.6–2.6)
MCHC RBC-ENTMCNC: 30.5 PG — SIGNIFICANT CHANGE UP (ref 27–34)
MCHC RBC-ENTMCNC: 30.7 PG — SIGNIFICANT CHANGE UP (ref 27–34)
MCHC RBC-ENTMCNC: 33.1 GM/DL — SIGNIFICANT CHANGE UP (ref 32–36)
MCHC RBC-ENTMCNC: 33.2 GM/DL — SIGNIFICANT CHANGE UP (ref 32–36)
MCV RBC AUTO: 92.2 FL — SIGNIFICANT CHANGE UP (ref 80–100)
MCV RBC AUTO: 92.3 FL — SIGNIFICANT CHANGE UP (ref 80–100)
NRBC # BLD: 0 /100 WBCS — SIGNIFICANT CHANGE UP (ref 0–0)
NRBC # BLD: 0 /100 WBCS — SIGNIFICANT CHANGE UP (ref 0–0)
OSMOLALITY SERPL: 308 MOSMOL/KG — HIGH (ref 280–301)
OSMOLALITY UR: 404 MOS/KG — SIGNIFICANT CHANGE UP (ref 300–900)
PHOSPHATE SERPL-MCNC: 4.5 MG/DL — SIGNIFICANT CHANGE UP (ref 2.5–4.5)
PHOSPHATE SERPL-MCNC: 4.9 MG/DL — HIGH (ref 2.5–4.5)
PLATELET # BLD AUTO: 262 K/UL — SIGNIFICANT CHANGE UP (ref 150–400)
PLATELET # BLD AUTO: 307 K/UL — SIGNIFICANT CHANGE UP (ref 150–400)
POTASSIUM SERPL-MCNC: 4.1 MMOL/L — SIGNIFICANT CHANGE UP (ref 3.5–5.3)
POTASSIUM SERPL-MCNC: 4.3 MMOL/L — SIGNIFICANT CHANGE UP (ref 3.5–5.3)
POTASSIUM SERPL-SCNC: 4.1 MMOL/L — SIGNIFICANT CHANGE UP (ref 3.5–5.3)
POTASSIUM SERPL-SCNC: 4.3 MMOL/L — SIGNIFICANT CHANGE UP (ref 3.5–5.3)
POTASSIUM UR-SCNC: 41 MMOL/L — SIGNIFICANT CHANGE UP
PROTHROM AB SERPL-ACNC: 20.9 SEC — HIGH (ref 10.6–13.6)
RBC # BLD: 2.87 M/UL — LOW (ref 4.2–5.8)
RBC # BLD: 2.95 M/UL — LOW (ref 4.2–5.8)
RBC # FLD: 22.4 % — HIGH (ref 10.3–14.5)
RBC # FLD: 22.4 % — HIGH (ref 10.3–14.5)
SODIUM SERPL-SCNC: 135 MMOL/L — SIGNIFICANT CHANGE UP (ref 135–145)
SODIUM SERPL-SCNC: 139 MMOL/L — SIGNIFICANT CHANGE UP (ref 135–145)
SODIUM UR-SCNC: 20 MMOL/L — SIGNIFICANT CHANGE UP
WBC # BLD: 17.15 K/UL — HIGH (ref 3.8–10.5)
WBC # BLD: 21.43 K/UL — HIGH (ref 3.8–10.5)
WBC # FLD AUTO: 17.15 K/UL — HIGH (ref 3.8–10.5)
WBC # FLD AUTO: 21.43 K/UL — HIGH (ref 3.8–10.5)

## 2021-04-22 PROCEDURE — 99233 SBSQ HOSP IP/OBS HIGH 50: CPT

## 2021-04-22 PROCEDURE — 71045 X-RAY EXAM CHEST 1 VIEW: CPT | Mod: 26

## 2021-04-22 RX ORDER — DORZOLAMIDE HYDROCHLORIDE TIMOLOL MALEATE 20; 5 MG/ML; MG/ML
1 SOLUTION/ DROPS OPHTHALMIC
Qty: 0 | Refills: 0 | DISCHARGE

## 2021-04-22 RX ORDER — SODIUM CHLORIDE 9 MG/ML
1000 INJECTION, SOLUTION INTRAVENOUS
Refills: 0 | Status: DISCONTINUED | OUTPATIENT
Start: 2021-04-22 | End: 2021-04-22

## 2021-04-22 RX ORDER — BRIMONIDINE TARTRATE 2 MG/MG
1 SOLUTION/ DROPS OPHTHALMIC THREE TIMES A DAY
Refills: 0 | Status: DISCONTINUED | OUTPATIENT
Start: 2021-04-22 | End: 2021-04-30

## 2021-04-22 RX ORDER — PILOCARPINE HCL 4 %
1 DROPS OPHTHALMIC (EYE) THREE TIMES A DAY
Refills: 0 | Status: DISCONTINUED | OUTPATIENT
Start: 2021-04-22 | End: 2021-04-30

## 2021-04-22 RX ORDER — ENOXAPARIN SODIUM 100 MG/ML
30 INJECTION SUBCUTANEOUS EVERY 24 HOURS
Refills: 0 | Status: DISCONTINUED | OUTPATIENT
Start: 2021-04-22 | End: 2021-04-30

## 2021-04-22 RX ORDER — SODIUM CHLORIDE 9 MG/ML
1000 INJECTION, SOLUTION INTRAVENOUS
Refills: 0 | Status: DISCONTINUED | OUTPATIENT
Start: 2021-04-22 | End: 2021-04-26

## 2021-04-22 RX ORDER — ERYTHROMYCIN BASE 5 MG/GRAM
1 OINTMENT (GRAM) OPHTHALMIC (EYE)
Qty: 0 | Refills: 0 | DISCHARGE

## 2021-04-22 RX ADMIN — PANTOPRAZOLE SODIUM 40 MILLIGRAM(S): 20 TABLET, DELAYED RELEASE ORAL at 17:37

## 2021-04-22 RX ADMIN — PIPERACILLIN AND TAZOBACTAM 25 GRAM(S): 4; .5 INJECTION, POWDER, LYOPHILIZED, FOR SOLUTION INTRAVENOUS at 20:39

## 2021-04-22 RX ADMIN — SODIUM CHLORIDE 50 MILLILITER(S): 9 INJECTION, SOLUTION INTRAVENOUS at 10:51

## 2021-04-22 RX ADMIN — Medication 1 DROP(S): at 22:41

## 2021-04-22 RX ADMIN — CHLORHEXIDINE GLUCONATE 1 APPLICATION(S): 213 SOLUTION TOPICAL at 05:19

## 2021-04-22 RX ADMIN — PANTOPRAZOLE SODIUM 40 MILLIGRAM(S): 20 TABLET, DELAYED RELEASE ORAL at 05:19

## 2021-04-22 RX ADMIN — FLUCONAZOLE 50 MILLIGRAM(S): 150 TABLET ORAL at 08:40

## 2021-04-22 RX ADMIN — BRIMONIDINE TARTRATE 1 DROP(S): 2 SOLUTION/ DROPS OPHTHALMIC at 22:41

## 2021-04-22 RX ADMIN — PIPERACILLIN AND TAZOBACTAM 25 GRAM(S): 4; .5 INJECTION, POWDER, LYOPHILIZED, FOR SOLUTION INTRAVENOUS at 05:19

## 2021-04-22 RX ADMIN — SODIUM CHLORIDE 50 MILLILITER(S): 9 INJECTION, SOLUTION INTRAVENOUS at 17:37

## 2021-04-22 RX ADMIN — ENOXAPARIN SODIUM 30 MILLIGRAM(S): 100 INJECTION SUBCUTANEOUS at 14:44

## 2021-04-22 RX ADMIN — LATANOPROST 1 DROP(S): 0.05 SOLUTION/ DROPS OPHTHALMIC; TOPICAL at 22:42

## 2021-04-22 RX ADMIN — PIPERACILLIN AND TAZOBACTAM 25 GRAM(S): 4; .5 INJECTION, POWDER, LYOPHILIZED, FOR SOLUTION INTRAVENOUS at 14:43

## 2021-04-22 RX ADMIN — SODIUM CHLORIDE 125 MILLILITER(S): 9 INJECTION, SOLUTION INTRAVENOUS at 08:41

## 2021-04-22 RX ADMIN — SODIUM CHLORIDE 50 MILLILITER(S): 9 INJECTION, SOLUTION INTRAVENOUS at 20:42

## 2021-04-22 NOTE — DIETITIAN INITIAL EVALUATION ADULT. - REASON INDICATOR FOR ASSESSMENT
Nutrition Assessment warranted for length of stay on SICU  Information obtained from: medical record, communication with team. Pt extubated 4/22; A&O x1, agitated/confused.

## 2021-04-22 NOTE — PROGRESS NOTE ADULT - ASSESSMENT
89y Male with history of CKD, CHF, afib on eliquis and BPH presenting with peritonitis from duodenal perforation s/p ex lap with pyloric excluding gastrojejunostomy and ethan patch on 4/20. Pt successfully is extubated in SICU, now HD weaning off pressors.         Plan:   - NPO/IVFm  - NGT to wall suction  - Protonix IVP BID  - F/u Crea. pt endorsing MICH on CKD  - Pain control: IV tylenol, dilaudid prn   - DVT ppx: Lovenox  - Appreciate SICU care    ACS  [id number]

## 2021-04-22 NOTE — PROGRESS NOTE ADULT - ATTENDING COMMENTS
Pt seen and examined with ACS PA/resident, agree with above.    1. Perforated large DU s/p Jordi patch and pyloric exclusion with septic shock, IMCH as below:  - NGT with bilious output  - No bowel function  - If no bowel function by Saturday will consider TPN. GJ anastomoses can have edema with functional GOO, so if ngt output is high may consider UGIS by next week.  - Continue abx for peritonitis (4 days)  - Levophed to maintain MAP >65mmHg  - Hold home antihypertensives, diuretics, and betablocker for now    2. 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 Glaucoma:  - Medications reviewed with pt's family at the bedside. H&P updated, med rec done, eye drops started.

## 2021-04-22 NOTE — DIETITIAN INITIAL EVALUATION ADULT. - ADD RECOMMEND
1) Monitor GI function, advance diet to clear liquids, then Low Fiber as tolerated. 2) Monitor need for oral supplements.

## 2021-04-22 NOTE — DIETITIAN INITIAL EVALUATION ADULT. - PERTINENT LABORATORY DATA
04-22 @ 00:35: Sodium 135, Potassium 4.3, Calcium 8.2<L>, Magnesium 2.2, Phosphorus 4.9<H>, BUN 59<H>, Creatinine 2.36<H>, Glucose 154<H>, Hemoglobin 9.0<L>, Hematocrit 27.2<L>

## 2021-04-22 NOTE — PROGRESS NOTE ADULT - SUBJECTIVE AND OBJECTIVE BOX
interval events:   - Patient successfully extubated. On 0.02 norepinephrine.   - Added fluconazole for proximal GI perf  - Protonix infusion changed to protonix IVP BID  - Lovenox started for VTE ppx  - Added haldol 2.5mg q6hrs prn for agitation  - NGT: 550cc/ 24hrs, bilious   - YOLIE: 470 SS    Patient seen and examined at bedside. Feeling better.  Pain is well controlled.      Physical Exam  Gen: NAD, alert  Pulm: Patent airways  Abd: soft, ND, midline incision with dressing in place, w/o strikethrough bleeding, YOLIE drain at right side abd with ~470cc SS.  NGT to wall suction 550cc /24 hrs and bilious.         Vital Signs Last 24 Hrs  T(C): 35.2 (2021 11:00), Max: 36.4 (2021 15:00)  T(F): 95.4 (2021 11:00), Max: 97.5 (2021 15:00)  HR: 90 (2021 11:45) (70 - 106)  BP: 146/69 (2021 09:48) (126/75 - 146/69)  BP(mean): 99 (2021 09:45) (93 - 99)  RR: 21 (2021 11:45) (13 - 30)  SpO2: 99% (2021 11:45) (95% - 100%)    I&O's Detail    2021 07:01  -  2021 07:00  --------------------------------------------------------  IN:    IV PiggyBack: 200 mL    IV PiggyBack: 375 mL    Lactated Ringers: 3000 mL    Norepinephrine: 264.2 mL    Pantoprazole: 20 mL    Propofol: 6.2 mL    Vasopressin: 3.6 mL  Total IN: 3869 mL    OUT:    Bulb (mL): 570 mL    Indwelling Catheter - Urethral (mL): 800 mL    Nasogastric/Oral tube (mL): 650 mL  Total OUT: 2020 mL    Total NET: 1849 mL      2021 07:01  -  2021 14:06  --------------------------------------------------------  IN:    Lactated Ringers: 250 mL    Norepinephrine: 1.9 mL  Total IN: 251.9 mL    OUT:    Bulb (mL): 130 mL  Total OUT: 130 mL    Total NET: 121.9 mL          04    135  |  104  |  59<H>  ----------------------------<  154<H>  4.3   |  20<L>  |  2.36<H>    Ca    8.2<L>      2021 00:35  Phos  4.9     04-22  Mg     2.2     04-22                              9.0    21.43 )-----------( 307      ( 2021 00:35 )             27.2       PT/INR - ( 2021 00:36 )   PT: 20.9 sec;   INR: 1.79 ratio         PTT - ( 2021 00:36 )  PTT:39.2 sec    LABS:                         9.0    21.43 )-----------( 307      ( 2021 00:35 )             27.2     04-22    135  |  104  |  59<H>  ----------------------------<  154<H>  4.3   |  20<L>  |  2.36<H>    Ca    8.2<L>      2021 00:35  Phos  4.9     04-22  Mg     2.2     04-22      PT/INR - ( 2021 00:36 )   PT: 20.9 sec;   INR: 1.79 ratio         PTT - ( 2021 00:36 )  PTT:39.2 sec  Urinalysis Basic - ( 2021 15:04 )    Color: Light Yellow / Appearance: Clear / S.015 / pH: x  Gluc: x / Ketone: Negative  / Bili: Negative / Urobili: Negative   Blood: x / Protein: Negative / Nitrite: Negative   Leuk Esterase: Negative / RBC: 0 /hpf / WBC 0 /HPF   Sq Epi: x / Non Sq Epi: 1 /hpf / Bacteria: 0.0        MEDICATIONS  (STANDING):  chlorhexidine 4% Liquid 1 Application(s) Topical <User Schedule>  enoxaparin Injectable 30 milliGRAM(s) SubCutaneous every 24 hours  fluconAZOLE IVPB 200 milliGRAM(s) IV Intermittent every 24 hours  fluconAZOLE IVPB      latanoprost 0.005% Ophthalmic Solution 1 Drop(s) Both EYES at bedtime  multiple electrolytes Injection Type 1 1000 milliLiter(s) (50 mL/Hr) IV Continuous <Continuous>  norepinephrine Infusion 0.05 MICROgram(s)/kG/Min (9.57 mL/Hr) IV Continuous <Continuous>  pantoprazole  Injectable 40 milliGRAM(s) IV Push every 12 hours  piperacillin/tazobactam IVPB.. 3.375 Gram(s) IV Intermittent every 8 hours    MEDICATIONS  (PRN):  acetaminophen  IVPB .. 500 milliGRAM(s) IV Intermittent every 6 hours PRN Mild Pain (1 - 3)  HYDROmorphone  Injectable 0.25 milliGRAM(s) IV Push every 3 hours PRN breakthrough pain  sodium chloride 0.9% lock flush 10 milliLiter(s) IV Push every 1 hour PRN Pre/post blood products, medications, blood draw, and to maintain line patency

## 2021-04-22 NOTE — PROGRESS NOTE ADULT - ATTENDING COMMENTS
89y Male with history of CKD, CHF, afib on Eliquis and BPH presenting with peritonitis from duodenal perforation s/p ex lap with pyloric excluding gastrojejunostomy and ethan patch on 4/20.    Dc Haldol, good pain management for delirium  Hypotensive in septic shock, titrate Levo  CXR reviewed, developing pul edema in significant fluid balance, cut down IVF rate  Abx Zosyn, diflucan, monitor leucocytosis  Worsening of MICH on CKD  HCT stable   Change Lovenox dose to 30 mg, CrCl < 30  Protonix  IV BID   Blood H pylori level pending  OOB mobilization

## 2021-04-22 NOTE — PROGRESS NOTE ADULT - ASSESSMENT
89y Male with history of CKD, CHF, afib on eliquis and BPH presenting with peritonitis from duodenal perforation s/p ex lap with pyloric excluding gastrojejunostomy and ethan patch on 4/20.     PLAN:   Neurologic: delirium  - Monitor mental status  - Pain control with tylenol, dilaudid prn  - Haldol prn agitation    Respiratory:  - Extubated 04/22  - IS / OOBTC / ambulation as tolerated to prevent atelectasis    Cardiovascular: history of afib and CHF  - Holding home metoprolol while on vasopressors  - Echo April 2020 EF 59%  - On norepinephrine infusion, wean as tolerated to maintain MAP >65mmHg  - Lactate cleared    Gastrointestinal/Nutrition:   - NPO  - NGT to suction, do not manipulate  - Protonix BID for GI perf    Renal/Genitourinary: CKD III (baseline Cr 1.4-1.6)   - Monitor I/Os, Beckman in place  - History of BPH, requiring intermittent catheterization, recent admission for urinary retention  - LR @ 125 while NPO  - Holding home lasix, tamsulosin    Hematologic:   - Holding home AC  - VTE ppx with lovenox    Infectious Disease:   - Zosyn and fluconazole  - Pending H Pylori serologies     Endocrine:   - Monitor glucose on BMPs    Lines/Tubes:  - L rad a line  - PIVs  - Beckman  - RIJ CVC    Disposition: SICU  Code Status: Full code

## 2021-04-22 NOTE — DIETITIAN INITIAL EVALUATION ADULT. - REASON FOR ADMISSION
Abdominal pain    Per chart: "89y Male with history of CKD, CHF, afib on eliquis and BPH presenting with peritonitis from duodenal perforation s/p ex lap with pyloric excluding gastrojejunostomy and ethan patch on 4/20."

## 2021-04-22 NOTE — ADVANCED PRACTICE NURSE CONSULT - RECOMMEDATIONS
Recommendations  1)  Continue w/ Advanced Cavilon Barrier Film to coccygeal area every 72 hours. This is bacteria & water proof & will apply a clear protective barrier over this area.  2) Continue to turn & position in bed & off load when OOB to chair.  3) The use of an off loading seat cushion when pt OOB to chair. Limit sitting to 2 hour intervals.  4) Nutrition support as pt condition allows  5) Continue to monitor skin  Remain available as requested by staff  Discussed w/ Staff JAMILA Paniagua - to f/u w/ seat cushion(off loading)

## 2021-04-22 NOTE — PROGRESS NOTE ADULT - ASSESSMENT
better today than yesterday, reassured.  reorients  see labs SCr ok if doesnt rise more  see meds, Sx Team mgmnt  I will fu for continuity, spoke c RN bedside anf patientr and dtr Darren

## 2021-04-22 NOTE — DIETITIAN INITIAL EVALUATION ADULT. - PERTINENT MEDS FT
MEDICATIONS  (STANDING):  chlorhexidine 4% Liquid 1 Application(s) Topical <User Schedule>  enoxaparin Injectable 40 milliGRAM(s) SubCutaneous every 24 hours  fluconAZOLE IVPB 200 milliGRAM(s) IV Intermittent every 24 hours  fluconAZOLE IVPB      lactated ringers. 1000 milliLiter(s) (125 mL/Hr) IV Continuous <Continuous>  latanoprost 0.005% Ophthalmic Solution 1 Drop(s) Both EYES at bedtime  norepinephrine Infusion 0.05 MICROgram(s)/kG/Min (9.57 mL/Hr) IV Continuous <Continuous>  pantoprazole  Injectable 40 milliGRAM(s) IV Push every 12 hours  piperacillin/tazobactam IVPB.. 3.375 Gram(s) IV Intermittent every 8 hours

## 2021-04-22 NOTE — ADVANCED PRACTICE NURSE CONSULT - REASON FOR CONSULT
Requested by nursing staff to assess skin. PMH:  89y male MDS, CKD, Afib on eliquis, BPH with intermittent self-catheterization, CHF, presents to ED BIBEMS from assisted living c/o diffuse, moderate, abdominal pain since this morning. Patient reports having 1 day hx of abdominal pain. On 4/20/2021 s/p  Exploratory laparotomy, Gastrojejunostomy with duodenal exclusion & Repair, hernia, umbilical.

## 2021-04-22 NOTE — ADVANCED PRACTICE NURSE CONSULT - ASSESSMENT
Pt is awake & alert, OOB to chair. Pt is in SICU on a [place] [facility] low air loss surface & being turned & positioned as per nursing documentation. Patient scored 12 on Heriberto Risk assessment. Pt has a rivera catheter to contain urine. Pt was assisted by nursing staff to standing position & coccygeal area assessed. Stage 1 non blanchable erythemia noted on coccygeal area measuring 5cm(l) x 6cm(w). Staff applied Advanced Cavilon earlier today. Pt sitting on a a disposable underpad which will wick moisture away from skin

## 2021-04-22 NOTE — PROGRESS NOTE ADULT - SUBJECTIVE AND OBJECTIVE BOX
DATE OF SERVICE: 04-22-21 @ 08:33    HPI:  89y M MDS, CKD, Afib on eliquis, BPH with intermittent self-catheterization, CHF[facility]/o diffuse, moderate, abdominal pain since this morning. Patient reports having 1 day hx of abdominal pain. (20 Apr 2021 17:12)      Interval EventspOp day 2, extubated yesterday, alert but confused, thinks he is dgoing to die. reorients, still npo  still on pressors but bp ok, labs ok, SCR incr but has been there before. see meds    MEDICATIONS  (STANDING):  chlorhexidine 4% Liquid 1 Application(s) Topical <User Schedule>  enoxaparin Injectable 40 milliGRAM(s) SubCutaneous every 24 hours  fluconAZOLE IVPB 200 milliGRAM(s) IV Intermittent every 24 hours  fluconAZOLE IVPB      lactated ringers. 1000 milliLiter(s) (125 mL/Hr) IV Continuous <Continuous>  latanoprost 0.005% Ophthalmic Solution 1 Drop(s) Both EYES at bedtime  norepinephrine Infusion 0.05 MICROgram(s)/kG/Min (9.57 mL/Hr) IV Continuous <Continuous>  pantoprazole  Injectable 40 milliGRAM(s) IV Push every 12 hours  piperacillin/tazobactam IVPB.. 3.375 Gram(s) IV Intermittent every 8 hours    MEDICATIONS  (PRN):  acetaminophen  IVPB .. 500 milliGRAM(s) IV Intermittent every 6 hours PRN Mild Pain (1 - 3)  haloperidol    Injectable 2.5 milliGRAM(s) IV Push every 6 hours PRN Agitation  HYDROmorphone  Injectable 0.25 milliGRAM(s) IV Push every 3 hours PRN breakthrough pain  sodium chloride 0.9% lock flush 10 milliLiter(s) IV Push every 1 hour PRN Pre/post blood products, medications, blood draw, and to maintain line patency      Patient is a 89y old  Male who presents with a chief complaint of Abdominal pain    Per chart: "89y Male with history of CKD, CHF, afib on eliquis and BPH presenting with peritonitis from duodenal perforation s/p ex lap with pyloric excluding gastrojejunostomy and ethan patch on 4/20." (22 Apr 2021 07:36)      REVIEW OF SYSTEMS  confused alert  General:nad , worried  Skin/Breast:  Ophthalmologic: sees but liottle no no no ch  ENMT:	no vco no ch  Respiratory and Thorax:no cough no sp no sob  Cardiovascular:no cp no palp  Gastrointestinal:no nvcd   Genitourinary:no co   Musculoskeletal:	co stiff no pain  Neurological:	no co  Psychiatric:	  Hematology/Lymphatics:	  Endocrine:	no poly udd  Allergic/Immunologic:  AOSN	y      Vital Signs Last 24 Hrs  T(C): 36 (22 Apr 2021 03:00), Max: 36.7 (21 Apr 2021 11:00)  T(F): 96.8 (22 Apr 2021 03:00), Max: 98.1 (21 Apr 2021 11:00)  HR: 85 (22 Apr 2021 06:45) (65 - 111)  BP: 113/63 (21 Apr 2021 10:15) (113/63 - 113/63)  BP(mean): 83 (21 Apr 2021 10:15) (83 - 83)  RR: 18 (22 Apr 2021 06:45) (12 - 30)  SpO2: 98% (22 Apr 2021 06:45) (96% - 100%)    PHYSICAL EXAM:    Constitutional:vss nad coop  H+Nnc at  Eyes:martell cwnl nearly blind one eye  ENMT:oral hygiene poor, dry  Neck:no cb notm  Breasts:  Back:  Respiratory:ctab nrrw, better air m  Cardiovascular:irreg irreg  Gastrointestinal:obese, abd wound  Genitourinary:rivera  Rectal:  Extremities:very heavy, no pitting now  Vascular:hm -, vv-  Neurological:alert nfatmae. confused but reorioentsa  Skin:cdi  Lymph Nodes:  Musculoskeletal:  Psychiatric:worried, calm    LABS  CBC Full  -  ( 22 Apr 2021 00:35 )  WBC Count : 21.43 K/uL  RBC Count : 2.95 M/uL  Hemoglobin : 9.0 g/dL  Hematocrit : 27.2 %  Platelet Count - Automated : 307 K/uL  Mean Cell Volume : 92.2 fl  Mean Cell Hemoglobin : 30.5 pg  Mean Cell Hemoglobin Concentration : 33.1 gm/dL  Auto Neutrophil # : x  Auto Lymphocyte # : x  Auto Monocyte # : x  Auto Eosinophil # : x  Auto Basophil # : x  Auto Neutrophil % : x  Auto Lymphocyte % : x  Auto Monocyte % : x  Auto Eosinophil % : x  Auto Basophil % : x      04-22    135  |  104  |  59<H>  ----------------------------<  154<H>  4.3   |  20<L>  |  2.36<H>    Ca    8.2<L>      22 Apr 2021 00:35  Phos  4.9     04-22  Mg     2.2     04-22    TPro  6.4  /  Alb  3.1<L>  /  TBili  0.3  /  DBili  x   /  AST  20  /  ALT  17  /  AlkPhos  87  04-20      PT/INR - ( 22 Apr 2021 00:36 )   PT: 20.9 sec;   INR: 1.79 ratio         PTT - ( 22 Apr 2021 00:36 )  PTT:39.2 sec    Imaging:    Xray:    Echo:    CT:    MRI:    Tele:    Orders:    DAVID Lemus [phone]

## 2021-04-22 NOTE — PROGRESS NOTE ADULT - SUBJECTIVE AND OBJECTIVE BOX
HISTORY  89y M MDS, CKD, Afib on eliquis, BPH with intermittent self-catheterization, CHF, presented to ED [facility] from assisted living c/o diffuse, moderate, abdominal pain since this morning. Patient reports having 1 day hx of abdominal pain. He was found to be peritoneal on exam and a CT scan showing perforated duodenal ulcer. He was borderline hypotensive, bradycardic and hypothermic. He was given 1U of PRBCs and 2L of IVF in the ED and taken to the OR urgently. He is now s/p exploratory laparotomy with washout of purulence and pyloric exclusion retrocolic gastrojejunostomy and omental patch. Post operatively, he came to the SICU, intubated and on phenylephrine.      24 HOUR EVENTS:  - Extubated  - Vasopressin discontinued, norepinephrine requirements decreasing  - Added fluconazole for proximal GI perf  - Protonix infusion changed to protonix IVP BID  - Lovenox started for VTE ppx  - Added haldol 2.5mg q6hrs prn for agitation      NEURO  Exam: AOx1, following commands, confused  Meds: acetaminophen  IVPB .. 500 milliGRAM(s) IV Intermittent every 6 hours PRN Mild Pain (1 - 3)  haloperidol    Injectable 2.5 milliGRAM(s) IV Push every 6 hours PRN Agitation  HYDROmorphone  Injectable 0.25 milliGRAM(s) IV Push every 3 hours PRN breakthrough pain  [x] Adequacy of sedation and pain control has been assessed and adjusted      RESPIRATORY  RR: 17 (04-22-21 @ 01:30) (12 - 33)  SpO2: 100% (04-22-21 @ 01:30) (95% - 100%)  Exam: unlabored respirations, saturating well on room air  Mechanical Ventilation: Mode: CPAP with PS, RR (patient): 19, FiO2: 30, PEEP: 5, PS: 5, MAP: 7  ABG - ( 22 Apr 2021 00:18 )  pH: 7.39  /  pCO2: 39    /  pO2: 74    / HCO3: 23    / Base Excess: -1.3  /  SaO2: 95      Meds:       CARDIOVASCULAR  HR: 77 (04-22-21 @ 01:30) (65 - 111)  BP: 113/63 (04-21-21 @ 10:15) (113/63 - 113/63)  BP(mean): 83 (04-21-21 @ 10:15) (83 - 83)  ABP: 104/46 (04-22-21 @ 01:30) (92/41 - 142/66)  ABP(mean): 66 (04-22-21 @ 01:30) (59 - 93)  VBG - ( 20 Apr 2021 16:38 )  pH: 7.35  /  pCO2: 44    /  pO2: 52    / HCO3: 24    / Base Excess: -1.0  /  SaO2: 82     Lactate: 1.2    Exam: regular rate, irregularly irregular rhythm  Cardiac Rhythm: a-fib  Perfusion     [x]Adequate   [ ]Inadequate  Mentation   [x]Normal       [ ]Reduced  Extremities  [x]Warm         [ ]Cool  Volume Status [ ]Hypervolemic [x]Euvolemic [ ]Hypovolemic  Meds: norepinephrine Infusion 0.05 MICROgram(s)/kG/Min IV Continuous <Continuous>      GI/NUTRITION  Exam: soft, nondistended, mild syed-incisional tenderness. YOLIE drain with serosanguinous output  Diet: NPO  Meds: pantoprazole  Injectable 40 milliGRAM(s) IV Push every 12 hours      GENITOURINARY  I&O's Detail    04-20 @ 07:01  -  04-21 @ 07:00  --------------------------------------------------------  IN:    Dexmedetomidine: 84.2 mL    IV PiggyBack: 200 mL    IV PiggyBack: 175 mL    Lactated Ringers: 1250 mL    Lactated Ringers Bolus: 1500 mL    Norepinephrine: 66.9 mL    Pantoprazole: 90 mL    Phenylephrine: 371.4 mL    Propofol: 36.6 mL    Vasopressin: 9 mL  Total IN: 3783.1 mL    OUT:    Bulb (mL): 400 mL    Indwelling Catheter - Urethral (mL): 285 mL    Nasogastric/Oral tube (mL): 250 mL    Propofol: 0 mL  Total OUT: 935 mL    Total NET: 2848.1 mL    04-21 @ 07:01  -  04-22 @ 01:46  --------------------------------------------------------  IN:    IV PiggyBack: 200 mL    IV PiggyBack: 300 mL    Lactated Ringers: 2250 mL    Norepinephrine: 233.6 mL    Pantoprazole: 20 mL    Propofol: 6.2 mL    Vasopressin: 3.6 mL  Total IN: 3013.4 mL    OUT:    Bulb (mL): 470 mL    Indwelling Catheter - Urethral (mL): 545 mL    Nasogastric/Oral tube (mL): 550 mL  Total OUT: 1565 mL    Total NET: 1448.4 mL    04-22    135  |  104  |  59<H>  ----------------------------<  154<H>  4.3   |  20<L>  |  2.36<H>    Ca    8.2<L>      22 Apr 2021 00:35  Phos  4.9     04-22  Mg     2.2     04-22    TPro  6.4  /  Alb  3.1<L>  /  TBili  0.3  /  DBili  x   /  AST  20  /  ALT  17  /  AlkPhos  87  04-20    [x] Beckman catheter, indication: UOP monitoring  Meds: lactated ringers. 1000 milliLiter(s) IV Continuous <Continuous>  sodium chloride 0.9% lock flush 10 milliLiter(s) IV Push every 1 hour PRN Pre/post blood products, medications, blood draw, and to maintain line patency      HEMATOLOGIC  Meds: enoxaparin Injectable 40 milliGRAM(s) SubCutaneous every 24 hours  [x] VTE Prophylaxis                        9.0    21.43 )-----------( 307      ( 22 Apr 2021 00:35 )             27.2     PT/INR - ( 22 Apr 2021 00:36 )   PT: 20.9 sec;   INR: 1.79 ratio    PTT - ( 22 Apr 2021 00:36 )  PTT:39.2 sec  Transfusion     [ ] PRBC   [ ] Platelets   [ ] FFP   [ ] Cryoprecipitate      INFECTIOUS DISEASES  T(C): 35.8 (04-21-21 @ 23:00), Max: 37.6 (04-21-21 @ 04:00)  WBC Count: 21.43 K/uL (04-22 @ 00:35)    Recent Cultures:  Specimen Source: .Blood Blood, 04-20 @ 22:52; Results   No growth to date.; Gram Stain: --; Organism: --  Specimen Source: .Urine Clean Catch (Midstream), 04-20 @ 22:50; Results   <10,000 CFU/mL Normal Urogenital Jessica; Gram Stain: --; Organism: --    Meds: fluconAZOLE IVPB 200 milliGRAM(s) IV Intermittent every 24 hours  fluconAZOLE IVPB      piperacillin/tazobactam IVPB.. 3.375 Gram(s) IV Intermittent every 8 hours      ENDOCRINE  Capillary Blood Glucose    Meds:       ACCESS DEVICES:  [x] Peripheral IV  [x] Central Venous Line	[x] R	[ ] L	[x] IJ	[ ] Fem	[ ] SC	Placed: 04/21/21  [x] Arterial Line		[ ] R	[x] L	[ ] Fem	[x] Rad	[ ] Ax	Placed: 04/20/21  [ ] PICC:					[ ] Mediport  [x] Urinary Catheter, Date Placed: 04/20/21  [x] Necessity of urinary, arterial, and venous catheters discussed      OTHER MEDICATIONS:  chlorhexidine 4% Liquid 1 Application(s) Topical <User Schedule>  latanoprost 0.005% Ophthalmic Solution 1 Drop(s) Both EYES at bedtime      CODE STATUS: full code      IMAGING:

## 2021-04-22 NOTE — DIETITIAN INITIAL EVALUATION ADULT. - OTHER INFO
GASTROINTESTINAL:  Last BM: none noted; abdominal distention  Bowel Regimen: none  NGT output x 24-hours: 650ml  RLQ YOLIE output x 24-hours: 570ml    NUTRITION STATUS:  - IVF: lactated ringers @ 125 ml/hr  - CKD III (in setting of advanced age)    WEIGHT HISTORY:  - Relatively stable weight >10 years, per past RD notes: 100.7kg (3/12/2010), 96.3kg (4/29/20), 102.1kg (4/20/21)

## 2021-04-22 NOTE — DIETITIAN INITIAL EVALUATION ADULT. - ORAL INTAKE PTA/DIET HISTORY
DIET Unable to assess; pt admitted from assisted living facility  ALLERGIES: NKFA  HOME NUTRITION SUPPLEMENTS: none noted

## 2021-04-23 LAB
ANION GAP SERPL CALC-SCNC: 13 MMOL/L — SIGNIFICANT CHANGE UP (ref 5–17)
APTT BLD: 36.5 SEC — HIGH (ref 27.5–35.5)
BUN SERPL-MCNC: 58 MG/DL — HIGH (ref 7–23)
CALCIUM SERPL-MCNC: 8.2 MG/DL — LOW (ref 8.4–10.5)
CHLORIDE SERPL-SCNC: 106 MMOL/L — SIGNIFICANT CHANGE UP (ref 96–108)
CO2 SERPL-SCNC: 21 MMOL/L — LOW (ref 22–31)
CREAT SERPL-MCNC: 2.46 MG/DL — HIGH (ref 0.5–1.3)
GLUCOSE SERPL-MCNC: 126 MG/DL — HIGH (ref 70–99)
INR BLD: 1.41 RATIO — HIGH (ref 0.88–1.16)
MAGNESIUM SERPL-MCNC: 2.3 MG/DL — SIGNIFICANT CHANGE UP (ref 1.6–2.6)
PHOSPHATE SERPL-MCNC: 4.5 MG/DL — SIGNIFICANT CHANGE UP (ref 2.5–4.5)
POTASSIUM SERPL-MCNC: 4.2 MMOL/L — SIGNIFICANT CHANGE UP (ref 3.5–5.3)
POTASSIUM SERPL-SCNC: 4.2 MMOL/L — SIGNIFICANT CHANGE UP (ref 3.5–5.3)
PROTHROM AB SERPL-ACNC: 16.7 SEC — HIGH (ref 10.6–13.6)
SODIUM SERPL-SCNC: 140 MMOL/L — SIGNIFICANT CHANGE UP (ref 135–145)

## 2021-04-23 PROCEDURE — 71045 X-RAY EXAM CHEST 1 VIEW: CPT | Mod: 26

## 2021-04-23 PROCEDURE — 99232 SBSQ HOSP IP/OBS MODERATE 35: CPT

## 2021-04-23 RX ORDER — METOPROLOL TARTRATE 50 MG
5 TABLET ORAL EVERY 6 HOURS
Refills: 0 | Status: DISCONTINUED | OUTPATIENT
Start: 2021-04-23 | End: 2021-04-28

## 2021-04-23 RX ORDER — CHLORHEXIDINE GLUCONATE 213 G/1000ML
1 SOLUTION TOPICAL
Refills: 0 | Status: DISCONTINUED | OUTPATIENT
Start: 2021-04-23 | End: 2021-04-24

## 2021-04-23 RX ORDER — FLUCONAZOLE 150 MG/1
200 TABLET ORAL EVERY 24 HOURS
Refills: 0 | Status: DISCONTINUED | OUTPATIENT
Start: 2021-04-23 | End: 2021-04-25

## 2021-04-23 RX ORDER — HYDROMORPHONE HYDROCHLORIDE 2 MG/ML
0.25 INJECTION INTRAMUSCULAR; INTRAVENOUS; SUBCUTANEOUS
Refills: 0 | Status: DISCONTINUED | OUTPATIENT
Start: 2021-04-23 | End: 2021-04-24

## 2021-04-23 RX ORDER — METOPROLOL TARTRATE 50 MG
12.5 TABLET ORAL EVERY 12 HOURS
Refills: 0 | Status: DISCONTINUED | OUTPATIENT
Start: 2021-04-23 | End: 2021-04-23

## 2021-04-23 RX ADMIN — BRIMONIDINE TARTRATE 1 DROP(S): 2 SOLUTION/ DROPS OPHTHALMIC at 13:49

## 2021-04-23 RX ADMIN — Medication 5 MILLIGRAM(S): at 12:34

## 2021-04-23 RX ADMIN — Medication 5 MILLIGRAM(S): at 17:20

## 2021-04-23 RX ADMIN — ENOXAPARIN SODIUM 30 MILLIGRAM(S): 100 INJECTION SUBCUTANEOUS at 13:50

## 2021-04-23 RX ADMIN — PIPERACILLIN AND TAZOBACTAM 25 GRAM(S): 4; .5 INJECTION, POWDER, LYOPHILIZED, FOR SOLUTION INTRAVENOUS at 04:24

## 2021-04-23 RX ADMIN — PIPERACILLIN AND TAZOBACTAM 25 GRAM(S): 4; .5 INJECTION, POWDER, LYOPHILIZED, FOR SOLUTION INTRAVENOUS at 13:49

## 2021-04-23 RX ADMIN — PANTOPRAZOLE SODIUM 40 MILLIGRAM(S): 20 TABLET, DELAYED RELEASE ORAL at 06:01

## 2021-04-23 RX ADMIN — BRIMONIDINE TARTRATE 1 DROP(S): 2 SOLUTION/ DROPS OPHTHALMIC at 06:00

## 2021-04-23 RX ADMIN — PIPERACILLIN AND TAZOBACTAM 25 GRAM(S): 4; .5 INJECTION, POWDER, LYOPHILIZED, FOR SOLUTION INTRAVENOUS at 23:22

## 2021-04-23 RX ADMIN — Medication 1 DROP(S): at 13:49

## 2021-04-23 RX ADMIN — Medication 5 MILLIGRAM(S): at 23:23

## 2021-04-23 RX ADMIN — FLUCONAZOLE 50 MILLIGRAM(S): 150 TABLET ORAL at 09:52

## 2021-04-23 RX ADMIN — Medication 1 DROP(S): at 06:01

## 2021-04-23 RX ADMIN — PANTOPRAZOLE SODIUM 40 MILLIGRAM(S): 20 TABLET, DELAYED RELEASE ORAL at 17:20

## 2021-04-23 RX ADMIN — Medication 5 MILLIGRAM(S): at 06:01

## 2021-04-23 RX ADMIN — CHLORHEXIDINE GLUCONATE 1 APPLICATION(S): 213 SOLUTION TOPICAL at 06:01

## 2021-04-23 NOTE — PROGRESS NOTE ADULT - ASSESSMENT
89y Male with history of CKD, CHF, afib on eliquis and BPH presenting with peritonitis from duodenal perforation s/p ex lap with pyloric excluding gastrojejunostomy and ethan patch on 4/20        Plan:   - NPO/IVFm  - NGT to wall suction  - Protonix IVP BID  - F/u Crea. pt endorsing MICH on CKD  - Pain control: IV tylenol, dilaudid prn   - DVT ppx: Lovenox  - Appreciate SICU care    [id number]

## 2021-04-23 NOTE — PROGRESS NOTE ADULT - SUBJECTIVE AND OBJECTIVE BOX
DATE OF SERVICE: 04-23-21 @ 15:48    HPI:  89y M MDS, CKD, Afib on eliquis, BPH with intermittent self-catheterization, CHF, presents to ED [facility] from assisted living c/o diffuse, moderate, abdominal pain since this morning. Patient reports having 1 day hx of abdominal pain. (20 Apr 2021 17:12)      Interval Events  more alert lesc confused, off pressors  see notes, labs etc.    MEDICATIONS  (STANDING):  brimonidine 0.2% Ophthalmic Solution 1 Drop(s) Both EYES three times a day  chlorhexidine 2% Cloths 1 Application(s) Topical <User Schedule>  enoxaparin Injectable 30 milliGRAM(s) SubCutaneous every 24 hours  fluconAZOLE IVPB 200 milliGRAM(s) IV Intermittent every 24 hours  latanoprost 0.005% Ophthalmic Solution 1 Drop(s) Both EYES at bedtime  metoprolol tartrate Injectable 5 milliGRAM(s) IV Push every 6 hours  multiple electrolytes Injection Type 1 1000 milliLiter(s) (75 mL/Hr) IV Continuous <Continuous>  pantoprazole  Injectable 40 milliGRAM(s) IV Push every 12 hours  pilocarpine 4% Solution 1 Drop(s) Both EYES three times a day  piperacillin/tazobactam IVPB.. 3.375 Gram(s) IV Intermittent every 8 hours    MEDICATIONS  (PRN):  acetaminophen  IVPB .. 500 milliGRAM(s) IV Intermittent every 6 hours PRN Mild Pain (1 - 3)  HYDROmorphone  Injectable 0.25 milliGRAM(s) IV Push every 3 hours PRN Severe Pain (7 - 10)      Patient is a 89y old  Male who presents with a chief complaint of Abdominal pain (23 Apr 2021 09:30)      REVIEW OF SYSTEMS    General:nad oriented wants to be oob  Skin/Breast:mno co no ch  Ophthalmologic:no co no ch  ENMT:	no co no ch hears ok wants to eat  Respiratory and Thorax:no cough no sp no sob  Cardiovascular:no cp no palp  Gastrointestinal:no nvcd no gas no bm yet  Genitourinary:no fdi  Musculoskeletal:	no no p  Neurological:	no co  Psychiatric:	no co  Hematology/Lymphatics:	  Endocrine:no polyudd of course  Allergic/Immunologic:  AOSN	y      Vital Signs Last 24 Hrs  T(C): 36 (23 Apr 2021 12:00), Max: 36.3 (22 Apr 2021 19:00)  T(F): 96.8 (23 Apr 2021 12:00), Max: 97.3 (22 Apr 2021 19:00)  HR: 86 (23 Apr 2021 14:00) (74 - 118)  BP: 121/70 (23 Apr 2021 13:00) (121/70 - 155/76)  BP(mean): 90 (23 Apr 2021 13:00) (90 - 106)  RR: 19 (23 Apr 2021 14:00) (15 - 101)  SpO2: 97% (23 Apr 2021 14:00) (94% - 100%)    PHYSICAL EXAM:    Constitutional:vss nad better  H+N ncat  Eyes:martell cwnl  ENMT:hears swallows spoeaks ok haS NGT  Neck:NO CB NO TM  Breasts:  Back:  Respiratory:CTAB NO RW POOR INSP EFFORT  Cardiovascular:rrr  Gastrointestinal:bs + soft nt  Genitourinary:fhm-, vv-  Neurological:alert o, not confused now, atmae,   Skin:cdi x chest  Lymph Nodes:  calm coop oriented understands    LABS  CBC Full  -  ( 22 Apr 2021 23:43 )  WBC Count : 17.15 K/uL  RBC Count : 2.87 M/uL  Hemoglobin : 8.8 g/dL  Hematocrit : 26.5 %  Platelet Count - Automated : 262 K/uL  Mean Cell Volume : 92.3 fl  Mean Cell Hemoglobin : 30.7 pg  Mean Cell Hemoglobin Concentration : 33.2 gm/dL  Auto Neutrophil # : x  Auto Lymphocyte # : x  Auto Monocyte # : x  Auto Eosinophil # : x  Auto Basophil # : x  Auto Neutrophil % : x  Auto Lymphocyte % : x  Auto Monocyte % : x  Auto Eosinophil % : x  Auto Basophil % : x      04-22    140  |  106  |  58<H>  ----------------------------<  126<H>  4.2   |  21<L>  |  2.46<H>    Ca    8.2<L>      22 Apr 2021 23:43  Phos  4.5     04-22  Mg     2.3     04-22        PT/INR - ( 22 Apr 2021 23:43 )   PT: 16.7 sec;   INR: 1.41 ratio         PTT - ( 22 Apr 2021 23:43 )  PTT:36.5 sec    Imaging:    Xray:    Echo:    CT:    MRI:    Tele:    Orders:    DAVDI Lemus [phone]

## 2021-04-23 NOTE — PROGRESS NOTE ADULT - SUBJECTIVE AND OBJECTIVE BOX
ACS DAILY PROGRESS NOTE:       SUBJECTIVE/ROS: Patient seen and examined on rounds- drain 535cc over 24 hours and 500cc NGT- off manfred  Denies nausea, vomiting, chest pain, shortness of breath    24 HOUR EVENTS:  - Urine lytes sent iso worsening MICH: although FENa prerenal, IVF decreased to 50cc/hr due to CXR with pulm edema  - H pylori serologies negative  - Started metoprolol 5mg q6hrs for a-fib (on 25mg BID at home)      MEDICATIONS  (STANDING):  brimonidine 0.2% Ophthalmic Solution 1 Drop(s) Both EYES three times a day  chlorhexidine 2% Cloths 1 Application(s) Topical <User Schedule>  enoxaparin Injectable 30 milliGRAM(s) SubCutaneous every 24 hours  fluconAZOLE IVPB 200 milliGRAM(s) IV Intermittent every 24 hours  latanoprost 0.005% Ophthalmic Solution 1 Drop(s) Both EYES at bedtime  metoprolol tartrate Injectable 5 milliGRAM(s) IV Push every 6 hours  multiple electrolytes Injection Type 1 1000 milliLiter(s) (50 mL/Hr) IV Continuous <Continuous>  pantoprazole  Injectable 40 milliGRAM(s) IV Push every 12 hours  pilocarpine 4% Solution 1 Drop(s) Both EYES three times a day  piperacillin/tazobactam IVPB.. 3.375 Gram(s) IV Intermittent every 8 hours    MEDICATIONS  (PRN):  acetaminophen  IVPB .. 500 milliGRAM(s) IV Intermittent every 6 hours PRN Mild Pain (1 - 3)  HYDROmorphone  Injectable 0.25 milliGRAM(s) IV Push every 3 hours PRN Severe Pain (7 - 10)      OBJECTIVE:    Vital Signs Last 24 Hrs  T(C): 36 (2021 07:00), Max: 36.3 (2021 19:00)  T(F): 96.8 (2021 07:00), Max: 97.3 (2021 19:00)  HR: 85 (2021 09:00) (75 - 118)  BP: 146/69 (2021 09:48) (146/69 - 146/69)  BP(mean): 99 (2021 09:45) (99 - 99)  RR: 19 (2021 09:00) (17 - 101)  SpO2: 98% (2021 09:00) (94% - 100%)        I&O's Detail    2021 07:01  -  2021 07:00  --------------------------------------------------------  IN:    IV PiggyBack: 200 mL    Lactated Ringers: 250 mL    multiple electrolytes Injection Type 1.: 1100 mL    Norepinephrine: 1.9 mL  Total IN: 1551.9 mL    OUT:    Bulb (mL): 535 mL    Indwelling Catheter - Urethral (mL): 985 mL    Nasogastric/Oral tube (mL): 500 mL  Total OUT: 2020 mL    Total NET: -468.1 mL      2021 07:01  -  2021 09:30  --------------------------------------------------------  IN:    multiple electrolytes Injection Type 1.: 100 mL  Total IN: 100 mL    OUT:    Indwelling Catheter - Urethral (mL): 75 mL  Total OUT: 75 mL    Total NET: 25 mL          Daily     Daily Weight in k (2021 00:24)    LABS:                        8.8    17.15 )-----------( 262      ( 2021 23:43 )             26.5     04-22    140  |  106  |  58<H>  ----------------------------<  126<H>  4.2   |  21<L>  |  2.46<H>    Ca    8.2<L>      2021 23:43  Phos  4.5     04-22  Mg     2.3     04-22      PT/INR - ( 2021 23:43 )   PT: 16.7 sec;   INR: 1.41 ratio         PTT - ( 2021 23:43 )  PTT:36.5 sec              Physical Exam  Gen: NAD, alert  Pulm: Patent airways  Abd: soft, ND, midline incision with dressing in place, w/o strikethrough bleeding, YOLIE drain at right side serosanguinous  NGT to wall suction bilious

## 2021-04-23 NOTE — PROGRESS NOTE ADULT - ATTENDING COMMENTS
Pt seen and examined with ACS PA/resident, agree with above.    1. Perforated large DU s/p Jordi patch and pyloric exclusion with septic shock, MICH as below:  - NGT with bilious output  - No bowel function  - If no bowel function by Saturday will consider TPN. GJ anastomoses can have edema with functional GOO, so if ngt output is high may consider UGIS by next week.  - Continue abx for peritonitis (4 days)  - Levophed off  - Hold home antihypertensives, diuretics, and betablocker for now    2. 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 Glaucoma:  - Medications restarted

## 2021-04-23 NOTE — PROGRESS NOTE ADULT - SUBJECTIVE AND OBJECTIVE BOX
HISTORY  89y M MDS, CKD, Afib on eliquis, BPH with intermittent self-catheterization, CHF, [facility]/o diffuse, moderate, abdominal pain since this morning. Patient reports having 1 day hx of abdominal pain. He was found to be peritoneal on exam and a CT scan showing perforated duodenal ulcer. He was borderline hypotensive, bradycardic and hypothermic. He was given 1U of PRBCs and 2L of IVF in the ED and taken to the OR urgently. He is now s/p exploratory laparotomy with washout of purulence and pyloric exclusion retrocolic gastrojejunostomy and omental patch. Post operatively, he came to the SICU, intubated and on phenylephrine.      24 HOUR EVENTS:  - Urine lytes sent iso worsening MICH: although FENa prerenal, IVF decreased to 50cc/hr due to CXR with pulm edema  - H pylori serologies negative  - Started metoprolol 12.5 q12hrs for a-fib (on 25mg BID at home)      NEURO  Exam: AOx2, following commands  Meds: acetaminophen  IVPB .. 500 milliGRAM(s) IV Intermittent every 6 hours PRN Mild Pain (1 - 3)  HYDROmorphone  Injectable 0.25 milliGRAM(s) IV Push every 3 hours PRN breakthrough pain  [x] Adequacy of sedation and pain control has been assessed and adjusted      RESPIRATORY  RR: 35 (04-23-21 @ 04:00) (14 - 101)  SpO2: 97% (04-23-21 @ 04:00) (94% - 100%)  Exam: unlabored respirations, saturating well on room air  ABG - ( 22 Apr 2021 00:18 )  pH: 7.39  /  pCO2: 39    /  pO2: 74    / HCO3: 23    / Base Excess: -1.3  /  SaO2: 95      Meds:       CARDIOVASCULAR  HR: 87 (04-23-21 @ 04:00) (70 - 118)  BP: 146/69 (04-22-21 @ 09:48) (126/75 - 146/69)  BP(mean): 99 (04-22-21 @ 09:45) (93 - 99)  ABP: 117/56 (04-23-21 @ 04:00) (71/31 - 145/67)  ABP(mean): 79 (04-23-21 @ 04:00) (53 - 96)  Exam: regular rate, irregularly irregular rhythm  Cardiac Rhythm: a-fib  Perfusion     [x]Adequate   [ ]Inadequate  Mentation   [x]Normal       [ ]Reduced  Extremities  [x]Warm         [ ]Cool  Volume Status [ ]Hypervolemic [x]Euvolemic [ ]Hypovolemic  Meds:       GI/NUTRITION  Exam: soft, nondistended, mild syed-incisional tenderness. YOLIE drain with serosanguinous output  Diet: NPO  Meds: pantoprazole  Injectable 40 milliGRAM(s) IV Push every 12 hours      GENITOURINARY  I&O's Detail    04-21 @ 07:01  -  04-22 @ 07:00  --------------------------------------------------------  IN:    IV PiggyBack: 200 mL    IV PiggyBack: 375 mL    Lactated Ringers: 3000 mL    Norepinephrine: 264.2 mL    Pantoprazole: 20 mL    Propofol: 6.2 mL    Vasopressin: 3.6 mL  Total IN: 3869 mL    OUT:    Bulb (mL): 570 mL    Indwelling Catheter - Urethral (mL): 800 mL    Nasogastric/Oral tube (mL): 650 mL  Total OUT: 2020 mL    Total NET: 1849 mL    04-22 @ 07:01 - 04-23 @ 04:19  --------------------------------------------------------  IN:    IV PiggyBack: 125 mL    Lactated Ringers: 250 mL    multiple electrolytes Injection Type 1.: 950 mL    Norepinephrine: 1.9 mL  Total IN: 1326.9 mL    OUT:    Bulb (mL): 415 mL    Indwelling Catheter - Urethral (mL): 785 mL    Nasogastric/Oral tube (mL): 350 mL  Total OUT: 1550 mL    Total NET: -223.1 mL    04-22    140  |  106  |  58<H>  ----------------------------<  126<H>  4.2   |  21<L>  |  2.46<H>    Ca    8.2<L>      22 Apr 2021 23:43  Phos  4.5     04-22  Mg     2.3     04-22    [x] Beckman catheter, indication: UOP monitoring  Meds: multiple electrolytes Injection Type 1 1000 milliLiter(s) IV Continuous <Continuous>  sodium chloride 0.9% lock flush 10 milliLiter(s) IV Push every 1 hour PRN Pre/post blood products, medications, blood draw, and to maintain line patency      HEMATOLOGIC  Meds: enoxaparin Injectable 30 milliGRAM(s) SubCutaneous every 24 hours  [x] VTE Prophylaxis                        8.8    17.15 )-----------( 262      ( 22 Apr 2021 23:43 )             26.5     PT/INR - ( 22 Apr 2021 23:43 )   PT: 16.7 sec;   INR: 1.41 ratio    PTT - ( 22 Apr 2021 23:43 )  PTT:36.5 sec  Transfusion     [ ] PRBC   [ ] Platelets   [ ] FFP   [ ] Cryoprecipitate      INFECTIOUS DISEASES  T(C): 36.3 (04-23-21 @ 03:00), Max: 36.3 (04-22-21 @ 19:00)  WBC Count: 17.15 K/uL (04-22 @ 23:43)    Recent Cultures:  Specimen Source: .Blood Blood, 04-20 @ 22:52; Results   No growth to date.; Gram Stain: --; Organism: --  Specimen Source: .Urine Clean Catch (Midstream), 04-20 @ 22:50; Results   <10,000 CFU/mL Normal Urogenital Jessica; Gram Stain: --; Organism: --    Meds: fluconAZOLE IVPB 200 milliGRAM(s) IV Intermittent every 24 hours  fluconAZOLE IVPB      piperacillin/tazobactam IVPB.. 3.375 Gram(s) IV Intermittent every 8 hours      ENDOCRINE  Capillary Blood Glucose    Meds:       ACCESS DEVICES:  [x] Peripheral IV  [x] Central Venous Line	[x] R	[ ] L	[x] IJ	[ ] Fem	[ ] SC	Placed: 04/21/21  [x] Arterial Line		[ ] R	[x] L	[ ] Fem	[x] Rad	[ ] Ax	Placed: 04/20/21  [ ] PICC:					[ ] Mediport  [x] Urinary Catheter, Date Placed: 04/20/21  [x] Necessity of urinary, arterial, and venous catheters discussed      OTHER MEDICATIONS:  brimonidine 0.2% Ophthalmic Solution 1 Drop(s) Both EYES three times a day  chlorhexidine 4% Liquid 1 Application(s) Topical <User Schedule>  latanoprost 0.005% Ophthalmic Solution 1 Drop(s) Both EYES at bedtime  pilocarpine 4% Solution 1 Drop(s) Both EYES three times a day      CODE STATUS: Full code      IMAGING: HISTORY  89y M MDS, CKD, Afib on eliquis, BPH with intermittent self-catheterization, CHF, presented to ED [facility] from assisted living c/o diffuse, moderate, abdominal pain since this morning. Patient reports having 1 day hx of abdominal pain. He was found to be peritoneal on exam and a CT scan showing perforated duodenal ulcer. He was borderline hypotensive, bradycardic and hypothermic. He was given 1U of PRBCs and 2L of IVF in the ED and taken to the OR urgently. He is now s/p exploratory laparotomy with washout of purulence and pyloric exclusion retrocolic gastrojejunostomy and omental patch. Post operatively, he came to the SICU, intubated and on phenylephrine.      24 HOUR EVENTS:  - Urine lytes sent iso worsening MICH: although FENa prerenal, IVF decreased to 50cc/hr due to CXR with pulm edema  - H pylori serologies negative  - Started metoprolol 5mg q6hrs for a-fib (on 25mg BID at home)      NEURO  Exam: AOx2, following commands  Meds: acetaminophen  IVPB .. 500 milliGRAM(s) IV Intermittent every 6 hours PRN Mild Pain (1 - 3)  HYDROmorphone  Injectable 0.25 milliGRAM(s) IV Push every 3 hours PRN breakthrough pain  [x] Adequacy of sedation and pain control has been assessed and adjusted      RESPIRATORY  RR: 35 (04-23-21 @ 04:00) (14 - 101)  SpO2: 97% (04-23-21 @ 04:00) (94% - 100%)  Exam: unlabored respirations, saturating well on room air  ABG - ( 22 Apr 2021 00:18 )  pH: 7.39  /  pCO2: 39    /  pO2: 74    / HCO3: 23    / Base Excess: -1.3  /  SaO2: 95      Meds:       CARDIOVASCULAR  HR: 87 (04-23-21 @ 04:00) (70 - 118)  BP: 146/69 (04-22-21 @ 09:48) (126/75 - 146/69)  BP(mean): 99 (04-22-21 @ 09:45) (93 - 99)  ABP: 117/56 (04-23-21 @ 04:00) (71/31 - 145/67)  ABP(mean): 79 (04-23-21 @ 04:00) (53 - 96)  Exam: regular rate, irregularly irregular rhythm  Cardiac Rhythm: a-fib  Perfusion     [x]Adequate   [ ]Inadequate  Mentation   [x]Normal       [ ]Reduced  Extremities  [x]Warm         [ ]Cool  Volume Status [ ]Hypervolemic [x]Euvolemic [ ]Hypovolemic  Meds:       GI/NUTRITION  Exam: soft, nondistended, mild syed-incisional tenderness. YOLIE drain with serosanguinous output  Diet: NPO  Meds: pantoprazole  Injectable 40 milliGRAM(s) IV Push every 12 hours      GENITOURINARY  I&O's Detail    04-21 @ 07:01  -  04-22 @ 07:00  --------------------------------------------------------  IN:    IV PiggyBack: 200 mL    IV PiggyBack: 375 mL    Lactated Ringers: 3000 mL    Norepinephrine: 264.2 mL    Pantoprazole: 20 mL    Propofol: 6.2 mL    Vasopressin: 3.6 mL  Total IN: 3869 mL    OUT:    Bulb (mL): 570 mL    Indwelling Catheter - Urethral (mL): 800 mL    Nasogastric/Oral tube (mL): 650 mL  Total OUT: 2020 mL    Total NET: 1849 mL    04-22 @ 07:01 - 04-23 @ 04:19  --------------------------------------------------------  IN:    IV PiggyBack: 125 mL    Lactated Ringers: 250 mL    multiple electrolytes Injection Type 1.: 950 mL    Norepinephrine: 1.9 mL  Total IN: 1326.9 mL    OUT:    Bulb (mL): 415 mL    Indwelling Catheter - Urethral (mL): 785 mL    Nasogastric/Oral tube (mL): 350 mL  Total OUT: 1550 mL    Total NET: -223.1 mL    04-22    140  |  106  |  58<H>  ----------------------------<  126<H>  4.2   |  21<L>  |  2.46<H>    Ca    8.2<L>      22 Apr 2021 23:43  Phos  4.5     04-22  Mg     2.3     04-22    [x] Beckman catheter, indication: UOP monitoring  Meds: multiple electrolytes Injection Type 1 1000 milliLiter(s) IV Continuous <Continuous>  sodium chloride 0.9% lock flush 10 milliLiter(s) IV Push every 1 hour PRN Pre/post blood products, medications, blood draw, and to maintain line patency      HEMATOLOGIC  Meds: enoxaparin Injectable 30 milliGRAM(s) SubCutaneous every 24 hours  [x] VTE Prophylaxis                        8.8    17.15 )-----------( 262      ( 22 Apr 2021 23:43 )             26.5     PT/INR - ( 22 Apr 2021 23:43 )   PT: 16.7 sec;   INR: 1.41 ratio    PTT - ( 22 Apr 2021 23:43 )  PTT:36.5 sec  Transfusion     [ ] PRBC   [ ] Platelets   [ ] FFP   [ ] Cryoprecipitate      INFECTIOUS DISEASES  T(C): 36.3 (04-23-21 @ 03:00), Max: 36.3 (04-22-21 @ 19:00)  WBC Count: 17.15 K/uL (04-22 @ 23:43)    Recent Cultures:  Specimen Source: .Blood Blood, 04-20 @ 22:52; Results   No growth to date.; Gram Stain: --; Organism: --  Specimen Source: .Urine Clean Catch (Midstream), 04-20 @ 22:50; Results   <10,000 CFU/mL Normal Urogenital Jessica; Gram Stain: --; Organism: --    Meds: fluconAZOLE IVPB 200 milliGRAM(s) IV Intermittent every 24 hours  fluconAZOLE IVPB      piperacillin/tazobactam IVPB.. 3.375 Gram(s) IV Intermittent every 8 hours      ENDOCRINE  Capillary Blood Glucose    Meds:       ACCESS DEVICES:  [x] Peripheral IV  [x] Central Venous Line	[x] R	[ ] L	[x] IJ	[ ] Fem	[ ] SC	Placed: 04/21/21  [x] Arterial Line		[ ] R	[x] L	[ ] Fem	[x] Rad	[ ] Ax	Placed: 04/20/21  [ ] PICC:					[ ] Mediport  [x] Urinary Catheter, Date Placed: 04/20/21  [x] Necessity of urinary, arterial, and venous catheters discussed      OTHER MEDICATIONS:  brimonidine 0.2% Ophthalmic Solution 1 Drop(s) Both EYES three times a day  chlorhexidine 4% Liquid 1 Application(s) Topical <User Schedule>  latanoprost 0.005% Ophthalmic Solution 1 Drop(s) Both EYES at bedtime  pilocarpine 4% Solution 1 Drop(s) Both EYES three times a day      CODE STATUS: Full code      IMAGING:

## 2021-04-23 NOTE — PROGRESS NOTE ADULT - ASSESSMENT
89y Male with history of CKD, CHF, afib on eliquis and BPH presenting with peritonitis from duodenal perforation s/p ex lap with pyloric excluding gastrojejunostomy and ethan patch on 4/20.     PLAN:   Neurologic: delirium  - Monitor mental status  - Pain control with tylenol, dilaudid prn    Respiratory:  - Extubated 04/22  - IS / OOBTC / ambulation as tolerated to prevent atelectasis  - AM CXR to assess pulm edema    Cardiovascular: history of afib and CHF  - Off vasopressors  - Holding home amlodipine, can restart if pt becomes hypertensive  - Metoprolol 12.5 q12hr for hx a-fib (takes 25mg BID at home)  - Echo April 2020 EF 59%  - Lactate cleared    Gastrointestinal/Nutrition:   - NPO  - NGT to suction, do not manipulate  - Protonix BID for GI perf    Renal/Genitourinary: CKD III (baseline Cr 1.4-1.6)   - Monitor I/Os, Beckman in place  - History of BPH, requiring intermittent catheterization, recent admission for urinary retention  - Plasmalyte @ 50cc/hr while NPO, gentle hydration iso pulm edema on CXR  - Holding home lasix, tamsulosin    Hematologic:   - Holding home AC  - VTE ppx with lovenox    Infectious Disease:   - Zosyn and fluconazole  - H Pylori serologies negative    Endocrine:   - Monitor glucose on BMPs    Lines/Tubes:  - L rad a line  - PIVs  - Beckman  - RIJ CVC    Disposition: SICU  Code Status: Full code 89y Male with history of CKD, CHF, afib on eliquis and BPH presenting with peritonitis from duodenal perforation s/p ex lap with pyloric excluding gastrojejunostomy and ethan patch on 4/20.     PLAN:   Neurologic: delirium  - Monitor mental status  - Pain control with tylenol, dilaudid prn    Respiratory:  - Extubated 04/22  - IS / OOBTC / ambulation as tolerated to prevent atelectasis  - AM CXR to assess pulm edema    Cardiovascular: history of afib and CHF  - Off vasopressors  - Holding home amlodipine, can restart if pt becomes hypertensive  - Metoprolol 5mg q6hr for hx a-fib (takes 25mg BID at home)  - Echo April 2020 EF 59%  - Lactate cleared    Gastrointestinal/Nutrition:   - NPO  - NGT to suction, do not manipulate  - Protonix BID for GI perf    Renal/Genitourinary: CKD III (baseline Cr 1.4-1.6)   - Monitor I/Os, Beckman in place  - History of BPH, requiring intermittent catheterization, recent admission for urinary retention  - Plasmalyte @ 50cc/hr while NPO, gentle hydration iso pulm edema on CXR  - Holding home lasix, tamsulosin    Hematologic:   - Holding home AC  - VTE ppx with lovenox    Infectious Disease:   - Zosyn and fluconazole  - H Pylori serologies negative    Endocrine:   - Monitor glucose on BMPs    Lines/Tubes:  - L rad a line  - PIVs  - Beckman  - RIJ CVC    Disposition: SICU  Code Status: Full code

## 2021-04-23 NOTE — CHART NOTE - NSCHARTNOTEFT_GEN_A_CORE
SICU Transfer Note    Transfer from: SICU  Transfer to:   Accepting physican:    SICU COURSE:  4/20: OR for ex-lap, washout, pyloric exclusion with retrocolic GJ, Jordi patch, umbo hernia repair  4/21: extubated, pressors d/c, fluconazole added, LNX started  4/22: Metoprolol started, Hpylori negative    ASSESSMENT & PLAN:   89y Male with history of CKD, CHF, afib on eliquis and BPH presenting with peritonitis from duodenal perforation s/p ex lap with pyloric excluding gastrojejunostomy and jordi patch on 4/20    Plan:   - NPO/mIVF  - NGT to wall suction  - Monitor bowel function  - Protonix IVP BID  - F/u UOP  - Continue abx   - Pain control: IV tylenol, dilaudid prn   - DVT ppx: Lovenox  - Holding home meds    Vital Signs Last 24 Hrs  T(C): 36.3 (23 Apr 2021 20:50), Max: 36.3 (23 Apr 2021 03:00)  T(F): 97.3 (23 Apr 2021 20:50), Max: 97.3 (23 Apr 2021 03:00)  HR: 75 (23 Apr 2021 20:50) (74 - 108)  BP: 115/75 (23 Apr 2021 20:50) (115/75 - 155/76)  BP(mean): 90 (23 Apr 2021 13:00) (90 - 106)  RR: 18 (23 Apr 2021 20:50) (15 - 101)  SpO2: 97% (23 Apr 2021 20:50) (94% - 99%)  I&O's Summary    22 Apr 2021 07:01  -  23 Apr 2021 07:00  --------------------------------------------------------  IN: 1551.9 mL / OUT: 2020 mL / NET: -468.1 mL    23 Apr 2021 07:01  -  23 Apr 2021 23:11  --------------------------------------------------------  IN: 400 mL / OUT: 575 mL / NET: -175 mL      MEDICATIONS  (STANDING):  brimonidine 0.2% Ophthalmic Solution 1 Drop(s) Both EYES three times a day  chlorhexidine 2% Cloths 1 Application(s) Topical <User Schedule>  enoxaparin Injectable 30 milliGRAM(s) SubCutaneous every 24 hours  fluconAZOLE IVPB 200 milliGRAM(s) IV Intermittent every 24 hours  latanoprost 0.005% Ophthalmic Solution 1 Drop(s) Both EYES at bedtime  metoprolol tartrate Injectable 5 milliGRAM(s) IV Push every 6 hours  multiple electrolytes Injection Type 1 1000 milliLiter(s) (75 mL/Hr) IV Continuous <Continuous>  pantoprazole  Injectable 40 milliGRAM(s) IV Push every 12 hours  pilocarpine 4% Solution 1 Drop(s) Both EYES three times a day  piperacillin/tazobactam IVPB.. 3.375 Gram(s) IV Intermittent every 8 hours    MEDICATIONS  (PRN):  acetaminophen  IVPB .. 500 milliGRAM(s) IV Intermittent every 6 hours PRN Mild Pain (1 - 3)  HYDROmorphone  Injectable 0.25 milliGRAM(s) IV Push every 3 hours PRN Severe Pain (7 - 10)        LABS                                            8.8                   Neurophils% (auto):   x      (04-22 @ 23:43):    17.15)-----------(262          Lymphocytes% (auto):  x                                             26.5                   Eosinphils% (auto):   x        Manual%: Neutrophils x    ; Lymphocytes x    ; Eosinophils x    ; Bands%: x    ; Blasts x                                    140    |  106    |  58                  Calcium: 8.2   / iCa: x      (04-22 @ 23:43)    ----------------------------<  126       Magnesium: 2.3                              4.2     |  21     |  2.46             Phosphorous: 4.5        ( 04-22 @ 23:43 )   PT: 16.7 sec;   INR: 1.41 ratio  aPTT: 36.5 sec

## 2021-04-23 NOTE — PROGRESS NOTE ADULT - ASSESSMENT
improved ms  still npo , has bs and hungry  fu per Sx  fu WBC, SCr,   Sx mngmnt but can be oob to chair  for sure will need rehab and naveen rivera b4dc

## 2021-04-23 NOTE — PROGRESS NOTE ADULT - ATTENDING COMMENTS
89y Male with history of CKD, CHF, afib on Eliquis and BPH presenting with peritonitis from duodenal perforation s/p ex lap with pyloric excluding gastrojejunostomy and ethan patch on 4/20.    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

## 2021-04-24 LAB
ANION GAP SERPL CALC-SCNC: 16 MMOL/L — SIGNIFICANT CHANGE UP (ref 5–17)
BUN SERPL-MCNC: 54 MG/DL — HIGH (ref 7–23)
CALCIUM SERPL-MCNC: 8.1 MG/DL — LOW (ref 8.4–10.5)
CHLORIDE SERPL-SCNC: 108 MMOL/L — SIGNIFICANT CHANGE UP (ref 96–108)
CO2 SERPL-SCNC: 22 MMOL/L — SIGNIFICANT CHANGE UP (ref 22–31)
CREAT SERPL-MCNC: 2.53 MG/DL — HIGH (ref 0.5–1.3)
GLUCOSE SERPL-MCNC: 101 MG/DL — HIGH (ref 70–99)
HCT VFR BLD CALC: 27.6 % — LOW (ref 39–50)
HGB BLD-MCNC: 8.7 G/DL — LOW (ref 13–17)
MAGNESIUM SERPL-MCNC: 2.6 MG/DL — SIGNIFICANT CHANGE UP (ref 1.6–2.6)
MCHC RBC-ENTMCNC: 30.4 PG — SIGNIFICANT CHANGE UP (ref 27–34)
MCHC RBC-ENTMCNC: 31.5 GM/DL — LOW (ref 32–36)
MCV RBC AUTO: 96.5 FL — SIGNIFICANT CHANGE UP (ref 80–100)
NRBC # BLD: 0 /100 WBCS — SIGNIFICANT CHANGE UP (ref 0–0)
PHOSPHATE SERPL-MCNC: 4 MG/DL — SIGNIFICANT CHANGE UP (ref 2.5–4.5)
PLATELET # BLD AUTO: 286 K/UL — SIGNIFICANT CHANGE UP (ref 150–400)
POTASSIUM SERPL-MCNC: 4 MMOL/L — SIGNIFICANT CHANGE UP (ref 3.5–5.3)
POTASSIUM SERPL-SCNC: 4 MMOL/L — SIGNIFICANT CHANGE UP (ref 3.5–5.3)
RBC # BLD: 2.86 M/UL — LOW (ref 4.2–5.8)
RBC # FLD: 22.5 % — HIGH (ref 10.3–14.5)
SODIUM SERPL-SCNC: 146 MMOL/L — HIGH (ref 135–145)
WBC # BLD: 9.27 K/UL — SIGNIFICANT CHANGE UP (ref 3.8–10.5)
WBC # FLD AUTO: 9.27 K/UL — SIGNIFICANT CHANGE UP (ref 3.8–10.5)

## 2021-04-24 RX ORDER — HALOPERIDOL DECANOATE 100 MG/ML
2.5 INJECTION INTRAMUSCULAR EVERY 6 HOURS
Refills: 0 | Status: DISCONTINUED | OUTPATIENT
Start: 2021-04-24 | End: 2021-04-24

## 2021-04-24 RX ORDER — FINASTERIDE 5 MG/1
5 TABLET, FILM COATED ORAL DAILY
Refills: 0 | Status: DISCONTINUED | OUTPATIENT
Start: 2021-04-24 | End: 2021-04-30

## 2021-04-24 RX ADMIN — Medication 1 DROP(S): at 06:08

## 2021-04-24 RX ADMIN — SODIUM CHLORIDE 75 MILLILITER(S): 9 INJECTION, SOLUTION INTRAVENOUS at 06:09

## 2021-04-24 RX ADMIN — LATANOPROST 1 DROP(S): 0.05 SOLUTION/ DROPS OPHTHALMIC; TOPICAL at 21:26

## 2021-04-24 RX ADMIN — Medication 200 MILLIGRAM(S): at 06:06

## 2021-04-24 RX ADMIN — Medication 5 MILLIGRAM(S): at 11:46

## 2021-04-24 RX ADMIN — BRIMONIDINE TARTRATE 1 DROP(S): 2 SOLUTION/ DROPS OPHTHALMIC at 06:08

## 2021-04-24 RX ADMIN — BRIMONIDINE TARTRATE 1 DROP(S): 2 SOLUTION/ DROPS OPHTHALMIC at 14:05

## 2021-04-24 RX ADMIN — FINASTERIDE 5 MILLIGRAM(S): 5 TABLET, FILM COATED ORAL at 11:46

## 2021-04-24 RX ADMIN — Medication 5 MILLIGRAM(S): at 06:10

## 2021-04-24 RX ADMIN — Medication 1 DROP(S): at 14:05

## 2021-04-24 RX ADMIN — PIPERACILLIN AND TAZOBACTAM 25 GRAM(S): 4; .5 INJECTION, POWDER, LYOPHILIZED, FOR SOLUTION INTRAVENOUS at 15:05

## 2021-04-24 RX ADMIN — Medication 1 DROP(S): at 21:27

## 2021-04-24 RX ADMIN — PANTOPRAZOLE SODIUM 40 MILLIGRAM(S): 20 TABLET, DELAYED RELEASE ORAL at 06:08

## 2021-04-24 RX ADMIN — FLUCONAZOLE 50 MILLIGRAM(S): 150 TABLET ORAL at 09:05

## 2021-04-24 RX ADMIN — PANTOPRAZOLE SODIUM 40 MILLIGRAM(S): 20 TABLET, DELAYED RELEASE ORAL at 17:11

## 2021-04-24 RX ADMIN — Medication 5 MILLIGRAM(S): at 17:11

## 2021-04-24 RX ADMIN — BRIMONIDINE TARTRATE 1 DROP(S): 2 SOLUTION/ DROPS OPHTHALMIC at 21:27

## 2021-04-24 RX ADMIN — PIPERACILLIN AND TAZOBACTAM 25 GRAM(S): 4; .5 INJECTION, POWDER, LYOPHILIZED, FOR SOLUTION INTRAVENOUS at 08:27

## 2021-04-24 RX ADMIN — ENOXAPARIN SODIUM 30 MILLIGRAM(S): 100 INJECTION SUBCUTANEOUS at 14:04

## 2021-04-24 NOTE — PROGRESS NOTE ADULT - SUBJECTIVE AND OBJECTIVE BOX
HPI:  P/w peritonitis from duodenal perforation s/p ex lap with pyloric excluding gastrojejunostomy and ethan patch on 4/20      24h Events:   - TTF  - Overnight, no acute events    Subjective:   Patient examined at bedside this AM. Reports     Objective:  Vital Signs  T(C): 36.3 (04-24 @ 00:40), Max: 36.3 (04-23 @ 20:50)  HR: 105 (04-24 @ 04:29) (74 - 108)  BP: 150/81 (04-24 @ 04:29) (110/65 - 155/76)  RR: 18 (04-24 @ 04:29) (15 - 31)  SpO2: 95% (04-24 @ 04:29) (95% - 99%)  04-22-21 @ 07:01  -  04-23-21 @ 07:00  --------------------------------------------------------  IN:  Total IN: 0 mL    OUT:    Bulb (mL): 535 mL    Indwelling Catheter - Urethral (mL): 985 mL    Nasogastric/Oral tube (mL): 500 mL  Total OUT: 2020 mL    Total NET: -2020 mL      04-23-21 @ 07:01  -  04-24-21 @ 04:56  --------------------------------------------------------  IN:  Total IN: 0 mL    OUT:    Bulb (mL): 180 mL    Indwelling Catheter - Urethral (mL): 775 mL  Total OUT: 955 mL    Total NET: -955 mL        Physical Exam:  Gen: NAD, alert  Pulm: Patent airways  Abd: soft, ND, midline incision with dressing in place, w/o strikethrough bleeding, YOLIE drain at right side serosanguinous      Labs:                        8.8    17.15 )-----------( 262      ( 22 Apr 2021 23:43 )             26.5   04-22    140  |  106  |  58<H>  ----------------------------<  126<H>  4.2   |  21<L>  |  2.46<H>    Ca    8.2<L>      22 Apr 2021 23:43  Phos  4.5     04-22  Mg     2.3     04-22      CAPILLARY BLOOD GLUCOSE          Medications:   MEDICATIONS  (STANDING):  brimonidine 0.2% Ophthalmic Solution 1 Drop(s) Both EYES three times a day  chlorhexidine 2% Cloths 1 Application(s) Topical <User Schedule>  enoxaparin Injectable 30 milliGRAM(s) SubCutaneous every 24 hours  fluconAZOLE IVPB 200 milliGRAM(s) IV Intermittent every 24 hours  latanoprost 0.005% Ophthalmic Solution 1 Drop(s) Both EYES at bedtime  metoprolol tartrate Injectable 5 milliGRAM(s) IV Push every 6 hours  multiple electrolytes Injection Type 1 1000 milliLiter(s) (75 mL/Hr) IV Continuous <Continuous>  pantoprazole  Injectable 40 milliGRAM(s) IV Push every 12 hours  pilocarpine 4% Solution 1 Drop(s) Both EYES three times a day  piperacillin/tazobactam IVPB.. 3.375 Gram(s) IV Intermittent every 8 hours    MEDICATIONS  (PRN):  acetaminophen  IVPB .. 500 milliGRAM(s) IV Intermittent every 6 hours PRN Mild Pain (1 - 3)  haloperidol    Injectable 2.5 milliGRAM(s) IV Push every 6 hours PRN Agitation  HYDROmorphone  Injectable 0.25 milliGRAM(s) IV Push every 3 hours PRN Severe Pain (7 - 10)     HPI:  P/w peritonitis from duodenal perforation s/p ex lap with pyloric excluding gastrojejunostomy and ethan patch on 4/20      24h Events:   - TTF  - Overnight, self d/c NGT, mildly agitated not requiring Haldol    Subjective:   Patient examined at bedside this AM. Reports     Objective:  Vital Signs  T(C): 36.3 (04-24 @ 00:40), Max: 36.3 (04-23 @ 20:50)  HR: 105 (04-24 @ 04:29) (74 - 108)  BP: 150/81 (04-24 @ 04:29) (110/65 - 155/76)  RR: 18 (04-24 @ 04:29) (15 - 31)  SpO2: 95% (04-24 @ 04:29) (95% - 99%)  04-22-21 @ 07:01  -  04-23-21 @ 07:00  --------------------------------------------------------  IN:  Total IN: 0 mL    OUT:    Bulb (mL): 535 mL    Indwelling Catheter - Urethral (mL): 985 mL    Nasogastric/Oral tube (mL): 500 mL  Total OUT: 2020 mL    Total NET: -2020 mL      04-23-21 @ 07:01  -  04-24-21 @ 04:56  --------------------------------------------------------  IN:  Total IN: 0 mL    OUT:    Bulb (mL): 180 mL    Indwelling Catheter - Urethral (mL): 775 mL  Total OUT: 955 mL    Total NET: -955 mL        Physical Exam:  Gen: NAD, alert  Pulm: Patent airways  Abd: soft, ND, midline incision with dressing in place, w/o strikethrough bleeding, YOLIE drain at right side serosanguinous      Labs:                        8.8    17.15 )-----------( 262      ( 22 Apr 2021 23:43 )             26.5   04-22    140  |  106  |  58<H>  ----------------------------<  126<H>  4.2   |  21<L>  |  2.46<H>    Ca    8.2<L>      22 Apr 2021 23:43  Phos  4.5     04-22  Mg     2.3     04-22      CAPILLARY BLOOD GLUCOSE          Medications:   MEDICATIONS  (STANDING):  brimonidine 0.2% Ophthalmic Solution 1 Drop(s) Both EYES three times a day  chlorhexidine 2% Cloths 1 Application(s) Topical <User Schedule>  enoxaparin Injectable 30 milliGRAM(s) SubCutaneous every 24 hours  fluconAZOLE IVPB 200 milliGRAM(s) IV Intermittent every 24 hours  latanoprost 0.005% Ophthalmic Solution 1 Drop(s) Both EYES at bedtime  metoprolol tartrate Injectable 5 milliGRAM(s) IV Push every 6 hours  multiple electrolytes Injection Type 1 1000 milliLiter(s) (75 mL/Hr) IV Continuous <Continuous>  pantoprazole  Injectable 40 milliGRAM(s) IV Push every 12 hours  pilocarpine 4% Solution 1 Drop(s) Both EYES three times a day  piperacillin/tazobactam IVPB.. 3.375 Gram(s) IV Intermittent every 8 hours    MEDICATIONS  (PRN):  acetaminophen  IVPB .. 500 milliGRAM(s) IV Intermittent every 6 hours PRN Mild Pain (1 - 3)  haloperidol    Injectable 2.5 milliGRAM(s) IV Push every 6 hours PRN Agitation  HYDROmorphone  Injectable 0.25 milliGRAM(s) IV Push every 3 hours PRN Severe Pain (7 - 10)

## 2021-04-24 NOTE — PROGRESS NOTE ADULT - SUBJECTIVE AND OBJECTIVE BOX
DATE OF SERVICE: 04-24-21 @ 17:37    HPI:  89y M MDS, CKD, Afib on eliquis, BPH with intermittent self-catheterization, CHF, presents to ED [facility] from assisted living c/o diffuse, moderate, abdominal pain since this morning. Patient reports having 1 day hx of abdominal pain. (20 Apr 2021 17:12)      Interval Events  SICU>&Cadet  Pulled out NGTube  BS better, hungry, WBC decr, SCr Incr    MEDICATIONS  (STANDING):  brimonidine 0.2% Ophthalmic Solution 1 Drop(s) Both EYES three times a day  enoxaparin Injectable 30 milliGRAM(s) SubCutaneous every 24 hours  finasteride 5 milliGRAM(s) Oral daily  fluconAZOLE IVPB 200 milliGRAM(s) IV Intermittent every 24 hours  latanoprost 0.005% Ophthalmic Solution 1 Drop(s) Both EYES at bedtime  metoprolol tartrate Injectable 5 milliGRAM(s) IV Push every 6 hours  multiple electrolytes Injection Type 1 1000 milliLiter(s) (75 mL/Hr) IV Continuous <Continuous>  pantoprazole  Injectable 40 milliGRAM(s) IV Push every 12 hours  pilocarpine 4% Solution 1 Drop(s) Both EYES three times a day  piperacillin/tazobactam IVPB.. 3.375 Gram(s) IV Intermittent every 8 hours    MEDICATIONS  (PRN):  acetaminophen  IVPB .. 500 milliGRAM(s) IV Intermittent every 6 hours PRN Mild Pain (1 - 3)  haloperidol    Injectable 2.5 milliGRAM(s) IV Push every 6 hours PRN Agitation  HYDROmorphone  Injectable 0.25 milliGRAM(s) IV Push every 3 hours PRN Severe Pain (7 - 10)      Patient is a 89y old  Male who presents with a chief complaint of Abdominal pain (24 Apr 2021 04:56)      REVIEW OF SYSTEMS    General:nad, talking to himself, no co nad   Skin/Breast:  Ophthalmologic:vison poor, "Dark"   ENMT:	no nco no ch. wants to eat  Respiratory and Thorax:no cough no sp no sob  Cardiovascular:npo cp no mpa;lp  Gastrointestinal:no nvcd he tghinks he had a bm  Genitourinary:no co  Musculoskeletal:	no a no p  Neurological:	no co no ch  Psychiatric:no co   Hematology/Lymphatics:	  Endocrine:	no poly uddd  Allergic/Immunologic:  AOSN	y      Vital Signs Last 24 Hrs  T(C): 36.8 (24 Apr 2021 14:38), Max: 36.8 (24 Apr 2021 14:38)  T(F): 98.3 (24 Apr 2021 14:38), Max: 98.3 (24 Apr 2021 14:38)  HR: 90 (24 Apr 2021 14:38) (73 - 105)  BP: 120/88 (24 Apr 2021 14:38) (110/65 - 150/81)  BP(mean): --  RR: 18 (24 Apr 2021 14:38) (18 - 18)  SpO2: 96% (24 Apr 2021 14:38) (95% - 97%)    PHYSICAL EXAM:    Constitutional:vss nad no co  H+N ncat  Eyes:martell cwnl, vision is poor   ENMT:jhears swallows speaks ok  Neck:no cb no tm  Breasts:  Back:  Respiratory:ctab no rrw  Cardiovascular:rrr  Gastrointestinal:BS+ better ,good ,soft nt  Genitourinary:folsy  Rectal:  Extremities:edema as always softer less pitting  Vascular:hm -, vv-  Neurological:nfatmae  Skin:cdi x abd  Lymph Nodes:  Musculoskeletal:  Psychiatric:calm coop roriented although gets confused, reorients, memory is ok    LABS  CBC Full  -  ( 22 Apr 2021 23:43 )  WBC Count : 17.15 K/uL  RBC Count : 2.87 M/uL  Hemoglobin : 8.8 g/dL  Hematocrit : 26.5 %  Platelet Count - Automated : 262 K/uL  Mean Cell Volume : 92.3 fl  Mean Cell Hemoglobin : 30.7 pg  Mean Cell Hemoglobin Concentration : 33.2 gm/dL  Auto Neutrophil # : x  Auto Lymphocyte # : x  Auto Monocyte # : x  Auto Eosinophil # : x  Auto Basophil # : x  Auto Neutrophil % : x  Auto Lymphocyte % : x  Auto Monocyte % : x  Auto Eosinophil % : x  Auto Basophil % : x      04-22    140  |  106  |  58<H>  ----------------------------<  126<H>  4.2   |  21<L>  |  2.46<H>    Ca    8.2<L>      22 Apr 2021 23:43  Phos  4.5     04-22  Mg     2.3     04-22        PT/INR - ( 22 Apr 2021 23:43 )   PT: 16.7 sec;   INR: 1.41 ratio         PTT - ( 22 Apr 2021 23:43 )  PTT:36.5 sec    Imaging:    Xray:    Echo:    CT:    MRI:    Tele:    Orders:    DAVID Lemus [phone]

## 2021-04-24 NOTE — PROGRESS NOTE ADULT - ASSESSMENT
89y Male with history of CKD, CHF, afib on eliquis and BPH presenting with peritonitis from duodenal perforation s/p ex lap with pyloric excluding gastrojejunostomy and ethan patch on 4/20.    Plan:   - NPO/mIVF  - Re-eval need for NGT?  - Monitor bowel function  - Protonix IVP BID  - F/u UOP  - Continue abx   - Pain control: IV tylenol, dilaudid prn   - DVT ppx: Lovenox  - Holding home meds    ACS  [id number]

## 2021-04-24 NOTE — PROGRESS NOTE ADULT - ATTENDING COMMENTS
Pt seen and examined with ACS PA/resident, agree with above.    1. Perforated large DU s/p Jordi patch and pyloric exclusion with septic shock, MICH as below:  - Septic shock resolved  - NGT with bilious output, ngt was dislodged overnight  - No bowel function  - Per Dr. Ordonez, will start nutritional supplements today  - Continue abx for peritonitis (4 days)  - Hold home antihypertensives, diuretics, and betablocker for now    2. 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 Glaucoma:  - Medications restarted .

## 2021-04-24 NOTE — PROGRESS NOTE ADULT - ASSESSMENT
WBC declined a little\SCR up buit been there before  Sx FU re diet, ngt out, BS better ?BM  NEEDS NAKIA BE OOB TOO AND WILL CERTAINLY NEEED REHAB

## 2021-04-25 DIAGNOSIS — D46.9 MYELODYSPLASTIC SYNDROME, UNSPECIFIED: ICD-10-CM

## 2021-04-25 LAB
ANION GAP SERPL CALC-SCNC: 17 MMOL/L — SIGNIFICANT CHANGE UP (ref 5–17)
BUN SERPL-MCNC: 52 MG/DL — HIGH (ref 7–23)
CALCIUM SERPL-MCNC: 8.5 MG/DL — SIGNIFICANT CHANGE UP (ref 8.4–10.5)
CHLORIDE SERPL-SCNC: 109 MMOL/L — HIGH (ref 96–108)
CO2 SERPL-SCNC: 20 MMOL/L — LOW (ref 22–31)
CREAT SERPL-MCNC: 2.51 MG/DL — HIGH (ref 0.5–1.3)
CULTURE RESULTS: SIGNIFICANT CHANGE UP
CULTURE RESULTS: SIGNIFICANT CHANGE UP
GLUCOSE SERPL-MCNC: 91 MG/DL — SIGNIFICANT CHANGE UP (ref 70–99)
HCT VFR BLD CALC: 27.5 % — LOW (ref 39–50)
HGB BLD-MCNC: 8.6 G/DL — LOW (ref 13–17)
MAGNESIUM SERPL-MCNC: 2.7 MG/DL — HIGH (ref 1.6–2.6)
MCHC RBC-ENTMCNC: 30.4 PG — SIGNIFICANT CHANGE UP (ref 27–34)
MCHC RBC-ENTMCNC: 31.3 GM/DL — LOW (ref 32–36)
MCV RBC AUTO: 97.2 FL — SIGNIFICANT CHANGE UP (ref 80–100)
NRBC # BLD: 0 /100 WBCS — SIGNIFICANT CHANGE UP (ref 0–0)
PHOSPHATE SERPL-MCNC: 4.1 MG/DL — SIGNIFICANT CHANGE UP (ref 2.5–4.5)
PLATELET # BLD AUTO: 285 K/UL — SIGNIFICANT CHANGE UP (ref 150–400)
POTASSIUM SERPL-MCNC: 3.8 MMOL/L — SIGNIFICANT CHANGE UP (ref 3.5–5.3)
POTASSIUM SERPL-SCNC: 3.8 MMOL/L — SIGNIFICANT CHANGE UP (ref 3.5–5.3)
RBC # BLD: 2.83 M/UL — LOW (ref 4.2–5.8)
RBC # FLD: 22.5 % — HIGH (ref 10.3–14.5)
SODIUM SERPL-SCNC: 146 MMOL/L — HIGH (ref 135–145)
SPECIMEN SOURCE: SIGNIFICANT CHANGE UP
SPECIMEN SOURCE: SIGNIFICANT CHANGE UP
WBC # BLD: 7.62 K/UL — SIGNIFICANT CHANGE UP (ref 3.8–10.5)
WBC # FLD AUTO: 7.62 K/UL — SIGNIFICANT CHANGE UP (ref 3.8–10.5)

## 2021-04-25 RX ADMIN — Medication 5 MILLIGRAM(S): at 11:19

## 2021-04-25 RX ADMIN — FLUCONAZOLE 50 MILLIGRAM(S): 150 TABLET ORAL at 08:17

## 2021-04-25 RX ADMIN — LATANOPROST 1 DROP(S): 0.05 SOLUTION/ DROPS OPHTHALMIC; TOPICAL at 23:50

## 2021-04-25 RX ADMIN — FINASTERIDE 5 MILLIGRAM(S): 5 TABLET, FILM COATED ORAL at 11:19

## 2021-04-25 RX ADMIN — Medication 5 MILLIGRAM(S): at 00:25

## 2021-04-25 RX ADMIN — PANTOPRAZOLE SODIUM 40 MILLIGRAM(S): 20 TABLET, DELAYED RELEASE ORAL at 17:23

## 2021-04-25 RX ADMIN — PIPERACILLIN AND TAZOBACTAM 25 GRAM(S): 4; .5 INJECTION, POWDER, LYOPHILIZED, FOR SOLUTION INTRAVENOUS at 07:38

## 2021-04-25 RX ADMIN — ENOXAPARIN SODIUM 30 MILLIGRAM(S): 100 INJECTION SUBCUTANEOUS at 14:00

## 2021-04-25 RX ADMIN — Medication 1 DROP(S): at 23:51

## 2021-04-25 RX ADMIN — Medication 1 DROP(S): at 05:33

## 2021-04-25 RX ADMIN — Medication 5 MILLIGRAM(S): at 23:55

## 2021-04-25 RX ADMIN — PIPERACILLIN AND TAZOBACTAM 25 GRAM(S): 4; .5 INJECTION, POWDER, LYOPHILIZED, FOR SOLUTION INTRAVENOUS at 00:25

## 2021-04-25 RX ADMIN — Medication 5 MILLIGRAM(S): at 17:22

## 2021-04-25 RX ADMIN — SODIUM CHLORIDE 75 MILLILITER(S): 9 INJECTION, SOLUTION INTRAVENOUS at 07:02

## 2021-04-25 RX ADMIN — Medication 5 MILLIGRAM(S): at 05:33

## 2021-04-25 RX ADMIN — PANTOPRAZOLE SODIUM 40 MILLIGRAM(S): 20 TABLET, DELAYED RELEASE ORAL at 05:33

## 2021-04-25 RX ADMIN — BRIMONIDINE TARTRATE 1 DROP(S): 2 SOLUTION/ DROPS OPHTHALMIC at 23:51

## 2021-04-25 RX ADMIN — Medication 1 DROP(S): at 14:00

## 2021-04-25 RX ADMIN — BRIMONIDINE TARTRATE 1 DROP(S): 2 SOLUTION/ DROPS OPHTHALMIC at 14:00

## 2021-04-25 RX ADMIN — BRIMONIDINE TARTRATE 1 DROP(S): 2 SOLUTION/ DROPS OPHTHALMIC at 05:33

## 2021-04-25 NOTE — PROGRESS NOTE ADULT - SUBJECTIVE AND OBJECTIVE BOX
ACS Daily Progress Note      Subjective:   Patient seen at bedside this AM.       Objective:  Vital Signs  T(C): 36.3 (04-25 @ 00:20), Max: 36.8 (04-24 @ 14:38)  HR: 72 (04-25 @ 05:14) (67 - 99)  BP: 153/87 (04-25 @ 05:14) (120/88 - 153/87)  RR: 18 (04-25 @ 05:14) (18 - 18)  SpO2: 95% (04-25 @ 05:14) (95% - 97%)  04-23-21 @ 07:01  -  04-24-21 @ 07:00  --------------------------------------------------------  IN:  Total IN: 0 mL    OUT:    Bulb (mL): 180 mL    Indwelling Catheter - Urethral (mL): 775 mL  Total OUT: 955 mL    Total NET: -955 mL      04-24-21 @ 07:01  -  04-25-21 @ 06:26  --------------------------------------------------------  IN:  Total IN: 0 mL    OUT:    Bulb (mL): 170 mL    Indwelling Catheter - Urethral (mL): 600 mL    Voided (mL): 350 mL  Total OUT: 1120 mL    Total NET: -1120 mL          Physical Exam:  GEN: resting in bed comfortably in NAD  RESP: no increased WOB  ABD: soft, mildly distended, mildly tender without rebound tenderness or guarding  EXTR: warm, well-perfused without gross deformities; spontaneous movement in b/l U/L extrem  NEURO: AAOx2    Labs:                        8.6    7.62  )-----------( 285      ( 25 Apr 2021 05:55 )             27.5   04-25    146<H>  |  109<H>  |  52<H>  ----------------------------<  91  3.8   |  20<L>  |  2.51<H>    Ca    8.5      25 Apr 2021 05:54  Phos  4.1     04-25  Mg     2.7     04-25      CAPILLARY BLOOD GLUCOSE          Medications:   MEDICATIONS  (STANDING):  brimonidine 0.2% Ophthalmic Solution 1 Drop(s) Both EYES three times a day  enoxaparin Injectable 30 milliGRAM(s) SubCutaneous every 24 hours  finasteride 5 milliGRAM(s) Oral daily  fluconAZOLE IVPB 200 milliGRAM(s) IV Intermittent every 24 hours  latanoprost 0.005% Ophthalmic Solution 1 Drop(s) Both EYES at bedtime  metoprolol tartrate Injectable 5 milliGRAM(s) IV Push every 6 hours  multiple electrolytes Injection Type 1 1000 milliLiter(s) (75 mL/Hr) IV Continuous <Continuous>  pantoprazole  Injectable 40 milliGRAM(s) IV Push every 12 hours  pilocarpine 4% Solution 1 Drop(s) Both EYES three times a day  piperacillin/tazobactam IVPB.. 3.375 Gram(s) IV Intermittent every 8 hours    MEDICATIONS  (PRN):  acetaminophen  IVPB .. 500 milliGRAM(s) IV Intermittent every 6 hours PRN Mild Pain (1 - 3)      Imaging:

## 2021-04-25 NOTE — PROGRESS NOTE ADULT - ASSESSMENT
labs are ok for now , sl better  can start flomax, taking PO, has to be fed  should bed , i will write orders again today  45'

## 2021-04-25 NOTE — PROGRESS NOTE ADULT - SUBJECTIVE AND OBJECTIVE BOX
DATE OF SERVICE: 04-25-21 @ 12:35    HPI:  89y M MDS, CKD, Afib on eliquis, BPH with intermittent self-catheterization, CHF[facility] diffuse, moderate, abdominal pain since this morning. Patient reports having 1 day hx of abdominal pain. (20 Apr 2021 17:12)      Interval Events  on cleArs and drionking s problems but has to be fed. Ifed him  Labs are ok, stable  has no co but somewhat delerious, though oriented. Asking if he is [place], assisnated? Heaven or Hell? amita recognized me, stated my name and asked for his wife. Knows it is April 2021. rEO[facility]ed to [facility], etc.    MEDICATIONS  (STANDING):  brimonidine 0.2% Ophthalmic Solution 1 Drop(s) Both EYES three times a day  enoxaparin Injectable 30 milliGRAM(s) SubCutaneous every 24 hours  finasteride 5 milliGRAM(s) Oral daily  latanoprost 0.005% Ophthalmic Solution 1 Drop(s) Both EYES at bedtime  metoprolol tartrate Injectable 5 milliGRAM(s) IV Push every 6 hours  multiple electrolytes Injection Type 1 1000 milliLiter(s) (75 mL/Hr) IV Continuous <Continuous>  pantoprazole  Injectable 40 milliGRAM(s) IV Push every 12 hours  pilocarpine 4% Solution 1 Drop(s) Both EYES three times a day    MEDICATIONS  (PRN):  acetaminophen  IVPB .. 500 milliGRAM(s) IV Intermittent every 6 hours PRN Mild Pain (1 - 3)      Patient is a 89y old  Male who presents with a chief complaint of Abdominal pain (25 Apr 2021 06:26)      REVIEW OF SYSTEMS    General:bnad no co  Skin/Breast:  Ophthalmologic:lo v, no co no ch  ENMT:	no co no ch . wants to eat and took some drink  Respiratory and Thorax:no cough no sp no sob  Cardiovascular:no cp no palp   Gastrointestinal:no nvcd  Genitourinary:aware of rivera  Musculoskeletal:	no a no p  Neurological:	alert oriented, no co no ch  Psychiatric: reoriented, calm coop	  Hematology/Lymphatics:	  Endocrine:	  Allergic/Immunologic:  AOSN	y      Vital Signs Last 24 Hrs  T(C): 36.8 (25 Apr 2021 08:58), Max: 36.8 (24 Apr 2021 14:38)  T(F): 98.3 (25 Apr 2021 08:58), Max: 98.3 (24 Apr 2021 14:38)  HR: 81 (25 Apr 2021 08:58) (67 - 99)  BP: 152/82 (25 Apr 2021 08:58) (120/88 - 153/87)  BP(mean): --  RR: 18 (25 Apr 2021 08:58) (18 - 18)  SpO2: 96% (25 Apr 2021 08:58) (95% - 97%)    PHYSICAL EXAM:    Constitutional:vss nadno co  H+N ncat  Eyes:saicwnl  ENMT:swallows speaks ok, hears oh ,om hy  Neck:no cb no tm  Breasts:  Back:  Respiratory:ctab no rrw  Cardiovascular:rrr  Gastrointestinal:bs+ soft nt round  not tymp  Genitourinary:rivera  Rectal:  Extremities:edema no ch no pitting   Vascular:hm-, vv-  Neurological:as noted, nfatmae  Skin:cdi, dry  Lymph Nodes:  Musculoskeletal:  Psychiatric:reoerientes, nad, has not needed Rx    LABS  CBC Full  -  ( 25 Apr 2021 05:55 )  WBC Count : 7.62 K/uL  RBC Count : 2.83 M/uL  Hemoglobin : 8.6 g/dL  Hematocrit : 27.5 %  Platelet Count - Automated : 285 K/uL  Mean Cell Volume : 97.2 fl  Mean Cell Hemoglobin : 30.4 pg  Mean Cell Hemoglobin Concentration : 31.3 gm/dL  Auto Neutrophil # : x  Auto Lymphocyte # : x  Auto Monocyte # : x  Auto Eosinophil # : x  Auto Basophil # : x  Auto Neutrophil % : x  Auto Lymphocyte % : x  Auto Monocyte % : x  Auto Eosinophil % : x  Auto Basophil % : x      04-25    146<H>  |  109<H>  |  52<H>  ----------------------------<  91  3.8   |  20<L>  |  2.51<H>    Ca    8.5      25 Apr 2021 05:54  Phos  4.1     04-25  Mg     2.7     04-25            Imaging:    Xray:    Echo:    CT:    MRI:    Tele:    Orders:    DAVID Lemus [phone]

## 2021-04-25 NOTE — PROGRESS NOTE ADULT - ASSESSMENT
89y Male with history of CKD, CHF, afib on eliquis and BPH presenting with peritonitis from duodenal perforation s/p ex lap with pyloric excluding gastrojejunostomy and ethan patch on 4/20.    Plan:   - CLD  - Monitor bowel function  - Protonix IVP BID  - F/u UOP  - Continue abx   - Pain control: IV tylenol, dilaudid prn   - DVT ppx: Lovenox  - Holding home meds    ACS  [id number]

## 2021-04-25 NOTE — PROGRESS NOTE ADULT - ATTENDING COMMENTS
Pt seen and examined with ACS PA/resident, agree with above.    1. Perforated large DU s/p Jordi patch and pyloric exclusion with septic shock, MICH as below:  - Septic shock resolved  - Per Dr. Ordonez, will continue nutritional supplements and advance diet as tolerated  - Antibiotic course for peritonitis completed  - Hold home antihypertensives, diuretics, and betablocker for now    2. 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 Glaucoma:  - Medications restarted .

## 2021-04-26 DIAGNOSIS — E87.0 HYPEROSMOLALITY AND HYPERNATREMIA: ICD-10-CM

## 2021-04-26 DIAGNOSIS — R10.9 UNSPECIFIED ABDOMINAL PAIN: ICD-10-CM

## 2021-04-26 LAB
ANION GAP SERPL CALC-SCNC: 14 MMOL/L — SIGNIFICANT CHANGE UP (ref 5–17)
ANION GAP SERPL CALC-SCNC: 17 MMOL/L — SIGNIFICANT CHANGE UP (ref 5–17)
BUN SERPL-MCNC: 49 MG/DL — HIGH (ref 7–23)
BUN SERPL-MCNC: 51 MG/DL — HIGH (ref 7–23)
CALCIUM SERPL-MCNC: 8 MG/DL — LOW (ref 8.4–10.5)
CALCIUM SERPL-MCNC: 8.8 MG/DL — SIGNIFICANT CHANGE UP (ref 8.4–10.5)
CHLORIDE SERPL-SCNC: 110 MMOL/L — HIGH (ref 96–108)
CHLORIDE SERPL-SCNC: 112 MMOL/L — HIGH (ref 96–108)
CO2 SERPL-SCNC: 22 MMOL/L — SIGNIFICANT CHANGE UP (ref 22–31)
CO2 SERPL-SCNC: 23 MMOL/L — SIGNIFICANT CHANGE UP (ref 22–31)
CREAT SERPL-MCNC: 2.35 MG/DL — HIGH (ref 0.5–1.3)
CREAT SERPL-MCNC: 2.51 MG/DL — HIGH (ref 0.5–1.3)
GLUCOSE SERPL-MCNC: 103 MG/DL — HIGH (ref 70–99)
GLUCOSE SERPL-MCNC: 104 MG/DL — HIGH (ref 70–99)
HCT VFR BLD CALC: 28.5 % — LOW (ref 39–50)
HGB BLD-MCNC: 8.7 G/DL — LOW (ref 13–17)
MAGNESIUM SERPL-MCNC: 2.8 MG/DL — HIGH (ref 1.6–2.6)
MCHC RBC-ENTMCNC: 30.5 GM/DL — LOW (ref 32–36)
MCHC RBC-ENTMCNC: 30.5 PG — SIGNIFICANT CHANGE UP (ref 27–34)
MCV RBC AUTO: 100 FL — SIGNIFICANT CHANGE UP (ref 80–100)
NRBC # BLD: 1 /100 WBCS — HIGH (ref 0–0)
PHOSPHATE SERPL-MCNC: 4.1 MG/DL — SIGNIFICANT CHANGE UP (ref 2.5–4.5)
PLATELET # BLD AUTO: 298 K/UL — SIGNIFICANT CHANGE UP (ref 150–400)
POTASSIUM SERPL-MCNC: 3.8 MMOL/L — SIGNIFICANT CHANGE UP (ref 3.5–5.3)
POTASSIUM SERPL-MCNC: 4.2 MMOL/L — SIGNIFICANT CHANGE UP (ref 3.5–5.3)
POTASSIUM SERPL-SCNC: 3.8 MMOL/L — SIGNIFICANT CHANGE UP (ref 3.5–5.3)
POTASSIUM SERPL-SCNC: 4.2 MMOL/L — SIGNIFICANT CHANGE UP (ref 3.5–5.3)
RBC # BLD: 2.85 M/UL — LOW (ref 4.2–5.8)
RBC # FLD: 23 % — HIGH (ref 10.3–14.5)
SODIUM SERPL-SCNC: 147 MMOL/L — HIGH (ref 135–145)
SODIUM SERPL-SCNC: 151 MMOL/L — HIGH (ref 135–145)
SURGICAL PATHOLOGY STUDY: SIGNIFICANT CHANGE UP
WBC # BLD: 9.32 K/UL — SIGNIFICANT CHANGE UP (ref 3.8–10.5)
WBC # FLD AUTO: 9.32 K/UL — SIGNIFICANT CHANGE UP (ref 3.8–10.5)

## 2021-04-26 RX ORDER — ALPRAZOLAM 0.25 MG
0.25 TABLET ORAL ONCE
Refills: 0 | Status: DISCONTINUED | OUTPATIENT
Start: 2021-04-26 | End: 2021-04-26

## 2021-04-26 RX ORDER — SODIUM CHLORIDE 9 MG/ML
1000 INJECTION, SOLUTION INTRAVENOUS
Refills: 0 | Status: DISCONTINUED | OUTPATIENT
Start: 2021-04-26 | End: 2021-04-27

## 2021-04-26 RX ORDER — ALPRAZOLAM 0.25 MG
0.25 TABLET ORAL DAILY
Refills: 0 | Status: DISCONTINUED | OUTPATIENT
Start: 2021-04-27 | End: 2021-04-30

## 2021-04-26 RX ADMIN — SODIUM CHLORIDE 75 MILLILITER(S): 9 INJECTION, SOLUTION INTRAVENOUS at 00:06

## 2021-04-26 RX ADMIN — BRIMONIDINE TARTRATE 1 DROP(S): 2 SOLUTION/ DROPS OPHTHALMIC at 13:15

## 2021-04-26 RX ADMIN — PANTOPRAZOLE SODIUM 40 MILLIGRAM(S): 20 TABLET, DELAYED RELEASE ORAL at 05:32

## 2021-04-26 RX ADMIN — BRIMONIDINE TARTRATE 1 DROP(S): 2 SOLUTION/ DROPS OPHTHALMIC at 22:23

## 2021-04-26 RX ADMIN — PANTOPRAZOLE SODIUM 40 MILLIGRAM(S): 20 TABLET, DELAYED RELEASE ORAL at 18:03

## 2021-04-26 RX ADMIN — SODIUM CHLORIDE 30 MILLILITER(S): 9 INJECTION, SOLUTION INTRAVENOUS at 12:29

## 2021-04-26 RX ADMIN — FINASTERIDE 5 MILLIGRAM(S): 5 TABLET, FILM COATED ORAL at 12:35

## 2021-04-26 RX ADMIN — BRIMONIDINE TARTRATE 1 DROP(S): 2 SOLUTION/ DROPS OPHTHALMIC at 05:32

## 2021-04-26 RX ADMIN — Medication 1 DROP(S): at 13:14

## 2021-04-26 RX ADMIN — Medication 0.25 MILLIGRAM(S): at 14:53

## 2021-04-26 RX ADMIN — Medication 5 MILLIGRAM(S): at 18:02

## 2021-04-26 RX ADMIN — Medication 1 DROP(S): at 05:32

## 2021-04-26 RX ADMIN — Medication 5 MILLIGRAM(S): at 05:31

## 2021-04-26 RX ADMIN — Medication 200 MILLIGRAM(S): at 00:15

## 2021-04-26 RX ADMIN — Medication 5 MILLIGRAM(S): at 12:31

## 2021-04-26 RX ADMIN — ENOXAPARIN SODIUM 30 MILLIGRAM(S): 100 INJECTION SUBCUTANEOUS at 13:46

## 2021-04-26 RX ADMIN — LATANOPROST 1 DROP(S): 0.05 SOLUTION/ DROPS OPHTHALMIC; TOPICAL at 22:23

## 2021-04-26 RX ADMIN — Medication 1 DROP(S): at 22:23

## 2021-04-26 RX ADMIN — Medication 500 MILLIGRAM(S): at 01:30

## 2021-04-26 NOTE — PROGRESS NOTE ADULT - SUBJECTIVE AND OBJECTIVE BOX
DATE OF SERVICE: 04-26-21 @ 09:09    HPI:  89y M MDS, CKD, Afib on eliquis, BPH with intermittent self-catheterization, CHF, presents to ED [facility] from assisted living c/o diffuse, moderate, abdominal pain since this morning. Patient reports having 1 day hx of abdominal pain. (20 Apr 2021 17:12)      Interval Events  agitated last , restrained but not sedated, has wrist restraints  still agitated this am, sometimes confused but oriented x3, ?abd pain, nt sure,   voice is hoarse, SNa is hi, getting electrolytes IV ordered by Sx?    MEDICATIONS  (STANDING):  brimonidine 0.2% Ophthalmic Solution 1 Drop(s) Both EYES three times a day  dextrose 5% + sodium chloride 0.45%. 1000 milliLiter(s) (30 mL/Hr) IV Continuous <Continuous>  enoxaparin Injectable 30 milliGRAM(s) SubCutaneous every 24 hours  finasteride 5 milliGRAM(s) Oral daily  latanoprost 0.005% Ophthalmic Solution 1 Drop(s) Both EYES at bedtime  metoprolol tartrate Injectable 5 milliGRAM(s) IV Push every 6 hours  pantoprazole  Injectable 40 milliGRAM(s) IV Push every 12 hours  pilocarpine 4% Solution 1 Drop(s) Both EYES three times a day    MEDICATIONS  (PRN):  acetaminophen  IVPB .. 500 milliGRAM(s) IV Intermittent every 6 hours PRN Mild Pain (1 - 3)      Patient is a 89y old  Male who presents with a chief complaint of Abdominal pain (26 Apr 2021 08:56)      REVIEW OF SYSTEMS    General: wants more people around, wont take PO now, agitated but orioented an m,fox is clear  Skin/Breast:  Ophthalmologic:no co no ch cant see well but aware  ENMT:	no co  Respiratory and Thorax:n0o cough no sp no sob  Cardiovascular:no cp no palp  Gastrointestinal:?aabd pain, or may just be refering to sx "at 89which you dont do"  Genitourinary:no co   Musculoskeletal:	no a no p  Neurological:	no co no ch  Psychiatric:	agitated, speech is not clear nor are complaints but he is oriented and give accurate details and Hx  Hematology/Lymphatics:	  Endocrine:	no poly udd  Allergic/Immunologic:  AOSN	y      Vital Signs Last 24 Hrs  T(C): 36.3 (26 Apr 2021 05:21), Max: 36.6 (25 Apr 2021 14:52)  T(F): 97.4 (26 Apr 2021 05:21), Max: 97.9 (25 Apr 2021 14:52)  HR: 94 (26 Apr 2021 05:21) (70 - 94)  BP: 150/87 (26 Apr 2021 05:21) (131/89 - 150/87)  BP(mean): --  RR: 18 (26 Apr 2021 05:21) (18 - 18)  SpO2: 95% (26 Apr 2021 05:21) (95% - 97%)    PHYSICAL EXAM:    Constitutional:agfitated retsrained in bed  H+N nc at  Eyes:sa cwnl  ENMT:hears respionds approp, spit outfood,   Neck:no cb notm  Breasts:  Back:  Respiratory:ctab norrw  Cardiovascular:rrr  Gastrointestinal:bs+ soft nt  Genitourinary:rivera  Rectal:  Extremities:edema as usu not pitting  Vascular:hm- vv-  Neurological:voice is hoarse, nfatmae, memory is ok  Skin:  Lymph Nodes:  Musculoskeletal:  Psychiatric: agitated and not happy, but a&o    LABS  CBC Full  -  ( 26 Apr 2021 07:52 )  WBC Count : 9.32 K/uL  RBC Count : 2.85 M/uL  Hemoglobin : 8.7 g/dL  Hematocrit : 28.5 %  Platelet Count - Automated : 298 K/uL  Mean Cell Volume : 100.0 fl  Mean Cell Hemoglobin : 30.5 pg  Mean Cell Hemoglobin Concentration : 30.5 gm/dL  Auto Neutrophil # : x  Auto Lymphocyte # : x  Auto Monocyte # : x  Auto Eosinophil # : x  Auto Basophil # : x  Auto Neutrophil % : x  Auto Lymphocyte % : x  Auto Monocyte % : x  Auto Eosinophil % : x  Auto Basophil % : x      04-26    151<H>  |  112<H>  |  51<H>  ----------------------------<  103<H>  4.2   |  22  |  2.51<H>    Ca    8.8      26 Apr 2021 07:52  Phos  4.1     04-26  Mg     2.8     04-26            Imaging:    Xray:    Echo:    CT:    MRI:    Tele:    Orders:    DAVID Lemus [phone]

## 2021-04-26 NOTE — PROGRESS NOTE ADULT - SUBJECTIVE AND OBJECTIVE BOX
ACS DAILY PROGRESS NOTE:       SUBJECTIVE/ROS: Patient feels well  Denies nausea, vomiting, chest pain, shortness of breath         MEDICATIONS  (STANDING):  brimonidine 0.2% Ophthalmic Solution 1 Drop(s) Both EYES three times a day  enoxaparin Injectable 30 milliGRAM(s) SubCutaneous every 24 hours  finasteride 5 milliGRAM(s) Oral daily  latanoprost 0.005% Ophthalmic Solution 1 Drop(s) Both EYES at bedtime  metoprolol tartrate Injectable 5 milliGRAM(s) IV Push every 6 hours  multiple electrolytes Injection Type 1 1000 milliLiter(s) (75 mL/Hr) IV Continuous <Continuous>  pantoprazole  Injectable 40 milliGRAM(s) IV Push every 12 hours  pilocarpine 4% Solution 1 Drop(s) Both EYES three times a day    MEDICATIONS  (PRN):  acetaminophen  IVPB .. 500 milliGRAM(s) IV Intermittent every 6 hours PRN Mild Pain (1 - 3)      OBJECTIVE:    Vital Signs Last 24 Hrs  T(C): 36.3 (26 Apr 2021 05:21), Max: 36.8 (25 Apr 2021 08:58)  T(F): 97.4 (26 Apr 2021 05:21), Max: 98.3 (25 Apr 2021 08:58)  HR: 94 (26 Apr 2021 05:21) (70 - 94)  BP: 150/87 (26 Apr 2021 05:21) (131/89 - 152/82)  BP(mean): --  RR: 18 (26 Apr 2021 05:21) (18 - 18)  SpO2: 95% (26 Apr 2021 05:21) (95% - 97%)        I&O's Detail    25 Apr 2021 07:01  -  26 Apr 2021 07:00  --------------------------------------------------------  IN:    IV PiggyBack: 250 mL    multiple electrolytes Injection Type 1.: 1800 mL  Total IN: 2050 mL    OUT:    Bulb (mL): 160 mL    Indwelling Catheter - Urethral (mL): 1350 mL    Oral Fluid: 0 mL  Total OUT: 1510 mL    Total NET: 540 mL          Daily     Daily     LABS:                        8.7    9.32  )-----------( 298      ( 26 Apr 2021 07:52 )             28.5     04-26    151<H>  |  112<H>  |  51<H>  ----------------------------<  103<H>  4.2   |  22  |  2.51<H>    Ca    8.8      26 Apr 2021 07:52  Phos  4.1     04-26  Mg     2.8     04-26                    PHYSICAL EXAM:  Constitutional: well developed, well nourished, NAD  Eyes: anicteric  ENMT: normal facies, symmetric  Neck: supple  Respiratory: CTA bilaterally  Cardiovascular: RRR  Gastrointestinal: abdomen soft, nontender, nondistended. No obvious masses. No peritonitis  Extremities: FROM, warm  Neurological: intact, non-focal  Skin: no gross lesions  Lymph Nodes: no gross adenopathy  Musculoskeletal: equal strength bilateral upper and lower extremities  Psychiatric: oriented x 3; appropriate

## 2021-04-26 NOTE — CHART NOTE - NSCHARTNOTEFT_GEN_A_CORE
spoke with Dr. Lemus- he feels strongly patients symptoms more related to delusions and anxiety rather than delirium- prefers we try Xanax .25mg once to see how patient will respond to medication- discussed with Dr. Doherty and Dr. Bryson    [phone]

## 2021-04-26 NOTE — PROGRESS NOTE ADULT - ATTENDING COMMENTS
from surgical perspective showing good progress  tolerating diet.  Has YOLIE with non bilious drainage    the patient is showing signs of delirium  I had detailed discussion with patients daughter  restarted home Xanax.  Will use Seroquel at night  on 1/2 normal saline for mild hypernatremia - medical co- management assisting  Patient was OOB with daughter at bedside    anticipate need for one to one for evenings

## 2021-04-26 NOTE — PROGRESS NOTE ADULT - ASSESSMENT
89y Male with history of CKD, CHF, afib on eliquis and BPH presenting with peritonitis from duodenal perforation s/p ex lap with pyloric excluding gastrojejunostomy and ethan patch on 4/20.    Plan:   - Regular diet  - Monitor bowel function  - change fluids to D5 + 1/2Ns -- check BMP at 3pm  - Protonix IVP BID  - d/c rivera- straight cath q12h  - Continue abx   - Pain control: IV tylenol, dilaudid prn   - DVT ppx: Lovenox  - Holding home meds    ACS  [phone]

## 2021-04-26 NOTE — PROGRESS NOTE ADULT - ASSESSMENT
SCr still hi, SNa risen, getting electrolytes IV (>?)  restrained, but should be oob and needs to be fed  can get ativan if no acte physio, Sx rel reason for agitation  can hydrate c D5 and fu labs  tylenol for p[ain

## 2021-04-27 DIAGNOSIS — E87.6 HYPOKALEMIA: ICD-10-CM

## 2021-04-27 LAB
ANION GAP SERPL CALC-SCNC: 11 MMOL/L — SIGNIFICANT CHANGE UP (ref 5–17)
ANION GAP SERPL CALC-SCNC: 9 MMOL/L — SIGNIFICANT CHANGE UP (ref 5–17)
BUN SERPL-MCNC: 41 MG/DL — HIGH (ref 7–23)
BUN SERPL-MCNC: 44 MG/DL — HIGH (ref 7–23)
CALCIUM SERPL-MCNC: 8 MG/DL — LOW (ref 8.4–10.5)
CALCIUM SERPL-MCNC: 8.1 MG/DL — LOW (ref 8.4–10.5)
CHLORIDE SERPL-SCNC: 112 MMOL/L — HIGH (ref 96–108)
CHLORIDE SERPL-SCNC: 113 MMOL/L — HIGH (ref 96–108)
CO2 SERPL-SCNC: 24 MMOL/L — SIGNIFICANT CHANGE UP (ref 22–31)
CO2 SERPL-SCNC: 25 MMOL/L — SIGNIFICANT CHANGE UP (ref 22–31)
CREAT SERPL-MCNC: 2.08 MG/DL — HIGH (ref 0.5–1.3)
CREAT SERPL-MCNC: 2.23 MG/DL — HIGH (ref 0.5–1.3)
GLUCOSE SERPL-MCNC: 101 MG/DL — HIGH (ref 70–99)
GLUCOSE SERPL-MCNC: 154 MG/DL — HIGH (ref 70–99)
HCT VFR BLD CALC: 24.9 % — LOW (ref 39–50)
HCT VFR BLD CALC: 25 % — LOW (ref 39–50)
HCT VFR BLD CALC: 26.1 % — LOW (ref 39–50)
HGB BLD-MCNC: 7.5 G/DL — LOW (ref 13–17)
HGB BLD-MCNC: 7.6 G/DL — LOW (ref 13–17)
HGB BLD-MCNC: 7.9 G/DL — LOW (ref 13–17)
MAGNESIUM SERPL-MCNC: 2.6 MG/DL — SIGNIFICANT CHANGE UP (ref 1.6–2.6)
MCHC RBC-ENTMCNC: 30 GM/DL — LOW (ref 32–36)
MCHC RBC-ENTMCNC: 30.1 PG — SIGNIFICANT CHANGE UP (ref 27–34)
MCHC RBC-ENTMCNC: 30.3 GM/DL — LOW (ref 32–36)
MCHC RBC-ENTMCNC: 30.3 PG — SIGNIFICANT CHANGE UP (ref 27–34)
MCHC RBC-ENTMCNC: 30.4 PG — SIGNIFICANT CHANGE UP (ref 27–34)
MCHC RBC-ENTMCNC: 30.5 GM/DL — LOW (ref 32–36)
MCV RBC AUTO: 100.4 FL — HIGH (ref 80–100)
MCV RBC AUTO: 100.4 FL — HIGH (ref 80–100)
MCV RBC AUTO: 99.2 FL — SIGNIFICANT CHANGE UP (ref 80–100)
NRBC # BLD: 0 /100 WBCS — SIGNIFICANT CHANGE UP (ref 0–0)
NRBC # BLD: 0 /100 WBCS — SIGNIFICANT CHANGE UP (ref 0–0)
NRBC # BLD: 1 /100 WBCS — HIGH (ref 0–0)
PHOSPHATE SERPL-MCNC: 3.5 MG/DL — SIGNIFICANT CHANGE UP (ref 2.5–4.5)
PLATELET # BLD AUTO: 217 K/UL — SIGNIFICANT CHANGE UP (ref 150–400)
PLATELET # BLD AUTO: 221 K/UL — SIGNIFICANT CHANGE UP (ref 150–400)
PLATELET # BLD AUTO: 250 K/UL — SIGNIFICANT CHANGE UP (ref 150–400)
POTASSIUM SERPL-MCNC: 3.4 MMOL/L — LOW (ref 3.5–5.3)
POTASSIUM SERPL-MCNC: 3.4 MMOL/L — LOW (ref 3.5–5.3)
POTASSIUM SERPL-SCNC: 3.4 MMOL/L — LOW (ref 3.5–5.3)
POTASSIUM SERPL-SCNC: 3.4 MMOL/L — LOW (ref 3.5–5.3)
RBC # BLD: 2.49 M/UL — LOW (ref 4.2–5.8)
RBC # BLD: 2.51 M/UL — LOW (ref 4.2–5.8)
RBC # BLD: 2.6 M/UL — LOW (ref 4.2–5.8)
RBC # FLD: 22.2 % — HIGH (ref 10.3–14.5)
RBC # FLD: 22.3 % — HIGH (ref 10.3–14.5)
RBC # FLD: 22.4 % — HIGH (ref 10.3–14.5)
SARS-COV-2 RNA SPEC QL NAA+PROBE: SIGNIFICANT CHANGE UP
SODIUM SERPL-SCNC: 146 MMOL/L — HIGH (ref 135–145)
SODIUM SERPL-SCNC: 148 MMOL/L — HIGH (ref 135–145)
WBC # BLD: 7.41 K/UL — SIGNIFICANT CHANGE UP (ref 3.8–10.5)
WBC # BLD: 7.71 K/UL — SIGNIFICANT CHANGE UP (ref 3.8–10.5)
WBC # BLD: 8.66 K/UL — SIGNIFICANT CHANGE UP (ref 3.8–10.5)
WBC # FLD AUTO: 7.41 K/UL — SIGNIFICANT CHANGE UP (ref 3.8–10.5)
WBC # FLD AUTO: 7.71 K/UL — SIGNIFICANT CHANGE UP (ref 3.8–10.5)
WBC # FLD AUTO: 8.66 K/UL — SIGNIFICANT CHANGE UP (ref 3.8–10.5)

## 2021-04-27 RX ORDER — ALBUTEROL 90 UG/1
2.5 AEROSOL, METERED ORAL ONCE
Refills: 0 | Status: COMPLETED | OUTPATIENT
Start: 2021-04-27 | End: 2021-04-27

## 2021-04-27 RX ORDER — TAMSULOSIN HYDROCHLORIDE 0.4 MG/1
0.4 CAPSULE ORAL AT BEDTIME
Refills: 0 | Status: DISCONTINUED | OUTPATIENT
Start: 2021-04-27 | End: 2021-04-30

## 2021-04-27 RX ORDER — ERYTHROPOIETIN 10000 [IU]/ML
40000 INJECTION, SOLUTION INTRAVENOUS; SUBCUTANEOUS ONCE
Refills: 0 | Status: COMPLETED | OUTPATIENT
Start: 2021-04-27 | End: 2021-04-27

## 2021-04-27 RX ORDER — POTASSIUM CHLORIDE 20 MEQ
20 PACKET (EA) ORAL ONCE
Refills: 0 | Status: COMPLETED | OUTPATIENT
Start: 2021-04-27 | End: 2021-04-27

## 2021-04-27 RX ORDER — SODIUM CHLORIDE 9 MG/ML
1000 INJECTION, SOLUTION INTRAVENOUS
Refills: 0 | Status: DISCONTINUED | OUTPATIENT
Start: 2021-04-27 | End: 2021-04-28

## 2021-04-27 RX ORDER — POTASSIUM PHOSPHATE, MONOBASIC POTASSIUM PHOSPHATE, DIBASIC 236; 224 MG/ML; MG/ML
15 INJECTION, SOLUTION INTRAVENOUS ONCE
Refills: 0 | Status: COMPLETED | OUTPATIENT
Start: 2021-04-27 | End: 2021-04-27

## 2021-04-27 RX ADMIN — LATANOPROST 1 DROP(S): 0.05 SOLUTION/ DROPS OPHTHALMIC; TOPICAL at 21:15

## 2021-04-27 RX ADMIN — BRIMONIDINE TARTRATE 1 DROP(S): 2 SOLUTION/ DROPS OPHTHALMIC at 06:26

## 2021-04-27 RX ADMIN — Medication 5 MILLIGRAM(S): at 12:40

## 2021-04-27 RX ADMIN — Medication 1 DROP(S): at 21:16

## 2021-04-27 RX ADMIN — Medication 1 DROP(S): at 06:25

## 2021-04-27 RX ADMIN — Medication 0.25 MILLIGRAM(S): at 14:42

## 2021-04-27 RX ADMIN — ENOXAPARIN SODIUM 30 MILLIGRAM(S): 100 INJECTION SUBCUTANEOUS at 14:41

## 2021-04-27 RX ADMIN — Medication 1 DROP(S): at 14:29

## 2021-04-27 RX ADMIN — SODIUM CHLORIDE 60 MILLILITER(S): 9 INJECTION, SOLUTION INTRAVENOUS at 11:41

## 2021-04-27 RX ADMIN — ALBUTEROL 2.5 MILLIGRAM(S): 90 AEROSOL, METERED ORAL at 14:26

## 2021-04-27 RX ADMIN — TAMSULOSIN HYDROCHLORIDE 0.4 MILLIGRAM(S): 0.4 CAPSULE ORAL at 21:15

## 2021-04-27 RX ADMIN — Medication 20 MILLIEQUIVALENT(S): at 11:42

## 2021-04-27 RX ADMIN — FINASTERIDE 5 MILLIGRAM(S): 5 TABLET, FILM COATED ORAL at 11:42

## 2021-04-27 RX ADMIN — PANTOPRAZOLE SODIUM 40 MILLIGRAM(S): 20 TABLET, DELAYED RELEASE ORAL at 06:26

## 2021-04-27 RX ADMIN — BRIMONIDINE TARTRATE 1 DROP(S): 2 SOLUTION/ DROPS OPHTHALMIC at 14:29

## 2021-04-27 RX ADMIN — POTASSIUM PHOSPHATE, MONOBASIC POTASSIUM PHOSPHATE, DIBASIC 62.5 MILLIMOLE(S): 236; 224 INJECTION, SOLUTION INTRAVENOUS at 21:16

## 2021-04-27 RX ADMIN — Medication 5 MILLIGRAM(S): at 00:54

## 2021-04-27 RX ADMIN — ERYTHROPOIETIN 40000 UNIT(S): 10000 INJECTION, SOLUTION INTRAVENOUS; SUBCUTANEOUS at 16:56

## 2021-04-27 RX ADMIN — PANTOPRAZOLE SODIUM 40 MILLIGRAM(S): 20 TABLET, DELAYED RELEASE ORAL at 17:00

## 2021-04-27 RX ADMIN — Medication 5 MILLIGRAM(S): at 06:37

## 2021-04-27 RX ADMIN — BRIMONIDINE TARTRATE 1 DROP(S): 2 SOLUTION/ DROPS OPHTHALMIC at 21:15

## 2021-04-27 NOTE — PROGRESS NOTE ADULT - SUBJECTIVE AND OBJECTIVE BOX
ACS DAILY PROGRESS NOTE:       SUBJECTIVE/ROS: Patient feels well. Denies nausea, vomiting, chest pain, shortness of breath       MEDICATIONS  (STANDING):  ALBUTerol    0.083%. 2.5 milliGRAM(s) Nebulizer once  brimonidine 0.2% Ophthalmic Solution 1 Drop(s) Both EYES three times a day  dextrose 5%. 1000 milliLiter(s) (60 mL/Hr) IV Continuous <Continuous>  enoxaparin Injectable 30 milliGRAM(s) SubCutaneous every 24 hours  epoetin karlene-epbx (RETACRIT) Injectable 85421 Unit(s) SubCutaneous once  finasteride 5 milliGRAM(s) Oral daily  latanoprost 0.005% Ophthalmic Solution 1 Drop(s) Both EYES at bedtime  metoprolol tartrate Injectable 5 milliGRAM(s) IV Push every 6 hours  pantoprazole  Injectable 40 milliGRAM(s) IV Push every 12 hours  pilocarpine 4% Solution 1 Drop(s) Both EYES three times a day  tamsulosin 0.4 milliGRAM(s) Oral at bedtime    MEDICATIONS  (PRN):  acetaminophen  IVPB .. 500 milliGRAM(s) IV Intermittent every 6 hours PRN Mild Pain (1 - 3)  ALPRAZolam 0.25 milliGRAM(s) Oral daily PRN agitation      OBJECTIVE:    Vital Signs Last 24 Hrs  T(C): 34.7 (27 Apr 2021 11:04), Max: 36.8 (27 Apr 2021 01:45)  T(F): 94.5 (27 Apr 2021 11:04), Max: 98.2 (27 Apr 2021 01:45)  HR: 69 (27 Apr 2021 00:24) (69 - 78)  BP: 123/67 (27 Apr 2021 11:04) (123/65 - 135/88)  BP(mean): --  RR: 18 (27 Apr 2021 13:56) (17 - 18)  SpO2: 95% (27 Apr 2021 13:56) (94% - 99%)      I&O's Detail        26 Apr 2021 07:01  -  27 Apr 2021 07:00  --------------------------------------------------------  IN:    dextrose 5% + sodium chloride 0.45%: 720 mL    Oral Fluid: 220 mL  Total IN: 940 mL    OUT:    Bulb (mL): 205 mL    Indwelling Catheter - Urethral (mL): 365 mL    Intermittent Catheterization - Urethral (mL): 700 mL  Total OUT: 1270 mL    Total NET: -330 mL      27 Apr 2021 07:01  -  27 Apr 2021 14:19  --------------------------------------------------------  IN:    Oral Fluid: 120 mL  Total IN: 120 mL    OUT:    Bulb (mL): 75 mL    Intermittent Catheterization - Urethral (mL): 800 mL  Total OUT: 875 mL    Total NET: -755 mL            LABS:                                     7.9    8.66  )-----------( 217      ( 27 Apr 2021 10:47 )             26.1       04-27    148<H>  |  113<H>  |  44<H>  ----------------------------<  101<H>  3.4<L>   |  24  |  2.23<H>    Ca    8.0<L>      27 Apr 2021 07:26  Phos  3.5     04-27  Mg     2.6     04-27            CAPILLARY BLOOD GLUCOSE        PHYSICAL EXAM:  Constitutional: well developed, well nourished, NAD  Eyes: anicteric  ENMT: normal facies, symmetric  Neck: supple  Respiratory: CTA bilaterally  Cardiovascular: RRR  Gastrointestinal: abdomen soft, nontender, nondistended. No obvious masses. No peritonitis  Extremities: FROM, warm  Neurological: intact, non-focal  Skin: no gross lesions  Lymph Nodes: no gross adenopathy  Musculoskeletal: equal strength bilateral upper and lower extremities  Psychiatric: oriented x 3; appropriate

## 2021-04-27 NOTE — PROVIDER CONTACT NOTE (OTHER) - DATE AND TIME:
26-Apr-2021 22:15
27-Apr-2021 18:30
27-Apr-2021 07:30
27-Apr-2021 12:00
21-Apr-2021 16:30
24-Apr-2021 04:30
25-Apr-2021 23:00

## 2021-04-27 NOTE — PROVIDER CONTACT NOTE (OTHER) - SITUATION
bleeding from midline abdominal surgical incision.
pt rectal temp 94.3
patient briefly in afib tachycardic to 130s
pt agitated & confused & combative
Patient pulled out NG tube
temp 96 axillary
Temp increased after bearhugger applied, but still below normal

## 2021-04-27 NOTE — PROVIDER CONTACT NOTE (OTHER) - BACKGROUND
Pt admitted for abdominal surgery
Patient was admitted with abdominal pain and underwent a exploratory laparotomy.
Pt admitted for duodenal perforation
exploratory laparotomy, extensive washout with purulence,pyloric exclusion with retrocolic gastrojejunostomy performed ethan patch performed umbilical hernia repair performed YOLIE epps left in place
pt s/p duodenal ulcer perforation with history of afib

## 2021-04-27 NOTE — PROVIDER CONTACT NOTE (OTHER) - RECOMMENDATIONS
ekg completed
Echo Sky made aware, RN recommends B/L wrist restraints
Follow provider orders
KENNY Dodge made aware, RN recommends rectal temp for a more accurate reading.
Jaky Campos recommends heating blankets & heating packs but that intervention was already done for the patient. RN recommends heating blanket.
Follow provider orders

## 2021-04-27 NOTE — PROGRESS NOTE ADULT - ASSESSMENT
89y Male with history of CKD, CHF, afib on eliquis and BPH presenting with peritonitis from duodenal perforation s/p ex lap with pyloric excluding gastrojejunostomy and ethan patch on 4/20.    Plan:   - Regular diet  - Monitor bowel function  - change fluids to D5 + 1/2Ns -- check BMP at 3pm  - Protonix IVP BID  - d/c rivera- straight cath q12h  - Continue abx   - Pain control: IV tylenol, dilaudid prn   - DVT ppx: Lovenox  - Holding home meds    ACS  [phone]

## 2021-04-27 NOTE — PROVIDER CONTACT NOTE (OTHER) - REASON
pt agitated & confused & combative
Surgical site dehiscence
Patient pulled out NG tube
Temp
temp 96 axillary
patient briefly in afib tachycardic to 130s
pt rectal temp 94.3

## 2021-04-27 NOTE — PROGRESS NOTE ADULT - ASSESSMENT
procrit  flomax  OOB to chair, has to be fed  Sx team fu re plan  see notes from yesteerday  disc in detail c PA and also w dtr and w pt again  and   see orders

## 2021-04-27 NOTE — PROVIDER CONTACT NOTE (OTHER) - ASSESSMENT
Patient is A&Ox1. VSS remain stable. Patient is confused and pulled out NG tube from the left nostril. No bleeding or signs of discomfort are present.
pt axillary temp reading 96, pt non compliant w/ oral route, pt confused combative & agitated, pt feels cool to touch, multiple blankets added to patient, heat packs given to patient as well.
pt confused & agitated & combative, refusing to take any oral intake or medication, pt's temp was unable to be obtained via any other route besides rectal, multiple probes used for rectal reading, all readings show 94.1- 94.3 temp. pt feels cool to touch, heat packs and blankets were given to patient but pt is very agitated & combative to keep the blankets & heat packs on,
pt confused, oriented to name only, pt attempting to get OOB w/o asst, bed alarm on, after being educated on fall risk, pt pulling off abd midline drsg & pulling on j/p drain, ke site saturated w/ sanguineous drainage, both drsg changed by RN, IV's pulled out by patient, pt attempting to pull out rivera cath after educating pt on placement & reason for rivera, RN reorients & reeducates pt on safety measures w/ drains + fall risk.
Pt stable on room air.
patient briefly in afib tachycardic to 130s. BP stable
Pt stable. No signs immediate distress noted

## 2021-04-27 NOTE — PROGRESS NOTE ADULT - ATTENDING COMMENTS
extreme of age  intermittent delirium  patient pulled out YOLIE drain  1 to 1 care as needed  decrease in hematocrit which is continued to be followed

## 2021-04-27 NOTE — PROVIDER CONTACT NOTE (OTHER) - ACTION/TREATMENT ORDERED:
no action ordered
Echo Sky made aware, Provider orders B/L Wrist restraints stat, will cont to madelyn.
KENNY Nicholson order rectal temp now, will cont to madelyn.
KENNY Campos states not necessary to do vital signs or temperature often d/t temperature reading being low, no interventions @ this time, will cont to madelyn eldridge.
Provider came to the bedside to assess patient. As per provider no interventions necessary at this time. Safety maintained. Bed alarm active. Will continue to monitor.
Provider at bedside to assess. Pressure applied drsg changed. No orders at this time. Will continue to monitor
Retake temp 2000

## 2021-04-27 NOTE — CHART NOTE - NSCHARTNOTEFT_GEN_A_CORE
Pt's daughter requesting that pt receive his Aranesp, which is given weekly.  Daughter requesting team call hematologist.  Spoke with patient's hematologist, Dr. Drummond ([phone])  Pt was ordered for Retacrit at 9:30AM which can be given in lieu of Aranesp per hematologist.    Called floor and spoke with nurse, requested that Retacrit be given as ordered.    DC Costa PA-C , pager # [phone]

## 2021-04-27 NOTE — PROGRESS NOTE ADULT - SUBJECTIVE AND OBJECTIVE BOX
DATE OF SERVICE: 04-27-21 @ 09:35    HPI:  89y M MDS, CKD, Afib on eliquis, BPH with intermittent self-catheterization, CHF, presents to ED BIBEMS from assisted living c/o diffuse, moderate, abdominal pain since this morning. Patient reports having 1 day hx of abdominal pain. (20 Apr 2021 17:12)      Interval Events  wbc nl, H/H decr, Hx MDS, gets procrit out pt, ordered now, Na al better, SCr better but still hi, has recovered in past. K was lo so getting K po, and Ivf changed  was agitated and delusional yesterday, not this am so far, Tempo was lo but seems ok now. oriented and reasonable  rivera is out so getting str cath, can restart flomax    MEDICATIONS  (STANDING):  brimonidine 0.2% Ophthalmic Solution 1 Drop(s) Both EYES three times a day  dextrose 5% + sodium chloride 0.45%. 1000 milliLiter(s) (30 mL/Hr) IV Continuous <Continuous>  enoxaparin Injectable 30 milliGRAM(s) SubCutaneous every 24 hours  epoetin karlene-epbx (RETACRIT) Injectable 77982 Unit(s) SubCutaneous once  finasteride 5 milliGRAM(s) Oral daily  latanoprost 0.005% Ophthalmic Solution 1 Drop(s) Both EYES at bedtime  metoprolol tartrate Injectable 5 milliGRAM(s) IV Push every 6 hours  pantoprazole  Injectable 40 milliGRAM(s) IV Push every 12 hours  pilocarpine 4% Solution 1 Drop(s) Both EYES three times a day  potassium chloride    Tablet ER 20 milliEquivalent(s) Oral once  tamsulosin 0.4 milliGRAM(s) Oral at bedtime    MEDICATIONS  (PRN):  acetaminophen  IVPB .. 500 milliGRAM(s) IV Intermittent every 6 hours PRN Mild Pain (1 - 3)  ALPRAZolam 0.25 milliGRAM(s) Oral daily PRN agitation      Patient is a 89y old  Male who presents with a chief complaint of Abdominal pain (26 Apr 2021 09:08)      REVIEW OF SYSTEMS    General:awake nad rsponds  Skin/Breast:no co  Ophthalmologic:no co no c  ENMT:	thirsty, no other co  Respiratory and Thorax:no cough no sp no sob  Cardiovascular:no cp no palp  Gastrointestinal:no nvcd  Genitourinary:no fdi  Musculoskeletal:	no a no p  Neurological:	no co no ch  Psychiatric:	no co   Hematology/Lymphatics:	  Endocrine:	no polyudd  Allergic/Immunologic:  AOSN	y      Vital Signs Last 24 Hrs  T(C): 34.6 (27 Apr 2021 07:03), Max: 36.8 (27 Apr 2021 01:45)  T(F): 94.3 (27 Apr 2021 07:03), Max: 98.2 (27 Apr 2021 01:45)  HR: 69 (27 Apr 2021 00:24) (69 - 88)  BP: 130/82 (27 Apr 2021 06:37) (123/65 - 136/86)  BP(mean): --  RR: 18 (27 Apr 2021 06:37) (16 - 18)  SpO2: 94% (27 Apr 2021 06:37) (94% - 99%)    PHYSICAL EXAM:    Constitutional:vss nad reasonable and remembers  H+N ncat  Eyes:martell lo vision  ENMT:hears swallows speaks slowly,   Neck:no cb no tm  Breasts:  Back:  Respiratory:ctab norrw  Cardiovascular:rrr  Gastrointestinal:bs+ soft nt  Genitourinary:rivera nout  Rectal:  Extremities:edema as always but no pittimng  Vascular:hm-, vv-  Neurological:alert nad atmae oriented  Skin:b&B hands  Lymph Nodes:  Musculoskeletal:  Psychiatric:calm coop now reasonable, not delusional now, mem is ok    LABS  CBC Full  -  ( 27 Apr 2021 07:26 )  WBC Count : 7.41 K/uL  RBC Count : 2.49 M/uL  Hemoglobin : 7.5 g/dL  Hematocrit : 25.0 %  Platelet Count - Automated : 221 K/uL  Mean Cell Volume : 100.4 fl  Mean Cell Hemoglobin : 30.1 pg  Mean Cell Hemoglobin Concentration : 30.0 gm/dL  Auto Neutrophil # : x  Auto Lymphocyte # : x  Auto Monocyte # : x  Auto Eosinophil # : x  Auto Basophil # : x  Auto Neutrophil % : x  Auto Lymphocyte % : x  Auto Monocyte % : x  Auto Eosinophil % : x  Auto Basophil % : x      04-27    148<H>  |  113<H>  |  44<H>  ----------------------------<  101<H>  3.4<L>   |  24  |  2.23<H>    Ca    8.0<L>      27 Apr 2021 07:26  Phos  3.5     04-27  Mg     2.6     04-27            Imaging:    Xray:    Echo:    CT:    MRI:    Tele:    Orders:    DAVID Lemus [phone]

## 2021-04-28 RX ORDER — METOPROLOL TARTRATE 50 MG
25 TABLET ORAL
Refills: 0 | Status: DISCONTINUED | OUTPATIENT
Start: 2021-04-28 | End: 2021-04-30

## 2021-04-28 RX ORDER — FUROSEMIDE 40 MG
40 TABLET ORAL ONCE
Refills: 0 | Status: DISCONTINUED | OUTPATIENT
Start: 2021-04-29 | End: 2021-04-29

## 2021-04-28 RX ORDER — PANTOPRAZOLE SODIUM 20 MG/1
40 TABLET, DELAYED RELEASE ORAL
Refills: 0 | Status: DISCONTINUED | OUTPATIENT
Start: 2021-04-28 | End: 2021-04-30

## 2021-04-28 RX ADMIN — Medication 0.25 MILLIGRAM(S): at 00:59

## 2021-04-28 RX ADMIN — ENOXAPARIN SODIUM 30 MILLIGRAM(S): 100 INJECTION SUBCUTANEOUS at 14:33

## 2021-04-28 RX ADMIN — BRIMONIDINE TARTRATE 1 DROP(S): 2 SOLUTION/ DROPS OPHTHALMIC at 22:10

## 2021-04-28 RX ADMIN — LATANOPROST 1 DROP(S): 0.05 SOLUTION/ DROPS OPHTHALMIC; TOPICAL at 22:09

## 2021-04-28 RX ADMIN — TAMSULOSIN HYDROCHLORIDE 0.4 MILLIGRAM(S): 0.4 CAPSULE ORAL at 22:10

## 2021-04-28 RX ADMIN — Medication 5 MILLIGRAM(S): at 00:20

## 2021-04-28 RX ADMIN — FINASTERIDE 5 MILLIGRAM(S): 5 TABLET, FILM COATED ORAL at 12:54

## 2021-04-28 RX ADMIN — Medication 5 MILLIGRAM(S): at 12:54

## 2021-04-28 RX ADMIN — BRIMONIDINE TARTRATE 1 DROP(S): 2 SOLUTION/ DROPS OPHTHALMIC at 05:31

## 2021-04-28 RX ADMIN — Medication 1 DROP(S): at 05:35

## 2021-04-28 RX ADMIN — Medication 1 DROP(S): at 22:09

## 2021-04-28 RX ADMIN — Medication 5 MILLIGRAM(S): at 05:31

## 2021-04-28 RX ADMIN — Medication 1 DROP(S): at 13:15

## 2021-04-28 RX ADMIN — PANTOPRAZOLE SODIUM 40 MILLIGRAM(S): 20 TABLET, DELAYED RELEASE ORAL at 05:39

## 2021-04-28 RX ADMIN — Medication 25 MILLIGRAM(S): at 17:27

## 2021-04-28 RX ADMIN — BRIMONIDINE TARTRATE 1 DROP(S): 2 SOLUTION/ DROPS OPHTHALMIC at 13:15

## 2021-04-28 RX ADMIN — SODIUM CHLORIDE 60 MILLILITER(S): 9 INJECTION, SOLUTION INTRAVENOUS at 06:50

## 2021-04-28 NOTE — CHART NOTE - NSCHARTNOTEFT_GEN_A_CORE
Nutrition Follow Up Note  Patient seen for: nutrition follow up    Chart reviewed, events noted.    Source: [] Patient       [x] EMR        [] RN        [] Family at bedside       [x] Other: PCA at bedside    -If unable to interview patient: [] Trach/Vent/BiPAP  [x] Disoriented/confused/inappropriate to interview and sleeping at time of visit    Diet Order:   Diet, Regular:   Supplement Feeding Modality:  Oral  Ensure Enlive Cans or Servings Per Day:  3       Frequency:  Daily (21)    - Is current order appropriate/adequate? [x] Yes  []  No:     - PO intake :   [] >75%  Adequate    [x] 50-75%  Fair       [] <50%  Poor - and pt consuming ~1 ensure enlive daily    - Nutrition-related concerns: pt with fair po intake and accepts ensure enlive supplement x1/day, po intake likely at baseline; nursing staff provides pt feeding assistance as needed    GI:  Last BMs  today.   Bowel Regimen? [x] Yes   [] No      Weights: wt changes noted, fluids shifts likely contributing, continue to monitor for trend  Daily Weight in k (), Weight in k ()    Nutritionally Pertinent MEDICATIONS  (STANDING):  dextrose 5%.  finasteride  metoprolol tartrate Injectable  pantoprazole  Injectable  tamsulosin    Pertinent Labs:  @ 17:46: Na 146<H>, BUN 41<H>, Cr 2.08<H>, <H>, K+ 3.4<L>    Skin per nursing documentation: stage I pressure injury to sacrum noted  Edema: 2+ generalized edema and 3+ edema to b/l feet noted    Estimated Needs:   [x] no change since previous assessment  [] recalculated:     Previous Nutrition Diagnosis: Inadequate Protein-Energy Intake  Nutrition Diagnosis is: [x] ongoing  [] resolved [] not applicable - po intake likely at baseline and oral nutrition supplement in place    New Nutrition Diagnosis: [x] Not applicable    Nutrition Care Plan:  [] In Progress  [x] Achieved  [] Not applicable       Recommendations:         [x] Continue current diet order            [] Modify oral nutrition supplement: reduce ensure enlive to 1x/daily as pt does not drink more than this per day     [x] Other: consider addition of multivitamin if no contraindications    Monitoring and Evaluation:   Continue to monitor nutritional intake, tolerance to diet prescription, weights, labs, skin integrity      RD remains available upon request and will follow up per protocol. Esperanza Knight RD, CDN Pager: [phone] Nutrition Follow Up Note  Patient seen for: nutrition follow up    Chart reviewed, events noted.    Source: [] Patient       [x] EMR        [] RN        [] Family at bedside       [x] Other: PCA at bedside    -If unable to interview patient: [] Trach/Vent/BiPAP  [x] Disoriented/confused/inappropriate to interview and sleeping at time of visit    Diet Order:   Diet, Regular:   Supplement Feeding Modality:  Oral  Ensure Enlive Cans or Servings Per Day:  3       Frequency:  Daily (21)    - Is current order appropriate/adequate? [x] Yes  []  No:     - PO intake :   [] >75%  Adequate    [x] 50-75%  Fair       [] <50%  Poor - and pt consuming ~1 ensure enlive daily    - Nutrition-related concerns: pt with fair po intake and accepts ensure enlive supplement x1/day, po intake likely at baseline; nursing staff provides pt feeding assistance as needed    GI:  Last BMs  today.   Bowel Regimen? [x] Yes   [] No      Weights: wt changes noted, fluids shifts likely contributing, continue to monitor for trend  Daily Weight in k (), Weight in k ()    Nutritionally Pertinent MEDICATIONS  (STANDING):  dextrose 5%.  finasteride  metoprolol tartrate Injectable  pantoprazole  Injectable  tamsulosin    Pertinent Labs:  @ 17:46: Na 146<H>, BUN 41<H>, Cr 2.08<H>, <H>, K+ 3.4<L>    Skin per nursing documentation: stage I pressure injury to sacrum noted  Edema: 2+ generalized edema and 3+ edema to b/l feet noted    Estimated Needs:   [x] no change since previous assessment  [] recalculated:     Previous Nutrition Diagnosis: Inadequate Protein-Energy Intake  Nutrition Diagnosis is: [x] ongoing  [] resolved [] not applicable - po intake likely at baseline and oral nutrition supplement in place    New Nutrition Diagnosis: [x] Not applicable    Nutrition Care Plan:  [] In Progress  [x] Achieved  [] Not applicable       Recommendations:         [x] Continue current diet order            [x] Modify oral nutrition supplement: reduce ensure enlive to 1x/daily as pt does not drink more than this per day     [x] Other: consider addition of multivitamin if no contraindications    Monitoring and Evaluation:   Continue to monitor nutritional intake, tolerance to diet prescription, weights, labs, skin integrity      RD remains available upon request and will follow up per protocol. Esperanza Knight RD, CDN Pager: [phone]

## 2021-04-28 NOTE — PROGRESS NOTE ADULT - ASSESSMENT
SCr dropping, Na. K better?, labs this am, p  got procrit, need fu H/H  needs to be ooba nd eat\  Sx FU re wound  rivera is out, urinating and had a bm  reviewed all notes, called dtr Darren, will disc cond c group home where he lives today and will see wife

## 2021-04-28 NOTE — PROGRESS NOTE ADULT - ASSESSMENT
89y Male with history of CKD, CHF, afib on eliquis and BPH presenting with peritonitis from duodenal perforation s/p ex lap with pyloric excluding gastrojejunostomy and ethan patch on 4/20.    Plan:   - dc YOLIE today  - Regular diet  - Monitor bowel function  - change fluids to D5 + 1/2Ns -- check BMP at 3pm  - Protonix IVP BID  -  straight cath q12h  - Continue abx   - Pain control  - DVT ppx: Lovenox      ACS  [phone]

## 2021-04-28 NOTE — CHART NOTE - NSCHARTNOTEFT_GEN_A_CORE
[facility] INCIDENTAL FINDING:     Patient's daughter, Val Julien was bedside with the patient and was notified by [facility] team about the incidental findings on CT scan, specifically pancreatic lesions and renal cysts. She verbalized understanding and stated that she will call his primary care physician Dr. Gentile to for further evaluation. I provided the patient with a copy of the CT report detailing the findings. Dr. Gentile was called at [phone] and notified of incidental findings. He ensured access to [facility] records for this patient and declined need for faxing any reports. All concerns were addressed and questions were answered.       Dali Ponce, PGY1  [facility] Surgery  [phone]

## 2021-04-28 NOTE — PROGRESS NOTE ADULT - SUBJECTIVE AND OBJECTIVE BOX
DATE OF SERVICE: 04-28-21 @ 09:12    HPI:  89y M MDS, CKD, Afib on eliquis, BPH with intermittent self-catheterization, CHF, [facility]/o diffuse, moderate, abdominal pain since this morning. Patient reports having 1 day hx of abdominal pain. (20 Apr 2021 17:12)      Interval Events  got procrit, H/h this am p - am labs today  not agitated not delusional anymore but still has 1:1,  rivera is out, getting IVF  wound dehissence, dressed , no pain    MEDICATIONS  (STANDING):  brimonidine 0.2% Ophthalmic Solution 1 Drop(s) Both EYES three times a day  dextrose 5%. 1000 milliLiter(s) (60 mL/Hr) IV Continuous <Continuous>  enoxaparin Injectable 30 milliGRAM(s) SubCutaneous every 24 hours  finasteride 5 milliGRAM(s) Oral daily  latanoprost 0.005% Ophthalmic Solution 1 Drop(s) Both EYES at bedtime  metoprolol tartrate Injectable 5 milliGRAM(s) IV Push every 6 hours  pantoprazole  Injectable 40 milliGRAM(s) IV Push every 12 hours  pilocarpine 4% Solution 1 Drop(s) Both EYES three times a day  tamsulosin 0.4 milliGRAM(s) Oral at bedtime    MEDICATIONS  (PRN):  acetaminophen  IVPB .. 500 milliGRAM(s) IV Intermittent every 6 hours PRN Mild Pain (1 - 3)  ALPRAZolam 0.25 milliGRAM(s) Oral daily PRN agitation      Patient is a 89y old  Male who presents with a chief complaint of Abdominal pain (27 Apr 2021 14:18)      REVIEW OF SYSTEMS    General:nad alert wants to eat, wants to be oob  Skin/Breast:no co  Ophthalmologic:no co no ch  ENMT:	no co noch, lo v chronic  Respiratory and Thorax:no cpough no sp no sob  Cardiovascular:no cp no palp  Gastrointestinal:no nvcd  Genitourinary:no fdi  Musculoskeletal:	no a no p  Neurological:	no co no ch  Psychiatric:	  Hematology/Lymphatics:	  Endocrine:	no polyudd  Allergic/Immunologic:  AOSN	y      Vital Signs Last 24 Hrs  T(C): 36.3 (28 Apr 2021 09:03), Max: 36.4 (27 Apr 2021 22:37)  T(F): 97.4 (28 Apr 2021 09:03), Max: 97.6 (28 Apr 2021 00:17)  HR: 63 (28 Apr 2021 09:03) (63 - 90)  BP: 117/70 (28 Apr 2021 09:03) (104/66 - 138/70)  BP(mean): --  RR: 17 (28 Apr 2021 09:03) (17 - 18)  SpO2: 93% (28 Apr 2021 09:03) (93% - 95%)    PHYSICAL EXAM:    Constitutional:nad feels ok no co  H+N nc at  Eyes:martell cwnl lo vision   ENMT:hears swallows ok no cough, taking po  Neck:no cb no tm  Breasts:  Back:  Respiratory:ctab no rrw  Cardiovascular:irreg irreg   Gastrointestinal:BS+,  soft nt  no g  Genitourinary:  Rectal:  Extremities:edema supple no pitting  Vascular:hm-, vv-  Neurological:no ch nfatmae hasssnt ambulasted sinc pta  Skin:wound dehissence but dressed   Lymph Nodes:  Musculoskeletal:  Psychiatric:caalm coop, oriented converses    LABS  CBC Full  -  ( 27 Apr 2021 17:46 )  WBC Count : 7.71 K/uL  RBC Count : 2.51 M/uL  Hemoglobin : 7.6 g/dL  Hematocrit : 24.9 %  Platelet Count - Automated : 250 K/uL  Mean Cell Volume : 99.2 fl  Mean Cell Hemoglobin : 30.3 pg  Mean Cell Hemoglobin Concentration : 30.5 gm/dL  Auto Neutrophil # : x  Auto Lymphocyte # : x  Auto Monocyte # : x  Auto Eosinophil # : x  Auto Basophil # : x  Auto Neutrophil % : x  Auto Lymphocyte % : x  Auto Monocyte % : x  Auto Eosinophil % : x  Auto Basophil % : x      04-27    146<H>  |  112<H>  |  41<H>  ----------------------------<  154<H>  3.4<L>   |  25  |  2.08<H>    Ca    8.1<L>      27 Apr 2021 17:46  Phos  3.5     04-27  Mg     2.6     04-27            Imaging:    Xray:    Echo:    CT:    MRI:    Tele:    Orders:    DAVID Lemus [phone]

## 2021-04-28 NOTE — PROGRESS NOTE ADULT - SUBJECTIVE AND OBJECTIVE BOX
ACS Daily Progress Note      Subjective:   Patient seen at bedside this AM. Reports feeling well, without complaints.  Tolerating diet without N/V.     24h Events:   - Bleeding from midline s/p bedside silver nitrate applied and bleeding controlled.     Objective:  Vital Signs  T(C): 36.4 (04-28 @ 12:15), Max: 36.4 (04-27 @ 22:37)  HR: 69 (04-28 @ 12:15) (63 - 90)  BP: 105/65 (04-28 @ 12:15) (104/66 - 138/70)  RR: 17 (04-28 @ 12:15) (17 - 18)  SpO2: 93% (04-28 @ 12:15) (93% - 95%)  04-27-21 @ 07:01  -  04-28-21 @ 07:00  --------------------------------------------------------  IN:  Total IN: 0 mL    OUT:    Bulb (mL): 225 mL    Intermittent Catheterization - Urethral (mL): 1250 mL  Total OUT: 1475 mL    Total NET: -1475 mL      04-28-21 @ 07:01  -  04-28-21 @ 16:00  --------------------------------------------------------  IN:  Total IN: 0 mL    OUT:    Bulb (mL): 50 mL    Intermittent Catheterization - Urethral (mL): 680 mL  Total OUT: 730 mL    Total NET: -730 mL          Physical Exam:  GEN: resting in bed comfortably in NAD  RESP: no increased WOB  ABD: soft, non-distended, non-tender without rebound tenderness or guarding  EXTR: warm, well-perfused without gross deformities; spontaneous movement in b/l U/L extrem  NEURO: AAOx4    Labs:                        7.6    7.71  )-----------( 250      ( 27 Apr 2021 17:46 )             24.9   04-27    146<H>  |  112<H>  |  41<H>  ----------------------------<  154<H>  3.4<L>   |  25  |  2.08<H>    Ca    8.1<L>      27 Apr 2021 17:46  Phos  3.5     04-27  Mg     2.6     04-27      CAPILLARY BLOOD GLUCOSE          Medications:   MEDICATIONS  (STANDING):  brimonidine 0.2% Ophthalmic Solution 1 Drop(s) Both EYES three times a day  dextrose 5%. 1000 milliLiter(s) (60 mL/Hr) IV Continuous <Continuous>  enoxaparin Injectable 30 milliGRAM(s) SubCutaneous every 24 hours  finasteride 5 milliGRAM(s) Oral daily  latanoprost 0.005% Ophthalmic Solution 1 Drop(s) Both EYES at bedtime  metoprolol tartrate Injectable 5 milliGRAM(s) IV Push every 6 hours  pantoprazole  Injectable 40 milliGRAM(s) IV Push every 12 hours  pilocarpine 4% Solution 1 Drop(s) Both EYES three times a day  tamsulosin 0.4 milliGRAM(s) Oral at bedtime    MEDICATIONS  (PRN):  acetaminophen  IVPB .. 500 milliGRAM(s) IV Intermittent every 6 hours PRN Mild Pain (1 - 3)  ALPRAZolam 0.25 milliGRAM(s) Oral daily PRN agitation      Imaging:

## 2021-04-29 LAB
ANION GAP SERPL CALC-SCNC: 12 MMOL/L — SIGNIFICANT CHANGE UP (ref 5–17)
BUN SERPL-MCNC: 33 MG/DL — HIGH (ref 7–23)
CALCIUM SERPL-MCNC: 8.1 MG/DL — LOW (ref 8.4–10.5)
CHLORIDE SERPL-SCNC: 110 MMOL/L — HIGH (ref 96–108)
CO2 SERPL-SCNC: 24 MMOL/L — SIGNIFICANT CHANGE UP (ref 22–31)
CREAT SERPL-MCNC: 1.92 MG/DL — HIGH (ref 0.5–1.3)
GLUCOSE SERPL-MCNC: 97 MG/DL — SIGNIFICANT CHANGE UP (ref 70–99)
HCT VFR BLD CALC: 27.5 % — LOW (ref 39–50)
HGB BLD-MCNC: 8.6 G/DL — LOW (ref 13–17)
MCHC RBC-ENTMCNC: 31 PG — SIGNIFICANT CHANGE UP (ref 27–34)
MCHC RBC-ENTMCNC: 31.3 GM/DL — LOW (ref 32–36)
MCV RBC AUTO: 99.3 FL — SIGNIFICANT CHANGE UP (ref 80–100)
NRBC # BLD: 1 /100 WBCS — HIGH (ref 0–0)
PLATELET # BLD AUTO: 234 K/UL — SIGNIFICANT CHANGE UP (ref 150–400)
POTASSIUM SERPL-MCNC: 4.1 MMOL/L — SIGNIFICANT CHANGE UP (ref 3.5–5.3)
POTASSIUM SERPL-SCNC: 4.1 MMOL/L — SIGNIFICANT CHANGE UP (ref 3.5–5.3)
RBC # BLD: 2.77 M/UL — LOW (ref 4.2–5.8)
RBC # FLD: 22 % — HIGH (ref 10.3–14.5)
SODIUM SERPL-SCNC: 146 MMOL/L — HIGH (ref 135–145)
WBC # BLD: 10.37 K/UL — SIGNIFICANT CHANGE UP (ref 3.8–10.5)
WBC # FLD AUTO: 10.37 K/UL — SIGNIFICANT CHANGE UP (ref 3.8–10.5)

## 2021-04-29 RX ADMIN — ENOXAPARIN SODIUM 30 MILLIGRAM(S): 100 INJECTION SUBCUTANEOUS at 14:35

## 2021-04-29 RX ADMIN — FINASTERIDE 5 MILLIGRAM(S): 5 TABLET, FILM COATED ORAL at 11:58

## 2021-04-29 RX ADMIN — PANTOPRAZOLE SODIUM 40 MILLIGRAM(S): 20 TABLET, DELAYED RELEASE ORAL at 06:50

## 2021-04-29 RX ADMIN — BRIMONIDINE TARTRATE 1 DROP(S): 2 SOLUTION/ DROPS OPHTHALMIC at 23:06

## 2021-04-29 RX ADMIN — Medication 1 DROP(S): at 06:50

## 2021-04-29 RX ADMIN — Medication 25 MILLIGRAM(S): at 17:26

## 2021-04-29 RX ADMIN — LATANOPROST 1 DROP(S): 0.05 SOLUTION/ DROPS OPHTHALMIC; TOPICAL at 23:06

## 2021-04-29 RX ADMIN — Medication 1 DROP(S): at 23:06

## 2021-04-29 RX ADMIN — BRIMONIDINE TARTRATE 1 DROP(S): 2 SOLUTION/ DROPS OPHTHALMIC at 06:51

## 2021-04-29 RX ADMIN — BRIMONIDINE TARTRATE 1 DROP(S): 2 SOLUTION/ DROPS OPHTHALMIC at 14:36

## 2021-04-29 RX ADMIN — Medication 1 DROP(S): at 13:44

## 2021-04-29 RX ADMIN — TAMSULOSIN HYDROCHLORIDE 0.4 MILLIGRAM(S): 0.4 CAPSULE ORAL at 22:57

## 2021-04-29 RX ADMIN — Medication 25 MILLIGRAM(S): at 06:50

## 2021-04-29 NOTE — PROGRESS NOTE ADULT - PROBLEM SELECTOR PLAN 10
rivera out , on flomax, urinating and can str cath as needed
needs hydration
reorients  calmvss nad
gtt, lo vision

## 2021-04-29 NOTE — PROGRESS NOTE ADULT - PROBLEM SELECTOR PLAN 9
has rivera, not home, does str cath daily at home
gtt
maribel sl imporoved but has recovered in past
see gtt
un clear emile can get tylenol and fu re more leger as needed true pain   avoid narcotics
controlled c BBklr, off amlodipine  don't see the need for lasix, so I dcd it , disc c JAMILA Pruitt  need to eatm, drink, I does then can hold ivf, p labs

## 2021-04-29 NOTE — PROGRESS NOTE ADULT - PROBLEM SELECTOR PROBLEM 9
Glaucoma
Undifferentiated abdominal pain
Benign prostatic hyperplasia with incomplete bladder emptying
Benign Hypertension
Glaucoma
CKD (chronic kidney disease) stage 3, GFR 30-59 ml/min

## 2021-04-29 NOTE — PROGRESS NOTE ADULT - SUBJECTIVE AND OBJECTIVE BOX
ACS DAILY PROGRESS NOTE:       SUBJECTIVE/ROS: Patient feels well- no events overnight- as per PCA patient comfortable with no agitation - Denies nausea, vomiting, chest pain, shortness of breath         MEDICATIONS  (STANDING):  brimonidine 0.2% Ophthalmic Solution 1 Drop(s) Both EYES three times a day  enoxaparin Injectable 30 milliGRAM(s) SubCutaneous every 24 hours  finasteride 5 milliGRAM(s) Oral daily  furosemide    Tablet 40 milliGRAM(s) Oral once  latanoprost 0.005% Ophthalmic Solution 1 Drop(s) Both EYES at bedtime  metoprolol tartrate 25 milliGRAM(s) Oral two times a day  pantoprazole    Tablet 40 milliGRAM(s) Oral before breakfast  pilocarpine 4% Solution 1 Drop(s) Both EYES three times a day  tamsulosin 0.4 milliGRAM(s) Oral at bedtime    MEDICATIONS  (PRN):  acetaminophen  IVPB .. 500 milliGRAM(s) IV Intermittent every 6 hours PRN Mild Pain (1 - 3)  ALPRAZolam 0.25 milliGRAM(s) Oral daily PRN agitation      OBJECTIVE:    Vital Signs Last 24 Hrs  T(C): 36.2 (29 Apr 2021 06:01), Max: 36.4 (28 Apr 2021 12:15)  T(F): 97.1 (29 Apr 2021 06:01), Max: 97.5 (28 Apr 2021 12:15)  HR: 68 (29 Apr 2021 06:01) (63 - 85)  BP: 138/86 (29 Apr 2021 06:01) (105/65 - 143/90)  BP(mean): --  RR: 18 (29 Apr 2021 06:01) (16 - 18)  SpO2: 96% (29 Apr 2021 06:01) (93% - 96%)        I&O's Detail    28 Apr 2021 07:01  -  29 Apr 2021 07:00  --------------------------------------------------------  IN:    Oral Fluid: 1060 mL  Total IN: 1060 mL    OUT:    Bulb (mL): 50 mL    Intermittent Catheterization - Urethral (mL): 1130 mL    Voided (mL): 275 mL  Total OUT: 1455 mL    Total NET: -395 mL          Daily     Daily     LABS:                        7.6    7.71  )-----------( 250      ( 27 Apr 2021 17:46 )             24.9     04-27    146<H>  |  112<H>  |  41<H>  ----------------------------<  154<H>  3.4<L>   |  25  |  2.08<H>    Ca    8.1<L>      27 Apr 2021 17:46                  Physical Exam:  GEN: resting in bed comfortably in NAD  RESP: no increased WOB  ABD: soft, non-distended, non-tender without rebound tenderness or guarding  EXTR: warm, well-perfused without gross deformities; spontaneous movement in b/l U/L extrem  NEURO: AAOx4

## 2021-04-29 NOTE — PROGRESS NOTE ADULT - SUBJECTIVE AND OBJECTIVE BOX
DATE OF SERVICE: 04-29-21 @ 09:10    HPI:  89y M MDS, CKD, Afib on eliquis, BPH with intermittent self-catheterization, CHF, presents to ED [facility] from assisted living c/o diffuse, moderate, abdominal pain since this morning. Patient reports having 1 day hx of abdominal pain. (20 Apr 2021 17:12)      Interval Events  ivf dcd (?) lasix ordered (?) reviewed notes, cant tell why. I ordered cbc and bmp now, last labs 4/27, before procrit  trauma called re incidental findings: pancreatic and renal cysts - no further leger now  needs fu wound, andd then dc planning if labs ok  needs to be oob and needs to be fed. dinner from last nite is still bedside, untouched, waiting for bkfast  1:1 gone(?) needs asist not supervision  sometimes confused but reorients quickly, not demented but deconditioned , been in bed >1 week, surely will neeed rehab    MEDICATIONS  (STANDING):  brimonidine 0.2% Ophthalmic Solution 1 Drop(s) Both EYES three times a day  enoxaparin Injectable 30 milliGRAM(s) SubCutaneous every 24 hours  finasteride 5 milliGRAM(s) Oral daily  latanoprost 0.005% Ophthalmic Solution 1 Drop(s) Both EYES at bedtime  metoprolol tartrate 25 milliGRAM(s) Oral two times a day  pantoprazole    Tablet 40 milliGRAM(s) Oral before breakfast  pilocarpine 4% Solution 1 Drop(s) Both EYES three times a day  tamsulosin 0.4 milliGRAM(s) Oral at bedtime    MEDICATIONS  (PRN):  acetaminophen  IVPB .. 500 milliGRAM(s) IV Intermittent every 6 hours PRN Mild Pain (1 - 3)  ALPRAZolam 0.25 milliGRAM(s) Oral daily PRN agitation      Patient is a 89y old  Male who presents with a chief complaint of Abdominal pain (29 Apr 2021 08:10)      REVIEW OF SYSTEMS    General:nad, reorients, hungry  Skin/Breast:no co no ch  Ophthalmologic:no co no ch chronic lo vision  ENMT:	hears speaks ok  Respiratory and Thorax:no cough no sp nosob  Cardiovascular:no cp no palp  Gastrointestinal:no nvcd  Genitourinary:no fdi,   Musculoskeletal:	no a no pain  Neurological:no co no ch	  Psychiatric:	  Hematology/Lymphatics:	  Endocrine:	  Allergic/Immunologic:  AOSN	y      Vital Signs Last 24 Hrs  T(C): 36.2 (29 Apr 2021 06:01), Max: 36.4 (28 Apr 2021 12:15)  T(F): 97.1 (29 Apr 2021 06:01), Max: 97.5 (28 Apr 2021 12:15)  HR: 68 (29 Apr 2021 06:01) (68 - 85)  BP: 138/86 (29 Apr 2021 06:01) (105/65 - 143/90)  BP(mean): --  RR: 18 (29 Apr 2021 06:01) (16 - 18)  SpO2: 96% (29 Apr 2021 06:01) (93% - 96%)    PHYSICAL EXAM:    Constitutional:vss nad no co x hungry  H+N ncat  Eyes:martell cwnl  ENMT:om hy, hears ok  Neck:no cb no tm  Breasts:  Back:  Respiratory:ctab no rrw  Cardiovascular:irrg irreg but slower now  Gastrointestinal:bs+ soft nt  Genitourinary:rivera out  Rectal:  Extremities:no edema, always supple no pitting  Vascular:hm-, vv-  Neurological:nfatmae, alert ox3,  Skin:abd wound-sx to fu  Lymph Nodes:  Musculoskeletal:  Psychiatric:calm coop reasonable    LABS  CBC Full  -  ( 27 Apr 2021 17:46 )  WBC Count : 7.71 K/uL  RBC Count : 2.51 M/uL  Hemoglobin : 7.6 g/dL  Hematocrit : 24.9 %  Platelet Count - Automated : 250 K/uL  Mean Cell Volume : 99.2 fl  Mean Cell Hemoglobin : 30.3 pg  Mean Cell Hemoglobin Concentration : 30.5 gm/dL  Auto Neutrophil # : x  Auto Lymphocyte # : x  Auto Monocyte # : x  Auto Eosinophil # : x  Auto Basophil # : x  Auto Neutrophil % : x  Auto Lymphocyte % : x  Auto Monocyte % : x  Auto Eosinophil % : x  Auto Basophil % : x      04-27    146<H>  |  112<H>  |  41<H>  ----------------------------<  154<H>  3.4<L>   |  25  |  2.08<H>    Ca    8.1<L>      27 Apr 2021 17:46            Imaging:    Xray:    Echo:    CT:    MRI:    Tele:    Orders:    DAVID Lemus [phone]

## 2021-04-29 NOTE — PROGRESS NOTE ADULT - ASSESSMENT
89y Male with history of CKD, CHF, afib on eliquis and BPH presenting with peritonitis from duodenal perforation s/p ex lap with pyloric excluding gastrojejunostomy and ethan patch on 4/20.    Plan:    - Regular diet  - Monitor bowel function  - Protonix  - straight cath  - Continue abx   - Pain control  - DVT ppx: Lovenox  - Dispo planning      ACS  [id number]

## 2021-04-29 NOTE — PROGRESS NOTE ADULT - ASSESSMENT
needs l;abs this am, fu cbc, if h/h has not risen then needs PRBCs  FU Scr etc  I dont see need for lasix so i dcdc it, notes reviewed, lasix and ivf changed not noted by anyone so far. if patiebnt eats doesnt need IVF jeff if SCR declines  OOB, needs to be fed  dc plan certainly rehab  disc c Trauma ACP re findings, no leger now  disc c Dtr Darren and wife and group home staff, and c RN 7T now

## 2021-04-29 NOTE — PROGRESS NOTE ADULT - ATTENDING COMMENTS
at bedside with patients daughter  reportedly tolerated breakfast  sleeping on my exam but delirium is reportedly clearing  arranged for bed mobility team to mobilize to chair  YOLIE drain is out  no further bleeding from incision  Hematocrit: 27.5 %

## 2021-04-30 ENCOUNTER — TRANSCRIPTION ENCOUNTER (OUTPATIENT)
Age: 86
End: 2021-04-30

## 2021-04-30 VITALS
DIASTOLIC BLOOD PRESSURE: 77 MMHG | SYSTOLIC BLOOD PRESSURE: 137 MMHG | RESPIRATION RATE: 18 BRPM | TEMPERATURE: 98 F | OXYGEN SATURATION: 98 % | HEART RATE: 65 BPM

## 2021-04-30 PROCEDURE — 84133 ASSAY OF URINE POTASSIUM: CPT

## 2021-04-30 PROCEDURE — 88302 TISSUE EXAM BY PATHOLOGIST: CPT

## 2021-04-30 PROCEDURE — 87040 BLOOD CULTURE FOR BACTERIA: CPT

## 2021-04-30 PROCEDURE — 74177 CT ABD & PELVIS W/CONTRAST: CPT

## 2021-04-30 PROCEDURE — 82330 ASSAY OF CALCIUM: CPT

## 2021-04-30 PROCEDURE — 87086 URINE CULTURE/COLONY COUNT: CPT

## 2021-04-30 PROCEDURE — 97162 PT EVAL MOD COMPLEX 30 MIN: CPT

## 2021-04-30 PROCEDURE — 82570 ASSAY OF URINE CREATININE: CPT

## 2021-04-30 PROCEDURE — 71045 X-RAY EXAM CHEST 1 VIEW: CPT

## 2021-04-30 PROCEDURE — 86900 BLOOD TYPING SEROLOGIC ABO: CPT

## 2021-04-30 PROCEDURE — 82947 ASSAY GLUCOSE BLOOD QUANT: CPT

## 2021-04-30 PROCEDURE — 86677 HELICOBACTER PYLORI ANTIBODY: CPT

## 2021-04-30 PROCEDURE — 85014 HEMATOCRIT: CPT

## 2021-04-30 PROCEDURE — 84100 ASSAY OF PHOSPHORUS: CPT

## 2021-04-30 PROCEDURE — 85018 HEMOGLOBIN: CPT

## 2021-04-30 PROCEDURE — 94640 AIRWAY INHALATION TREATMENT: CPT

## 2021-04-30 PROCEDURE — 83930 ASSAY OF BLOOD OSMOLALITY: CPT

## 2021-04-30 PROCEDURE — 82803 BLOOD GASES ANY COMBINATION: CPT

## 2021-04-30 PROCEDURE — 93005 ELECTROCARDIOGRAM TRACING: CPT

## 2021-04-30 PROCEDURE — 84145 PROCALCITONIN (PCT): CPT

## 2021-04-30 PROCEDURE — 85610 PROTHROMBIN TIME: CPT

## 2021-04-30 PROCEDURE — 83605 ASSAY OF LACTIC ACID: CPT

## 2021-04-30 PROCEDURE — 85730 THROMBOPLASTIN TIME PARTIAL: CPT

## 2021-04-30 PROCEDURE — P9016: CPT

## 2021-04-30 PROCEDURE — 99285 EMERGENCY DEPT VISIT HI MDM: CPT | Mod: 25

## 2021-04-30 PROCEDURE — 82435 ASSAY OF BLOOD CHLORIDE: CPT

## 2021-04-30 PROCEDURE — 97110 THERAPEUTIC EXERCISES: CPT

## 2021-04-30 PROCEDURE — U0003: CPT

## 2021-04-30 PROCEDURE — 85027 COMPLETE CBC AUTOMATED: CPT

## 2021-04-30 PROCEDURE — 84295 ASSAY OF SERUM SODIUM: CPT

## 2021-04-30 PROCEDURE — 96374 THER/PROPH/DIAG INJ IV PUSH: CPT

## 2021-04-30 PROCEDURE — 97116 GAIT TRAINING THERAPY: CPT

## 2021-04-30 PROCEDURE — 36430 TRANSFUSION BLD/BLD COMPNT: CPT

## 2021-04-30 PROCEDURE — 80048 BASIC METABOLIC PNL TOTAL CA: CPT

## 2021-04-30 PROCEDURE — 86901 BLOOD TYPING SEROLOGIC RH(D): CPT

## 2021-04-30 PROCEDURE — 83690 ASSAY OF LIPASE: CPT

## 2021-04-30 PROCEDURE — 94002 VENT MGMT INPAT INIT DAY: CPT

## 2021-04-30 PROCEDURE — 80053 COMPREHEN METABOLIC PANEL: CPT

## 2021-04-30 PROCEDURE — 97166 OT EVAL MOD COMPLEX 45 MIN: CPT

## 2021-04-30 PROCEDURE — 86850 RBC ANTIBODY SCREEN: CPT

## 2021-04-30 PROCEDURE — U0005: CPT

## 2021-04-30 PROCEDURE — 84484 ASSAY OF TROPONIN QUANT: CPT

## 2021-04-30 PROCEDURE — 85025 COMPLETE CBC W/AUTO DIFF WBC: CPT

## 2021-04-30 PROCEDURE — 84300 ASSAY OF URINE SODIUM: CPT

## 2021-04-30 PROCEDURE — 97535 SELF CARE MNGMENT TRAINING: CPT

## 2021-04-30 PROCEDURE — C1769: CPT

## 2021-04-30 PROCEDURE — 83735 ASSAY OF MAGNESIUM: CPT

## 2021-04-30 PROCEDURE — 81001 URINALYSIS AUTO W/SCOPE: CPT

## 2021-04-30 PROCEDURE — 83935 ASSAY OF URINE OSMOLALITY: CPT

## 2021-04-30 PROCEDURE — 97530 THERAPEUTIC ACTIVITIES: CPT

## 2021-04-30 PROCEDURE — 84132 ASSAY OF SERUM POTASSIUM: CPT

## 2021-04-30 PROCEDURE — C1889: CPT

## 2021-04-30 PROCEDURE — 83880 ASSAY OF NATRIURETIC PEPTIDE: CPT

## 2021-04-30 PROCEDURE — 94003 VENT MGMT INPAT SUBQ DAY: CPT

## 2021-04-30 RX ORDER — PANTOPRAZOLE SODIUM 20 MG/1
1 TABLET, DELAYED RELEASE ORAL
Qty: 0 | Refills: 0 | DISCHARGE
Start: 2021-04-30

## 2021-04-30 RX ORDER — ALPRAZOLAM 0.25 MG
1 TABLET ORAL
Qty: 0 | Refills: 0 | DISCHARGE

## 2021-04-30 RX ORDER — TAMSULOSIN HYDROCHLORIDE 0.4 MG/1
1 CAPSULE ORAL
Qty: 0 | Refills: 0 | DISCHARGE
Start: 2021-04-30

## 2021-04-30 RX ORDER — ENOXAPARIN SODIUM 100 MG/ML
30 INJECTION SUBCUTANEOUS
Qty: 0 | Refills: 0 | DISCHARGE
Start: 2021-04-30

## 2021-04-30 RX ORDER — AMLODIPINE BESYLATE 2.5 MG/1
1 TABLET ORAL
Qty: 0 | Refills: 0 | DISCHARGE

## 2021-04-30 RX ORDER — FEBUXOSTAT 40 MG/1
1 TABLET ORAL
Qty: 0 | Refills: 0 | DISCHARGE

## 2021-04-30 RX ORDER — APIXABAN 2.5 MG/1
1 TABLET, FILM COATED ORAL
Qty: 0 | Refills: 0 | DISCHARGE

## 2021-04-30 RX ORDER — METOPROLOL TARTRATE 50 MG
1 TABLET ORAL
Qty: 0 | Refills: 0 | DISCHARGE
Start: 2021-04-30

## 2021-04-30 RX ORDER — ALPRAZOLAM 0.25 MG
1 TABLET ORAL
Qty: 0 | Refills: 0 | DISCHARGE
Start: 2021-04-30

## 2021-04-30 RX ORDER — METOPROLOL TARTRATE 50 MG
1 TABLET ORAL
Qty: 0 | Refills: 0 | DISCHARGE

## 2021-04-30 RX ORDER — FUROSEMIDE 40 MG
2 TABLET ORAL
Qty: 0 | Refills: 0 | DISCHARGE

## 2021-04-30 RX ADMIN — Medication 1 DROP(S): at 06:04

## 2021-04-30 RX ADMIN — Medication 25 MILLIGRAM(S): at 06:03

## 2021-04-30 RX ADMIN — BRIMONIDINE TARTRATE 1 DROP(S): 2 SOLUTION/ DROPS OPHTHALMIC at 13:06

## 2021-04-30 RX ADMIN — PANTOPRAZOLE SODIUM 40 MILLIGRAM(S): 20 TABLET, DELAYED RELEASE ORAL at 06:03

## 2021-04-30 RX ADMIN — BRIMONIDINE TARTRATE 1 DROP(S): 2 SOLUTION/ DROPS OPHTHALMIC at 06:03

## 2021-04-30 RX ADMIN — ENOXAPARIN SODIUM 30 MILLIGRAM(S): 100 INJECTION SUBCUTANEOUS at 13:10

## 2021-04-30 RX ADMIN — FINASTERIDE 5 MILLIGRAM(S): 5 TABLET, FILM COATED ORAL at 13:06

## 2021-04-30 RX ADMIN — Medication 1 DROP(S): at 13:06

## 2021-04-30 NOTE — DISCHARGE NOTE PROVIDER - CARE PROVIDER_API CALL
Rocky Lemus  [facility]  [address] [place] [address]  Phone: [phone]  Fax: [phone]  Follow Up Time: 1 week    Dominic Ordonez)  Surgery  [address] [place] [address], [address], [place] [id number]  Phone: [phone]  Fax: [phone]  Follow Up Time: 1 week

## 2021-04-30 NOTE — PROGRESS NOTE ADULT - PROBLEM SELECTOR PLAN 1
pOp day 9, wound dehissence, Sx FU, no co
pOp day 10  sX fu
on svce, getting electrolytes IV now  can get D5w
pOp day 7  Sx fu, eating needs to be oob
Kevin garcia. stil npo c ngt
pOp d2, Sx ICU mngmnt  see meds , notes
pOpday 3  WSx Management   ngt out BS better, wants to eat
on clears, prob can advance, defer to Sx  has to be fed, taking PO s problems
pOp day 10, wound dehissence needs fu and notes

## 2021-04-30 NOTE — DISCHARGE NOTE PROVIDER - NSDCCPCAREPLAN_GEN_ALL_CORE_FT
PRINCIPAL DISCHARGE DIAGNOSIS  Diagnosis: Duodenal ulcer with perforation and obstruction  Assessment and Plan of Treatment: WOUND CARE: Please pack midline incision with dry gauze and abdominal pad with paper tape. Change daily.    BATHING: Please do not submerge wound underwater. You may shower and/or sponge bathe. Please pat wound dry after showering.    ACTIVITY: No heavy lifting anything more than 10-15lbs or straining. Otherwise, you may return to your usual level of physical activity. If you are taking narcotic pain medication (such as oxycodone or Percocet), do NOT drive a car, operate machinery or make important decisions.  NOTIFY YOUR SURGEON IF: You have any excessive bleeding or pus draining from your wound, any fever (over 100.4 F) or chills, persistent nausea/vomiting with inability to tolerate food or liquids, persistent diarrhea, or if your pain is not controlled on your discharge pain medications.  FOLLOW-UP:  1. Please call to make a follow-up appointment in 1-2 weeks upon discharge from the hospital with Dr. Ordonez  2. Please follow up with your primary care physician in one week regarding your hospitalization. with Dr. Lemus  Please call immediately upon discharge to schedule these appointments

## 2021-04-30 NOTE — PROGRESS NOTE ADULT - PROBLEM SELECTOR PROBLEM 8
Glaucoma
PAF (paroxysmal atrial fibrillation)
Glaucoma
Anemia
Benign prostatic hyperplasia with incomplete bladder emptying
Benign prostatic hyperplasia with incomplete bladder emptying
Hypokalemia
PAF (paroxysmal atrial fibrillation)
Benign prostatic hyperplasia with incomplete bladder emptying

## 2021-04-30 NOTE — PROGRESS NOTE ADULT - NUTRITIONAL ASSESSMENT
later
later
LATER  NPO
later after recovery
later
need sto be fed
later
later
needs to eat needs assist

## 2021-04-30 NOTE — PROGRESS NOTE ADULT - PROBLEM SELECTOR PLAN 5
Scr rising but has in past and recovered
gets procrit outpt
controlled, on bblkr off norvasc
eliquis at home not need in hosp yet
Lo vision, mult gtt
FU labs today
fu cbc this am , got procrit, if H/H drops then should get PRBCs
on lovenox in hosp
reorients quicklly  not demented

## 2021-04-30 NOTE — CHART NOTE - NSCHARTNOTESELECT_GEN_ALL_CORE
Event Note
Nutrition Services
Hematologist call/Event Note
Home meds/Event Note
Incidental Findings
Transfer Note

## 2021-04-30 NOTE — PROGRESS NOTE ADULT - PROBLEM SELECTOR PLAN 3
rising SCr but still ok for now  monitor and adj meds
xanax at home q hs to sllep, hasn't needed it here  swee orders to call if agitated, avoid haldol and avoid narcs
not this am, deconditioned from hosp stay. not demented  reorients when engaged
improving
MICH, improving, has in past
calm coop reasonable now, take xanax at home, prn hospital  needs assistance not 1:1 supervision
joby lewis
Rising SCr but been there befi=ore, cont to monitor stc
4-5 gtt  Lo vision

## 2021-04-30 NOTE — PROGRESS NOTE ADULT - PROBLEM SELECTOR PLAN 2
pOp and MDS, gets procrit out pt
reorients  needs to be oob to chair, needs to be fed
seeems stable, gets procrit occ
fu h/h can get and may need procrit again
Chronic, gets procrit
Hx MDS, Got procrit yesterday, if H/H remains low wound transfuse
reorients quickly, not demented
was taking ativan at hiome almost daily, can get ativan or xanax here, preferable to haldol, avoid narcotics, can get tylenol for pain
Gets procrit q 2-4 wks out pt, H/H decr <* will give procrit today, if no ch can consider prbcs

## 2021-04-30 NOTE — DISCHARGE NOTE PROVIDER - HOSPITAL COURSE
89y M MDS, CKD, Afib on eliquis, BPH with intermittent self-catheterization, CHF, presents to ED [facility] from assisted living c/o diffuse, moderate, abdominal pain since this morning. Patient reports having 1 day hx of abdominal pain.  CT revealed perforated duodenal ulcer on CT and hemodynamically unstable consistent with peritonitis. Taken emergently to OR and underwent an ex-lap, gastrojejunostomy with duodenal exclusion, ethan patch and umbilical hernia repair. Post-op patient transferred to SICU intubated and on pressors. PT consulted and recommended OCTAVIO upon discharge. Extubated on 4/21, Fluconazole added, lovenox added for DVT PPX. H Pylori negative, NGT self removed 4/23. Patient transferred from SICU to surgical floor on 4/24. Clear liquid diet started on 4/25 and advanced as tolerated. YOLIE drain removed 4/28.      Patient's daughter, Val Julien was bedside with the patient and was notified by ATP team about the incidental findings on CT scan, specifically pancreatic lesions and renal cysts. She verbalized understanding and stated that she will call his primary care physician Dr. Gentile to for further evaluation. I provided the patient with a copy of the CT report detailing the findings. Dr. Gentile was called at [phone] and notified of incidental findings. He ensured access to [place] records for this patient and declined need for faxing any reports. All concerns were addressed and questions were answered. .

## 2021-04-30 NOTE — PROGRESS NOTE ADULT - PROBLEM SELECTOR PLAN 4
146, monitor
goog - home BBlkr and amlodipine
evelina murguia, getting gtt
takes xanax at home daily
juan alberto Alejo this am
should repeat after lytes iv \  rec hydration c D5W
wliquis home
takes xanax at home, can get it here see notes
3.4, got k, need labs today

## 2021-04-30 NOTE — DISCHARGE NOTE NURSING/CASE MANAGEMENT/SOCIAL WORK - PATIENT PORTAL LINK FT
You can access the FollowMyHealth Patient Portal offered by [facility] by registering at the following website: [email or web address]. By joining [employer]’s FollowMyHealth portal, you will also be able to view your health information using other applications (apps) compatible with our system.

## 2021-04-30 NOTE — PROGRESS NOTE ADULT - PROBLEM SELECTOR PLAN 6
lovenox/eliquis
rivera in hosp, straight cath at home, was on flomax
stabilizing, normalized befored after last rise sCr
SCr rising but been there before and recpovered, needs fu
gets procrit pout pt Hgb <9
lovenox, was on eliquis home
mild, calm, takes xanax at home daily,
 harshad
oanhoprojose to [facility]. no longer delerious - 2 days now  calm coop, has 1:1 and needs the help

## 2021-04-30 NOTE — PROGRESS NOTE ADULT - PROBLEM SELECTOR PLAN 8
lovenox, eliquis at home
see above
BP ok on BBLkr, without ccb
see gtt  lo vision
gtt  lo vision
U retntion  Hx, does self cath at home  can start flomax later for tov when Scr and SNa normalize
rosanne, lovenox/eliquis home
nicole now, was str cath daily at home and was diann flomax, which shouild resume
better,  getting K PO  need hydration, wants to eat but has to be fed at least temp

## 2021-04-30 NOTE — DISCHARGE NOTE PROVIDER - NSDCFUADDAPPT_GEN_ALL_CORE_FT
Patient's daughter, Val Julien was bedside with the patient and was notified by ATP team about the incidental findings on CT scan, specifically pancreatic lesions and renal cysts. She verbalized understanding and stated that she will call his primary care physician Dr. Gentile to for further evaluation. I provided the patient with a copy of the CT report detailing the findings. Dr. Gentile was called at [phone] and notified of incidental findings.

## 2021-04-30 NOTE — PROGRESS NOTE ADULT - PROBLEM SELECTOR PROBLEM 7
Hypernatremia
Confusion
Benign Hypertension
Benign prostatic hyperplasia with incomplete bladder emptying
Benign prostatic hyperplasia with incomplete bladder emptying
Anxiety
Benign prostatic hyperplasia with incomplete bladder emptying

## 2021-04-30 NOTE — PROGRESS NOTE ADULT - PROBLEM SELECTOR PROBLEM 5
CKD (chronic kidney disease) stage 3, GFR 30-59 ml/min
MDS (Myelodysplastic Syndrome)
Benign Hypertension
Confusion
Glaucoma
PAF (paroxysmal atrial fibrillation)
Hypernatremia
MDS (Myelodysplastic Syndrome)
PAF (paroxysmal atrial fibrillation)

## 2021-04-30 NOTE — PROGRESS NOTE ADULT - PROBLEM SELECTOR PROBLEM 2
MDS (myelodysplastic syndrome)
Anemia
MDS (Myelodysplastic Syndrome)
Anemia
MDS (myelodysplastic syndrome)
Confusion
Anxiety
Confusion
MDS (Myelodysplastic Syndrome)

## 2021-04-30 NOTE — CHART NOTE - NSCHARTNOTEFT_GEN_A_CORE
spoke with patient's PCP Dr. Lemus- will hold Eliquis for d/c and send patient on prophylactic Lovenox- also continue to hold amlodipine, Lasix and febuxostat per Dr. Lemus- he will decide at follow up appointment about restarting medications    [phone]

## 2021-04-30 NOTE — PROGRESS NOTE ADULT - SUBJECTIVE AND OBJECTIVE BOX
DATE OF SERVICE: 04-30-21 @ 08:58    HPI:  89y M MDS, CKD, Afib on eliquis, BPH with intermittent self-catheterization, CHF, [facility]/o diffuse, moderate, abdominal pain since this morning. Patient reports having 1 day hx of abdominal pain. (20 Apr 2021 17:12)      Interval Events  neon, awake hungry co not enuf food, had bm, getting str cath  labs h/h did incr, can wait a few days and get procit again if H/H does not rise\  , scR DECLINING  dc plan rehab  see meds see notes    MEDICATIONS  (STANDING):  brimonidine 0.2% Ophthalmic Solution 1 Drop(s) Both EYES three times a day  enoxaparin Injectable 30 milliGRAM(s) SubCutaneous every 24 hours  finasteride 5 milliGRAM(s) Oral daily  latanoprost 0.005% Ophthalmic Solution 1 Drop(s) Both EYES at bedtime  metoprolol tartrate 25 milliGRAM(s) Oral two times a day  pantoprazole    Tablet 40 milliGRAM(s) Oral before breakfast  pilocarpine 4% Solution 1 Drop(s) Both EYES three times a day  tamsulosin 0.4 milliGRAM(s) Oral at bedtime    MEDICATIONS  (PRN):  acetaminophen  IVPB .. 500 milliGRAM(s) IV Intermittent every 6 hours PRN Mild Pain (1 - 3)  ALPRAZolam 0.25 milliGRAM(s) Oral daily PRN agitation      Patient is a 89y old  Male who presents with a chief complaint of Abdominal pain (30 Apr 2021 08:45)      REVIEW OF SYSTEMS    General:nad wants to eat, upset wants a phone  Skin/Breast:no cp  Ophthalmologic:no ch no co   ENMT:	no ch no co  Respiratory and Thorax:no cough no sp no sob  Cardiovascular:no cp no palp  Gastrointestinal:no nvcd  Genitourinary:no fdi  Musculoskeletal:	no a no p  Neurological:	o co no ch  Psychiatric:	  Hematology/Lymphatics:	  Endocrine:	  Allergic/Immunologic:  AOSN	y      Vital Signs Last 24 Hrs  T(C): 36.9 (30 Apr 2021 05:35), Max: 36.9 (30 Apr 2021 00:40)  T(F): 98.5 (30 Apr 2021 05:35), Max: 98.5 (30 Apr 2021 05:35)  HR: 65 (30 Apr 2021 05:35) (65 - 77)  BP: 138/79 (30 Apr 2021 05:35) (117/80 - 150/77)  BP(mean): --  RR: 18 (30 Apr 2021 05:35) (18 - 18)  SpO2: 98% (30 Apr 2021 05:35) (94% - 98%)    PHYSICAL EXAM:    Constitutional:nad  hungry wants to be oob  H+N ncat  Eyes:martell cwnl  ENMT:hears speaks ok  Neck:no cb no tm  Breasts:  Back:  Respiratory:ctab no rrw  Cardiovascular:rrr  Gastrointestinal:bs+ soft nt  Genitourinary:rivera out  Rectal:  Extremities:no pittinf, fat  Vascular:hm- ,vv-  Neurological:no ch nfatmae  Skin:cdi x adb wound dsd  Lymph Nodes:  Musculoskeletal:  Psychiatric:calm coop reorients    LABS  CBC Full  -  ( 29 Apr 2021 11:27 )  WBC Count : 10.37 K/uL  RBC Count : 2.77 M/uL  Hemoglobin : 8.6 g/dL  Hematocrit : 27.5 %  Platelet Count - Automated : 234 K/uL  Mean Cell Volume : 99.3 fl  Mean Cell Hemoglobin : 31.0 pg  Mean Cell Hemoglobin Concentration : 31.3 gm/dL  Auto Neutrophil # : x  Auto Lymphocyte # : x  Auto Monocyte # : x  Auto Eosinophil # : x  Auto Basophil # : x  Auto Neutrophil % : x  Auto Lymphocyte % : x  Auto Monocyte % : x  Auto Eosinophil % : x  Auto Basophil % : x      04-29    146<H>  |  110<H>  |  33<H>  ----------------------------<  97  4.1   |  24  |  1.92<H>    Ca    8.1<L>      29 Apr 2021 11:27            Imaging:    Xray:    Echo:    CT:    MRI:    Tele:    Orders:    DAVID Lemus [phone]

## 2021-04-30 NOTE — PROGRESS NOTE ADULT - PROBLEM SELECTOR PROBLEM 6
Hypokalemia
Benign Hypertension
MDS (Myelodysplastic Syndrome)
PAF (paroxysmal atrial fibrillation)
PAF (paroxysmal atrial fibrillation)
CKD (chronic kidney disease) stage 3, GFR 30-59 ml/min
Confusion
CKD (chronic kidney disease) stage 3, GFR 30-59 ml/min
Anxiety

## 2021-04-30 NOTE — PROGRESS NOTE ADULT - SUBJECTIVE AND OBJECTIVE BOX
ACS DAILY PROGRESS NOTE:       SUBJECTIVE/ROS: Patient feels well- no events overnight -tolerating diet Denies nausea, vomiting, chest pain, shortness of breath         MEDICATIONS  (STANDING):  brimonidine 0.2% Ophthalmic Solution 1 Drop(s) Both EYES three times a day  enoxaparin Injectable 30 milliGRAM(s) SubCutaneous every 24 hours  finasteride 5 milliGRAM(s) Oral daily  latanoprost 0.005% Ophthalmic Solution 1 Drop(s) Both EYES at bedtime  metoprolol tartrate 25 milliGRAM(s) Oral two times a day  pantoprazole    Tablet 40 milliGRAM(s) Oral before breakfast  pilocarpine 4% Solution 1 Drop(s) Both EYES three times a day  tamsulosin 0.4 milliGRAM(s) Oral at bedtime    MEDICATIONS  (PRN):  acetaminophen  IVPB .. 500 milliGRAM(s) IV Intermittent every 6 hours PRN Mild Pain (1 - 3)  ALPRAZolam 0.25 milliGRAM(s) Oral daily PRN agitation      OBJECTIVE:    Vital Signs Last 24 Hrs  T(C): 36.9 (30 Apr 2021 05:35), Max: 36.9 (30 Apr 2021 00:40)  T(F): 98.5 (30 Apr 2021 05:35), Max: 98.5 (30 Apr 2021 05:35)  HR: 65 (30 Apr 2021 05:35) (65 - 77)  BP: 138/79 (30 Apr 2021 05:35) (117/80 - 150/77)  BP(mean): --  RR: 18 (30 Apr 2021 05:35) (18 - 18)  SpO2: 98% (30 Apr 2021 05:35) (94% - 98%)        I&O's Detail    29 Apr 2021 07:01  -  30 Apr 2021 07:00  --------------------------------------------------------  IN:    Oral Fluid: 1160 mL  Total IN: 1160 mL    OUT:    Intermittent Catheterization - Urethral (mL): 1100 mL    Voided (mL): 200 mL  Total OUT: 1300 mL    Total NET: -140 mL          Daily     Daily     LABS:                        8.6    10.37 )-----------( 234      ( 29 Apr 2021 11:27 )             27.5     04-29    146<H>  |  110<H>  |  33<H>  ----------------------------<  97  4.1   |  24  |  1.92<H>    Ca    8.1<L>      29 Apr 2021 11:27                  Physical Exam:  GEN: resting in bed comfortably in NAD  RESP: no increased WOB  ABD: soft, non-distended, non-tender without rebound tenderness or guarding, incisions c/d/i  EXTR: warm, well-perfused without gross deformities; spontaneous movement in b/l U/L extrem  NEURO: AAOx4

## 2021-04-30 NOTE — PROGRESS NOTE ADULT - PROBLEM SELECTOR PROBLEM 4
Hypokalemia
Hypernatremia
Anxiety
Anxiety
Benign Hypertension
PAF (paroxysmal atrial fibrillation)
Glaucoma
Hypernatremia
CKD (chronic kidney disease) stage 3, GFR 30-59 ml/min

## 2021-04-30 NOTE — PROGRESS NOTE ADULT - ASSESSMENT
89y Male with history of CKD, CHF, afib on eliquis and BPH presenting with peritonitis from duodenal perforation s/p ex lap with pyloric excluding gastrojejunostomy and ethan patch on 4/20.    Plan:    - Regular diet  - Monitor bowel function  - Protonix  - straight cath  - Continue abx   - Pain control  - DVT ppx: Lovenox  - Dispo planning      ACS  [phone]

## 2021-04-30 NOTE — PROGRESS NOTE ADULT - PROBLEM SELECTOR PROBLEM 1
Duodenal ulcer with perforation and obstruction

## 2021-04-30 NOTE — PROGRESS NOTE ADULT - NSICDXPILOT_GEN_ALL_CORE
[place]

## 2021-04-30 NOTE — DISCHARGE NOTE PROVIDER - NSDCCPTREATMENT_GEN_ALL_CORE_FT
PRINCIPAL PROCEDURE  Procedure: Exploratory laparotomy  Findings and Treatment: recover from surgery

## 2021-04-30 NOTE — DISCHARGE NOTE PROVIDER - NSDCFUADDINST_GEN_ALL_CORE_FT
Please continue Lovenox while in rehab. Dr. Lemus will restart Eliquis as outpatient.  Patient's Lasix, amlodipine and febuxostat are held per Dr. Lemus as well and will be restarted at follow up appointments.

## 2021-04-30 NOTE — DISCHARGE NOTE PROVIDER - PROVIDER TOKENS
PROVIDER:[TOKEN:[[id number]],FOLLOWUP:[1 week]],PROVIDER:[TOKEN:[[id number]],FOLLOWUP:[1 week]]

## 2021-04-30 NOTE — DISCHARGE NOTE PROVIDER - NSDCMRMEDTOKEN_GEN_ALL_CORE_FT
Alphagan P 0.1% ophthalmic solution: 1 drop(s) to each affected eye every 8 hours  amLODIPine 2.5 mg oral tablet: 1 tab(s) orally once a day  bimatoprost 0.01% ophthalmic solution: 1 drop(s) to both eyes once a day (in the evening)  Eliquis 2.5 mg oral tablet: 1 tab(s) orally 2 times a day  febuxostat 40 mg oral tablet: 1 tab(s) orally once a day  finasteride 5 mg oral tablet: 1 tab(s) orally once a day  furosemide 40 mg oral tablet: 2 tab(s) orally 2 times a day  Metoprolol Tartrate 25 mg oral tablet: 1 tab(s) orally 2 times a day  pilocarpine 4% ophthalmic gel: 1 day(s) to each affected eye 3 times a day  Rhopressa 0.02% ophthalmic solution: 1 drop(s) in the right eye once a day  Xanax 0.25 mg oral tablet: 1 tab(s) orally once a day (at bedtime)   acetaminophen 325 mg oral tablet: 2 tab(s) orally every 6 hours, As Needed  Alphagan P 0.1% ophthalmic solution: 1 drop(s) to each affected eye every 8 hours  ALPRAZolam 0.25 mg oral tablet: 1 tab(s) orally once a day, As needed  bimatoprost 0.01% ophthalmic solution: 1 drop(s) to both eyes once a day (in the evening)  enoxaparin: 30 milligram(s) subcutaneous every 24 hours  finasteride 5 mg oral tablet: 1 tab(s) orally once a day  metoprolol tartrate 25 mg oral tablet: 1 tab(s) orally 2 times a day  pantoprazole 40 mg oral delayed release tablet: 1 tab(s) orally once a day (before a meal)  pilocarpine 4% ophthalmic gel: 1 day(s) to each affected eye 3 times a day  Rhopressa 0.02% ophthalmic solution: 1 drop(s) in the right eye once a day  tamsulosin 0.4 mg oral capsule: 1 cap(s) orally once a day (at bedtime)

## 2021-04-30 NOTE — PROGRESS NOTE ADULT - PROBLEM SELECTOR PROBLEM 3
PAF (paroxysmal atrial fibrillation)
Anxiety
CKD (chronic kidney disease) stage 3, GFR 30-59 ml/min
CKD (chronic kidney disease) stage 3, GFR 30-59 ml/min
Confusion
CKD (chronic kidney disease) stage 3, GFR 30-59 ml/min
CKD (chronic kidney disease) stage 3, GFR 30-59 ml/min
Glaucoma
Anxiety

## 2021-04-30 NOTE — PROGRESS NOTE ADULT - PROBLEM SELECTOR PLAN 7
gets xanax daily at home and can here if needed
reorients, would avoid haldol, especially if gets narcotics also,  Hx Anxiety and was taking alprazolam daily at home  better choice for this pt now w deconditioned non ambulatory state
long Hx UR, str cath at home - self, restart flomax as rivera is out
BP ok on bbklr, off amlodipie, avoid diuretics now
str cath until ambulatory  may increaase flomax to bid
ur, str cath daily at home. will need tov pt dc
on nelsonkr, spasc
 harshad
BP is ok still on levofed

## 2021-04-30 NOTE — PROGRESS NOTE ADULT - PROVIDER SPECIALTY LIST ADULT
SICU
Surgery
SICU
Surgery
Trauma Surgery
Family Medicine
SICU
Trauma Surgery
Surgery
Surgery
Trauma Surgery
Trauma Surgery
Family Medicine
Geriatrics
Geriatrics
Family Medicine
Geriatrics
Family Medicine

## 2021-05-14 ENCOUNTER — APPOINTMENT (OUTPATIENT)
Dept: TRAUMA SURGERY | Facility: CLINIC | Age: 86
End: 2021-05-14
Payer: MEDICARE

## 2021-05-14 DIAGNOSIS — K26.5 CHRONIC OR UNSPECIFIED DUODENAL ULCER WITH PERFORATION: ICD-10-CM

## 2021-05-14 PROCEDURE — 99024 POSTOP FOLLOW-UP VISIT: CPT

## 2021-05-19 ENCOUNTER — INPATIENT (INPATIENT)
Facility: HOSPITAL | Age: 86
LOS: 6 days | Discharge: NURSING, REHAB OR LONG-TERM CARE | DRG: 871 | End: 2021-05-26
Attending: STUDENT IN AN ORGANIZED HEALTH CARE EDUCATION/TRAINING PROGRAM | Admitting: GENERAL ACUTE CARE HOSPITAL
Payer: MEDICARE

## 2021-05-19 VITALS
OXYGEN SATURATION: 98 % | RESPIRATION RATE: 16 BRPM | SYSTOLIC BLOOD PRESSURE: 89 MMHG | HEART RATE: 37 BPM | HEIGHT: 66 IN | DIASTOLIC BLOOD PRESSURE: 50 MMHG | WEIGHT: 212.97 LBS

## 2021-05-19 DIAGNOSIS — M95.0 ACQUIRED DEFORMITY OF NOSE: Chronic | ICD-10-CM

## 2021-05-19 DIAGNOSIS — A41.9 SEPSIS, UNSPECIFIED ORGANISM: ICD-10-CM

## 2021-05-19 DIAGNOSIS — S61.412A LACERATION WITHOUT FOREIGN BODY OF LEFT HAND, INITIAL ENCOUNTER: Chronic | ICD-10-CM

## 2021-05-19 LAB
ACANTHOCYTES BLD QL SMEAR: SLIGHT — SIGNIFICANT CHANGE UP
ALBUMIN SERPL ELPH-MCNC: 2.4 G/DL — LOW (ref 3.3–5)
ALP SERPL-CCNC: 110 U/L — SIGNIFICANT CHANGE UP (ref 40–120)
ALT FLD-CCNC: 17 U/L — SIGNIFICANT CHANGE UP (ref 10–45)
ANION GAP SERPL CALC-SCNC: 17 MMOL/L — SIGNIFICANT CHANGE UP (ref 5–17)
ANION GAP SERPL CALC-SCNC: 8 MMOL/L — SIGNIFICANT CHANGE UP (ref 5–17)
ANISOCYTOSIS BLD QL: SIGNIFICANT CHANGE UP
ANISOCYTOSIS BLD QL: SIGNIFICANT CHANGE UP
APPEARANCE UR: ABNORMAL
APTT BLD: 37.6 SEC — HIGH (ref 27.5–35.5)
AST SERPL-CCNC: 19 U/L — SIGNIFICANT CHANGE UP (ref 10–40)
BACTERIA # UR AUTO: ABNORMAL
BASE EXCESS BLDV CALC-SCNC: -0.3 MMOL/L — SIGNIFICANT CHANGE UP (ref -2–2)
BASE EXCESS BLDV CALC-SCNC: 0.1 MMOL/L — SIGNIFICANT CHANGE UP (ref -2–2)
BASOPHILS # BLD AUTO: 0 K/UL — SIGNIFICANT CHANGE UP (ref 0–0.2)
BASOPHILS # BLD AUTO: 0.08 K/UL — SIGNIFICANT CHANGE UP (ref 0–0.2)
BASOPHILS NFR BLD AUTO: 0 % — SIGNIFICANT CHANGE UP (ref 0–2)
BASOPHILS NFR BLD AUTO: 0.9 % — SIGNIFICANT CHANGE UP (ref 0–2)
BILIRUB SERPL-MCNC: 0.3 MG/DL — SIGNIFICANT CHANGE UP (ref 0.2–1.2)
BILIRUB UR-MCNC: NEGATIVE — SIGNIFICANT CHANGE UP
BLD GP AB SCN SERPL QL: NEGATIVE — SIGNIFICANT CHANGE UP
BUN SERPL-MCNC: 53 MG/DL — HIGH (ref 7–23)
BUN SERPL-MCNC: 55 MG/DL — HIGH (ref 7–23)
BURR CELLS BLD QL SMEAR: PRESENT — SIGNIFICANT CHANGE UP
BURR CELLS BLD QL SMEAR: PRESENT — SIGNIFICANT CHANGE UP
CA-I SERPL-SCNC: 1.03 MMOL/L — LOW (ref 1.12–1.3)
CA-I SERPL-SCNC: 1.03 MMOL/L — LOW (ref 1.12–1.3)
CALCIUM SERPL-MCNC: 7.1 MG/DL — LOW (ref 8.4–10.5)
CALCIUM SERPL-MCNC: 7.7 MG/DL — LOW (ref 8.4–10.5)
CHLORIDE BLDV-SCNC: 109 MMOL/L — HIGH (ref 96–108)
CHLORIDE BLDV-SCNC: 111 MMOL/L — HIGH (ref 96–108)
CHLORIDE SERPL-SCNC: 105 MMOL/L — SIGNIFICANT CHANGE UP (ref 96–108)
CHLORIDE SERPL-SCNC: 109 MMOL/L — HIGH (ref 96–108)
CK MB BLD-MCNC: 12.2 % — HIGH (ref 0–3.5)
CK MB CFR SERPL CALC: 4.2 NG/ML — SIGNIFICANT CHANGE UP (ref 0–6.7)
CK MB CFR SERPL CALC: 5 NG/ML — SIGNIFICANT CHANGE UP (ref 0–6.7)
CK SERPL-CCNC: 28 U/L — LOW (ref 30–200)
CK SERPL-CCNC: 41 U/L — SIGNIFICANT CHANGE UP (ref 30–200)
CO2 BLDV-SCNC: 26 MMOL/L — SIGNIFICANT CHANGE UP (ref 22–30)
CO2 BLDV-SCNC: 27 MMOL/L — SIGNIFICANT CHANGE UP (ref 22–30)
CO2 SERPL-SCNC: 18 MMOL/L — LOW (ref 22–31)
CO2 SERPL-SCNC: 20 MMOL/L — LOW (ref 22–31)
COLOR SPEC: SIGNIFICANT CHANGE UP
CREAT SERPL-MCNC: 2.54 MG/DL — HIGH (ref 0.5–1.3)
CREAT SERPL-MCNC: 2.98 MG/DL — HIGH (ref 0.5–1.3)
DIFF PNL FLD: ABNORMAL
ELLIPTOCYTES BLD QL SMEAR: SLIGHT — SIGNIFICANT CHANGE UP
ELLIPTOCYTES BLD QL SMEAR: SLIGHT — SIGNIFICANT CHANGE UP
EOSINOPHIL # BLD AUTO: 0 K/UL — SIGNIFICANT CHANGE UP (ref 0–0.5)
EOSINOPHIL # BLD AUTO: 0.08 K/UL — SIGNIFICANT CHANGE UP (ref 0–0.5)
EOSINOPHIL NFR BLD AUTO: 0 % — SIGNIFICANT CHANGE UP (ref 0–6)
EOSINOPHIL NFR BLD AUTO: 0.9 % — SIGNIFICANT CHANGE UP (ref 0–6)
EPI CELLS # UR: 0 /HPF — SIGNIFICANT CHANGE UP
GAS PNL BLDV: 140 MMOL/L — SIGNIFICANT CHANGE UP (ref 135–145)
GAS PNL BLDV: 141 MMOL/L — SIGNIFICANT CHANGE UP (ref 135–145)
GAS PNL BLDV: SIGNIFICANT CHANGE UP
GLUCOSE BLDV-MCNC: 119 MG/DL — HIGH (ref 70–99)
GLUCOSE BLDV-MCNC: 125 MG/DL — HIGH (ref 70–99)
GLUCOSE SERPL-MCNC: 113 MG/DL — HIGH (ref 70–99)
GLUCOSE SERPL-MCNC: 144 MG/DL — HIGH (ref 70–99)
GLUCOSE UR QL: NEGATIVE — SIGNIFICANT CHANGE UP
HCO3 BLDV-SCNC: 25 MMOL/L — SIGNIFICANT CHANGE UP (ref 21–29)
HCO3 BLDV-SCNC: 26 MMOL/L — SIGNIFICANT CHANGE UP (ref 21–29)
HCT VFR BLD CALC: 22.4 % — LOW (ref 39–50)
HCT VFR BLD CALC: 26.4 % — LOW (ref 39–50)
HCT VFR BLD CALC: 28.3 % — LOW (ref 39–50)
HCT VFR BLDA CALC: 27 % — LOW (ref 39–50)
HCT VFR BLDA CALC: 27 % — LOW (ref 39–50)
HGB BLD CALC-MCNC: 8.6 G/DL — LOW (ref 13–17)
HGB BLD CALC-MCNC: 8.7 G/DL — LOW (ref 13–17)
HGB BLD-MCNC: 6.9 G/DL — CRITICAL LOW (ref 13–17)
HGB BLD-MCNC: 8.1 G/DL — LOW (ref 13–17)
HGB BLD-MCNC: 8.7 G/DL — LOW (ref 13–17)
HOWELL-JOLLY BOD BLD QL SMEAR: PRESENT — SIGNIFICANT CHANGE UP
HOWELL-JOLLY BOD BLD QL SMEAR: PRESENT — SIGNIFICANT CHANGE UP
HYALINE CASTS # UR AUTO: 2 /LPF — SIGNIFICANT CHANGE UP (ref 0–2)
HYPOCHROMIA BLD QL: SLIGHT — SIGNIFICANT CHANGE UP
INR BLD: 1.8 RATIO — HIGH (ref 0.88–1.16)
KETONES UR-MCNC: NEGATIVE — SIGNIFICANT CHANGE UP
LACTATE BLDV-MCNC: 1.7 MMOL/L — SIGNIFICANT CHANGE UP (ref 0.7–2)
LACTATE BLDV-MCNC: 1.9 MMOL/L — SIGNIFICANT CHANGE UP (ref 0.7–2)
LEUKOCYTE ESTERASE UR-ACNC: ABNORMAL
LYMPHOCYTES # BLD AUTO: 0.88 K/UL — LOW (ref 1–3.3)
LYMPHOCYTES # BLD AUTO: 1.37 K/UL — SIGNIFICANT CHANGE UP (ref 1–3.3)
LYMPHOCYTES # BLD AUTO: 13.3 % — SIGNIFICANT CHANGE UP (ref 13–44)
LYMPHOCYTES # BLD AUTO: 9.8 % — LOW (ref 13–44)
MACROCYTES BLD QL: SIGNIFICANT CHANGE UP
MACROCYTES BLD QL: SIGNIFICANT CHANGE UP
MAGNESIUM SERPL-MCNC: 1.6 MG/DL — SIGNIFICANT CHANGE UP (ref 1.6–2.6)
MANUAL SMEAR VERIFICATION: SIGNIFICANT CHANGE UP
MANUAL SMEAR VERIFICATION: SIGNIFICANT CHANGE UP
MCHC RBC-ENTMCNC: 29.9 PG — SIGNIFICANT CHANGE UP (ref 27–34)
MCHC RBC-ENTMCNC: 30.1 PG — SIGNIFICANT CHANGE UP (ref 27–34)
MCHC RBC-ENTMCNC: 30.7 GM/DL — LOW (ref 32–36)
MCHC RBC-ENTMCNC: 30.7 GM/DL — LOW (ref 32–36)
MCHC RBC-ENTMCNC: 30.8 GM/DL — LOW (ref 32–36)
MCHC RBC-ENTMCNC: 31 PG — SIGNIFICANT CHANGE UP (ref 27–34)
MCV RBC AUTO: 100.7 FL — HIGH (ref 80–100)
MCV RBC AUTO: 97.4 FL — SIGNIFICANT CHANGE UP (ref 80–100)
MCV RBC AUTO: 97.8 FL — SIGNIFICANT CHANGE UP (ref 80–100)
METAMYELOCYTES # FLD: 0.9 % — HIGH (ref 0–0)
MONOCYTES # BLD AUTO: 0.4 K/UL — SIGNIFICANT CHANGE UP (ref 0–0.9)
MONOCYTES # BLD AUTO: 0.55 K/UL — SIGNIFICANT CHANGE UP (ref 0–0.9)
MONOCYTES NFR BLD AUTO: 4.5 % — SIGNIFICANT CHANGE UP (ref 2–14)
MONOCYTES NFR BLD AUTO: 5.3 % — SIGNIFICANT CHANGE UP (ref 2–14)
NEUTROPHILS # BLD AUTO: 7.36 K/UL — SIGNIFICANT CHANGE UP (ref 1.8–7.4)
NEUTROPHILS # BLD AUTO: 8.38 K/UL — HIGH (ref 1.8–7.4)
NEUTROPHILS NFR BLD AUTO: 79.6 % — HIGH (ref 43–77)
NEUTROPHILS NFR BLD AUTO: 80.3 % — HIGH (ref 43–77)
NEUTS BAND # BLD: 1.8 % — SIGNIFICANT CHANGE UP (ref 0–8)
NEUTS BAND # BLD: 1.8 % — SIGNIFICANT CHANGE UP (ref 0–8)
NITRITE UR-MCNC: NEGATIVE — SIGNIFICANT CHANGE UP
NRBC # BLD: 1 /100 — HIGH (ref 0–0)
NRBC # BLD: 2 /100 WBCS — HIGH (ref 0–0)
NRBC # BLD: 9 /100 — HIGH (ref 0–0)
OVALOCYTES BLD QL SMEAR: SIGNIFICANT CHANGE UP
OVALOCYTES BLD QL SMEAR: SLIGHT — SIGNIFICANT CHANGE UP
PCO2 BLDV: 45 MMHG — SIGNIFICANT CHANGE UP (ref 35–50)
PCO2 BLDV: 50 MMHG — SIGNIFICANT CHANGE UP (ref 35–50)
PH BLDV: 7.33 — LOW (ref 7.35–7.45)
PH BLDV: 7.36 — SIGNIFICANT CHANGE UP (ref 7.35–7.45)
PH UR: 6 — SIGNIFICANT CHANGE UP (ref 5–8)
PLAT MORPH BLD: ABNORMAL
PLAT MORPH BLD: ABNORMAL
PLATELET # BLD AUTO: 133 K/UL — LOW (ref 150–400)
PLATELET # BLD AUTO: 153 K/UL — SIGNIFICANT CHANGE UP (ref 150–400)
PLATELET # BLD AUTO: 91 K/UL — LOW (ref 150–400)
PO2 BLDV: 27 MMHG — SIGNIFICANT CHANGE UP (ref 25–45)
PO2 BLDV: 40 MMHG — SIGNIFICANT CHANGE UP (ref 25–45)
POIKILOCYTOSIS BLD QL AUTO: SIGNIFICANT CHANGE UP
POIKILOCYTOSIS BLD QL AUTO: SLIGHT — SIGNIFICANT CHANGE UP
POLYCHROMASIA BLD QL SMEAR: SIGNIFICANT CHANGE UP
POLYCHROMASIA BLD QL SMEAR: SLIGHT — SIGNIFICANT CHANGE UP
POTASSIUM BLDV-SCNC: 3.3 MMOL/L — LOW (ref 3.5–5.3)
POTASSIUM BLDV-SCNC: 3.7 MMOL/L — SIGNIFICANT CHANGE UP (ref 3.5–5.3)
POTASSIUM SERPL-MCNC: 3.7 MMOL/L — SIGNIFICANT CHANGE UP (ref 3.5–5.3)
POTASSIUM SERPL-MCNC: >9 MMOL/L — CRITICAL HIGH (ref 3.5–5.3)
POTASSIUM SERPL-SCNC: 3.7 MMOL/L — SIGNIFICANT CHANGE UP (ref 3.5–5.3)
POTASSIUM SERPL-SCNC: >9 MMOL/L — CRITICAL HIGH (ref 3.5–5.3)
PROT SERPL-MCNC: 5.3 G/DL — LOW (ref 6–8.3)
PROT UR-MCNC: ABNORMAL
PROTHROM AB SERPL-ACNC: 21 SEC — HIGH (ref 10.6–13.6)
RBC # BLD: 2.29 M/UL — LOW (ref 4.2–5.8)
RBC # BLD: 2.71 M/UL — LOW (ref 4.2–5.8)
RBC # BLD: 2.81 M/UL — LOW (ref 4.2–5.8)
RBC # FLD: 23.5 % — HIGH (ref 10.3–14.5)
RBC # FLD: 23.9 % — HIGH (ref 10.3–14.5)
RBC # FLD: 24.2 % — HIGH (ref 10.3–14.5)
RBC BLD AUTO: ABNORMAL
RBC BLD AUTO: ABNORMAL
RBC CASTS # UR COMP ASSIST: 3 /HPF — SIGNIFICANT CHANGE UP (ref 0–4)
RH IG SCN BLD-IMP: POSITIVE — SIGNIFICANT CHANGE UP
SAO2 % BLDV: 41 % — LOW (ref 67–88)
SAO2 % BLDV: 67 % — SIGNIFICANT CHANGE UP (ref 67–88)
SARS-COV-2 RNA SPEC QL NAA+PROBE: SIGNIFICANT CHANGE UP
SODIUM SERPL-SCNC: 131 MMOL/L — LOW (ref 135–145)
SODIUM SERPL-SCNC: 146 MMOL/L — HIGH (ref 135–145)
SP GR SPEC: 1.01 — LOW (ref 1.01–1.02)
T3 SERPL-MCNC: 80 NG/DL — SIGNIFICANT CHANGE UP (ref 80–200)
T4 AB SER-ACNC: 5.6 UG/DL — SIGNIFICANT CHANGE UP (ref 4.6–12)
TARGETS BLD QL SMEAR: SLIGHT — SIGNIFICANT CHANGE UP
TROPONIN T, HIGH SENSITIVITY RESULT: 62 NG/L — HIGH (ref 0–51)
TROPONIN T, HIGH SENSITIVITY RESULT: 74 NG/L — HIGH (ref 0–51)
TSH SERPL-MCNC: 2.45 UIU/ML — SIGNIFICANT CHANGE UP (ref 0.27–4.2)
UROBILINOGEN FLD QL: NEGATIVE — SIGNIFICANT CHANGE UP
VARIANT LYMPHS # BLD: 0.9 % — SIGNIFICANT CHANGE UP (ref 0–6)
WBC # BLD: 10.29 K/UL — SIGNIFICANT CHANGE UP (ref 3.8–10.5)
WBC # BLD: 14.92 K/UL — HIGH (ref 3.8–10.5)
WBC # BLD: 8.97 K/UL — SIGNIFICANT CHANGE UP (ref 3.8–10.5)
WBC # FLD AUTO: 10.29 K/UL — SIGNIFICANT CHANGE UP (ref 3.8–10.5)
WBC # FLD AUTO: 14.92 K/UL — HIGH (ref 3.8–10.5)
WBC # FLD AUTO: 8.97 K/UL — SIGNIFICANT CHANGE UP (ref 3.8–10.5)
WBC UR QL: 370 /HPF — HIGH (ref 0–5)

## 2021-05-19 PROCEDURE — 72125 CT NECK SPINE W/O DYE: CPT | Mod: 26,MA

## 2021-05-19 PROCEDURE — 71250 CT THORAX DX C-: CPT | Mod: 26,MA

## 2021-05-19 PROCEDURE — 70450 CT HEAD/BRAIN W/O DYE: CPT | Mod: 26,MA

## 2021-05-19 PROCEDURE — 71045 X-RAY EXAM CHEST 1 VIEW: CPT | Mod: 26

## 2021-05-19 PROCEDURE — 93308 TTE F-UP OR LMTD: CPT | Mod: 26

## 2021-05-19 PROCEDURE — 93010 ELECTROCARDIOGRAM REPORT: CPT

## 2021-05-19 PROCEDURE — 99291 CRITICAL CARE FIRST HOUR: CPT | Mod: CS,GC

## 2021-05-19 PROCEDURE — 74176 CT ABD & PELVIS W/O CONTRAST: CPT | Mod: 26,MA

## 2021-05-19 RX ORDER — ATROPINE SULFATE 0.1 MG/ML
1 SYRINGE (ML) INJECTION ONCE
Refills: 0 | Status: DISCONTINUED | OUTPATIENT
Start: 2021-05-19 | End: 2021-05-21

## 2021-05-19 RX ORDER — PILOCARPINE HCL 4 %
1 DROPS OPHTHALMIC (EYE) DAILY
Refills: 0 | Status: DISCONTINUED | OUTPATIENT
Start: 2021-05-19 | End: 2021-05-20

## 2021-05-19 RX ORDER — TAMSULOSIN HYDROCHLORIDE 0.4 MG/1
0.4 CAPSULE ORAL AT BEDTIME
Refills: 0 | Status: DISCONTINUED | OUTPATIENT
Start: 2021-05-19 | End: 2021-05-19

## 2021-05-19 RX ORDER — APIXABAN 2.5 MG/1
1 TABLET, FILM COATED ORAL
Qty: 0 | Refills: 0 | DISCHARGE

## 2021-05-19 RX ORDER — CALCIUM GLUCONATE 100 MG/ML
1 VIAL (ML) INTRAVENOUS ONCE
Refills: 0 | Status: COMPLETED | OUTPATIENT
Start: 2021-05-19 | End: 2021-05-19

## 2021-05-19 RX ORDER — NOREPINEPHRINE BITARTRATE/D5W 8 MG/250ML
0.05 PLASTIC BAG, INJECTION (ML) INTRAVENOUS
Qty: 8 | Refills: 0 | Status: DISCONTINUED | OUTPATIENT
Start: 2021-05-19 | End: 2021-05-22

## 2021-05-19 RX ORDER — ACETAMINOPHEN 500 MG
2 TABLET ORAL
Qty: 0 | Refills: 0 | DISCHARGE

## 2021-05-19 RX ORDER — SODIUM CHLORIDE 9 MG/ML
500 INJECTION, SOLUTION INTRAVENOUS ONCE
Refills: 0 | Status: COMPLETED | OUTPATIENT
Start: 2021-05-19 | End: 2021-05-19

## 2021-05-19 RX ORDER — FUROSEMIDE 40 MG
40 TABLET ORAL DAILY
Refills: 0 | Status: DISCONTINUED | OUTPATIENT
Start: 2021-05-19 | End: 2021-05-19

## 2021-05-19 RX ORDER — PIPERACILLIN AND TAZOBACTAM 4; .5 G/20ML; G/20ML
3.38 INJECTION, POWDER, LYOPHILIZED, FOR SOLUTION INTRAVENOUS ONCE
Refills: 0 | Status: COMPLETED | OUTPATIENT
Start: 2021-05-19 | End: 2021-05-19

## 2021-05-19 RX ORDER — LATANOPROST 0.05 MG/ML
1 SOLUTION/ DROPS OPHTHALMIC; TOPICAL AT BEDTIME
Refills: 0 | Status: DISCONTINUED | OUTPATIENT
Start: 2021-05-19 | End: 2021-05-20

## 2021-05-19 RX ORDER — BRIMONIDINE TARTRATE 2 MG/MG
1 SOLUTION/ DROPS OPHTHALMIC DAILY
Refills: 0 | Status: DISCONTINUED | OUTPATIENT
Start: 2021-05-19 | End: 2021-05-20

## 2021-05-19 RX ORDER — URSODIOL 250 MG/1
1 TABLET, FILM COATED ORAL
Qty: 0 | Refills: 0 | DISCHARGE

## 2021-05-19 RX ORDER — SODIUM CHLORIDE 9 MG/ML
500 INJECTION INTRAMUSCULAR; INTRAVENOUS; SUBCUTANEOUS ONCE
Refills: 0 | Status: COMPLETED | OUTPATIENT
Start: 2021-05-19 | End: 2021-05-19

## 2021-05-19 RX ORDER — POTASSIUM CHLORIDE 20 MEQ
10 PACKET (EA) ORAL
Refills: 0 | Status: COMPLETED | OUTPATIENT
Start: 2021-05-19 | End: 2021-05-19

## 2021-05-19 RX ORDER — FINASTERIDE 5 MG/1
5 TABLET, FILM COATED ORAL DAILY
Refills: 0 | Status: DISCONTINUED | OUTPATIENT
Start: 2021-05-19 | End: 2021-05-19

## 2021-05-19 RX ORDER — AMLODIPINE BESYLATE 2.5 MG/1
2.5 TABLET ORAL DAILY
Refills: 0 | Status: DISCONTINUED | OUTPATIENT
Start: 2021-05-19 | End: 2021-05-19

## 2021-05-19 RX ORDER — PHENYLEPHRINE HYDROCHLORIDE 10 MG/ML
0.1 INJECTION INTRAVENOUS
Qty: 160 | Refills: 0 | Status: DISCONTINUED | OUTPATIENT
Start: 2021-05-19 | End: 2021-05-19

## 2021-05-19 RX ORDER — CHLORHEXIDINE GLUCONATE 213 G/1000ML
1 SOLUTION TOPICAL
Refills: 0 | Status: DISCONTINUED | OUTPATIENT
Start: 2021-05-19 | End: 2021-05-22

## 2021-05-19 RX ORDER — VANCOMYCIN HCL 1 G
1000 VIAL (EA) INTRAVENOUS ONCE
Refills: 0 | Status: COMPLETED | OUTPATIENT
Start: 2021-05-19 | End: 2021-05-19

## 2021-05-19 RX ORDER — URSODIOL 250 MG/1
250 TABLET, FILM COATED ORAL EVERY 12 HOURS
Refills: 0 | Status: DISCONTINUED | OUTPATIENT
Start: 2021-05-19 | End: 2021-05-19

## 2021-05-19 RX ORDER — ALPRAZOLAM 0.25 MG
0.25 TABLET ORAL DAILY
Refills: 0 | Status: DISCONTINUED | OUTPATIENT
Start: 2021-05-19 | End: 2021-05-19

## 2021-05-19 RX ORDER — FEBUXOSTAT 40 MG/1
1 TABLET ORAL
Qty: 0 | Refills: 0 | DISCHARGE

## 2021-05-19 RX ORDER — AMLODIPINE BESYLATE 2.5 MG/1
1 TABLET ORAL
Qty: 0 | Refills: 0 | DISCHARGE

## 2021-05-19 RX ORDER — NETARSUDIL 0.2 MG/ML
1 SOLUTION/ DROPS OPHTHALMIC; TOPICAL
Qty: 0 | Refills: 0 | DISCHARGE

## 2021-05-19 RX ORDER — ATROPINE SULFATE 0.1 MG/ML
1 SYRINGE (ML) INJECTION ONCE
Refills: 0 | Status: COMPLETED | OUTPATIENT
Start: 2021-05-19 | End: 2021-05-19

## 2021-05-19 RX ORDER — MAGNESIUM SULFATE 500 MG/ML
1 VIAL (ML) INJECTION ONCE
Refills: 0 | Status: COMPLETED | OUTPATIENT
Start: 2021-05-19 | End: 2021-05-19

## 2021-05-19 RX ORDER — SODIUM CHLORIDE 9 MG/ML
1000 INJECTION INTRAMUSCULAR; INTRAVENOUS; SUBCUTANEOUS
Refills: 0 | Status: DISCONTINUED | OUTPATIENT
Start: 2021-05-19 | End: 2021-05-21

## 2021-05-19 RX ORDER — APIXABAN 2.5 MG/1
2.5 TABLET, FILM COATED ORAL ONCE
Refills: 0 | Status: DISCONTINUED | OUTPATIENT
Start: 2021-05-19 | End: 2021-05-19

## 2021-05-19 RX ORDER — SODIUM CHLORIDE 9 MG/ML
500 INJECTION, SOLUTION INTRAVENOUS ONCE
Refills: 0 | Status: DISCONTINUED | OUTPATIENT
Start: 2021-05-19 | End: 2021-05-19

## 2021-05-19 RX ORDER — EPINEPHRINE 0.3 MG/.3ML
0.1 INJECTION INTRAMUSCULAR; SUBCUTANEOUS
Qty: 4 | Refills: 0 | Status: DISCONTINUED | OUTPATIENT
Start: 2021-05-19 | End: 2021-05-19

## 2021-05-19 RX ADMIN — Medication 100 GRAM(S): at 18:38

## 2021-05-19 RX ADMIN — PIPERACILLIN AND TAZOBACTAM 200 GRAM(S): 4; .5 INJECTION, POWDER, LYOPHILIZED, FOR SOLUTION INTRAVENOUS at 15:35

## 2021-05-19 RX ADMIN — EPINEPHRINE 36.2 MICROGRAM(S)/KG/MIN: 0.3 INJECTION INTRAMUSCULAR; SUBCUTANEOUS at 18:19

## 2021-05-19 RX ADMIN — PHENYLEPHRINE HYDROCHLORIDE 1.81 MICROGRAM(S)/KG/MIN: 10 INJECTION INTRAVENOUS at 19:50

## 2021-05-19 RX ADMIN — Medication 100 GRAM(S): at 19:49

## 2021-05-19 RX ADMIN — Medication 1 MILLIGRAM(S): at 14:51

## 2021-05-19 RX ADMIN — Medication 9.06 MICROGRAM(S)/KG/MIN: at 19:47

## 2021-05-19 RX ADMIN — Medication 100 GRAM(S): at 18:28

## 2021-05-19 RX ADMIN — SODIUM CHLORIDE 500 MILLILITER(S): 9 INJECTION INTRAMUSCULAR; INTRAVENOUS; SUBCUTANEOUS at 14:45

## 2021-05-19 RX ADMIN — Medication 250 MILLIGRAM(S): at 17:18

## 2021-05-19 RX ADMIN — Medication 100 MILLIEQUIVALENT(S): at 19:49

## 2021-05-19 RX ADMIN — SODIUM CHLORIDE 500 MILLILITER(S): 9 INJECTION, SOLUTION INTRAVENOUS at 16:40

## 2021-05-19 NOTE — CHART NOTE - NSCHARTNOTEFT_GEN_A_CORE
MICU Accept Note    CHIEF COMPLAINT: Sepsis     HPI / INTERVAL HISTORY: 89 year old male with pmhx MDS, a fib on eliquis, ckd, BPH w urinary retention requiring self-catheterization, peritonitis from duodenal perforation s/p ex lap with pyloric excluding gastrojejunostomy and ethan patch () who presents with hypotension and bradycardia from assisted-living  facility ([facility]). Unable to obtain ROS from patient. In the ED, patient hypothermic to 91F, hypotensive 80's/40's and bradycardic to 30's. Given 0.5mg atropine and volume resuscitation. Epi gtt started for persistent hypotension and bradycardia.     Hospital Course:      PAST MEDICAL & SURGICAL HISTORY:  Benign Hypertension    BPH (Benign Prostatic Hypertrophy)    MDS (Myelodysplastic Syndrome)    Glaucoma    Anemia    Chronic constipation    Nocturia associated with benign prostatic hypertrophy    Osteomyelitis of arm    Choledocholithiasis    CKD (chronic kidney disease) stage 3, GFR 30-59 ml/min    S/P Cholecystectomy    S/P TURP    History of cataract extraction  right eye     Osteomyelitis  left humerous- surgery childhood age 13    Laceration of finger, left, with tendon  1976    Mohs defect of nose  &#x27;s        FAMILY HISTORY:  Family history of prostate cancer    FH: CVA (cerebrovascular accident)  father    FH: arthritis  mother        SOCIAL HISTORY:  Smoking:   Substance Use:   EtOH Use:   Marital Status:   Sexual History:   Occupation:  Recent Travel:  Country of Birth:   Advance Directives:     HOME MEDICATIONS:      Allergies    Cipro (Swelling)  Cipro (Unknown)  Levaquin (Unknown)  sulfa drugs (Rash)  sulfa drugs (Unknown)    Intolerances          REVIEW OF SYSTEMS:  Constitutional: No fevers, chills, weight loss, weight gain  HEENT: No vision problems, eye pain, nasal congestion, rhinorrhea, sore throat, dysphagia  CV: No chest pain, orthopnea, palpitations  Resp: No cough, dyspnea, wheezing, hemoptysis  GI: No nausea, vomiting, diarrhea, constipation, abdominal pain  : [ ] dysuria [ ] nocturia [ ] hematuria [ ] increased urinary frequency  Musculoskeletal: [ ] back pain [ ] myalgias [ ] arthralgias [ ] fracture  Skin: [ ] rash [ ] itch  Neurological: [ ] headache [ ] dizziness [ ] syncope [ ] weakness [ ] numbness  Psychiatric: [ ] anxiety [ ] depression  Endocrine: [ ] diabetes [ ] thyroid problem  Hematologic/Lymphatic: [ ] anemia [ ] bleeding problem  Allergic/Immunologic: [ ] itchy eyes [ ] nasal discharge [ ] hives [ ] angioedema  [ ] All other systems negative  [ x] Unable to assess ROS because patient mental status     OBJECTIVE:  ICU Vital Signs Last 24 Hrs  T(C): 32.8 (19 May 2021 14:42), Max: 32.8 (19 May 2021 14:42)  T(F): 91.1 (19 May 2021 14:42), Max: 91.1 (19 May 2021 14:42)  HR: 45 (19 May 2021 17:35) (37 - 79)  BP: 87/52 (19 May 2021 17:35) (87/52 - 101/56)  BP(mean): 71 (19 May 2021 16:00) (67 - 75)  ABP: --  ABP(mean): --  RR: 16 (19 May 2021 17:35) (16 - 19)  SpO2: 100% (19 May 2021 17:35) (85% - 100%)        CAPILLARY BLOOD GLUCOSE      POCT Blood Glucose.: 129 mg/dL (19 May 2021 14:51)          VITALS:   T(C): 32.8 (21 @ 14:42), Max: 32.8 (21 @ 14:42)  HR: 45 (21 @ 17:35) (37 - 79)  BP: 87/52 (-21 @ 17:35) (87/52 - 101/56)  RR: 16 (21 @ 17:35) (16 - 19)  SpO2: 100% (21 @ 17:35) (85% - 100%)    GENERAL: lethargic elderly male   HEAD:  Atraumatic, Normocephalic  EYES: EOMI, PERRLA, conjunctiva and sclera clear  ENT: Moist mucous membranes  NECK: Supple, No JVD  CHEST/LUNG: Clear to auscultation bilaterally; No rales, rhonchi, wheezing, or rubs. Unlabored respirations  HEART: bradycardic, irregular rhythm; No murmurs, rubs, or gallops  ABDOMEN: BSx4; Soft, nontender, nondistended, 20cm midline incision w wound dehiscence and pus at umbilicus     EXTREMITIES:  3+ pitting edema LE b/l to level of the knee   NERVOUS SYSTEM:  A&Ox1  SKIN: No rashes or lesions  : rivera catheter with cloudy urine     HOSPITAL MEDICATIONS:  MEDICATIONS  (STANDING):  apixaban 2.5 milliGRAM(s) Oral Once  brimonidine 0.2% Ophthalmic Solution 1 Drop(s) Both EYES daily  EPINEPHrine    Infusion 0.1 MICROgram(s)/kG/Min (36.2 mL/Hr) IV Continuous <Continuous>  finasteride 5 milliGRAM(s) Oral daily  latanoprost 0.005% Ophthalmic Solution 1 Drop(s) Both EYES at bedtime  magnesium sulfate  IVPB 1 Gram(s) IV Intermittent Once  pilocarpine 4% Solution 1 Drop(s) Both EYES daily  potassium chloride  10 mEq/100 mL IVPB 10 milliEquivalent(s) IV Intermittent every 1 hour  sodium chloride 0.9%. 1000 milliLiter(s) (80 mL/Hr) IV Continuous <Continuous>  tamsulosin 0.4 milliGRAM(s) Oral at bedtime  ursodiol Tablet 250 milliGRAM(s) Oral every 12 hours    MEDICATIONS  (PRN):  ALPRAZolam 0.25 milliGRAM(s) Oral daily PRN anxiety      LABS:                        8.1    x     )-----------( x        ( 19 May 2021 16:36 )             26.4     Hgb Trend: 8.1<--  05-    134<L>  |  100  |  51<H>  ----------------------------<  399<H>  3.2<L>   |  21<L>  |  2.72<H>    Ca    6.9<L>      19 May 2021 18:33  Phos  3.9       Mg     1.6         TPro  4.4<L>  /  Alb  1.9<L>  /  TBili  0.3  /  DBili  x   /  AST  13  /  ALT  14  /  AlkPhos  92      Creatinine Trend: 2.72<--, 2.98<--, 1.92<--, 2.08<--, 2.23<--, 2.35<--  PT/INR - ( 19 May 2021 14:57 )   PT: 21.0 sec;   INR: 1.80 ratio         PTT - ( 19 May 2021 14:57 )  PTT:37.6 sec  Urinalysis Basic - ( 19 May 2021 15:57 )    Color: Light Yellow / Appearance: Slightly Turbid / S.009 / pH: x  Gluc: x / Ketone: Negative  / Bili: Negative / Urobili: Negative   Blood: x / Protein: Trace / Nitrite: Negative   Leuk Esterase: Large / RBC: 3 /hpf /  /HPF   Sq Epi: x / Non Sq Epi: 0 /hpf / Bacteria: Few        Venous Blood Gas:   @ 14:58  7.33/50/27//41  VBG Lactate: 1.9  Venous Blood Gas:   @ 14:48  7.36/45/40/  VBG Lactate: 1.7      MICROBIOLOGY:     RADIOLOGY & ADDITIONAL TESTS:      ASSESSMENT AND PLAN:    89 year old male with pmhx MDS, a fib on eliquis, ckd, BPH w urinary retention requiring self-catheterization, peritonitis from duodenal perforation s/p ex lap with pyloric excluding gastrojejunostomy and ethan patch () who is presenting with sepsis 2/2 UTI     #Neuro  AMS  -2/2 sepsis associated metabolic encephalopathy   -AO x 3 at baseline  -CT head no evidence of skull fracture, intracranial hemorrhage or mass effect.  -CT head no cervical spine fracture or traumatic malalignment  -Tx of sepsis as below     #CV  Hypotension  -2/2 septic shock   -c/w IVF resuscitation  -Epi gtt started in ED given bradycardia, will transition to levo       Bradycardia   -sinus abril vs a fib w slow ventricular  -patient on BB for a fib   -s/p atropine in the ED  -Epi gtt started in the ED   -EP consulted, recs pending     CHF  -BNP 2685  -TTE 59%, normal systolic function   -takes 40mg PO lasix at home  -3+ pitting edema on PE  -Offical TTE  -diuresis once BP improved     #Resp  -CXR clear lungs   -Saturating 97% on 4L NC   -Trial of intubation if needed     #Renal  MICH on CKD  -SCr 2.72 (baseline ~2)  -Urine lytes   -likely pre-renal     #GI  -NPO given AMS      #ID  Sepsis  -2/2 UTI  -s/p vanc/zosyn in the ED  -c/w vanc/zosyn  -f/u BCx and UCx  -c/w IVF    #Endo  Hyperglycemia   -BG ~ 400,   -check A1c  -ISS    #Heme  MDS  -Hematologist Dr. Drummond ([phone])  -On aranesp  -Hgb 6.9, will transfuse   -no active bleeding     #DVT  -SCDs  -full code w trial of intubation MICU Accept Note    CHIEF COMPLAINT: Sepsis     HPI / INTERVAL HISTORY: 89 year old male with pmhx MDS, a fib on eliquis, ckd, BPH w urinary retention requiring self-catheterization, peritonitis from duodenal perforation s/p ex lap with pyloric excluding gastrojejunostomy and ethan patch () who presents with hypotension and bradycardia from assisted-living  facility ([facility]). Unable to obtain ROS from patient. In the ED, patient hypothermic to 91F, hypotensive 80's/40's and bradycardic to 30's. Given 0.5mg atropine and volume resuscitation. Epi gtt started for persistent hypotension and bradycardia.     Hospital Course:      PAST MEDICAL & SURGICAL HISTORY:  Benign Hypertension    BPH (Benign Prostatic Hypertrophy)    MDS (Myelodysplastic Syndrome)    Glaucoma    Anemia    Chronic constipation    Nocturia associated with benign prostatic hypertrophy    Osteomyelitis of arm    Choledocholithiasis    CKD (chronic kidney disease) stage 3, GFR 30-59 ml/min    S/P Cholecystectomy    S/P TURP    History of cataract extraction  right eye     Osteomyelitis  left humerous- surgery childhood age 13    Laceration of finger, left, with tendon  1976    Mohs defect of nose  &#x27;s        FAMILY HISTORY:  Family history of prostate cancer    FH: CVA (cerebrovascular accident)  father    FH: arthritis  mother        SOCIAL HISTORY:  Smoking:   Substance Use:   EtOH Use:   Marital Status:   Sexual History:   Occupation:  Recent Travel:  Country of Birth:   Advance Directives:     HOME MEDICATIONS:      Allergies    Cipro (Swelling)  Cipro (Unknown)  Levaquin (Unknown)  sulfa drugs (Rash)  sulfa drugs (Unknown)    Intolerances          REVIEW OF SYSTEMS:  Constitutional: No fevers, chills, weight loss, weight gain  HEENT: No vision problems, eye pain, nasal congestion, rhinorrhea, sore throat, dysphagia  CV: No chest pain, orthopnea, palpitations  Resp: No cough, dyspnea, wheezing, hemoptysis  GI: No nausea, vomiting, diarrhea, constipation, abdominal pain  : [ ] dysuria [ ] nocturia [ ] hematuria [ ] increased urinary frequency  Musculoskeletal: [ ] back pain [ ] myalgias [ ] arthralgias [ ] fracture  Skin: [ ] rash [ ] itch  Neurological: [ ] headache [ ] dizziness [ ] syncope [ ] weakness [ ] numbness  Psychiatric: [ ] anxiety [ ] depression  Endocrine: [ ] diabetes [ ] thyroid problem  Hematologic/Lymphatic: [ ] anemia [ ] bleeding problem  Allergic/Immunologic: [ ] itchy eyes [ ] nasal discharge [ ] hives [ ] angioedema  [ ] All other systems negative  [ x] Unable to assess ROS because patient mental status     OBJECTIVE:  ICU Vital Signs Last 24 Hrs  T(C): 32.8 (19 May 2021 14:42), Max: 32.8 (19 May 2021 14:42)  T(F): 91.1 (19 May 2021 14:42), Max: 91.1 (19 May 2021 14:42)  HR: 45 (19 May 2021 17:35) (37 - 79)  BP: 87/52 (19 May 2021 17:35) (87/52 - 101/56)  BP(mean): 71 (19 May 2021 16:00) (67 - 75)  ABP: --  ABP(mean): --  RR: 16 (19 May 2021 17:35) (16 - 19)  SpO2: 100% (19 May 2021 17:35) (85% - 100%)        CAPILLARY BLOOD GLUCOSE      POCT Blood Glucose.: 129 mg/dL (19 May 2021 14:51)          VITALS:   T(C): 32.8 (21 @ 14:42), Max: 32.8 (21 @ 14:42)  HR: 45 (21 @ 17:35) (37 - 79)  BP: 87/52 (-21 @ 17:35) (87/52 - 101/56)  RR: 16 (21 @ 17:35) (16 - 19)  SpO2: 100% (21 @ 17:35) (85% - 100%)    GENERAL: lethargic elderly male   HEAD:  Atraumatic, Normocephalic  EYES: EOMI, PERRLA, conjunctiva and sclera clear  ENT: Moist mucous membranes  NECK: Supple, No JVD  CHEST/LUNG: Clear to auscultation bilaterally; No rales, rhonchi, wheezing, or rubs. Unlabored respirations  HEART: bradycardic, irregular rhythm; No murmurs, rubs, or gallops  ABDOMEN: BSx4; Soft, nontender, nondistended, 20cm midline incision w wound dehiscence and pus at umbilicus     EXTREMITIES:  3+ pitting edema LE b/l to level of the knee   NERVOUS SYSTEM:  A&Ox1  SKIN: No rashes or lesions  : rivera catheter with cloudy urine     HOSPITAL MEDICATIONS:  MEDICATIONS  (STANDING):  apixaban 2.5 milliGRAM(s) Oral Once  brimonidine 0.2% Ophthalmic Solution 1 Drop(s) Both EYES daily  EPINEPHrine    Infusion 0.1 MICROgram(s)/kG/Min (36.2 mL/Hr) IV Continuous <Continuous>  finasteride 5 milliGRAM(s) Oral daily  latanoprost 0.005% Ophthalmic Solution 1 Drop(s) Both EYES at bedtime  magnesium sulfate  IVPB 1 Gram(s) IV Intermittent Once  pilocarpine 4% Solution 1 Drop(s) Both EYES daily  potassium chloride  10 mEq/100 mL IVPB 10 milliEquivalent(s) IV Intermittent every 1 hour  sodium chloride 0.9%. 1000 milliLiter(s) (80 mL/Hr) IV Continuous <Continuous>  tamsulosin 0.4 milliGRAM(s) Oral at bedtime  ursodiol Tablet 250 milliGRAM(s) Oral every 12 hours    MEDICATIONS  (PRN):  ALPRAZolam 0.25 milliGRAM(s) Oral daily PRN anxiety      LABS:                        8.1    x     )-----------( x        ( 19 May 2021 16:36 )             26.4     Hgb Trend: 8.1<--  05-    134<L>  |  100  |  51<H>  ----------------------------<  399<H>  3.2<L>   |  21<L>  |  2.72<H>    Ca    6.9<L>      19 May 2021 18:33  Phos  3.9       Mg     1.6         TPro  4.4<L>  /  Alb  1.9<L>  /  TBili  0.3  /  DBili  x   /  AST  13  /  ALT  14  /  AlkPhos  92      Creatinine Trend: 2.72<--, 2.98<--, 1.92<--, 2.08<--, 2.23<--, 2.35<--  PT/INR - ( 19 May 2021 14:57 )   PT: 21.0 sec;   INR: 1.80 ratio         PTT - ( 19 May 2021 14:57 )  PTT:37.6 sec  Urinalysis Basic - ( 19 May 2021 15:57 )    Color: Light Yellow / Appearance: Slightly Turbid / S.009 / pH: x  Gluc: x / Ketone: Negative  / Bili: Negative / Urobili: Negative   Blood: x / Protein: Trace / Nitrite: Negative   Leuk Esterase: Large / RBC: 3 /hpf /  /HPF   Sq Epi: x / Non Sq Epi: 0 /hpf / Bacteria: Few        Venous Blood Gas:   @ 14:58  7.33/50/27//41  VBG Lactate: 1.9  Venous Blood Gas:   @ 14:48  7.36/45/40/  VBG Lactate: 1.7      MICROBIOLOGY:     RADIOLOGY & ADDITIONAL TESTS:      ASSESSMENT AND PLAN:    89 year old male with pmhx MDS, a fib on eliquis, ckd, BPH w urinary retention requiring self-catheterization, peritonitis from duodenal perforation s/p ex lap with pyloric excluding gastrojejunostomy and ethan patch () who is presenting with sepsis 2/2 UTI     #Neuro  AMS  -2/2 sepsis associated metabolic encephalopathy   -AO x 3 at baseline  -CT head no evidence of skull fracture, intracranial hemorrhage or mass effect.  -CT head no cervical spine fracture or traumatic malalignment  -Tx of sepsis as below     #CV  Hypotension  -2/2 septic shock   -c/w IVF resuscitation  -Epi gtt started in ED given bradycardia, will transition to levo       Bradycardia   -sinus abril vs a fib w slow ventricular  -patient on BB for a fib   -s/p atropine in the ED  -Epi gtt started in the ED   -EP consulted, recs pending     CHF  -BNP 2685  -TTE 59%, normal systolic function   -takes 40mg PO lasix at home  -3+ pitting edema on PE  -Offical TTE  -diuresis once BP improved     #Resp  -CXR clear lungs   -Saturating 97% on 4L NC   -Trial of intubation if needed     #Renal  MICH on CKD  -SCr 2.72 (baseline ~2)  -Urine lytes   -likely pre-renal     #GI  -NPO given AMS    #ID  Sepsis  -2/2 UTI  -s/p vanc/zosyn in the ED  -c/w vanc/zosyn  -f/u BCx and UCx  -c/w IVF    #Endo  Hyperglycemia   -BG ~ 400,   -check A1c  -ISS    #Heme  MDS  -Hematologist Dr. Drummond ([phone])  -On aranesp  -Hgb 6.9, will transfuse   -no active bleeding     #DVT  -SCDs  -full code w trial of intubation MICU Consult     CHIEF COMPLAINT: Sepsis     HPI / INTERVAL HISTORY: 89 year old male with pmhx MDS, a fib on eliquis, ckd, BPH w urinary retention requiring self-catheterization, peritonitis from duodenal perforation s/p ex lap with pyloric excluding gastrojejunostomy and ethan patch () who presents with hypotension and bradycardia from assisted-living  facility ([facility]). Unable to obtain ROS from patient. In the ED, patient hypothermic to 91F, hypotensive 80's/40's and bradycardic to 30's. Given 0.5mg atropine and volume resuscitation. Epi gtt started for persistent hypotension and bradycardia.     Hospital Course:      PAST MEDICAL & SURGICAL HISTORY:  Benign Hypertension    BPH (Benign Prostatic Hypertrophy)    MDS (Myelodysplastic Syndrome)    Glaucoma    Anemia    Chronic constipation    Nocturia associated with benign prostatic hypertrophy    Osteomyelitis of arm    Choledocholithiasis    CKD (chronic kidney disease) stage 3, GFR 30-59 ml/min    S/P Cholecystectomy    S/P TURP    History of cataract extraction  right eye     Osteomyelitis  left humerous- surgery childhood age 13    Laceration of finger, left, with tendon  1976    Mohs defect of nose  &#x27;s        FAMILY HISTORY:  Family history of prostate cancer    FH: CVA (cerebrovascular accident)  father    FH: arthritis  mother        SOCIAL HISTORY:  Smoking:   Substance Use:   EtOH Use:   Marital Status:   Sexual History:   Occupation:  Recent Travel:  Country of Birth:   Advance Directives:     HOME MEDICATIONS:      Allergies    Cipro (Swelling)  Cipro (Unknown)  Levaquin (Unknown)  sulfa drugs (Rash)  sulfa drugs (Unknown)    Intolerances          REVIEW OF SYSTEMS:  Constitutional: No fevers, chills, weight loss, weight gain  HEENT: No vision problems, eye pain, nasal congestion, rhinorrhea, sore throat, dysphagia  CV: No chest pain, orthopnea, palpitations  Resp: No cough, dyspnea, wheezing, hemoptysis  GI: No nausea, vomiting, diarrhea, constipation, abdominal pain  : [ ] dysuria [ ] nocturia [ ] hematuria [ ] increased urinary frequency  Musculoskeletal: [ ] back pain [ ] myalgias [ ] arthralgias [ ] fracture  Skin: [ ] rash [ ] itch  Neurological: [ ] headache [ ] dizziness [ ] syncope [ ] weakness [ ] numbness  Psychiatric: [ ] anxiety [ ] depression  Endocrine: [ ] diabetes [ ] thyroid problem  Hematologic/Lymphatic: [ ] anemia [ ] bleeding problem  Allergic/Immunologic: [ ] itchy eyes [ ] nasal discharge [ ] hives [ ] angioedema  [ ] All other systems negative  [ x] Unable to assess ROS because patient mental status     OBJECTIVE:  ICU Vital Signs Last 24 Hrs  T(C): 32.8 (19 May 2021 14:42), Max: 32.8 (19 May 2021 14:42)  T(F): 91.1 (19 May 2021 14:42), Max: 91.1 (19 May 2021 14:42)  HR: 45 (19 May 2021 17:35) (37 - 79)  BP: 87/52 (19 May 2021 17:35) (87/52 - 101/56)  BP(mean): 71 (19 May 2021 16:00) (67 - 75)  ABP: --  ABP(mean): --  RR: 16 (19 May 2021 17:35) (16 - 19)  SpO2: 100% (19 May 2021 17:35) (85% - 100%)        CAPILLARY BLOOD GLUCOSE      POCT Blood Glucose.: 129 mg/dL (19 May 2021 14:51)          VITALS:   T(C): 32.8 (21 @ 14:42), Max: 32.8 (21 @ 14:42)  HR: 45 (21 @ 17:35) (37 - 79)  BP: 87/52 (21 @ 17:35) (87/52 - 101/56)  RR: 16 (21 @ 17:35) (16 - 19)  SpO2: 100% (21 @ 17:35) (85% - 100%)    GENERAL: lethargic elderly male   HEAD:  Atraumatic, Normocephalic  EYES: EOMI, PERRLA, conjunctiva and sclera clear  ENT: Moist mucous membranes  NECK: Supple, No JVD  CHEST/LUNG: Clear to auscultation bilaterally; No rales, rhonchi, wheezing, or rubs. Unlabored respirations  HEART: bradycardic, irregular rhythm; No murmurs, rubs, or gallops  ABDOMEN: BSx4; Soft, nontender, nondistended, 20cm midline incision w wound dehiscence and pus at umbilicus     EXTREMITIES:  3+ pitting edema LE b/l to level of the knee   NERVOUS SYSTEM:  A&Ox1  SKIN: No rashes or lesions  : rivera catheter with cloudy urine     HOSPITAL MEDICATIONS:  MEDICATIONS  (STANDING):  apixaban 2.5 milliGRAM(s) Oral Once  brimonidine 0.2% Ophthalmic Solution 1 Drop(s) Both EYES daily  EPINEPHrine    Infusion 0.1 MICROgram(s)/kG/Min (36.2 mL/Hr) IV Continuous <Continuous>  finasteride 5 milliGRAM(s) Oral daily  latanoprost 0.005% Ophthalmic Solution 1 Drop(s) Both EYES at bedtime  magnesium sulfate  IVPB 1 Gram(s) IV Intermittent Once  pilocarpine 4% Solution 1 Drop(s) Both EYES daily  potassium chloride  10 mEq/100 mL IVPB 10 milliEquivalent(s) IV Intermittent every 1 hour  sodium chloride 0.9%. 1000 milliLiter(s) (80 mL/Hr) IV Continuous <Continuous>  tamsulosin 0.4 milliGRAM(s) Oral at bedtime  ursodiol Tablet 250 milliGRAM(s) Oral every 12 hours    MEDICATIONS  (PRN):  ALPRAZolam 0.25 milliGRAM(s) Oral daily PRN anxiety      LABS:                        8.1    x     )-----------( x        ( 19 May 2021 16:36 )             26.4     Hgb Trend: 8.1<--  05-    134<L>  |  100  |  51<H>  ----------------------------<  399<H>  3.2<L>   |  21<L>  |  2.72<H>    Ca    6.9<L>      19 May 2021 18:33  Phos  3.9       Mg     1.6         TPro  4.4<L>  /  Alb  1.9<L>  /  TBili  0.3  /  DBili  x   /  AST  13  /  ALT  14  /  AlkPhos  92      Creatinine Trend: 2.72<--, 2.98<--, 1.92<--, 2.08<--, 2.23<--, 2.35<--  PT/INR - ( 19 May 2021 14:57 )   PT: 21.0 sec;   INR: 1.80 ratio         PTT - ( 19 May 2021 14:57 )  PTT:37.6 sec  Urinalysis Basic - ( 19 May 2021 15:57 )    Color: Light Yellow / Appearance: Slightly Turbid / S.009 / pH: x  Gluc: x / Ketone: Negative  / Bili: Negative / Urobili: Negative   Blood: x / Protein: Trace / Nitrite: Negative   Leuk Esterase: Large / RBC: 3 /hpf /  /HPF   Sq Epi: x / Non Sq Epi: 0 /hpf / Bacteria: Few        Venous Blood Gas:   @ 14:58  7.33/50/27//41  VBG Lactate: 1.9  Venous Blood Gas:   @ 14:48  7.36/45/40/  VBG Lactate: 1.7      MICROBIOLOGY:     RADIOLOGY & ADDITIONAL TESTS:      ASSESSMENT AND PLAN:    89 year old male with pmhx MDS, a fib on eliquis, ckd, BPH w urinary retention requiring self-catheterization, peritonitis from duodenal perforation s/p ex lap with pyloric excluding gastrojejunostomy and ethan patch () who is presenting with sepsis 2/2 UTI     #Neuro  AMS  -2/2 sepsis associated metabolic encephalopathy   -AO x 3 at baseline  -CT head no evidence of skull fracture, intracranial hemorrhage or mass effect.  -CT head no cervical spine fracture or traumatic malalignment  -Tx of sepsis as below     #CV  Hypotension  -2/2 septic shock   -c/w IVF resuscitation  -Epi gtt started in ED given bradycardia, will transition to levo       Bradycardia   -sinus abril vs a fib w slow ventricular  -patient on BB for a fib   -s/p atropine in the ED  -Epi gtt started in the ED   -EP consulted, recs pending     CHF  -BNP 2685  -TTE 59%, normal systolic function   -takes 40mg PO lasix at home  -3+ pitting edema on PE  -Offical TTE  -diuresis once BP improved     #Resp  -CXR clear lungs   -Saturating 97% on 4L NC   -Trial of intubation if needed     #Renal  MICH on CKD  -SCr 2.72 (baseline ~2)  -Urine lytes   -likely pre-renal     #GI  Peritonitis   - duodenal perforation s/p ex lap with pyloric excluding gastrojejunostomy and ethan patch   -Large abdominal wound with dehiscence on physical exam   -CT abdomen Trace ascites, decreased. Inflammatory changes in the right upper quadrant, significantly improved from prior exam  -surgery consulted, considering wash-out       #ID  Sepsis  -2/2 UTI  -s/p vanc/zosyn in the ED  -c/w vanc/zosyn  -f/u BCx and UCx  -c/w IVF    #Endo  Hyperglycemia   -BG ~ 400,   -check A1c  -ISS    #Heme  MDS  -Hematologist Dr. Drummond ([phone])  -On aranesp  -Hgb 6.9, will transfuse   -no active bleeding     #DVT  -SCDs  -full code w trial of intubation MICU Consult     CHIEF COMPLAINT: Sepsis     HPI / INTERVAL HISTORY: 89 year old male with pmhx MDS, a fib on eliquis, ckd, BPH w urinary retention requiring self-catheterization, peritonitis from duodenal perforation s/p ex lap with pyloric excluding gastrojejunostomy and ethan patch () who presents with hypotension and bradycardia from assisted-living  facility ([facility] Unable to obtain ROS from patient. In the ED, patient hypothermic to 91F, hypotensive 80's/40's and bradycardic to 30's. Given 0.5mg atropine and volume resuscitation. Epi gtt started for persistent hypotension and bradycardia.     Hospital Course:      PAST MEDICAL & SURGICAL HISTORY:  Benign Hypertension    BPH (Benign Prostatic Hypertrophy)    MDS (Myelodysplastic Syndrome)    Glaucoma    Anemia    Chronic constipation    Nocturia associated with benign prostatic hypertrophy    Osteomyelitis of arm    Choledocholithiasis    CKD (chronic kidney disease) stage 3, GFR 30-59 ml/min    S/P Cholecystectomy    S/P TURP    History of cataract extraction  right eye     Osteomyelitis  left humerous- surgery childhood age 13    Laceration of finger, left, with tendon  1976    Mohs defect of nose  &#x27;s        FAMILY HISTORY:  Family history of prostate cancer    FH: CVA (cerebrovascular accident)  father    FH: arthritis  mother        SOCIAL HISTORY:  Smoking:   Substance Use:   EtOH Use:   Marital Status:   Sexual History:   Occupation:  Recent Travel:  Country of Birth:   Advance Directives:     HOME MEDICATIONS:      Allergies    Cipro (Swelling)  Cipro (Unknown)  Levaquin (Unknown)  sulfa drugs (Rash)  sulfa drugs (Unknown)    Intolerances          REVIEW OF SYSTEMS:  Constitutional: No fevers, chills, weight loss, weight gain  HEENT: No vision problems, eye pain, nasal congestion, rhinorrhea, sore throat, dysphagia  CV: No chest pain, orthopnea, palpitations  Resp: No cough, dyspnea, wheezing, hemoptysis  GI: No nausea, vomiting, diarrhea, constipation, abdominal pain  : [ ] dysuria [ ] nocturia [ ] hematuria [ ] increased urinary frequency  Musculoskeletal: [ ] back pain [ ] myalgias [ ] arthralgias [ ] fracture  Skin: [ ] rash [ ] itch  Neurological: [ ] headache [ ] dizziness [ ] syncope [ ] weakness [ ] numbness  Psychiatric: [ ] anxiety [ ] depression  Endocrine: [ ] diabetes [ ] thyroid problem  Hematologic/Lymphatic: [ ] anemia [ ] bleeding problem  Allergic/Immunologic: [ ] itchy eyes [ ] nasal discharge [ ] hives [ ] angioedema  [ ] All other systems negative  [ x] Unable to assess ROS because patient mental status     OBJECTIVE:  ICU Vital Signs Last 24 Hrs  T(C): 32.8 (19 May 2021 14:42), Max: 32.8 (19 May 2021 14:42)  T(F): 91.1 (19 May 2021 14:42), Max: 91.1 (19 May 2021 14:42)  HR: 45 (19 May 2021 17:35) (37 - 79)  BP: 87/52 (19 May 2021 17:35) (87/52 - 101/56)  BP(mean): 71 (19 May 2021 16:00) (67 - 75)  ABP: --  ABP(mean): --  RR: 16 (19 May 2021 17:35) (16 - 19)  SpO2: 100% (19 May 2021 17:35) (85% - 100%)        CAPILLARY BLOOD GLUCOSE      POCT Blood Glucose.: 129 mg/dL (19 May 2021 14:51)          VITALS:   T(C): 32.8 (21 @ 14:42), Max: 32.8 (21 @ 14:42)  HR: 45 (21 @ 17:35) (37 - 79)  BP: 87/52 (21 @ 17:35) (87/52 - 101/56)  RR: 16 (21 @ 17:35) (16 - 19)  SpO2: 100% (21 @ 17:35) (85% - 100%)    GENERAL: lethargic elderly male   HEAD:  Atraumatic, Normocephalic  EYES: EOMI, PERRLA, conjunctiva and sclera clear  ENT: Moist mucous membranes  NECK: Supple, No JVD  CHEST/LUNG: Clear to auscultation bilaterally; No rales, rhonchi, wheezing, or rubs. Unlabored respirations  HEART: bradycardic, irregular rhythm; No murmurs, rubs, or gallops  ABDOMEN: BSx4; Soft, exquisitely tender R abdomen, nondistended, 20cm midline incision w wound dehiscence and pus at umbilicus     EXTREMITIES:  3+ pitting edema LE b/l to level of the knee   NERVOUS SYSTEM:  A&Ox1  SKIN: No rashes or lesions  : rivera catheter with cloudy urine     HOSPITAL MEDICATIONS:  MEDICATIONS  (STANDING):  apixaban 2.5 milliGRAM(s) Oral Once  brimonidine 0.2% Ophthalmic Solution 1 Drop(s) Both EYES daily  EPINEPHrine    Infusion 0.1 MICROgram(s)/kG/Min (36.2 mL/Hr) IV Continuous <Continuous>  finasteride 5 milliGRAM(s) Oral daily  latanoprost 0.005% Ophthalmic Solution 1 Drop(s) Both EYES at bedtime  magnesium sulfate  IVPB 1 Gram(s) IV Intermittent Once  pilocarpine 4% Solution 1 Drop(s) Both EYES daily  potassium chloride  10 mEq/100 mL IVPB 10 milliEquivalent(s) IV Intermittent every 1 hour  sodium chloride 0.9%. 1000 milliLiter(s) (80 mL/Hr) IV Continuous <Continuous>  tamsulosin 0.4 milliGRAM(s) Oral at bedtime  ursodiol Tablet 250 milliGRAM(s) Oral every 12 hours    MEDICATIONS  (PRN):  ALPRAZolam 0.25 milliGRAM(s) Oral daily PRN anxiety      LABS:                        8.1    x     )-----------( x        ( 19 May 2021 16:36 )             26.4     Hgb Trend: 8.1<--  05-    134<L>  |  100  |  51<H>  ----------------------------<  399<H>  3.2<L>   |  21<L>  |  2.72<H>    Ca    6.9<L>      19 May 2021 18:33  Phos  3.9       Mg     1.6         TPro  4.4<L>  /  Alb  1.9<L>  /  TBili  0.3  /  DBili  x   /  AST  13  /  ALT  14  /  AlkPhos  92      Creatinine Trend: 2.72<--, 2.98<--, 1.92<--, 2.08<--, 2.23<--, 2.35<--  PT/INR - ( 19 May 2021 14:57 )   PT: 21.0 sec;   INR: 1.80 ratio         PTT - ( 19 May 2021 14:57 )  PTT:37.6 sec  Urinalysis Basic - ( 19 May 2021 15:57 )    Color: Light Yellow / Appearance: Slightly Turbid / S.009 / pH: x  Gluc: x / Ketone: Negative  / Bili: Negative / Urobili: Negative   Blood: x / Protein: Trace / Nitrite: Negative   Leuk Esterase: Large / RBC: 3 /hpf /  /HPF   Sq Epi: x / Non Sq Epi: 0 /hpf / Bacteria: Few        Venous Blood Gas:   @ 14:58  7.33/50/27//41  VBG Lactate: 1.9  Venous Blood Gas:   @ 14:48  7.36/45/40/  VBG Lactate: 1.7      MICROBIOLOGY:     RADIOLOGY & ADDITIONAL TESTS:      ASSESSMENT AND PLAN:    89 year old male with pmhx MDS, a fib on eliquis, ckd, BPH w urinary retention requiring self-catheterization, peritonitis from duodenal perforation s/p ex lap with pyloric excluding gastrojejunostomy and ethan patch () who is presenting with sepsis 2/2 UTI     #Neuro  AMS  -2/2 sepsis associated metabolic encephalopathy   -AO x 3 at baseline  -CT head no evidence of skull fracture, intracranial hemorrhage or mass effect.  -CT head no cervical spine fracture or traumatic malalignment  -Tx of sepsis as below     #CV  Hypotension  -2/2 septic shock   -c/w IVF resuscitation  -Epi gtt started in ED given bradycardia, will transition to levo       Bradycardia   -sinus abril vs a fib w slow ventricular  -patient on BB for a fib   -s/p atropine in the ED  -Epi gtt started in the ED   -EP consulted, recs pending     CHF  -BNP 2685  -TTE 59%, normal systolic function   -takes 40mg PO lasix at home  -3+ pitting edema on PE  -Offical TTE  -diuresis once BP improved     #Resp  -CXR clear lungs   -Saturating 97% on 4L NC   -Trial of intubation if needed     #Renal  MICH on CKD  -SCr 2.72 (baseline ~2)  -Urine lytes   -likely pre-renal     #GI  Peritonitis   - duodenal perforation s/p ex lap with pyloric excluding gastrojejunostomy and ethan patch   -Large abdominal wound with dehiscence on physical exam as well profound abdominal tenderness on R side   -CT abdomen Trace ascites, decreased. Inflammatory changes in the right upper quadrant, significantly improved from prior exam  -surgery consulted, considering wash-out       #ID  Sepsis  -2/2 UTI  -s/p vanc/zosyn in the ED  -c/w vanc/zosyn  -f/u BCx and UCx  -c/w IVF    #Endo  Hyperglycemia   -BG ~ 400,   -check A1c  -ISS    #Heme  MDS  -Hematologist Dr. Drummond ([phone])  -On aranesp  -Hgb 6.9, will transfuse   -no active bleeding     #DVT  -SCDs  -full code w trial of intubation MICU Consult     CHIEF COMPLAINT: Sepsis     HPI / INTERVAL HISTORY: 89 year old male with pmhx MDS, a fib on eliquis, ckd, BPH w urinary retention requiring self-catheterization, peritonitis from duodenal perforation s/p ex lap with pyloric excluding gastrojejunostomy and ethan patch () who presents with hypotension and bradycardia from assisted-living  facility ([facility]). Unable to obtain ROS from patient. In the ED, patient hypothermic to 91F, hypotensive 80's/40's and bradycardic to 30's. Given 0.5mg atropine and volume resuscitation. Epi gtt started for persistent hypotension and bradycardia.     Hospital Course:      PAST MEDICAL & SURGICAL HISTORY:  Benign Hypertension    BPH (Benign Prostatic Hypertrophy)    MDS (Myelodysplastic Syndrome)    Glaucoma    Anemia    Chronic constipation    Nocturia associated with benign prostatic hypertrophy    Osteomyelitis of arm    Choledocholithiasis    CKD (chronic kidney disease) stage 3, GFR 30-59 ml/min    S/P Cholecystectomy    S/P TURP    History of cataract extraction  right eye     Osteomyelitis  left humerous- surgery childhood age 13    Laceration of finger, left, with tendon  1976    Mohs defect of nose  &#x27;s        FAMILY HISTORY:  Family history of prostate cancer    FH: CVA (cerebrovascular accident)  father    FH: arthritis  mother        SOCIAL HISTORY:  Smoking:   Substance Use:   EtOH Use:   Marital Status:   Sexual History:   Occupation:  Recent Travel:  Country of Birth:   Advance Directives:     HOME MEDICATIONS:      Allergies    Cipro (Swelling)  Cipro (Unknown)  Levaquin (Unknown)  sulfa drugs (Rash)  sulfa drugs (Unknown)    Intolerances          REVIEW OF SYSTEMS:  Constitutional: No fevers, chills, weight loss, weight gain  HEENT: No vision problems, eye pain, nasal congestion, rhinorrhea, sore throat, dysphagia  CV: No chest pain, orthopnea, palpitations  Resp: No cough, dyspnea, wheezing, hemoptysis  GI: No nausea, vomiting, diarrhea, constipation, abdominal pain  : [ ] dysuria [ ] nocturia [ ] hematuria [ ] increased urinary frequency  Musculoskeletal: [ ] back pain [ ] myalgias [ ] arthralgias [ ] fracture  Skin: [ ] rash [ ] itch  Neurological: [ ] headache [ ] dizziness [ ] syncope [ ] weakness [ ] numbness  Psychiatric: [ ] anxiety [ ] depression  Endocrine: [ ] diabetes [ ] thyroid problem  Hematologic/Lymphatic: [ ] anemia [ ] bleeding problem  Allergic/Immunologic: [ ] itchy eyes [ ] nasal discharge [ ] hives [ ] angioedema  [ ] All other systems negative  [ x] Unable to assess ROS because patient mental status     OBJECTIVE:  ICU Vital Signs Last 24 Hrs  T(C): 32.8 (19 May 2021 14:42), Max: 32.8 (19 May 2021 14:42)  T(F): 91.1 (19 May 2021 14:42), Max: 91.1 (19 May 2021 14:42)  HR: 45 (19 May 2021 17:35) (37 - 79)  BP: 87/52 (19 May 2021 17:35) (87/52 - 101/56)  BP(mean): 71 (19 May 2021 16:00) (67 - 75)  ABP: --  ABP(mean): --  RR: 16 (19 May 2021 17:35) (16 - 19)  SpO2: 100% (19 May 2021 17:35) (85% - 100%)        CAPILLARY BLOOD GLUCOSE      POCT Blood Glucose.: 129 mg/dL (19 May 2021 14:51)          VITALS:   T(C): 32.8 (21 @ 14:42), Max: 32.8 (21 @ 14:42)  HR: 45 (21 @ 17:35) (37 - 79)  BP: 87/52 (21 @ 17:35) (87/52 - 101/56)  RR: 16 (21 @ 17:35) (16 - 19)  SpO2: 100% (21 @ 17:35) (85% - 100%)    GENERAL: lethargic elderly male   HEAD:  Atraumatic, Normocephalic  EYES: EOMI, PERRLA, conjunctiva and sclera clear  ENT: Moist mucous membranes  NECK: Supple, No JVD  CHEST/LUNG: Clear to auscultation bilaterally; No rales, rhonchi, wheezing, or rubs. Unlabored respirations  HEART: bradycardic, irregular rhythm; No murmurs, rubs, or gallops  ABDOMEN: BSx4; Soft, exquisitely tender R abdomen, nondistended, 20cm midline incision w wound dehiscence and pus at umbilicus     EXTREMITIES:  3+ pitting edema LE b/l to level of the knee   NERVOUS SYSTEM:  A&Ox1  SKIN: No rashes or lesions  : rivera catheter with cloudy urine     HOSPITAL MEDICATIONS:  MEDICATIONS  (STANDING):  apixaban 2.5 milliGRAM(s) Oral Once  brimonidine 0.2% Ophthalmic Solution 1 Drop(s) Both EYES daily  EPINEPHrine    Infusion 0.1 MICROgram(s)/kG/Min (36.2 mL/Hr) IV Continuous <Continuous>  finasteride 5 milliGRAM(s) Oral daily  latanoprost 0.005% Ophthalmic Solution 1 Drop(s) Both EYES at bedtime  magnesium sulfate  IVPB 1 Gram(s) IV Intermittent Once  pilocarpine 4% Solution 1 Drop(s) Both EYES daily  potassium chloride  10 mEq/100 mL IVPB 10 milliEquivalent(s) IV Intermittent every 1 hour  sodium chloride 0.9%. 1000 milliLiter(s) (80 mL/Hr) IV Continuous <Continuous>  tamsulosin 0.4 milliGRAM(s) Oral at bedtime  ursodiol Tablet 250 milliGRAM(s) Oral every 12 hours    MEDICATIONS  (PRN):  ALPRAZolam 0.25 milliGRAM(s) Oral daily PRN anxiety      LABS:                        8.1    x     )-----------( x        ( 19 May 2021 16:36 )             26.4     Hgb Trend: 8.1<--  05-    134<L>  |  100  |  51<H>  ----------------------------<  399<H>  3.2<L>   |  21<L>  |  2.72<H>    Ca    6.9<L>      19 May 2021 18:33  Phos  3.9       Mg     1.6         TPro  4.4<L>  /  Alb  1.9<L>  /  TBili  0.3  /  DBili  x   /  AST  13  /  ALT  14  /  AlkPhos  92      Creatinine Trend: 2.72<--, 2.98<--, 1.92<--, 2.08<--, 2.23<--, 2.35<--  PT/INR - ( 19 May 2021 14:57 )   PT: 21.0 sec;   INR: 1.80 ratio         PTT - ( 19 May 2021 14:57 )  PTT:37.6 sec  Urinalysis Basic - ( 19 May 2021 15:57 )    Color: Light Yellow / Appearance: Slightly Turbid / S.009 / pH: x  Gluc: x / Ketone: Negative  / Bili: Negative / Urobili: Negative   Blood: x / Protein: Trace / Nitrite: Negative   Leuk Esterase: Large / RBC: 3 /hpf /  /HPF   Sq Epi: x / Non Sq Epi: 0 /hpf / Bacteria: Few        Venous Blood Gas:   @ 14:58  7.33/50/27//41  VBG Lactate: 1.9  Venous Blood Gas:   @ 14:48  7.36/45/40/  VBG Lactate: 1.7      MICROBIOLOGY:     RADIOLOGY & ADDITIONAL TESTS:      ASSESSMENT AND PLAN:    89 year old male with pmhx MDS, a fib on eliquis, ckd, BPH w urinary retention requiring self-catheterization, peritonitis from duodenal perforation s/p ex lap with pyloric excluding gastrojejunostomy and ethan patch () who is presenting with sepsis 2/2 UTI     #Neuro  AMS  -2/2 sepsis associated metabolic encephalopathy   -AO x 3 at baseline  -CT head no evidence of skull fracture, intracranial hemorrhage or mass effect.  -CT head no cervical spine fracture or traumatic malalignment  -Tx of sepsis as below     #CV  Hypotension  -2/2 septic shock   -c/w IVF resuscitation  -Epi gtt started in ED given bradycardia, will transition to levo       Bradycardia   -sinus abril vs a fib w slow ventricular  -patient on BB for a fib   -s/p atropine in the ED  -Epi gtt started in the ED   -EP consulted, recs pending     CHF  -BNP 2685  -TTE 59%, normal systolic function   -takes 40mg PO lasix at home  -3+ pitting edema on PE  -Offical TTE  -diuresis once BP improved     #Resp  -CXR clear lungs   -Saturating 97% on 4L NC   -Trial of intubation if needed     #Renal  [facility] on CKD  -SCr 2.72 (baseline ~2)  -Urine lytes   -likely pre-renal     #GI  Peritonitis   - duodenal perforation s/p ex lap with pyloric excluding gastrojejunostomy and ethan patch   -Large abdominal wound with dehiscence on physical exam as well profound abdominal tenderness on R side   -CT abdomen Trace ascites, decreased. Inflammatory changes in the right upper quadrant, significantly improved from prior exam  -surgery consulted, considering wash-out       #ID  Sepsis  -2/2 UTI  -s/p vanc/zosyn in the ED  -c/w vanc/zosyn  -f/u BCx and UCx  -c/w IVF    #Endo  Hyperglycemia   -BG ~ 400,   -check A1c  -ISS    #Heme  MDS  -Hematologist Dr. Drummond ([phone])  -On aranesp  -Hgb 6.9, will transfuse   -no active bleeding     #DVT  -SCDs  -full code w trial of intubation    ---------attending attestation--------------    89 year old male with pmhx MDS, a fib on eliquis, ckd, BPH w urinary retention requiring self-catheterization, peritonitis from duodenal perforation s/p ex lap with pyloric excluding gastrojejunostomy and ethan patch () who is presenting with sepsis 2/2 UTI vs intrabdominal infection.  Initially severe sepsis with high pressor requirments rapidly improving suggesting more likely UTI source.   MICH with ATN but good urine outputs, FIB with variable response first refractory abril then RVR.    Overall 89 yo man with recent laparotomy for perfed ulcer here with severe sepsis and septic shock with UTI, hypoxic respiratory failure, MICH/ATN and encephalopathy.    Improving but overall prognosis remains poor given asage and recent history.    Aprecaite surgery eval and agree that given very small collection and rapidly improving course overall picture does not fit acute abdomen though pt continues to have RUQ tenderness.   45 min critcal care time spent  Santos Mulligan MD   Pulmonary and Critical Care Attending  [phone]

## 2021-05-19 NOTE — ED ADULT NURSE REASSESSMENT NOTE - NS ED NURSE REASSESS COMMENT FT1
as per MD L'martine levo turned off at this time. patient is normotensive and phenylephrine still going at this time.

## 2021-05-19 NOTE — ED PROVIDER NOTE - OBJECTIVE STATEMENT
89y M MDS, CKD, Afib on eliquis, BPH with intermittent self-catheterization, CHF, presents to ED BIBEMS from assisted living after he was found to be hypotensive and bradycardiac.  was given some IV fluids but did not improve so they called 911.  Unknown to us and not responding however know o Dr Roche who was in the ER, reporting the pt is normally verbal and this is a deviation from his baseline.

## 2021-05-19 NOTE — PROGRESS NOTE ADULT - SUBJECTIVE AND OBJECTIVE BOX
Surgery Attending  Called to evaluate Mr. Sifuentes as he is well known to our service after a recent episode of perforated duodenal  ulcer s/p ex lap, gastrojejunostomy and pyloric exclusion on 4/20. The patient clinical status has improved and was sent to rehab. He returns to the ED with septic shock and called to evaluate his abdomen. On exam his abdomen is soft, nontender and nondistended. He has a small opening of the wound still superior to the umbilicus with no signs of bilious drainage. There is a small portion of green color on the gauge of unclear etiology( ? pseudomonas in wound) but I do not believe is consistent with bile. THe dressing was changed and will be reexamined at a later point. On CT scan done without any IV or PO contrast there is trace ascites, less than prior admission and improvement in inflammation compared to prior admission. There is low suspicion for bile leak due to benign abdominal exam and improvement in inflammation on CT scan as well as no intraabdominal fluid. TB also within normal limits supporting low suspicion for bilious leak.   WIll plan to reexamine abdomen with serial exams. UA does appear to be consistent with UTI as  and positive LE with few bacteria.   If continued suspicion would consider rescanning with PO contrast if hemodynamically improved.

## 2021-05-19 NOTE — H&P ADULT - NSHPLABSRESULTS_GEN_ALL_CORE
LABS:                        8.1    x     )-----------( x        ( 19 May 2021 16:36 )             26.4         146<H>  |  109<H>  |  55<H>  ----------------------------<  113<H>  3.7   |  20<L>  |  2.98<H>    Ca    7.7<L>      19 May 2021 16:35  Phos  3.9       Mg     1.6         TPro  5.3<L>  /  Alb  2.4<L>  /  TBili  0.3  /  DBili  x   /  AST  19  /  ALT  17  /  AlkPhos  110      PT/INR - ( 19 May 2021 14:57 )   PT: 21.0 sec;   INR: 1.80 ratio         PTT - ( 19 May 2021 14:57 )  PTT:37.6 sec  CARDIAC MARKERS ( 19 May 2021 16:35 )  x     / x     / 41 U/L / x     / 5.0 ng/mL      Urinalysis Basic - ( 19 May 2021 15:57 )    Color: Light Yellow / Appearance: Slightly Turbid / S.009 / pH: x  Gluc: x / Ketone: Negative  / Bili: Negative / Urobili: Negative   Blood: x / Protein: Trace / Nitrite: Negative   Leuk Esterase: Large / RBC: 3 /hpf /  /HPF   Sq Epi: x / Non Sq Epi: 0 /hpf / Bacteria: Few           @ 14:58  3.7  27      Thyroid Stimulating Hormone, Serum: 2.65 uIU/mL (04-10 @ 03:25)

## 2021-05-19 NOTE — H&P ADULT - HISTORY OF PRESENT ILLNESS
89y M    h/o   MDS, CKD, Afib on eliquis, BPH with intermittent self-catheterization, CHF     , presents to ED BIBEMS from assisted living  by  dr blanquita lincoln,  after he was found to be hypotensive and bradycardic, .  was given some IV fluids but did not improve so they called 911.       reporting the pt is normally verbal and this is a deviation from his baseline.        89y M    h/o   MDS, CKD, Afib on eliquis, BPH with intermittent self-catheterization, CHF     , presents to ED BIBEMS from assisted living  by  dr blanquita lincoln,  after he was found to be hypotensive and bradycardic,      .  was given some IV fluids but did not improve so they called 911.       reporting the pt is normally verbal and this is a deviation from his baseline.      still   hypotensive in er    89y M    h/o   MDS, CKD, Afib on eliquis, BPH with intermittent self-catheterization, CHF    perforation/ ethan patch on 4/21     , presents to ED BIBEMS from assisted living  by  dr blanquita lincoln,  after he was found to be hypotensive and bradycardic,      .  was given some IV fluids but did not improve so they called 911.       reporting the pt is normally verbal and this is a deviation from his baseline.      still   hypotensive in er

## 2021-05-19 NOTE — CONSULT NOTE ADULT - SUBJECTIVE AND OBJECTIVE BOX
CC: 89y old Male admitted with a chief complaint of bradycardia, now in septic shock     HPI:  89y M MDS, CKD, Afib on eliquis, BPH with intermittent self-catheterization, CHF, presents to ED BIBEMS from sent in from rehab found to be hypotensive and bradycardiac.   Patient recently discharged after found to have perforated duodenal ulcer was taken to OR emergently for ex-lap, gastrojejunostomy with duodenal exclusion, ethan patch and umbilical hernia repair. Patient hypothermic in ED and hypotensive. Labs revealing leukocytosis. UA positive. CT scan peformed non-con without significant findings, no evidence of collection or leak; however, not optimal study since done without contrast. Started on manfred in ED.     PMHx: Benign Hypertension    BPH (Benign Prostatic Hypertrophy)    MDS (Myelodysplastic Syndrome)    Glaucoma    Anemia    Chronic constipation    Gall stones    Nocturia associated with benign prostatic hypertrophy    Osteomyelitis of arm    Choledocholithiasis    CKD (chronic kidney disease) stage 3, GFR 30-59 ml/min    BPH (benign prostatic hyperplasia)    MDS (myelodysplastic syndrome)    Anemia    Glaucoma      PSHx: S/P Cholecystectomy    S/P TURP    S/P Hernia Surgery    History of cataract extraction    Osteomyelitis    Laceration of finger, left, with tendon    Mohs defect of nose    No significant past surgical history      Medications (inpatient): atropine Injectable 1 milliGRAM(s) IV Push once  brimonidine 0.2% Ophthalmic Solution 1 Drop(s) Both EYES daily  latanoprost 0.005% Ophthalmic Solution 1 Drop(s) Both EYES at bedtime  norepinephrine Infusion 0.05 MICROgram(s)/kG/Min IV Continuous <Continuous>  pilocarpine 4% Solution 1 Drop(s) Both EYES daily  potassium chloride  10 mEq/100 mL IVPB 10 milliEquivalent(s) IV Intermittent every 1 hour  sodium chloride 0.9%. 1000 milliLiter(s) IV Continuous <Continuous>    Medications (PRN):  Allergies: Cipro (Swelling)  Cipro (Unknown)  Levaquin (Unknown)  sulfa drugs (Rash)  sulfa drugs (Unknown)  (Intolerances: )  Social Hx:   Family Hx: Family history of prostate cancer    FH: CVA (cerebrovascular accident)  father    FH: arthritis  mother        Physical Exam  T(C): 36.1  HR: 62 (37 - 79)  BP: 100/65 (87/52 - 103/99)  RR: 16 (16 - 19)  SpO2: 100% (85% - 100%)  Tmax: T(C): , Max: 36.1 (21 @ 21:31)    General: well developed, well nourished, NAD  Neuro: alert and oriented, no focal deficits, moves all extremities spontaneously  HEENT: NCAT, EOMI, anicteric, mucosa moist  Respiratory: airway patent, respirations unlabored  CVS: regular rate and rhythm  Abdomen: soft, mild TTP along incision, nondistended, mild green staining on dressing   Extremities: no edema, sensation and movement grossly intact  Skin: warm, dry, appropriate color    Labs:                        8.7    14.92 )-----------( 91       ( 19 May 2021 19:50 )             28.3     PT/INR - ( 19 May 2021 14:57 )   PT: 21.0 sec;   INR: 1.80 ratio         PTT - ( 19 May 2021 14:57 )  PTT:37.6 sec      131<L>  |  105  |  53<H>  ----------------------------<  144<H>  >9.0<HH>   |  18<L>  |  2.54<H>    Ca    7.1<L>      19 May 2021 19:50  Phos  3.9       Mg     1.6         TPro  4.4<L>  /  Alb  1.9<L>  /  TBili  0.3  /  DBili  x   /  AST  13  /  ALT  14  /  AlkPhos  92      Urinalysis Basic - ( 19 May 2021 15:57 )    Color: Light Yellow / Appearance: Slightly Turbid / S.009 / pH: x  Gluc: x / Ketone: Negative  / Bili: Negative / Urobili: Negative   Blood: x / Protein: Trace / Nitrite: Negative   Leuk Esterase: Large / RBC: 3 /hpf /  /HPF   Sq Epi: x / Non Sq Epi: 0 /hpf / Bacteria: Few            Imaging and other studies:  < from: CT Abdomen and Pelvis No Cont (21 @ 15:11) >  ABDOMEN AND PELVIS:  Limited evaluation of the abdominal and pelvic viscera due to lack of oral and IV contrast.    LIVER: Stable 1.4 cm cyst at the dome.  BILE DUCTS: Normal caliber.  GALLBLADDER: Cholecystectomy.  SPLEEN: Within normal limits.  PANCREAS: Multiple pancreatic cystic lesions, measuring up to 2.9 cm in the tail, unchanged  ADRENALS: Within normal limits.  KIDNEYS/URETERS: No hydronephrosis. Unchanged bilateral hypodense and hyperdense cystic lesions, incompletely characterized without intravenous contrast.    BLADDER: Within normal limits.  REPRODUCTIVE ORGANS: Prostate within normal limits.    BOWEL: Interval gastrojejunostomy. No bowel obstruction. Appendix is normal. Colonic diverticulosis.  PERITONEUM: Trace ascites, decreased. Inflammatory changes in the right upper quadrant, significantly improved from prior exam.  VESSELS: Atherosclerotic changes.  RETROPERITONEUM/LYMPH NODES: No lymphadenopathy.  ABDOMINAL WALL: Postsurgical changes.  BONES: Degenerative changes.    IMPRESSION:  Status post interval gastrojejunostomy. Inflammation in the right upper quadrant significantly improved from prior exam.    Small bilateral pleural effusions.

## 2021-05-19 NOTE — ED ADULT NURSE NOTE - OBJECTIVE STATEMENT
89y Male AOx1 with PMH of CKD, afib, CHF, BIBA, hard of hearing from nursing home to the ED for hypotension. Per EMS, the nurses at the facility took his VS today and found SBP in 80s and bradycardic. Currently, pt abril in the 30s. Pt placed on cardiac monitor, placed on pacer pads, and given atropine. Pt HR improved to 40s. Denies N/V, fever/chills, SOB, chest pain. Dr Roche at bedside, states pt has AMS. Spontaneous/unlabored respirations, systemic edema noted. Side rails up, bed in lowest position, safety maintained.

## 2021-05-19 NOTE — ED PROVIDER NOTE - PROGRESS NOTE DETAILS
Attending Shawna Patel: pt on pads. found ot be sig hypothermic. covered with broad spectrum abx. per report pt normally aaox2 ct head grossly without large hemorrhage. pt on bear hugger. will give IVF slowly as IVC plethoric upon arrival. a line predominant lung fields Spoke with pt PCP Dr Rocky Lemus, explained the case, he is familiar with the patient, if the ICU does not want to take him this evening can admit under his service.  [place] Attending Shawna Patel: pt persistently bradycardic. now beocming more hypotensive. concern for possible BRASH syndrome. per rerport  may be on beta blocks. potassium stable. will likely need ICU Attending Shawna Patel: pt becoming more hypotensive. and bradycardic, mqy require pacing epi gtt Attending Shawna Patel: on repeat evaluation pt became more tachycardic, is on epi gtt. will turn off gtt and monitor BP awaiting MICU Lizeth Evans MD PGY-3 pt signed out to me, hypotensive, bradycardic, hypothermic. On ept gtt, patient became tachcyardic - afib with RVR. Shut off epi with improvement in HR to 70s to 80s. Pending MICU  TSH pending. Lizeth Evans MD PGY-3 patient seen by surgery, spoke with resident Faith. dr. pardo would like to keep patient in micu to optimize/resuscitate from a possible urosepsis standpoint, possibly place NGT and use for PO contrast

## 2021-05-19 NOTE — ED ADULT NURSE REASSESSMENT NOTE - NS ED NURSE REASSESS COMMENT FT1
Pt remained hypotensive on epi drip, per MD SCOTTY Patel pt maxed on epi, pt then became hypertensive and tachycardic. Epi drip d/c, MD requesting to monitor pt.   Pt again hypotensive. MD requesting amio/phenylepherine drip. Will administer per MAR.

## 2021-05-19 NOTE — ED ADULT NURSE NOTE - NSIMPLEMENTINTERV_GEN_ALL_ED
Implemented All Fall with Harm Risk Interventions:  [place] to call system. Call bell, personal items and telephone within reach. Instruct patient to call for assistance. Room bathroom lighting operational. Non-slip footwear when patient is off stretcher. Physically safe environment: no spills, clutter or unnecessary equipment. Stretcher in lowest position, wheels locked, appropriate side rails in place. Provide visual cue, wrist band, yellow gown, etc. Monitor gait and stability. Monitor for mental status changes and reorient to person, place, and time. Review medications for side effects contributing to fall risk. Reinforce activity limits and safety measures with patient and family. Provide visual clues: red socks.

## 2021-05-19 NOTE — CONSULT NOTE ADULT - SUBJECTIVE AND OBJECTIVE BOX
CHIEF COMPLAINT:Patient is a 89y old  Male who presents with a chief complaint of     HPI:  89y M MDS, CKD, Afib on eliquis, BPH with intermittent self-catheterization, CHF, presents to ED [facility] from assisted living after he was found to be hypotensive and bradycardiac.  was given some IV fluids but did not improve so they called 911.   pt is well known to me with last admission , pmd Dr Lemus asked me to see pt.    PAST MEDICAL & SURGICAL HISTORY:  Benign Hypertension    BPH (Benign Prostatic Hypertrophy)    MDS (Myelodysplastic Syndrome)    Glaucoma    Anemia    Chronic constipation    Nocturia associated with benign prostatic hypertrophy    Osteomyelitis of arm    Choledocholithiasis    CKD (chronic kidney disease) stage 3, GFR 30-59 ml/min    S/P Cholecystectomy    S/P TURP    History of cataract extraction  right eye 6/11    Osteomyelitis  left humerous- surgery childhood age 13    Laceration of finger, left, with tendon  1976    Mohs defect of nose  1990&#x27;s        MEDICATIONS  (STANDING):  EPINEPHrine    Infusion 0.1 MICROgram(s)/kG/Min (36.2 mL/Hr) IV Continuous <Continuous>    MEDICATIONS  (PRN):      FAMILY HISTORY:  Family history of prostate cancer    FH: CVA (cerebrovascular accident)  father    FH: arthritis  mother        SOCIAL HISTORY:    [ ] Non-smoker  [ ] Smoker  [ ] Alcohol    Allergies    Cipro (Swelling)  Cipro (Unknown)  Levaquin (Unknown)  sulfa drugs (Rash)  sulfa drugs (Unknown)    Intolerances    	    REVIEW OF SYSTEMS:  CONSTITUTIONAL: No fever, weight loss, or fatigue  EYES: No eye pain, visual disturbances, or discharge  ENT:  No difficulty hearing, tinnitus, vertigo; No sinus or throat pain  NECK: No pain or stiffness  RESPIRATORY: No cough, wheezing, chills or hemoptysis; No Shortness of Breath  CARDIOVASCULAR: No chest pain, palpitations, passing out, dizziness, or leg swelling  GASTROINTESTINAL: No abdominal or epigastric pain. No nausea, vomiting, or hematemesis; No diarrhea or constipation. No melena or hematochezia.  GENITOURINARY: No dysuria, frequency, hematuria, or incontinence  NEUROLOGICAL: No headaches, memory loss, loss of strength, numbness, or tremors  SKIN: No itching, burning, rashes, or lesions   LYMPH Nodes: No enlarged glands  ENDOCRINE: No heat or cold intolerance; No hair loss  MUSCULOSKELETAL: No joint pain or swelling; No muscle, back, or extremity pain  PSYCHIATRIC: No depression, anxiety, mood swings, or difficulty sleeping  HEME/LYMPH: No easy bruising, or bleeding gums  ALLERGY AND IMMUNOLOGIC: No hives or eczema	    [ ] All others negative	  [x ] Unable to obtain    PHYSICAL EXAM:  T(C): 32.8 (05-19-21 @ 14:42), Max: 32.8 (05-19-21 @ 14:42)  HR: 45 (05-19-21 @ 17:35) (37 - 45)  BP: 87/52 (05-19-21 @ 17:35) (87/52 - 98/65)  RR: 16 (05-19-21 @ 17:35) (16 - 18)  SpO2: 100% (05-19-21 @ 17:35) (85% - 100%)  Wt(kg): --  I&O's Summary      Appearance: Normal	  HEENT:   Normal oral mucosa, PERRL, EOMI	  Lymphatic: No lymphadenopathy  Cardiovascular: Normal S1 S2, No JVD, + murmurs, No edema  Respiratory: Lungs clear to auscultation	  Psychiatry: A & O x 3, Mood & affect appropriate  Gastrointestinal:  Soft, Non-tender, + BS	  Skin: No rashes, No ecchymoses, No cyanosis	  Neurologic: Non-focal  Extremities: Normal range of motion, No clubbing, cyanosis or edema  Vascular: Peripheral pulses palpable 2+ bilaterally    TELEMETRY: 	    ECG:  	  RADIOLOGY:  OTHER: 	  	  LABS:	 	    CARDIAC MARKERS:  CARDIAC MARKERS ( 19 May 2021 16:35 )  x     / x     / 41 U/L / x     / 5.0 ng/mL                              8.1    x     )-----------( x        ( 19 May 2021 16:36 )             26.4     05-19    146<H>  |  109<H>  |  55<H>  ----------------------------<  113<H>  3.7   |  20<L>  |  2.98<H>    Ca    7.7<L>      19 May 2021 16:35  Phos  3.9     05-19  Mg     1.6     05-19    TPro  5.3<L>  /  Alb  2.4<L>  /  TBili  0.3  /  DBili  x   /  AST  19  /  ALT  17  /  AlkPhos  110  05-19    proBNP:   Lipid Profile:   HgA1c:   TSH:   PT/INR - ( 19 May 2021 14:57 )   PT: 21.0 sec;   INR: 1.80 ratio         PTT - ( 19 May 2021 14:57 )  PTT:37.6 sec    PREVIOUS DIAGNOSTIC TESTING:    < from: 12 Lead ECG (04.21.21 @ 16:42) >  Diagnosis Line ATRIAL FIBRILLATION  LEFT AXIS DEVIATION  RIGHT BUNDLE BRANCH BLOCK  INFERIOR INFARCT , AGE UNDETERMINED  ANTEROLATERAL INFARCT , AGE UNDETERMINED  ABNORMAL ECG  WHEN COMPARED WITH ECG OF `4/20/21 12:58  NO SIGNIFICANT CHANGE WAS FOUND  < from: Xray Chest 1 View- PORTABLE-Urgent (Xray Chest 1 View- PORTABLE-Urgent .) (05.19.21 @ 16:36) >  INTERPRETATION:  clear lungs                     CHIEF COMPLAINT:Patient is a 89y old  Male who presents with a chief complaint of     HPI:  89y M MDS, CKD, Afib on eliquis, BPH with intermittent self-catheterization, CHF, presents to ED [facility] from assisted living after he was found to be hypotensive and bradycardiac.  was given some IV fluids but did not improve so they called 911.   pt is well known to me with last admission , pmd Dr Lemus asked me to see pt.    PAST MEDICAL & SURGICAL HISTORY:  Benign Hypertension    BPH (Benign Prostatic Hypertrophy)    MDS (Myelodysplastic Syndrome)    Glaucoma    Anemia    Chronic constipation    Nocturia associated with benign prostatic hypertrophy    Osteomyelitis of arm    Choledocholithiasis    CKD (chronic kidney disease) stage 3, GFR 30-59 ml/min    S/P Cholecystectomy    S/P TURP    History of cataract extraction  right eye 6/11    Osteomyelitis  left humerous- surgery childhood age 13    Laceration of finger, left, with tendon  1976    Mohs defect of nose  1990&#x27;s        MEDICATIONS  (STANDING):  EPINEPHrine    Infusion 0.1 MICROgram(s)/kG/Min (36.2 mL/Hr) IV Continuous <Continuous>    MEDICATIONS  (PRN):      FAMILY HISTORY:  Family history of prostate cancer    FH: CVA (cerebrovascular accident)  father    FH: arthritis  mother        SOCIAL HISTORY:    [ ] Non-smoker  [ ] Smoker  [ ] Alcohol    Allergies    Cipro (Swelling)  Cipro (Unknown)  Levaquin (Unknown)  sulfa drugs (Rash)  sulfa drugs (Unknown)    Intolerances    	    REVIEW OF SYSTEMS:  CONSTITUTIONAL: No fever, weight loss, or fatigue  EYES: No eye pain, visual disturbances, or discharge  ENT:  No difficulty hearing, tinnitus, vertigo; No sinus or throat pain  NECK: No pain or stiffness  RESPIRATORY: No cough, wheezing, chills or hemoptysis; No Shortness of Breath  CARDIOVASCULAR: No chest pain, palpitations, passing out, dizziness, or leg swelling  GASTROINTESTINAL: No abdominal or epigastric pain. No nausea, vomiting, or hematemesis; No diarrhea or constipation. No melena or hematochezia.  GENITOURINARY: No dysuria, frequency, hematuria, or incontinence  NEUROLOGICAL: No headaches, memory loss, loss of strength, numbness, or tremors  SKIN: No itching, burning, rashes, or lesions   LYMPH Nodes: No enlarged glands  ENDOCRINE: No heat or cold intolerance; No hair loss  MUSCULOSKELETAL: No joint pain or swelling; No muscle, back, or extremity pain  PSYCHIATRIC: No depression, anxiety, mood swings, or difficulty sleeping  HEME/LYMPH: No easy bruising, or bleeding gums  ALLERGY AND IMMUNOLOGIC: No hives or eczema	    [ ] All others negative	  [x ] Unable to obtain    PHYSICAL EXAM:  T(C): 32.8 (05-19-21 @ 14:42), Max: 32.8 (05-19-21 @ 14:42)  HR: 45 (05-19-21 @ 17:35) (37 - 45)  BP: 87/52 (05-19-21 @ 17:35) (87/52 - 98/65)  RR: 16 (05-19-21 @ 17:35) (16 - 18)  SpO2: 100% (05-19-21 @ 17:35) (85% - 100%)  Wt(kg): --  I&O's Summary      Appearance: obtunded  HEENT:   Normal oral mucosa, PERRL, EOMI	  Lymphatic: No lymphadenopathy  Cardiovascular: Normal S1 S2, No JVD, + murmurs, +edema  Respiratory: Lungs clear to auscultation	  Gastrointestinal:  Soft, Non-tender, + BS	  Skin: No rashes, No ecchymoses, No cyanosis	  Neurologic: Non-focal  Extremities: Normal range of motion, No clubbing, cyanosis . +edema  Vascular: Peripheral pulses palpable 2+ bilaterally    TELEMETRY: 	    ECG:  	  RADIOLOGY:  OTHER: 	  	  LABS:	 	    CARDIAC MARKERS:  CARDIAC MARKERS ( 19 May 2021 16:35 )  x     / x     / 41 U/L / x     / 5.0 ng/mL                              8.1    x     )-----------( x        ( 19 May 2021 16:36 )             26.4     05-19    146<H>  |  109<H>  |  55<H>  ----------------------------<  113<H>  3.7   |  20<L>  |  2.98<H>    Ca    7.7<L>      19 May 2021 16:35  Phos  3.9     05-19  Mg     1.6     05-19    TPro  5.3<L>  /  Alb  2.4<L>  /  TBili  0.3  /  DBili  x   /  AST  19  /  ALT  17  /  AlkPhos  110  05-19    proBNP:   Lipid Profile:   HgA1c:   TSH:   PT/INR - ( 19 May 2021 14:57 )   PT: 21.0 sec;   INR: 1.80 ratio         PTT - ( 19 May 2021 14:57 )  PTT:37.6 sec    PREVIOUS DIAGNOSTIC TESTING:    < from: 12 Lead ECG (04.21.21 @ 16:42) >  Diagnosis Line ATRIAL FIBRILLATION  LEFT AXIS DEVIATION  RIGHT BUNDLE BRANCH BLOCK  INFERIOR INFARCT , AGE UNDETERMINED  ANTEROLATERAL INFARCT , AGE UNDETERMINED  ABNORMAL ECG  WHEN COMPARED WITH ECG OF `4/20/21 12:58  NO SIGNIFICANT CHANGE WAS FOUND  < from: Xray Chest 1 View- PORTABLE-Urgent (Xray Chest 1 View- PORTABLE-Urgent .) (05.19.21 @ 16:36) >  INTERPRETATION:  clear lungs                     CHIEF COMPLAINT:Patient is a 89y old  Male who presents with a chief complaint of     HPI:  89y M MDS, CKD, Afib on eliquis, BPH with intermittent self-catheterization, CHF, presents to ED [facility] from assisted living after he was found to be hypotensive and bradycardiac.  was given some IV fluids but did not improve so they called 911.   pt is well known to me with last admission , pmd Dr Lemus asked me to see pt.    PAST MEDICAL & SURGICAL HISTORY:  Benign Hypertension    BPH (Benign Prostatic Hypertrophy)    MDS (Myelodysplastic Syndrome)    Glaucoma    Anemia    Chronic constipation    Nocturia associated with benign prostatic hypertrophy    Osteomyelitis of arm    Choledocholithiasis    CKD (chronic kidney disease) stage 3, GFR 30-59 ml/min    S/P Cholecystectomy    S/P TURP    History of cataract extraction  right eye 6/11    Osteomyelitis  left humerous- surgery childhood age 13    Laceration of finger, left, with tendon  1976    Mohs defect of nose  1990&#x27;s        MEDICATIONS  (STANDING):  EPINEPHrine    Infusion 0.1 MICROgram(s)/kG/Min (36.2 mL/Hr) IV Continuous <Continuous>    MEDICATIONS  (PRN):      FAMILY HISTORY:  Family history of prostate cancer    FH: CVA (cerebrovascular accident)  father    FH: arthritis  mother        SOCIAL HISTORY:    [ ] Non-smoker  [ ] Smoker  [ ] Alcohol    Allergies    Cipro (Swelling)  Cipro (Unknown)  Levaquin (Unknown)  sulfa drugs (Rash)  sulfa drugs (Unknown)    Intolerances    	    REVIEW OF SYSTEMS:  CONSTITUTIONAL: No fever, weight loss, or fatigue  EYES: No eye pain, visual disturbances, or discharge  ENT:  No difficulty hearing, tinnitus, vertigo; No sinus or throat pain  NECK: No pain or stiffness  RESPIRATORY: No cough, wheezing, chills or hemoptysis; No Shortness of Breath  CARDIOVASCULAR: No chest pain, palpitations, passing out, dizziness, or leg swelling  GASTROINTESTINAL: No abdominal or epigastric pain. No nausea, vomiting, or hematemesis; No diarrhea or constipation. No melena or hematochezia.  GENITOURINARY: No dysuria, frequency, hematuria, or incontinence  NEUROLOGICAL: No headaches, memory loss, loss of strength, numbness, or tremors  SKIN: No itching, burning, rashes, or lesions   LYMPH Nodes: No enlarged glands  ENDOCRINE: No heat or cold intolerance; No hair loss  MUSCULOSKELETAL: No joint pain or swelling; No muscle, back, or extremity pain  PSYCHIATRIC: No depression, anxiety, mood swings, or difficulty sleeping  HEME/LYMPH: No easy bruising, or bleeding gums  ALLERGY AND IMMUNOLOGIC: No hives or eczema	    [ ] All others negative	  [x ] Unable to obtain    PHYSICAL EXAM:  T(C): 32.8 (05-19-21 @ 14:42), Max: 32.8 (05-19-21 @ 14:42)  HR: 45 (05-19-21 @ 17:35) (37 - 45)  BP: 87/52 (05-19-21 @ 17:35) (87/52 - 98/65)  RR: 16 (05-19-21 @ 17:35) (16 - 18)  SpO2: 100% (05-19-21 @ 17:35) (85% - 100%)  Wt(kg): --  I&O's Summary      Appearance: obtunded  HEENT:   Normal oral mucosa, PERRL, EOMI	  Lymphatic: No lymphadenopathy  Cardiovascular: Normal S1 S2, No JVD, + murmurs, +edema  Respiratory: Lungs clear to auscultation	  Gastrointestinal:  Soft, mildly distended  Skin: No rashes, No ecchymoses, No cyanosis	  Neurologic: Non-focal  Extremities: Normal range of motion, No clubbing, cyanosis . +edema  Vascular: Peripheral pulses palpable 2+ bilaterally    TELEMETRY: 	    ECG:  	  RADIOLOGY:  OTHER: 	  	  LABS:	 	    CARDIAC MARKERS:  CARDIAC MARKERS ( 19 May 2021 16:35 )  x     / x     / 41 U/L / x     / 5.0 ng/mL                              8.1    x     )-----------( x        ( 19 May 2021 16:36 )             26.4     05-19    146<H>  |  109<H>  |  55<H>  ----------------------------<  113<H>  3.7   |  20<L>  |  2.98<H>    Ca    7.7<L>      19 May 2021 16:35  Phos  3.9     05-19  Mg     1.6     05-19    TPro  5.3<L>  /  Alb  2.4<L>  /  TBili  0.3  /  DBili  x   /  AST  19  /  ALT  17  /  AlkPhos  110  05-19    proBNP:   Lipid Profile:   HgA1c:   TSH:   PT/INR - ( 19 May 2021 14:57 )   PT: 21.0 sec;   INR: 1.80 ratio         PTT - ( 19 May 2021 14:57 )  PTT:37.6 sec    PREVIOUS DIAGNOSTIC TESTING:    < from: 12 Lead ECG (04.21.21 @ 16:42) >  Diagnosis Line ATRIAL FIBRILLATION  LEFT AXIS DEVIATION  RIGHT BUNDLE BRANCH BLOCK  INFERIOR INFARCT , AGE UNDETERMINED  ANTEROLATERAL INFARCT , AGE UNDETERMINED  ABNORMAL ECG  WHEN COMPARED WITH ECG OF `4/20/21 12:58  NO SIGNIFICANT CHANGE WAS FOUND  < from: Xray Chest 1 View- PORTABLE-Urgent (Xray Chest 1 View- PORTABLE-Urgent .) (05.19.21 @ 16:36) >  INTERPRETATION:  clear lungs

## 2021-05-19 NOTE — ED PROVIDER NOTE - ATTENDING CONTRIBUTION TO CARE
Attending MD Shawna Patel:  I personally have seen and examined this patient.  Resident note reviewed and agree on plan of care and except where noted.  See HPI, PE, and MDM for details.

## 2021-05-19 NOTE — CONSULT NOTE ADULT - ASSESSMENT
89y/o s/p recent grahman patch and pyloric exclusion presenting in septic shock     - low suspicion for surgical pathology - as pt. without signs of peritonitis and CT scan without evidence of collection   - UA positive possible urosepsis   - serial abdominal exams   - redressed abdomina will rexamine for any further staining on gauze   - if pt without improvement can consider repeat CT scan with PO contrast     Patient seen an examined with attending Dr. Rich   [phone]

## 2021-05-19 NOTE — H&P ADULT - NSHPPHYSICALEXAM_GEN_ALL_CORE
PHYSICAL EXAMINATION:  Vital Signs Last 24 Hrs  T(C): 32.8 (19 May 2021 14:42), Max: 32.8 (19 May 2021 14:42)  T(F): 91.1 (19 May 2021 14:42), Max: 91.1 (19 May 2021 14:42)  HR: 45 (19 May 2021 17:35) (37 - 45)  BP: 87/52 (19 May 2021 17:35) (87/52 - 98/65)  BP(mean): 67 (19 May 2021 14:45) (67 - 75)  RR: 16 (19 May 2021 17:35) (16 - 18)  SpO2: 100% (19 May 2021 17:35) (85% - 100%)  CAPILLARY BLOOD GLUCOSE      POCT Blood Glucose.: 129 mg/dL (19 May 2021 14:51)        GENERAL: NAD, well-groomed,  HEAD:  atraumatic, normocephalic  EYES: sclera anicteric  ENMT: mucous membranes moist  NECK: supple, No JVD  CHEST/LUNG: clear to auscultation bilaterally;    no      rales   ,   no rhonchi,   HEART: normal S1, S2  ABDOMEN: BS+, soft, ND, NT   EXTREMITIES:    no    edema    b/l LEs  NEURO: awake, ,     moves all extremities  SKIN: no     rash PHYSICAL EXAMINATION:  Vital Signs Last 24 Hrs  T(C): 32.8 (19 May 2021 14:42), Max: 32.8 (19 May 2021 14:42)  T(F): 91.1 (19 May 2021 14:42), Max: 91.1 (19 May 2021 14:42)  HR: 45 (19 May 2021 17:35) (37 - 45)  BP: 87/52 (19 May 2021 17:35) (87/52 - 98/65)  BP(mean): 67 (19 May 2021 14:45) (67 - 75)  RR: 16 (19 May 2021 17:35) (16 - 18)  SpO2: 100% (19 May 2021 17:35) (85% - 100%)  CAPILLARY BLOOD GLUCOSE      POCT Blood Glucose.: 129 mg/dL (19 May 2021 14:51)        GENERAL: NAD, well-groomed,  HEAD:  atraumatic, normocephalic  EYES: sclera anicteric  ENMT: mucous membranes moist  NECK: supple, No JVD  CHEST/LUNG: clear to auscultation bilaterally;    no      rales   ,   no rhonchi,   HEART: normal S1, S2  ABDOMEN: BS+, soft, ND, NT   EXTREMITIES:      edema    b/l LEs  NEURO:   letatgic  SKIN: no     rash PHYSICAL EXAMINATION:  Vital Signs Last 24 Hrs  T(C): 32.8 (19 May 2021 14:42), Max: 32.8 (19 May 2021 14:42)  T(F): 91.1 (19 May 2021 14:42), Max: 91.1 (19 May 2021 14:42)  HR: 45 (19 May 2021 17:35) (37 - 45)  BP: 87/52 (19 May 2021 17:35) (87/52 - 98/65)  BP(mean): 67 (19 May 2021 14:45) (67 - 75)  RR: 16 (19 May 2021 17:35) (16 - 18)  SpO2: 100% (19 May 2021 17:35) (85% - 100%)  CAPILLARY BLOOD GLUCOSE      POCT Blood Glucose.: 129 mg/dL (19 May 2021 14:51)        GENERAL: NAD, well-groomed,  HEAD:  atraumatic, normocephalic  EYES: sclera anicteric  ENMT: mucous membranes moist  NECK: supple, No JVD  CHEST/LUNG: clear to auscultation bilaterally;    no      rales   ,   no rhonchi,   HEART: normal S1, S2  ABDOMEN: BS+, soft.  wound dehiscence with some drainage  EXTREMITIES:      edema    b/l LEs  NEURO:   letatgic  SKIN: no     rash

## 2021-05-19 NOTE — ED PROVIDER NOTE - PHYSICAL EXAMINATION
Attending Shawna Patel: Gen: ill appearing, heent: atrauamtic, left pupils 3mm right pupil 1mm, mmm, op pink, neck; nttp, no nuchal rigidity, chest: nttp, no crepitus, cv: rrr, +murmur, lungs: decreased at bases, abd: soft, ttp right upper abdomen, nondistended, no peritoneal signs, , ext: bl le pitting edema, skin: ecchymoses to left shoulder, neuro: somnolent not follwing commands

## 2021-05-19 NOTE — CONSULT NOTE ADULT - ASSESSMENT
89y M MDS, CKD, Afib on Eliquis BPH with intermittent self-catheterization, CHF, presents to ED BIBEMS from assisted living after he was found to be hypotensive and bradycardiac.  was given some IV fluids but did not improve so they called 911.   pt is well known to me with last admission , pmd Dr Lemus asked me to see pt.  pt with hx of a.fib, bradycardia and a.fib with hypotension and bradycardia  r/o sepsis ,pt with known hx of bradycardia  need to discuss about GOC  icu eval   89y M MDS, CKD, Afib on Eliquis BPH with intermittent self-catheterization, CHF, presents to ED BIBEMS from assisted living after he was found to be hypotensive and bradycardiac.  was given some IV fluids but did not improve so they called 911.   pt is well known to me with last admission , pmd Dr Lemus asked me to see pt.  pt with hx of a.fib, bradycardia and a.fib with hypotension and bradycardia  r/o sepsis broad spectrum abx  pt with known hx of bradycardia, hr has improved  need to discuss about GOC  icu eval   89y M MDS, CKD, Afib on Eliquis BPH with intermittent self-catheterization, CHF, presents to ED BIBEMS from assisted living after he was found to be hypotensive and bradycardiac.  was given some IV fluids but did not improve so they called 911.   pt is well known to me with last admission , pmd Dr Lemus asked me to see pt.  pt with hx of a.fib, bradycardia and a.fib with hypotension and bradycardia  r/o sepsis broad spectrum abx, ct scan abdomen and pelvis with hx of perforated bowel  pt with known hx of bradycardia, hr has improved  need to discuss about GOC  icu eval

## 2021-05-19 NOTE — ED ADULT NURSE REASSESSMENT NOTE - NS ED NURSE REASSESS COMMENT FT1
Placed temperature rivera in patient. Sterile technique used. 2 RNs at bedside. Urine is cloudy yellow. Pt tolerated procedure well. Pt also placed on diego hugger at this time. Placed temperature rivera in patient. Sterile technique used. 2 RNs at bedside. Urine is cloudy yellow. Pt tolerated procedure well. Pt also placed on diego hugger and warming blankets at this time for hypothermia.

## 2021-05-19 NOTE — H&P ADULT - ASSESSMENT
89y M    h/o   MDS, CKD, Afib on eliquis, BPH with intermittent self-catheterization, CHF       , presents to ED BIBEMS from assisted living  by  dr blanquita lincoln,  after he was found to be hypotensive and bradycardic, .  was given some IV fluids but did not improve so they called 911.       reporting the pt is normally verbal and this is a deviation from his baseline.       *  Hypotension.  and bradycardia         iv fluids         hold  lopressor         normal Ef    *  Afib          on eliquis     *   CKD           with  MICH now   *  HTN         hold norvasc/ lasix   *  Bradycardia          card dr pereira       ra< from: CT Abdomen and Pelvis No Cont (05.19.21 @ 15:11) >  IMPRESSION:  Status post interval gastrojejunostomy. Inflammation in the right upper quadrant significantly improved from prior exam.  Small bilateral pleural effusions.  Russell Rodriguez MD; Fellow Radiology  This document has been electronically signed.  LENI GRAMAJO MD; Attending Radiologist  This document has been electronically signed. May 19 2021  4:50PM  < end of copied text >      89y M    h/o   MDS, CKD, Afib on eliquis, BPH with intermittent self-catheterization, CHF       , presents to ED BIBEMS from assisted living  by  dr blanquita lincoln,  after he was found to be hypotensive and bradycardic, .  was given some IV fluids but did not improve so they called 911.       reporting the pt is normally verbal and this is a deviation from his baseline.       *  Hypotension.  and bradycardia         iv fluids         hold  lopressor         normal Ef    *  Afib          on eliquis     *   CKD           with  MICH now   *  HTN         hold norvasc/ lasix   *  Bradycardia          card dr pereira,  orozco d by  gabriel lincoln      remains on epi,  Er is unable  to wean/  await ICU  emmett;l    gabriel lincoln    will f/p       ra< from: CT Abdomen and Pelvis No Cont (05.19.21 @ 15:11) >  IMPRESSION:  Status post interval gastrojejunostomy. Inflammation in the right upper quadrant significantly improved from prior exam.  Small bilateral pleural effusions.  Russell Rodriguez MD; Fellow Radiology  This document has been electronically signed.  LENI GRAMAJO MD; Attending Radiologist  This document has been electronically signed. May 19 2021  4:50PM  < end of copied text >      89y M    h/o   MDS, CKD, Afib on eliquis, BPH with intermittent self-catheterization, CHF       , presents to ED BIBEMS from assisted living  by  dr blanquita lincoln,  after he was found to be hypotensive and bradycardic, .  was given some IV fluids but did not improve so they called 911.       reporting the pt is normally verbal and this is a deviation from his baseline.       *  Hypotension.  and bradycardia         iv fluids/  cannot t/o infectious  process.  on  zosyn/  follow   c/s         hold  lopressor         normal Ef    *  Afib          on eliquis  at  home     pt obtunded. unable to take po     *   CKD           with  MICH now   *  HTN         hold norvasc/ lasix   *  Bradycardia          card dr pereira,  orozco d by  gabriel lincoln      remains on epi,  Er is unable  to wean/   icu  ro  assume care of  [pt      gabriel lincoln    will f/p    per  daughter  Lasha,  pt  is  not dr/dnit   daughter  aware  that pt;s prognosis is   guarded       ra< from: CT Abdomen and Pelvis No Cont (05.19.21 @ 15:11) >  IMPRESSION:  Status post interval gastrojejunostomy. Inflammation in the right upper quadrant significantly improved from prior exam.  Small bilateral pleural effusions.  Russell Rodriguez MD; Fellow Radiology  This document has been electronically signed.  LENI GRAMAJO MD; Attending Radiologist  This document has been electronically signed. May 19 2021  4:50PM  < end of copied text >      89y M    h/o   MDS, CKD, Afib on eliquis, BPH with intermittent self-catheterization, CHF    s/p ethan patch/  perforation  of  duodenum  on 4.21       , presents to ED [facility] from assisted living  by  dr blanquita lincoln,  after he was found to be hypotensive and bradycardic, .  was given some IV fluids but did not improve so they called 911.       reporting the pt is normally verbal and this is a deviation from his baseline.       *  Hypotension.  and bradycardia.  with AMS/  lethargic         iv fluids/    probable  infectious  process. , need  to  r/o  septic  process        s/p  zosy, /vanco, /  follow   c/s          s/p  perforated   duodenum/  ex  lap  on 4.20/21    CT  today,  small b/l  pl  effusions       high risk of  aspiartion         hold  lopressor         normal Ef    *  Afib          on eliquis  at  home     pt obtunded. unable to take po     *   CKD           with  MICH now   *  HTN         hold norvasc/ lasix   *  Bradycardia          card ernesto nielson d by  gabriel lincoln      remains on epi,  Er is unable  to wean/   icu  ro  assume care of  [pt      gabriel lincoln    will f/p    per  daughter  Lasha,  pt  is  not dr/dnit   daughter  aware  that pt;s prognosis is   guarded       ra< from: CT Abdomen and Pelvis No Cont (05.19.21 @ 15:11) >  IMPRESSION:  Status post interval gastrojejunostomy. Inflammation in the right upper quadrant significantly improved from prior exam.  Small bilateral pleural effusions.  Russell Rodriguez MD; Fellow Radiology  This document has been electronically signed.  LENI GRAMAJO MD; Attending Radiologist  This document has been electronically signed. May 19 2021  4:50PM  < end of copied text >

## 2021-05-19 NOTE — ED PROVIDER NOTE - CLINICAL SUMMARY MEDICAL DECISION MAKING FREE TEXT BOX
Attending Shawna Patel: 89 y/o male with multiple medical issues presenting with concern for AMS. upon arrival pt ill appearing, hypotensive, bradycardic and hypothermic. pt with h/o afib in the past and reportedly on beta blocker. placed on pacer pads. stat vbg performed showing no evidence of hyperkalemia. pt given atropine witih improvement. found to be hypothermic and placed on warming blanket. concern for infectious etiology as cause of AMS, hypothermia and bradycardia. no known h/o thyroid disease and pt not reportedly on steroids. pan cultured and started on broad spectrum abx. tp with recent admission for abdominal perforation with dressing in place. on intitial exam pt with mild ttp right upper quadrant. ct performed showing no evidence of post surgical complication however with ttp and recent surgery , surgery consult obtained. pt started on pressors for persistent hypotension. will dw family as pt could require emergent pacemaker as well. pt will need admissoin, ICU level of care

## 2021-05-20 DIAGNOSIS — R00.1 BRADYCARDIA, UNSPECIFIED: ICD-10-CM

## 2021-05-20 DIAGNOSIS — H40.9 UNSPECIFIED GLAUCOMA: ICD-10-CM

## 2021-05-20 DIAGNOSIS — N39.0 URINARY TRACT INFECTION, SITE NOT SPECIFIED: ICD-10-CM

## 2021-05-20 DIAGNOSIS — D64.9 ANEMIA, UNSPECIFIED: ICD-10-CM

## 2021-05-20 DIAGNOSIS — N18.30 CHRONIC KIDNEY DISEASE, STAGE 3 UNSPECIFIED: ICD-10-CM

## 2021-05-20 DIAGNOSIS — I95.9 HYPOTENSION, UNSPECIFIED: ICD-10-CM

## 2021-05-20 DIAGNOSIS — A41.9 SEPSIS, UNSPECIFIED ORGANISM: ICD-10-CM

## 2021-05-20 DIAGNOSIS — K26.9 DUODENAL ULCER, UNSPECIFIED AS ACUTE OR CHRONIC, WITHOUT HEMORRHAGE OR PERFORATION: ICD-10-CM

## 2021-05-20 DIAGNOSIS — I10 ESSENTIAL (PRIMARY) HYPERTENSION: ICD-10-CM

## 2021-05-20 DIAGNOSIS — N40.0 BENIGN PROSTATIC HYPERPLASIA WITHOUT LOWER URINARY TRACT SYMPTOMS: ICD-10-CM

## 2021-05-20 LAB
ALBUMIN SERPL ELPH-MCNC: 2.4 G/DL — LOW (ref 3.3–5)
ALP SERPL-CCNC: 116 U/L — SIGNIFICANT CHANGE UP (ref 40–120)
ALT FLD-CCNC: 15 U/L — SIGNIFICANT CHANGE UP (ref 10–45)
ANION GAP SERPL CALC-SCNC: 16 MMOL/L — SIGNIFICANT CHANGE UP (ref 5–17)
APTT BLD: 35.6 SEC — HIGH (ref 27.5–35.5)
AST SERPL-CCNC: 20 U/L — SIGNIFICANT CHANGE UP (ref 10–40)
BASE EXCESS BLDV CALC-SCNC: 0.1 MMOL/L — SIGNIFICANT CHANGE UP (ref -2–2)
BILIRUB SERPL-MCNC: 0.4 MG/DL — SIGNIFICANT CHANGE UP (ref 0.2–1.2)
BUN SERPL-MCNC: 51 MG/DL — HIGH (ref 7–23)
CA-I SERPL-SCNC: 1.06 MMOL/L — LOW (ref 1.12–1.3)
CALCIUM SERPL-MCNC: 7.5 MG/DL — LOW (ref 8.4–10.5)
CHLORIDE BLDV-SCNC: 110 MMOL/L — HIGH (ref 96–108)
CHLORIDE SERPL-SCNC: 107 MMOL/L — SIGNIFICANT CHANGE UP (ref 96–108)
CO2 BLDV-SCNC: 27 MMOL/L — SIGNIFICANT CHANGE UP (ref 22–30)
CO2 SERPL-SCNC: 20 MMOL/L — LOW (ref 22–31)
COVID-19 SPIKE DOMAIN AB INTERP: POSITIVE
COVID-19 SPIKE DOMAIN ANTIBODY RESULT: >250 U/ML — HIGH
CREAT SERPL-MCNC: 2.88 MG/DL — HIGH (ref 0.5–1.3)
GAS PNL BLDV: 140 MMOL/L — SIGNIFICANT CHANGE UP (ref 135–145)
GAS PNL BLDV: SIGNIFICANT CHANGE UP
GAS PNL BLDV: SIGNIFICANT CHANGE UP
GLUCOSE BLDV-MCNC: 100 MG/DL — HIGH (ref 70–99)
GLUCOSE SERPL-MCNC: 98 MG/DL — SIGNIFICANT CHANGE UP (ref 70–99)
HCO3 BLDV-SCNC: 25 MMOL/L — SIGNIFICANT CHANGE UP (ref 21–29)
HCT VFR BLD CALC: 27.5 % — LOW (ref 39–50)
HCT VFR BLDA CALC: 28 % — LOW (ref 39–50)
HGB BLD CALC-MCNC: 9 G/DL — LOW (ref 13–17)
HGB BLD-MCNC: 8.6 G/DL — LOW (ref 13–17)
INR BLD: 1.52 RATIO — HIGH (ref 0.88–1.16)
LACTATE BLDV-MCNC: 2 MMOL/L — SIGNIFICANT CHANGE UP (ref 0.7–2)
MAGNESIUM SERPL-MCNC: 1.9 MG/DL — SIGNIFICANT CHANGE UP (ref 1.6–2.6)
MCHC RBC-ENTMCNC: 30.6 PG — SIGNIFICANT CHANGE UP (ref 27–34)
MCHC RBC-ENTMCNC: 31.3 GM/DL — LOW (ref 32–36)
MCV RBC AUTO: 97.9 FL — SIGNIFICANT CHANGE UP (ref 80–100)
NRBC # BLD: 3 /100 WBCS — HIGH (ref 0–0)
OTHER CELLS CSF MANUAL: 7 ML/DL — LOW (ref 18–22)
PCO2 BLDV: 48 MMHG — SIGNIFICANT CHANGE UP (ref 35–50)
PH BLDV: 7.35 — SIGNIFICANT CHANGE UP (ref 7.35–7.45)
PHOSPHATE SERPL-MCNC: 4 MG/DL — SIGNIFICANT CHANGE UP (ref 2.5–4.5)
PLATELET # BLD AUTO: 190 K/UL — SIGNIFICANT CHANGE UP (ref 150–400)
PO2 BLDV: 34 MMHG — SIGNIFICANT CHANGE UP (ref 25–45)
POTASSIUM BLDV-SCNC: 3.7 MMOL/L — SIGNIFICANT CHANGE UP (ref 3.5–5.3)
POTASSIUM SERPL-MCNC: 4 MMOL/L — SIGNIFICANT CHANGE UP (ref 3.5–5.3)
POTASSIUM SERPL-SCNC: 4 MMOL/L — SIGNIFICANT CHANGE UP (ref 3.5–5.3)
PROT SERPL-MCNC: 5.4 G/DL — LOW (ref 6–8.3)
PROTHROM AB SERPL-ACNC: 17.9 SEC — HIGH (ref 10.6–13.6)
RBC # BLD: 2.81 M/UL — LOW (ref 4.2–5.8)
RBC # FLD: 23.7 % — HIGH (ref 10.3–14.5)
SAO2 % BLDV: 57 % — LOW (ref 67–88)
SARS-COV-2 IGG+IGM SERPL QL IA: >250 U/ML — HIGH
SARS-COV-2 IGG+IGM SERPL QL IA: POSITIVE
SODIUM SERPL-SCNC: 143 MMOL/L — SIGNIFICANT CHANGE UP (ref 135–145)
VANCOMYCIN TROUGH SERPL-MCNC: 7.7 UG/ML — LOW (ref 10–20)
WBC # BLD: 13.25 K/UL — HIGH (ref 3.8–10.5)
WBC # FLD AUTO: 13.25 K/UL — HIGH (ref 3.8–10.5)

## 2021-05-20 PROCEDURE — 71045 X-RAY EXAM CHEST 1 VIEW: CPT | Mod: 26

## 2021-05-20 PROCEDURE — 99291 CRITICAL CARE FIRST HOUR: CPT

## 2021-05-20 RX ORDER — LATANOPROST 0.05 MG/ML
1 SOLUTION/ DROPS OPHTHALMIC; TOPICAL AT BEDTIME
Refills: 0 | Status: DISCONTINUED | OUTPATIENT
Start: 2021-05-20 | End: 2021-05-26

## 2021-05-20 RX ORDER — PILOCARPINE HCL 4 %
1 DROPS OPHTHALMIC (EYE) EVERY 8 HOURS
Refills: 0 | Status: DISCONTINUED | OUTPATIENT
Start: 2021-05-20 | End: 2021-05-26

## 2021-05-20 RX ORDER — PIPERACILLIN AND TAZOBACTAM 4; .5 G/20ML; G/20ML
3.38 INJECTION, POWDER, LYOPHILIZED, FOR SOLUTION INTRAVENOUS ONCE
Refills: 0 | Status: COMPLETED | OUTPATIENT
Start: 2021-05-20 | End: 2021-05-20

## 2021-05-20 RX ORDER — MIDODRINE HYDROCHLORIDE 2.5 MG/1
10 TABLET ORAL EVERY 8 HOURS
Refills: 0 | Status: DISCONTINUED | OUTPATIENT
Start: 2021-05-20 | End: 2021-05-21

## 2021-05-20 RX ORDER — BRIMONIDINE TARTRATE 2 MG/MG
1 SOLUTION/ DROPS OPHTHALMIC EVERY 8 HOURS
Refills: 0 | Status: DISCONTINUED | OUTPATIENT
Start: 2021-05-20 | End: 2021-05-26

## 2021-05-20 RX ORDER — LATANOPROST 0.05 MG/ML
1 SOLUTION/ DROPS OPHTHALMIC; TOPICAL AT BEDTIME
Refills: 0 | Status: DISCONTINUED | OUTPATIENT
Start: 2021-05-20 | End: 2021-05-20

## 2021-05-20 RX ORDER — PIPERACILLIN AND TAZOBACTAM 4; .5 G/20ML; G/20ML
3.38 INJECTION, POWDER, LYOPHILIZED, FOR SOLUTION INTRAVENOUS EVERY 12 HOURS
Refills: 0 | Status: DISCONTINUED | OUTPATIENT
Start: 2021-05-20 | End: 2021-05-24

## 2021-05-20 RX ORDER — MIDODRINE HYDROCHLORIDE 2.5 MG/1
10 TABLET ORAL EVERY 8 HOURS
Refills: 0 | Status: DISCONTINUED | OUTPATIENT
Start: 2021-05-20 | End: 2021-05-20

## 2021-05-20 RX ORDER — VANCOMYCIN HCL 1 G
500 VIAL (EA) INTRAVENOUS ONCE
Refills: 0 | Status: COMPLETED | OUTPATIENT
Start: 2021-05-20 | End: 2021-05-20

## 2021-05-20 RX ORDER — DORZOLAMIDE HYDROCHLORIDE TIMOLOL MALEATE 20; 5 MG/ML; MG/ML
1 SOLUTION/ DROPS OPHTHALMIC
Refills: 0 | Status: DISCONTINUED | OUTPATIENT
Start: 2021-05-20 | End: 2021-05-26

## 2021-05-20 RX ORDER — HEPARIN SODIUM 5000 [USP'U]/ML
5000 INJECTION INTRAVENOUS; SUBCUTANEOUS EVERY 8 HOURS
Refills: 0 | Status: DISCONTINUED | OUTPATIENT
Start: 2021-05-20 | End: 2021-05-22

## 2021-05-20 RX ADMIN — Medication 100 MILLIEQUIVALENT(S): at 00:55

## 2021-05-20 RX ADMIN — DORZOLAMIDE HYDROCHLORIDE TIMOLOL MALEATE 1 DROP(S): 20; 5 SOLUTION/ DROPS OPHTHALMIC at 20:47

## 2021-05-20 RX ADMIN — HEPARIN SODIUM 5000 UNIT(S): 5000 INJECTION INTRAVENOUS; SUBCUTANEOUS at 22:05

## 2021-05-20 RX ADMIN — Medication 100 MILLIEQUIVALENT(S): at 02:20

## 2021-05-20 RX ADMIN — HEPARIN SODIUM 5000 UNIT(S): 5000 INJECTION INTRAVENOUS; SUBCUTANEOUS at 13:54

## 2021-05-20 RX ADMIN — Medication 100 MILLIGRAM(S): at 17:56

## 2021-05-20 RX ADMIN — SODIUM CHLORIDE 80 MILLILITER(S): 9 INJECTION INTRAMUSCULAR; INTRAVENOUS; SUBCUTANEOUS at 09:16

## 2021-05-20 RX ADMIN — BRIMONIDINE TARTRATE 1 DROP(S): 2 SOLUTION/ DROPS OPHTHALMIC at 09:19

## 2021-05-20 RX ADMIN — PIPERACILLIN AND TAZOBACTAM 25 GRAM(S): 4; .5 INJECTION, POWDER, LYOPHILIZED, FOR SOLUTION INTRAVENOUS at 11:16

## 2021-05-20 RX ADMIN — BRIMONIDINE TARTRATE 1 DROP(S): 2 SOLUTION/ DROPS OPHTHALMIC at 22:06

## 2021-05-20 RX ADMIN — PIPERACILLIN AND TAZOBACTAM 200 GRAM(S): 4; .5 INJECTION, POWDER, LYOPHILIZED, FOR SOLUTION INTRAVENOUS at 04:16

## 2021-05-20 RX ADMIN — Medication 1 DROP(S): at 09:17

## 2021-05-20 RX ADMIN — BRIMONIDINE TARTRATE 1 DROP(S): 2 SOLUTION/ DROPS OPHTHALMIC at 14:45

## 2021-05-20 RX ADMIN — Medication 1 DROP(S): at 22:06

## 2021-05-20 RX ADMIN — Medication 9.06 MICROGRAM(S)/KG/MIN: at 09:16

## 2021-05-20 RX ADMIN — CHLORHEXIDINE GLUCONATE 1 APPLICATION(S): 213 SOLUTION TOPICAL at 06:10

## 2021-05-20 RX ADMIN — Medication 1 DROP(S): at 14:45

## 2021-05-20 RX ADMIN — LATANOPROST 1 DROP(S): 0.05 SOLUTION/ DROPS OPHTHALMIC; TOPICAL at 22:06

## 2021-05-20 NOTE — ADVANCED PRACTICE NURSE CONSULT - REASON FOR CONSULT
Requested by staff to assess skin status of pt a/w wounds to right buttocks and left toe. PMH is noted:     89y M    h/o   MDS, CKD, Afib on eliquis, BPH with intermittent self-catheterization, CHF    perforation/ ethan patch on 4/21     , presents to ED BIBEMS from assisted living  by  dr blanquita lincoln,  after he was found to be hypotensive and bradycardic,      .  was given some IV fluids but did not improve so they called 911.       reporting the pt is normally verbal and this is a deviation from his baseline.      still   hypotensive in er

## 2021-05-20 NOTE — CONSULT NOTE ADULT - PROBLEM SELECTOR RECOMMENDATION 9
getting abx  improved  wbc, shift long Hx, Does self cath intermittently but I am not suire what stautus was at rehab ?nicole,

## 2021-05-20 NOTE — PROGRESS NOTE ADULT - ASSESSMENT
89 year old male with pmhx MDS, a fib on eliquis, ckd, BPH w urinary retention requiring self-catheterization, peritonitis from duodenal perforation s/p ex lap with pyloric excluding gastrojejunostomy and ethan patch (4/21) who is presenting with sepsis 2/2 UTI     #Neuro  AMS  -2/2 sepsis associated metabolic encephalopathy   -AO x 3 at baseline  -CT head no evidence of skull fracture, intracranial hemorrhage or mass effect.  -CT head no cervical spine fracture or traumatic malalignment  -Tx of sepsis as below     #CV  Hypotension  -2/2 septic shock   -c/w IVF resuscitation  -Epi gtt started in ED given bradycardia, will transition to levo       Bradycardia   -sinus abril vs a fib w slow ventricular  -patient on BB for a fib   -s/p atropine in the ED  -Epi gtt started in the ED   -EP consulted, recs pending     CHF  -BNP 2685  -TTE 59%, normal systolic function   -takes 40mg PO lasix at home  -3+ pitting edema on PE  -Offical TTE  -diuresis once BP improved     #Resp  -CXR clear lungs   -Saturating 97% on 4L NC   -Trial of intubation if needed     #Renal  MICH on CKD  -SCr 2.72 (baseline ~2)  -Urine lytes   -likely pre-renal     #GI  Peritonitis   -4/21 duodenal perforation s/p ex lap with pyloric excluding gastrojejunostomy and ethan patch   -Large abdominal wound with dehiscence on physical exam as well profound abdominal tenderness on R side   -CT abdomen Trace ascites, decreased. Inflammatory changes in the right upper quadrant, significantly improved from prior exam  -surgery consulted, considering wash-out       #ID  Sepsis  -2/2 UTI  -s/p vanc/zosyn in the ED  -c/w vanc/zosyn  -f/u BCx and UCx  -c/w IVF    #Endo  Hyperglycemia   -BG ~ 400,   -check A1c  -ISS    #Heme  MDS  -Hematologist Dr. Drummond ([phone])  -On aranesp  -Hgb 6.9, will transfuse   -no active bleeding     #DVT  -SCDs  -full code w trial of intubation

## 2021-05-20 NOTE — ADVANCED PRACTICE NURSE CONSULT - RECOMMEDATIONS
Will recommend the followin. B/l feet: complete Cair boots, further evaluation by wound team Podiatrist.  2. b/l buttocks: continue to monitor, routine pericare with Sincere AGUILAR  3. continue with turning and positioning  4. nutrition consult  Tx plan discussed with RN

## 2021-05-20 NOTE — CONSULT NOTE ADULT - ASSESSMENT
see labs, see notes, disc c MICU Team, ?need for palliative-not req by family, has Adv Dir suggesting otherwise  already improving c meds in MICU  will fu for continuity as pt under my care several years and wife and family    HAd COVID Summer 2020 and recovered well   vaccinated at [facility] 2x

## 2021-05-20 NOTE — CONSULT NOTE ADULT - SUBJECTIVE AND OBJECTIVE BOX
Patient is a 89y old  Male who presents with a chief complaint of bradycardia (20 May 2021 10:27)      HPI:     89y M    h/o   MDS, CKD, Afib on eliquis, BPH with intermittent self-catheterization, CHF    perforation/ ethan patch on 4/21     , presents to ED [facility] from assisted living  by  dr blanquita lincoln,  after he was found to be hypotensive and bradycardic,      .  was given some IV fluids but did not improve so they called 911.       reporting the pt is normally verbal and this is a deviation from his baseline.    Podiatry consulted for left 2nd toe ulcer and fungal toenails.    PAST MEDICAL & SURGICAL HISTORY:  Benign Hypertension    BPH (Benign Prostatic Hypertrophy)    MDS (Myelodysplastic Syndrome)    Glaucoma    Anemia    Chronic constipation    Nocturia associated with benign prostatic hypertrophy    Osteomyelitis of arm    Choledocholithiasis    CKD (chronic kidney disease) stage 3, GFR 30-59 ml/min    S/P Cholecystectomy    S/P TURP    History of cataract extraction  right eye 6/11    Osteomyelitis  left humerous- surgery childhood age 13    Laceration of finger, left, with tendon  1976    Mohs defect of nose  1990&#x27;s        MEDICATIONS  (STANDING):  atropine Injectable 1 milliGRAM(s) IV Push once  brimonidine 0.2% Ophthalmic Solution 1 Drop(s) Both EYES every 8 hours  chlorhexidine 4% Liquid 1 Application(s) Topical <User Schedule>  dorzolamide 2%/timolol 0.5% Ophthalmic Solution 1 Drop(s) Both EYES two times a day  heparin   Injectable 5000 Unit(s) SubCutaneous every 8 hours  latanoprost 0.005% Ophthalmic Solution 1 Drop(s) Both EYES at bedtime  midodrine 10 milliGRAM(s) Oral every 8 hours  norepinephrine Infusion 0.05 MICROgram(s)/kG/Min (9.06 mL/Hr) IV Continuous <Continuous>  pilocarpine 4% Solution 1 Drop(s) Both EYES every 8 hours  piperacillin/tazobactam IVPB.. 3.375 Gram(s) IV Intermittent every 12 hours  sodium chloride 0.9%. 1000 milliLiter(s) (80 mL/Hr) IV Continuous <Continuous>    MEDICATIONS  (PRN):      Allergies    Cipro (Swelling)  Cipro (Unknown)  Levaquin (Unknown)  sulfa drugs (Rash)  sulfa drugs (Unknown)    Intolerances        VITALS:    Vital Signs Last 24 Hrs  T(C): 36.5 (20 May 2021 20:00), Max: 42 (20 May 2021 12:00)  T(F): 97.7 (20 May 2021 20:00), Max: 107.6 (20 May 2021 12:00)  HR: 54 (20 May 2021 21:45) (49 - 92)  BP: 104/51 (20 May 2021 21:45) (75/43 - 147/71)  BP(mean): 73 (20 May 2021 21:45) (55 - 100)  RR: 17 (20 May 2021 21:45) (14 - 29)  SpO2: 99% (20 May 2021 21:45) (96% - 100%)    LABS:                          8.6    13.25 )-----------( 190      ( 20 May 2021 00:13 )             27.5       05-20    143  |  107  |  51<H>  ----------------------------<  98  4.0   |  20<L>  |  2.88<H>    Ca    7.5<L>      20 May 2021 00:13  Phos  4.0     05-20  Mg     1.9     05-20    TPro  5.4<L>  /  Alb  2.4<L>  /  TBili  0.4  /  DBili  x   /  AST  20  /  ALT  15  /  AlkPhos  116  05-20      CAPILLARY BLOOD GLUCOSE          PT/INR - ( 20 May 2021 00:13 )   PT: 17.9 sec;   INR: 1.52 ratio         PTT - ( 20 May 2021 00:13 )  PTT:35.6 sec    LOWER EXTREMITY PHYSICAL EXAM:    Vasular: DP 2/4, PT non palp possibly due to edema B/L, CFT <3 seconds B/L, Temperature gradient WNL, B/L.   Neuro: unable to assess  Musculoskeletal/Ortho: hammertoes 2-5 bilat  Skin: left 2nd toe ulcer to subQ with overlying scabbing with no drainage, no signs of infection, chronic in nature likely secondary to previous blister, toenails thickened elongated dystrophic with subungual debris x10

## 2021-05-20 NOTE — PROGRESS NOTE ADULT - SUBJECTIVE AND OBJECTIVE BOX
PATIENT: DAVID MCKENZIE   AGE:     CHIEF COMPLAINT:  Patient is a 89y old  Male who presents with a chief complaint of bradycardia, HypoTn, sepsis UtI (20 May 2021 09:06)      OVERNIGHT EVENTS: Patient admitted overnight.     SUBJECTIVE: Patient seen and examined at bedside. Patient alert, eyes open spontaneously, did not answer any ROS questions.       OBJECTIVE:    VITAL SIGNS:  ICU Vital Signs Last 24 Hrs  T(C): 36.3 (20 May 2021 08:00), Max: 36.4 (19 May 2021 23:20)  T(F): 97.3 (20 May 2021 08:00), Max: 97.5 (19 May 2021 23:20)  HR: 60 (20 May 2021 10:15) (37 - 92)  BP: 106/51 (20 May 2021 10:15) (81/57 - 147/71)  BP(mean): 74 (20 May 2021 10:15) (62 - 107)  ABP: --  ABP(mean): --  RR: 18 (20 May 2021 10:15) (16 - 29)  SpO2: 100% (20 May 2021 10:15) (85% - 100%)         @ 07:  -   @ 07:00  --------------------------------------------------------  IN: 918.7 mL / OUT: 510 mL / NET: 408.7 mL     @ 07: @ 10:28  --------------------------------------------------------  IN: 254.6 mL / OUT: 150 mL / NET: 104.6 mL      CAPILLARY BLOOD GLUCOSE      POCT Blood Glucose.: 129 mg/dL (19 May 2021 14:51)      I/O SUMMARY:    21 @ 07:  -  21 @ 07:00  --------------------------------------------------------  IN: 918.7 mL / OUT: 510 mL / NET: 408.7 mL    21 @ 07:01  -  21 @ 10:28  --------------------------------------------------------  IN: 254.6 mL / OUT: 150 mL / NET: 104.6 mL      PHYSICAL EXAM:        MEDICATIONS:  MEDICATIONS  (STANDING):  atropine Injectable 1 milliGRAM(s) IV Push once  brimonidine 0.2% Ophthalmic Solution 1 Drop(s) Both EYES every 8 hours  chlorhexidine 4% Liquid 1 Application(s) Topical <User Schedule>  dorzolamide 2%/timolol 0.5% Ophthalmic Solution 1 Drop(s) Both EYES two times a day  heparin   Injectable 5000 Unit(s) SubCutaneous every 8 hours  latanoprost 0.005% Ophthalmic Solution 1 Drop(s) Both EYES at bedtime  midodrine 10 milliGRAM(s) Oral every 8 hours  norepinephrine Infusion 0.05 MICROgram(s)/kG/Min (9.06 mL/Hr) IV Continuous <Continuous>  pilocarpine 4% Solution 1 Drop(s) Both EYES every 8 hours  piperacillin/tazobactam IVPB.. 3.375 Gram(s) IV Intermittent every 12 hours  sodium chloride 0.9%. 1000 milliLiter(s) (80 mL/Hr) IV Continuous <Continuous>    MEDICATIONS  (PRN):      ALLERGIES:  Allergies    Cipro (Swelling)  Cipro (Unknown)  Levaquin (Unknown)  sulfa drugs (Rash)  sulfa drugs (Unknown)    Intolerances        LABS:                        8.6    13.25 )-----------( 190      ( 20 May 2021 00:13 )             27.5     05-    143  |  107  |  51<H>  ----------------------------<  98  4.0   |  20<L>  |  2.88<H>    Ca    7.5<L>      20 May 2021 00:13  Phos  4.0       Mg     1.9         TPro  5.4<L>  /  Alb  2.4<L>  /  TBili  0.4  /  DBili  x   /  AST  20  /  ALT  15  /  AlkPhos  116      LIVER FUNCTIONS - ( 20 May 2021 00:13 )  Alb: 2.4 g/dL / Pro: 5.4 g/dL / ALK PHOS: 116 U/L / ALT: 15 U/L / AST: 20 U/L / GGT: x           COAGULATION STUDIES:     aPTT  35.6 sec    (21 @ 00:13)     PT     17.9 sec    (21 @ 00:13)     INR    1.52 ratio         (21 @ 00:13), COAGULATION STUDIES:     aPTT  37.6 sec    (21 @ 14:57)     PT     21.0 sec    (21 @ 14:57)     INR    1.80 ratio         (21 @ 14:57)   PT/INR - ( 20 May 2021 00:13 )   PT: 17.9 sec;   INR: 1.52 ratio         PTT - ( 20 May 2021 00:13 )  PTT:35.6 sec  Urinalysis Basic - ( 19 May 2021 15:57 )    Color: Light Yellow / Appearance: Slightly Turbid / S.009 / pH: x  Gluc: x / Ketone: Negative  / Bili: Negative / Urobili: Negative   Blood: x / Protein: Trace / Nitrite: Negative   Leuk Esterase: Large / RBC: 3 /hpf /  /HPF   Sq Epi: x / Non Sq Epi: 0 /hpf / Bacteria: Few          POCT Blood Glucose.: 129 mg/dL (21 @ 14:51)    Urinalysis Basic - ( 19 May 2021 15:57 )    Color: Light Yellow / Appearance: Slightly Turbid / S.009 / pH: x  Gluc: x / Ketone: Negative  / Bili: Negative / Urobili: Negative   Blood: x / Protein: Trace / Nitrite: Negative   Leuk Esterase: Large / RBC: 3 /hpf /  /HPF   Sq Epi: x / Non Sq Epi: 0 /hpf / Bacteria: Few        MICROBIOLOGY:      RADIOLOGY & ADDITIONAL TESTS: Reviewed.

## 2021-05-20 NOTE — PROGRESS NOTE ADULT - SUBJECTIVE AND OBJECTIVE BOX
EP    PROGRESS  NOTE   ________________________________________________    CHIEF COMPLAINT:Patient is a 89y old  Male who presents with a chief complaint of bradycardia (19 May 2021 22:33)    	  REVIEW OF SYSTEMS:  CONSTITUTIONAL: No fever, weight loss, or fatigue  EYES: No eye pain, visual disturbances, or discharge  ENT:  No difficulty hearing, tinnitus, vertigo; No sinus or throat pain  NECK: No pain or stiffness  RESPIRATORY: No cough, wheezing, chills or hemoptysis; No Shortness of Breath  CARDIOVASCULAR: No chest pain, palpitations, passing out, dizziness, or leg swelling  GASTROINTESTINAL: No abdominal or epigastric pain. No nausea, vomiting, or hematemesis; No diarrhea or constipation. No melena or hematochezia.  GENITOURINARY: No dysuria, frequency, hematuria, or incontinence  NEUROLOGICAL: No headaches, memory loss, loss of strength, numbness, or tremors  SKIN: No itching, burning, rashes, or lesions   LYMPH Nodes: No enlarged glands  ENDOCRINE: No heat or cold intolerance; No hair loss  MUSCULOSKELETAL: No joint pain or swelling; No muscle, back, or extremity pain  PSYCHIATRIC: No depression, anxiety, mood swings, or difficulty sleeping  HEME/LYMPH: No easy bruising, or bleeding gums  ALLERGY AND IMMUNOLOGIC: No hives or eczema	    [ ] All others negative	  [x ] Unable to obtain    PHYSICAL EXAM:  T(C): 36.3 (05-20-21 @ 08:00), Max: 36.4 (05-19-21 @ 23:20)  HR: 72 (05-20-21 @ 08:15) (37 - 92)  BP: 107/59 (05-20-21 @ 08:15) (81/57 - 147/71)  RR: 20 (05-20-21 @ 08:15) (16 - 29)  SpO2: 100% (05-20-21 @ 08:15) (85% - 100%)  Wt(kg): --  I&O's Summary    19 May 2021 07:01  -  20 May 2021 07:00  --------------------------------------------------------  IN: 918.7 mL / OUT: 410 mL / NET: 508.7 mL    20 May 2021 07:01  -  20 May 2021 08:29  --------------------------------------------------------  IN: 87.2 mL / OUT: 0 mL / NET: 87.2 mL        Appearance: Normal	  HEENT:   Normal oral mucosa, PERRL, EOMI	  Lymphatic: No lymphadenopathy  Cardiovascular: Normal S1 S2, No JVD, + murmurs, + edema  Respiratory: Lungs clear to auscultation	  Psychiatry: A & O x 3, Mood & affect appropriate  Gastrointestinal:  Soft, Non-tender, + BS	  Skin: No rashes, No ecchymoses, No cyanosis	  Neurologic: Non-focal  Extremities: Normal range of motion, No clubbing, cyanosis . + edema  Vascular: Peripheral pulses palpable 2+ bilaterally    MEDICATIONS  (STANDING):  atropine Injectable 1 milliGRAM(s) IV Push once  brimonidine 0.2% Ophthalmic Solution 1 Drop(s) Both EYES every 8 hours  chlorhexidine 4% Liquid 1 Application(s) Topical <User Schedule>  dorzolamide 2%/timolol 0.5% Ophthalmic Solution 1 Drop(s) Both EYES two times a day  latanoprost 0.005% Ophthalmic Solution 1 Drop(s) Both EYES at bedtime  midodrine 10 milliGRAM(s) Oral every 8 hours  norepinephrine Infusion 0.05 MICROgram(s)/kG/Min (9.06 mL/Hr) IV Continuous <Continuous>  pilocarpine 4% Solution 1 Drop(s) Both EYES every 8 hours  piperacillin/tazobactam IVPB.. 3.375 Gram(s) IV Intermittent every 12 hours  sodium chloride 0.9%. 1000 milliLiter(s) (80 mL/Hr) IV Continuous <Continuous>      TELEMETRY: 	    ECG:  	  RADIOLOGY:  OTHER: 	  	  LABS:	 	    CARDIAC MARKERS:  CARDIAC MARKERS ( 19 May 2021 18:33 )  x     / x     / 28 U/L / x     / 4.2 ng/mL  CARDIAC MARKERS ( 19 May 2021 16:35 )  x     / x     / 41 U/L / x     / 5.0 ng/mL                                8.6    13.25 )-----------( 190      ( 20 May 2021 00:13 )             27.5     05-20    143  |  107  |  51<H>  ----------------------------<  98  4.0   |  20<L>  |  2.88<H>    Ca    7.5<L>      20 May 2021 00:13  Phos  4.0     05-20  Mg     1.9     05-20    TPro  5.4<L>  /  Alb  2.4<L>  /  TBili  0.4  /  DBili  x   /  AST  20  /  ALT  15  /  AlkPhos  116  05-20    proBNP: Serum Pro-Brain Natriuretic Peptide: 2685 pg/mL (05-19 @ 18:33)  Serum Pro-Brain Natriuretic Peptide: 2955 pg/mL (04-20 @ 13:13)    Lipid Profile:   HgA1c:   TSH: Thyroid Stimulating Hormone, Serum: 2.45 uIU/mL (05-19 @ 20:44)    PT/INR - ( 20 May 2021 00:13 )   PT: 17.9 sec;   INR: 1.52 ratio         PTT - ( 20 May 2021 00:13 )  PTT:35.6 sec      Assessment and plan  ---------------------------  89y M MDS, CKD, Afib on Eliquis BPH with intermittent self-catheterization, CHF, presents to ED BIBEMS from assisted living after he was found to be hypotensive and bradycardiac.  was given some IV fluids but did not improve so they called 911.   pt is well known to me with last admission , pmd Dr Lemus asked me to see pt.  pt with hx of a.fib, bradycardia and a.fib with hypotension and bradycardia  continue pressors keep MAP>65  abx   fu cultures  echo

## 2021-05-20 NOTE — CONSULT NOTE ADULT - SUBJECTIVE AND OBJECTIVE BOX
Patient is a 89y old  Male who presents with a chief complaint of bradycardia (20 May 2021 08:29)      HPI:     89y M    h/o   MDS, CKD, Afib on eliquis, BPH with intermittent self-catheterization, CHF    perforation/ ethan patch on 4/21     , presents to ED BIBEMS from assisted living  by  dr blanquita lincoln,  after he was found to be hypotensive and bradycardic,      .  was given some IV fluids but did not improve so they called 911.       reporting the pt is normally verbal and this is a deviation from his baseline.      still   hypotensive in er (19 May 2021 18:37)    Correction of above: sENT TO ed BY dR Burgos FROM [facility] AFTER ADM LAST MONTH FOR P-ERF dupod ulcer  sent for attn to Bradycardia [facility], . when ED called me re Adm I notified dr Mor COONEY who has seen pt in past fgor Chau on BBLKr  and Dr RODRIGUEZ available in ED for Adm H&P  afyter adm to medicine pt was transf to ICU, now alert on pressors, not chau not hypotensive , not demented  disc c MICU PAs re reason for palliative consult which does not seem indicated, has NOLST which directs full care, not DNR    PAST MEDICAL & SURGICAL HISTORY:  Benign Hypertension  BPH (Benign Prostatic Hypertrophy)  MDS (Myelodysplastic Syndrome)  [place]au[place]  Anemia  Chronic constipation  Nocturia associated with benign prostatic hypertrophy  Osteomyelitis of arm  Choledocholithiasis  CKD (chronic kidney disease) stage 3, GFR 30-59 ml/min  S/P Cholecystectomy  S/P TURP  History of cataract extraction  right eye 6/11  teomyelitis  left humerous- surgery childhood age 13  Laceration of finger, left, with tendon  1976  Mohs defect of nose  1990&#x27;s    Medications:  atropine Injectable 1 milliGRAM(s) IV Push once  brimonidine 0.2% Ophthalmic Solution 1 Drop(s) Both EYES every 8 hours  chlorhexidine 4% Liquid 1 Application(s) Topical <User Schedule>  dorzolamide 2%/timolol 0.5% Ophthalmic Solution 1 Drop(s) Both EYES two times a day  latanoprost 0.005% Ophthalmic Solution 1 Drop(s) Both EYES at bedtime  midodrine 10 milliGRAM(s) Oral every 8 hours  norepinephrine Infusion 0.05 MICROgram(s)/kG/Min IV Continuous <Continuous>  pilocarpine 4% Solution 1 Drop(s) Both EYES every 8 hours  piperacillin/tazobactam IVPB.. 3.375 Gram(s) IV Intermittent every 12 hours  sodium chloride 0.9%. 1000 milliLiter(s) IV Continuous <Continuous>      FAMILY HISTORY:  Family history of prostate cancer  wife [employer] , depression in prison  FH: CVA (cerebrovascular accident)  father  FH: arthritis  mothe    Social History  non smoker no etoh, fxn indep, str cath daily, self med    REVIEW OF SYSTEMS   micu stetting some confusion but reorients  General:nad no co	  Skin/Breast:no co	  Ophthalmologic:	lo v no ch  ENMT:	no co no ch  Respiratory and Thorax:	no cough no sp no sob  Cardiovascular:	no cp no p[alp  Gastrointestinal:	nio nvcd  Genitourinary:	no fdi no co aware of rivera  Musculoskeletal:	no a no p  Neurological:	no co no ch  Psychiatric:	no co  Hematology/Lymphatics:	  Endocrine:no poly udd  Allergic/Immunologic:	  AOSN y    Vital Signs Last 24 Hrs  T(C): 36.3 (20 May 2021 08:00), Max: 36.4 (19 May 2021 23:20)  T(F): 97.3 (20 May 2021 08:00), Max: 97.5 (19 May 2021 23:20)  HR: 72 (20 May 2021 08:15) (37 - 92)  BP: 107/59 (20 May 2021 08:15) (81/57 - 147/71)  BP(mean): 76 (20 May 2021 08:15) (62 - 107)  RR: 20 (20 May 2021 08:15) (16 - 29)  SpO2: 100% (20 May 2021 08:15) (85% - 100%)    Physical Exam: vss nad no co asked for his wife  H&N: ncat, martell cwnl sees hears has ngt and nc o@, no cb no tm  CV:rrr m  Pulm:ctab no rrw decr bs r  GI:soft nt  :rivera  Extrem:edema c pitting  Skin:cdi x abd wound inf dehissence dry healing  Vasc:hm-, vv-  Neuro:Speechclear sl dysart               Affect:approp               Memory:intact               Judgment: usu good               Orientation:y               Cognition:int, reorients               Sensory:gr nl               Motor:nfatmae               Gait:was amb at rehab               CN:gr nl  Psych:calm  coop  Other:    Labs:  CBC Full  -  ( 20 May 2021 00:13 )  WBC Count : 13.25 K/uL  RBC Count : 2.81 M/uL  Hemoglobin : 8.6 g/dL  Hematocrit : 27.5 %  Platelet Count - Automated : 190 K/uL  Mean Cell Volume : 97.9 fl  Mean Cell Hemoglobin : 30.6 pg  Mean Cell Hemoglobin Concentration : 31.3 gm/dL  Auto Neutrophil # : x  Auto Lymphocyte # : x  Auto Monocyte # : x  Auto Eosinophil # : x  Auto Basophil # : x  Auto Neutrophil % : x  Auto Lymphocyte % : x  Auto Monocyte % : x  Auto Eosinophil % : x  Auto Basophil % : x      05-20    143  |  107  |  51<H>  ----------------------------<  98  4.0   |  20<L>  |  2.88<H>    Ca    7.5<L>      20 May 2021 00:13  Phos  4.0     05-20  Mg     1.9     05-20    TPro  5.4<L>  /  Alb  2.4<L>  /  TBili  0.4  /  DBili  x   /  AST  20  /  ALT  15  /  AlkPhos  116  05-20      LIVER FUNCTIONS - ( 20 May 2021 00:13 )  Alb: 2.4 g/dL / Pro: 5.4 g/dL / ALK PHOS: 116 U/L / ALT: 15 U/L / AST: 20 U/L / GGT: x             PT/INR - ( 20 May 2021 00:13 )   PT: 17.9 sec;   INR: 1.52 ratio         PTT - ( 20 May 2021 00:13 )  PTT:35.6 sec    HEALTH ISSUES - PROBLEM Dx:

## 2021-05-20 NOTE — CHART NOTE - NSCHARTNOTEFT_GEN_A_CORE
Patient admitted to MICU with sepsis of possible UTI. Surgery evaluated due to recent duodenal perforated and gastrojejunal anastomosis. Abdomen soft, no evidence of leakage on CT    Re-evaluation in MICU, patient now on decreased pressers (0.04 of levo). Abdomen soft, nontender, nondistended. Midline abdominal wound without any drainage onto dressing    Faith Onofre, PGY2  ACS [id number]

## 2021-05-20 NOTE — ADVANCED PRACTICE NURSE CONSULT - ASSESSMENT
The pt was encountered in the MICU- he is on a [employer] sport support surface and is being assisted with turning and positioning; will recommend complete CAir boots for off-loading.  Upon assessment, a dry crusted wound was noted on the 2nd toe of the left foot, the toenails were elongated - will recommend a Podiatry consult for further evaluation.  On the b/l buttocks was an area of blanchable erythema with a deep red tone noted to he skin. Question if this may be incontinence with a fungal component or a deep tissue injury present on admission- will recommend to continue to monitor and apply Sincere AF with pericare .

## 2021-05-20 NOTE — CONSULT NOTE ADULT - ASSESSMENT
89 y/o male pt w/ left foot 2nd toe ulcer to subQ with no signs of infection  - pt seen and evaluated   - left foot ulcer with no signs of infection  - rec painting with betadine daily and leaving open to air  - z flows in bed at all times  - will follow up for toenail debridement

## 2021-05-21 DIAGNOSIS — Z51.5 ENCOUNTER FOR PALLIATIVE CARE: ICD-10-CM

## 2021-05-21 DIAGNOSIS — R53.81 OTHER MALAISE: ICD-10-CM

## 2021-05-21 DIAGNOSIS — A41.9 SEPSIS, UNSPECIFIED ORGANISM: ICD-10-CM

## 2021-05-21 DIAGNOSIS — Z71.89 OTHER SPECIFIED COUNSELING: ICD-10-CM

## 2021-05-21 LAB
-  AMIKACIN: SIGNIFICANT CHANGE UP
-  AMPICILLIN/SULBACTAM: SIGNIFICANT CHANGE UP
-  AMPICILLIN: SIGNIFICANT CHANGE UP
-  AZTREONAM: SIGNIFICANT CHANGE UP
-  CEFAZOLIN: SIGNIFICANT CHANGE UP
-  CEFEPIME: SIGNIFICANT CHANGE UP
-  CEFOXITIN: SIGNIFICANT CHANGE UP
-  CEFTRIAXONE: SIGNIFICANT CHANGE UP
-  CIPROFLOXACIN: SIGNIFICANT CHANGE UP
-  ERTAPENEM: SIGNIFICANT CHANGE UP
-  GENTAMICIN: SIGNIFICANT CHANGE UP
-  IMIPENEM: SIGNIFICANT CHANGE UP
-  LEVOFLOXACIN: SIGNIFICANT CHANGE UP
-  MEROPENEM: SIGNIFICANT CHANGE UP
-  NITROFURANTOIN: SIGNIFICANT CHANGE UP
-  PIPERACILLIN/TAZOBACTAM: SIGNIFICANT CHANGE UP
-  TIGECYCLINE: SIGNIFICANT CHANGE UP
-  TOBRAMYCIN: SIGNIFICANT CHANGE UP
-  TRIMETHOPRIM/SULFAMETHOXAZOLE: SIGNIFICANT CHANGE UP
ALBUMIN SERPL ELPH-MCNC: 2 G/DL — LOW (ref 3.3–5)
ALP SERPL-CCNC: 103 U/L — SIGNIFICANT CHANGE UP (ref 40–120)
ALT FLD-CCNC: 15 U/L — SIGNIFICANT CHANGE UP (ref 10–45)
ANION GAP SERPL CALC-SCNC: 14 MMOL/L — SIGNIFICANT CHANGE UP (ref 5–17)
APTT BLD: 35.9 SEC — HIGH (ref 27.5–35.5)
AST SERPL-CCNC: 16 U/L — SIGNIFICANT CHANGE UP (ref 10–40)
BASOPHILS # BLD AUTO: 0.02 K/UL — SIGNIFICANT CHANGE UP (ref 0–0.2)
BASOPHILS NFR BLD AUTO: 0.2 % — SIGNIFICANT CHANGE UP (ref 0–2)
BILIRUB SERPL-MCNC: 0.3 MG/DL — SIGNIFICANT CHANGE UP (ref 0.2–1.2)
BUN SERPL-MCNC: 48 MG/DL — HIGH (ref 7–23)
CALCIUM SERPL-MCNC: 7.5 MG/DL — LOW (ref 8.4–10.5)
CHLORIDE SERPL-SCNC: 111 MMOL/L — HIGH (ref 96–108)
CO2 SERPL-SCNC: 19 MMOL/L — LOW (ref 22–31)
CREAT SERPL-MCNC: 2.89 MG/DL — HIGH (ref 0.5–1.3)
CULTURE RESULTS: SIGNIFICANT CHANGE UP
EOSINOPHIL # BLD AUTO: 0.08 K/UL — SIGNIFICANT CHANGE UP (ref 0–0.5)
EOSINOPHIL NFR BLD AUTO: 0.7 % — SIGNIFICANT CHANGE UP (ref 0–6)
GLUCOSE SERPL-MCNC: 120 MG/DL — HIGH (ref 70–99)
HCT VFR BLD CALC: 26.2 % — LOW (ref 39–50)
HGB BLD-MCNC: 8 G/DL — LOW (ref 13–17)
IMM GRANULOCYTES NFR BLD AUTO: 0.9 % — SIGNIFICANT CHANGE UP (ref 0–1.5)
INR BLD: 1.5 RATIO — HIGH (ref 0.88–1.16)
LYMPHOCYTES # BLD AUTO: 1.92 K/UL — SIGNIFICANT CHANGE UP (ref 1–3.3)
LYMPHOCYTES # BLD AUTO: 16.3 % — SIGNIFICANT CHANGE UP (ref 13–44)
MAGNESIUM SERPL-MCNC: 1.7 MG/DL — SIGNIFICANT CHANGE UP (ref 1.6–2.6)
MCHC RBC-ENTMCNC: 30 PG — SIGNIFICANT CHANGE UP (ref 27–34)
MCHC RBC-ENTMCNC: 30.5 GM/DL — LOW (ref 32–36)
MCV RBC AUTO: 98.1 FL — SIGNIFICANT CHANGE UP (ref 80–100)
METHOD TYPE: SIGNIFICANT CHANGE UP
MONOCYTES # BLD AUTO: 0.89 K/UL — SIGNIFICANT CHANGE UP (ref 0–0.9)
MONOCYTES NFR BLD AUTO: 7.5 % — SIGNIFICANT CHANGE UP (ref 2–14)
MRSA PCR RESULT.: DETECTED
NEUTROPHILS # BLD AUTO: 8.77 K/UL — HIGH (ref 1.8–7.4)
NEUTROPHILS NFR BLD AUTO: 74.4 % — SIGNIFICANT CHANGE UP (ref 43–77)
NRBC # BLD: 1 /100 WBCS — HIGH (ref 0–0)
ORGANISM # SPEC MICROSCOPIC CNT: SIGNIFICANT CHANGE UP
ORGANISM # SPEC MICROSCOPIC CNT: SIGNIFICANT CHANGE UP
PHOSPHATE SERPL-MCNC: 3.6 MG/DL — SIGNIFICANT CHANGE UP (ref 2.5–4.5)
PLATELET # BLD AUTO: 158 K/UL — SIGNIFICANT CHANGE UP (ref 150–400)
POTASSIUM SERPL-MCNC: 3.8 MMOL/L — SIGNIFICANT CHANGE UP (ref 3.5–5.3)
POTASSIUM SERPL-SCNC: 3.8 MMOL/L — SIGNIFICANT CHANGE UP (ref 3.5–5.3)
PROT SERPL-MCNC: 5 G/DL — LOW (ref 6–8.3)
PROTHROM AB SERPL-ACNC: 17.6 SEC — HIGH (ref 10.6–13.6)
RBC # BLD: 2.67 M/UL — LOW (ref 4.2–5.8)
RBC # FLD: 24 % — HIGH (ref 10.3–14.5)
S AUREUS DNA NOSE QL NAA+PROBE: DETECTED
SODIUM SERPL-SCNC: 144 MMOL/L — SIGNIFICANT CHANGE UP (ref 135–145)
SPECIMEN SOURCE: SIGNIFICANT CHANGE UP
VANCOMYCIN FLD-MCNC: 8.5 UG/ML — SIGNIFICANT CHANGE UP
WBC # BLD: 11.79 K/UL — HIGH (ref 3.8–10.5)
WBC # FLD AUTO: 11.79 K/UL — HIGH (ref 3.8–10.5)

## 2021-05-21 PROCEDURE — 99291 CRITICAL CARE FIRST HOUR: CPT

## 2021-05-21 PROCEDURE — 99497 ADVNCD CARE PLAN 30 MIN: CPT | Mod: 25

## 2021-05-21 PROCEDURE — 99223 1ST HOSP IP/OBS HIGH 75: CPT

## 2021-05-21 PROCEDURE — 93010 ELECTROCARDIOGRAM REPORT: CPT

## 2021-05-21 RX ORDER — MIDODRINE HYDROCHLORIDE 2.5 MG/1
10 TABLET ORAL EVERY 8 HOURS
Refills: 0 | Status: DISCONTINUED | OUTPATIENT
Start: 2021-05-21 | End: 2021-05-21

## 2021-05-21 RX ORDER — FUROSEMIDE 40 MG
20 TABLET ORAL ONCE
Refills: 0 | Status: COMPLETED | OUTPATIENT
Start: 2021-05-21 | End: 2021-05-21

## 2021-05-21 RX ADMIN — Medication 20 MILLIGRAM(S): at 17:08

## 2021-05-21 RX ADMIN — BRIMONIDINE TARTRATE 1 DROP(S): 2 SOLUTION/ DROPS OPHTHALMIC at 22:41

## 2021-05-21 RX ADMIN — SODIUM CHLORIDE 80 MILLILITER(S): 9 INJECTION INTRAMUSCULAR; INTRAVENOUS; SUBCUTANEOUS at 05:07

## 2021-05-21 RX ADMIN — HEPARIN SODIUM 5000 UNIT(S): 5000 INJECTION INTRAVENOUS; SUBCUTANEOUS at 13:01

## 2021-05-21 RX ADMIN — LATANOPROST 1 DROP(S): 0.05 SOLUTION/ DROPS OPHTHALMIC; TOPICAL at 22:41

## 2021-05-21 RX ADMIN — BRIMONIDINE TARTRATE 1 DROP(S): 2 SOLUTION/ DROPS OPHTHALMIC at 06:02

## 2021-05-21 RX ADMIN — HEPARIN SODIUM 5000 UNIT(S): 5000 INJECTION INTRAVENOUS; SUBCUTANEOUS at 05:08

## 2021-05-21 RX ADMIN — CHLORHEXIDINE GLUCONATE 1 APPLICATION(S): 213 SOLUTION TOPICAL at 05:08

## 2021-05-21 RX ADMIN — BRIMONIDINE TARTRATE 1 DROP(S): 2 SOLUTION/ DROPS OPHTHALMIC at 15:48

## 2021-05-21 RX ADMIN — DORZOLAMIDE HYDROCHLORIDE TIMOLOL MALEATE 1 DROP(S): 20; 5 SOLUTION/ DROPS OPHTHALMIC at 05:09

## 2021-05-21 RX ADMIN — PIPERACILLIN AND TAZOBACTAM 25 GRAM(S): 4; .5 INJECTION, POWDER, LYOPHILIZED, FOR SOLUTION INTRAVENOUS at 11:55

## 2021-05-21 RX ADMIN — Medication 1 DROP(S): at 06:02

## 2021-05-21 RX ADMIN — PIPERACILLIN AND TAZOBACTAM 25 GRAM(S): 4; .5 INJECTION, POWDER, LYOPHILIZED, FOR SOLUTION INTRAVENOUS at 23:58

## 2021-05-21 RX ADMIN — DORZOLAMIDE HYDROCHLORIDE TIMOLOL MALEATE 1 DROP(S): 20; 5 SOLUTION/ DROPS OPHTHALMIC at 17:08

## 2021-05-21 RX ADMIN — Medication 1 DROP(S): at 15:48

## 2021-05-21 RX ADMIN — HEPARIN SODIUM 5000 UNIT(S): 5000 INJECTION INTRAVENOUS; SUBCUTANEOUS at 22:40

## 2021-05-21 RX ADMIN — PIPERACILLIN AND TAZOBACTAM 25 GRAM(S): 4; .5 INJECTION, POWDER, LYOPHILIZED, FOR SOLUTION INTRAVENOUS at 00:06

## 2021-05-21 RX ADMIN — Medication 1 DROP(S): at 22:40

## 2021-05-21 RX ADMIN — MIDODRINE HYDROCHLORIDE 10 MILLIGRAM(S): 2.5 TABLET ORAL at 13:01

## 2021-05-21 NOTE — CONSULT NOTE ADULT - PROBLEM SELECTOR RECOMMENDATION 3
1-2 mos ago had wsx at [facility], wound deghiisence healing, dry
H/o HTN. Now in septic shock.  - Management per primary team

## 2021-05-21 NOTE — CHART NOTE - NSCHARTNOTEFT_GEN_A_CORE
MICU Transfer Note    Transfer from: MICU    Transfer to: (  ) Medicine    ( x ) Telemetry     (   ) RCU        (    ) Palliative         (   ) Stroke Unit          (   ) __________________    Accepting physican:      MICU COURSE:  89 year old male with pmhx MDS, a fib on eliquis, ckd, BPH w urinary retention requiring self-catheterization, peritonitis from duodenal perforation s/p ex lap with pyloric excluding gastrojejunostomy and ethan patch (4/21) who is presenting with sepsis 2/2 UTI. The patient was admitted to the MICU for Vasopressor support. During MICU stay midodrine was attempted however, the patient was noted to have (+) bradycardia hrt rate noted to be high 30's to 40's at times. The patient remained a symptomatic . Urine culture (+) for enterobacter MRSA swab also (+). Piptazo and Vanco by level.         ASSESSMENT & PLAN:     89 year old male with pmhx MDS, a fib on eliquis, ckd, BPH w urinary retention requiring self-catheterization, peritonitis from duodenal perforation s/p ex lap with pyloric excluding gastrojejunostomy and ethan patch (4/21) who is presenting with sepsis 2/2 UTI     #Neuro  No Active events   - currently baseline mental status, alert oriented x3   - PT/OT f/u   -CT head no evidence of skull fracture, intracranial hemorrhage or mass effect.  -CT head no cervical spine fracture or traumatic malalignment      #CV  Distributive Shock secondary to UTI   - Vaso pressors now off   - continue to monitor VS q 4 x 72 hrs        Bradycardia   -a fib w slow ventricular, HR currently 50s-60s  -hold home beta blockers   -EP following, appreciate recs   - will need telemetry   A fib  - Hold AC at this time will resume once sepsis is resolved   - telemetry monitoring     CHF  -BNP 2685  - s/p lasix will monitor urine out put     #Resp  -Saturating 97% on 4L NC   - encourage cough and deep breathing     #Renal  MICH on CKD - likely prerenal vs. ATN in setting of septic shock   - Beckman in place, with ample urine output   - strict i/o   - monitor electrolytes   #GI  Peritonitis   -4/21 duodenal perforation s/p ex lap with pyloric excluding gastrojejunostomy and ethan patch   - benign abdominal exam, surgical wound open healing by second intention, no evidence of infection/drainage/warmth/tenderness   -CT abdomen Trace ascites, decreased. Inflammatory changes in the right upper quadrant, significantly improved from prior exam  -surgery consulted, no plan for surgical intervention     #ID  Sepsis  -2/2 UTI, UCx gram negative (+) enterococcus   -s/p vanc/zosyn   - MRSA (+) will dose vanco by level   -c/w zosyn   -f/u BCx       #Endo  Hyperglycemia   -Resolved   - f/u cortisol level in the am     #Heme  MDS  -Hematologist Dr. Drummond ([phone])  -On aranesp  -no active bleeding     #DVT  -SCDs      For Followup:  - EP   - PT/OT   - AC resumption         Vital Signs Last 24 Hrs  T(C): 36.2 (21 May 2021 20:00), Max: 36.5 (21 May 2021 06:00)  T(F): 97.2 (21 May 2021 20:00), Max: 97.7 (21 May 2021 06:00)  HR: 50 (21 May 2021 20:00) (49 - 67)  BP: 102/53 (21 May 2021 20:00) (78/47 - 128/62)  BP(mean): 75 (21 May 2021 20:00) (58 - 89)  RR: 17 (21 May 2021 20:00) (12 - 25)  SpO2: 99% (21 May 2021 20:00) (95% - 100%)  I&O's Summary    20 May 2021 07:01  -  21 May 2021 07:00  --------------------------------------------------------  IN: 2473.1 mL / OUT: 1130 mL / NET: 1343.1 mL    21 May 2021 07:01  -  21 May 2021 20:57  --------------------------------------------------------  IN: 240 mL / OUT: 675 mL / NET: -435 mL        MEDICATIONS  (STANDING):  brimonidine 0.2% Ophthalmic Solution 1 Drop(s) Both EYES every 8 hours  chlorhexidine 4% Liquid 1 Application(s) Topical <User Schedule>  dorzolamide 2%/timolol 0.5% Ophthalmic Solution 1 Drop(s) Both EYES two times a day  heparin   Injectable 5000 Unit(s) SubCutaneous every 8 hours  latanoprost 0.005% Ophthalmic Solution 1 Drop(s) Both EYES at bedtime  norepinephrine Infusion 0.05 MICROgram(s)/kG/Min (9.06 mL/Hr) IV Continuous <Continuous>  pilocarpine 4% Solution 1 Drop(s) Both EYES every 8 hours  piperacillin/tazobactam IVPB.. 3.375 Gram(s) IV Intermittent every 12 hours    MEDICATIONS  (PRN):        LABS                                            8.0                   Neurophils% (auto):   74.4   (05-21 @ 04:26):    11.79)-----------(158          Lymphocytes% (auto):  16.3                                          26.2                   Eosinphils% (auto):   0.7      Manual%: Neutrophils x    ; Lymphocytes x    ; Eosinophils x    ; Bands%: x    ; Blasts x                                    144    |  111    |  48                  Calcium: 7.5   / iCa: x      (05-21 @ 04:26)    ----------------------------<  120       Magnesium: 1.7                              3.8     |  19     |  2.89             Phosphorous: 3.6      TPro  5.0    /  Alb  2.0    /  TBili  0.3    /  DBili  x      /  AST  16     /  ALT  15     /  AlkPhos  103    21 May 2021 04:26    ( 05-21 @ 04:26 )   PT: 17.6 sec;   INR: 1.50 ratio  aPTT: 35.9 sec      Leia Galindo [facility]- [facility] ex [id number] MICU Transfer Note    Transfer from: MICU    Transfer to: (  ) Medicine    ( x ) Telemetry     (   ) RCU        (    ) Palliative         (   ) Stroke Unit          (   ) __________________    Accepting physican:      MICU COURSE:  89 year old male with pmhx MDS, a fib on eliquis, ckd, BPH w urinary retention requiring self-catheterization, peritonitis from duodenal perforation s/p ex lap with pyloric excluding gastrojejunostomy and ethan patch (4/21) who is presenting with sepsis 2/2 UTI. The patient was admitted to the MICU for Vasopressor support. During MICU stay midodrine was attempted however, the patient was noted to have (+) bradycardia hrt rate noted to be high 30's to 40's at times. The patient remained a symptomatic . Urine culture (+) for enterobacter MRSA swab also (+). Piptazo and Vanco by level.         ASSESSMENT & PLAN:     89 year old male with pmhx MDS, a fib on eliquis, ckd, BPH w urinary retention requiring self-catheterization, peritonitis from duodenal perforation s/p ex lap with pyloric excluding gastrojejunostomy and ethan patch (4/21) who is presenting with sepsis 2/2 UTI     #Neuro  No Active events   - currently baseline mental status, alert oriented x3   - PT/OT f/u   -CT head no evidence of skull fracture, intracranial hemorrhage or mass effect.  -CT head no cervical spine fracture or traumatic malalignment      #CV  Distributive Shock secondary to UTI   - Vaso pressors now off   - continue to monitor VS q 4 x 72 hrs        Bradycardia   -a fib w slow ventricular, HR currently 50s-60s  -hold home beta blockers   -EP following, appreciate recs   - will need telemetry   A fib  - Hold AC at this time will resume once sepsis is resolved   - telemetry monitoring     CHF  -BNP 2685  - s/p lasix will monitor urine out put     #Resp  -Saturating 97% on 4L NC   - encourage cough and deep breathing     #Renal  MICH on CKD - likely prerenal vs. ATN in setting of septic shock   - Beckman in place, with ample urine output   - strict i/o   - monitor electrolytes   #GI  Peritonitis   -4/21 duodenal perforation s/p ex lap with pyloric excluding gastrojejunostomy and ethan patch   - benign abdominal exam, surgical wound open healing by second intention, no evidence of infection/drainage/warmth/tenderness   -CT abdomen Trace ascites, decreased. Inflammatory changes in the right upper quadrant, significantly improved from prior exam  -surgery consulted, no plan for surgical intervention     #ID  Sepsis  -2/2 UTI, UCx gram negative (+) enterobacter  -s/p vanc/zosyn   - MRSA (+) will dose vanco by level   -c/w zosyn   -f/u BCx       #Endo  Hyperglycemia   -Resolved   - f/u cortisol level in the am     #Heme  MDS  -Hematologist Dr. Drummond ([phone])  -On aranesp  -no active bleeding     #DVT  -SCDs      For Followup:  - EP   - PT/OT   - AC resumption         Vital Signs Last 24 Hrs  T(C): 36.2 (21 May 2021 20:00), Max: 36.5 (21 May 2021 06:00)  T(F): 97.2 (21 May 2021 20:00), Max: 97.7 (21 May 2021 06:00)  HR: 50 (21 May 2021 20:00) (49 - 67)  BP: 102/53 (21 May 2021 20:00) (78/47 - 128/62)  BP(mean): 75 (21 May 2021 20:00) (58 - 89)  RR: 17 (21 May 2021 20:00) (12 - 25)  SpO2: 99% (21 May 2021 20:00) (95% - 100%)  I&O's Summary    20 May 2021 07:01  -  21 May 2021 07:00  --------------------------------------------------------  IN: 2473.1 mL / OUT: 1130 mL / NET: 1343.1 mL    21 May 2021 07:01  -  21 May 2021 20:57  --------------------------------------------------------  IN: 240 mL / OUT: 675 mL / NET: -435 mL        MEDICATIONS  (STANDING):  brimonidine 0.2% Ophthalmic Solution 1 Drop(s) Both EYES every 8 hours  chlorhexidine 4% Liquid 1 Application(s) Topical <User Schedule>  dorzolamide 2%/timolol 0.5% Ophthalmic Solution 1 Drop(s) Both EYES two times a day  heparin   Injectable 5000 Unit(s) SubCutaneous every 8 hours  latanoprost 0.005% Ophthalmic Solution 1 Drop(s) Both EYES at bedtime  norepinephrine Infusion 0.05 MICROgram(s)/kG/Min (9.06 mL/Hr) IV Continuous <Continuous>  pilocarpine 4% Solution 1 Drop(s) Both EYES every 8 hours  piperacillin/tazobactam IVPB.. 3.375 Gram(s) IV Intermittent every 12 hours    MEDICATIONS  (PRN):        LABS                                            8.0                   Neurophils% (auto):   74.4   (05-21 @ 04:26):    11.79)-----------(158          Lymphocytes% (auto):  16.3                                          26.2                   Eosinphils% (auto):   0.7      Manual%: Neutrophils x    ; Lymphocytes x    ; Eosinophils x    ; Bands%: x    ; Blasts x                                    144    |  111    |  48                  Calcium: 7.5   / iCa: x      (05-21 @ 04:26)    ----------------------------<  120       Magnesium: 1.7                              3.8     |  19     |  2.89             Phosphorous: 3.6      TPro  5.0    /  Alb  2.0    /  TBili  0.3    /  DBili  x      /  AST  16     /  ALT  15     /  AlkPhos  103    21 May 2021 04:26    ( 05-21 @ 04:26 )   PT: 17.6 sec;   INR: 1.50 ratio  aPTT: 35.9 sec      Leia Galindo [facility]- BC ex [id number] MICU Transfer Note    Transfer from: MICU    Transfer to: (  ) Medicine    ( x ) Telemetry     (   ) RCU        (    ) Palliative         (   ) Stroke Unit          (   ) __________________    Accepting physican: Dr. Lemus       MICU COURSE:  89 year old male with pmhx MDS, a fib on eliquis, ckd, BPH w urinary retention requiring self-catheterization, peritonitis from duodenal perforation s/p ex lap with pyloric excluding gastrojejunostomy and ethan patch (4/21) who is presenting with sepsis 2/2 UTI. The patient was admitted to the MICU for Vasopressor support. During MICU stay midodrine was attempted however, the patient was noted to have (+) bradycardia hrt rate noted to be high 30's to 40's at times. The patient remained a symptomatic . Urine culture (+) for enterobacter MRSA swab also (+). Piptazo and Vanco by level.         ASSESSMENT & PLAN:     89 year old male with pmhx MDS, a fib on eliquis, ckd, BPH w urinary retention requiring self-catheterization, peritonitis from duodenal perforation s/p ex lap with pyloric excluding gastrojejunostomy and ethan patch (4/21) who is presenting with sepsis 2/2 UTI     #Neuro  No Active events   - currently baseline mental status, alert oriented x3   - PT/OT f/u   -CT head no evidence of skull fracture, intracranial hemorrhage or mass effect.  -CT head no cervical spine fracture or traumatic malalignment      #CV  Distributive Shock secondary to UTI   - Vaso pressors now off   - continue to monitor VS q 4 x 72 hrs        Bradycardia   -a fib w slow ventricular, HR currently 50s-60s  -hold home beta blockers   -EP following, appreciate recs   - will need telemetry   A fib  - Hold AC at this time will resume once sepsis is resolved   - telemetry monitoring     CHF  -BNP 2685  - s/p lasix will monitor urine out put     #Resp  -Saturating 97% on 4L NC   - encourage cough and deep breathing     #Renal  MICH on CKD - likely prerenal vs. ATN in setting of septic shock   - Beckman in place, with ample urine output   - strict i/o   - monitor electrolytes   #GI  Peritonitis   -4/21 duodenal perforation s/p ex lap with pyloric excluding gastrojejunostomy and ethan patch   - benign abdominal exam, surgical wound open healing by second intention, no evidence of infection/drainage/warmth/tenderness   -CT abdomen Trace ascites, decreased. Inflammatory changes in the right upper quadrant, significantly improved from prior exam  -surgery consulted, no plan for surgical intervention     #ID  Sepsis  -2/2 UTI, UCx gram negative (+) enterobacter  -s/p vanc/zosyn   - MRSA (+) will dose vanco by level   -c/w zosyn   -f/u BCx       #Endo  Hyperglycemia   -Resolved   - f/u cortisol level in the am     #Heme  MDS  -Hematologist Dr. Drummond ([phone])  -On aranesp  -no active bleeding     #DVT  -SCDs      For Followup:  - EP   - PT/OT   - AC resumption         Vital Signs Last 24 Hrs  T(C): 36.2 (21 May 2021 20:00), Max: 36.5 (21 May 2021 06:00)  T(F): 97.2 (21 May 2021 20:00), Max: 97.7 (21 May 2021 06:00)  HR: 50 (21 May 2021 20:00) (49 - 67)  BP: 102/53 (21 May 2021 20:00) (78/47 - 128/62)  BP(mean): 75 (21 May 2021 20:00) (58 - 89)  RR: 17 (21 May 2021 20:00) (12 - 25)  SpO2: 99% (21 May 2021 20:00) (95% - 100%)  I&O's Summary    20 May 2021 07:01  -  21 May 2021 07:00  --------------------------------------------------------  IN: 2473.1 mL / OUT: 1130 mL / NET: 1343.1 mL    21 May 2021 07:01  -  21 May 2021 20:57  --------------------------------------------------------  IN: 240 mL / OUT: 675 mL / NET: -435 mL        MEDICATIONS  (STANDING):  brimonidine 0.2% Ophthalmic Solution 1 Drop(s) Both EYES every 8 hours  chlorhexidine 4% Liquid 1 Application(s) Topical <User Schedule>  dorzolamide 2%/timolol 0.5% Ophthalmic Solution 1 Drop(s) Both EYES two times a day  heparin   Injectable 5000 Unit(s) SubCutaneous every 8 hours  latanoprost 0.005% Ophthalmic Solution 1 Drop(s) Both EYES at bedtime  norepinephrine Infusion 0.05 MICROgram(s)/kG/Min (9.06 mL/Hr) IV Continuous <Continuous>  pilocarpine 4% Solution 1 Drop(s) Both EYES every 8 hours  piperacillin/tazobactam IVPB.. 3.375 Gram(s) IV Intermittent every 12 hours    MEDICATIONS  (PRN):        LABS                                            8.0                   Neurophils% (auto):   74.4   (05-21 @ 04:26):    11.79)-----------(158          Lymphocytes% (auto):  16.3                                          26.2                   Eosinphils% (auto):   0.7      Manual%: Neutrophils x    ; Lymphocytes x    ; Eosinophils x    ; Bands%: x    ; Blasts x                                    144    |  111    |  48                  Calcium: 7.5   / iCa: x      (05-21 @ 04:26)    ----------------------------<  120       Magnesium: 1.7                              3.8     |  19     |  2.89             Phosphorous: 3.6      TPro  5.0    /  Alb  2.0    /  TBili  0.3    /  DBili  x      /  AST  16     /  ALT  15     /  AlkPhos  103    21 May 2021 04:26    ( 05-21 @ 04:26 )   PT: 17.6 sec;   INR: 1.50 ratio  aPTT: 35.9 sec      Leia Galindo [facility]- BC ex [id number]

## 2021-05-21 NOTE — PROGRESS NOTE ADULT - SUBJECTIVE AND OBJECTIVE BOX
Patient is a 89y old  Male who presents with a chief complaint of bradycardia (21 May 2021 17:02)       INTERVAL HPI/OVERNIGHT EVENTS:  Patient seen and evaluated at bedside.  Pt is resting comfortable in NAD.     Allergies    Cipro (Swelling)  Cipro (Unknown)  Levaquin (Unknown)  sulfa drugs (Rash)  sulfa drugs (Unknown)    Intolerances        Vital Signs Last 24 Hrs  T(C): 36.2 (21 May 2021 20:00), Max: 36.5 (21 May 2021 06:00)  T(F): 97.2 (21 May 2021 20:00), Max: 97.7 (21 May 2021 06:00)  HR: 52 (21 May 2021 22:00) (50 - 67)  BP: 117/57 (21 May 2021 22:00) (78/47 - 128/62)  BP(mean): 82 (21 May 2021 22:00) (58 - 89)  RR: 17 (21 May 2021 22:00) (12 - 25)  SpO2: 98% (21 May 2021 22:00) (95% - 100%)    LABS:                        8.0    11.79 )-----------( 158      ( 21 May 2021 04:26 )             26.2     05-21    144  |  111<H>  |  48<H>  ----------------------------<  120<H>  3.8   |  19<L>  |  2.89<H>    Ca    7.5<L>      21 May 2021 04:26  Phos  3.6     05-21  Mg     1.7     05-21    TPro  5.0<L>  /  Alb  2.0<L>  /  TBili  0.3  /  DBili  x   /  AST  16  /  ALT  15  /  AlkPhos  103  05-21    PT/INR - ( 21 May 2021 04:26 )   PT: 17.6 sec;   INR: 1.50 ratio         PTT - ( 21 May 2021 04:26 )  PTT:35.9 sec    CAPILLARY BLOOD GLUCOSE          Lower Extremity Physical Exam:  Vasular: DP 2/4, PT non palp possibly due to edema B/L, CFT <3 seconds B/L, Temperature gradient WNL, B/L.   Neuro: unable to assess  Musculoskeletal/Ortho: hammertoes 2-5 bilat  Skin: left 2nd toe ulcer to subQ with overlying scabbing with no drainage, no signs of infection, chronic in nature likely secondary to previous blister, toenails thickened elongated dystrophic with subungual debris x10

## 2021-05-21 NOTE — PROGRESS NOTE ADULT - SUBJECTIVE AND OBJECTIVE BOX
DATE OF SERVICE: 05-21-21 @ 09:10    HPI:     89y M    h/o   MDS, CKD, Afib on eliquis, BPH with intermittent self-catheterization, CHF    perforation/ ethan patch on 4/21     , presents to ED [facility] from assisted living  by  dr blanquita lincoln,  after he was found to be hypotensive and bradycardic,      .  was given some IV fluids but did not improve so they called 911.       reporting the pt is normally verbal and this is a deviation from his baseline.      still   hypotensive in er (19 May 2021 18:37)      Interval Events  MICU, looks better alert nad why am I in the hospital?  explained UTI etc,  BOP still lo, on pressore, Ht Rate better 60  feels ok no co, has rivera.  SCr same but she there before and erecovered.. if decreases further should get Procrit as usuasl, it does elp, occ needed PRBCs  see meds see labs see notes   disc c Dtr Darren, (RN) and with dr Stewart today    MEDICATIONS  (STANDING):  brimonidine 0.2% Ophthalmic Solution 1 Drop(s) Both EYES every 8 hours  chlorhexidine 4% Liquid 1 Application(s) Topical <User Schedule>  dorzolamide 2%/timolol 0.5% Ophthalmic Solution 1 Drop(s) Both EYES two times a day  heparin   Injectable 5000 Unit(s) SubCutaneous every 8 hours  latanoprost 0.005% Ophthalmic Solution 1 Drop(s) Both EYES at bedtime  midodrine 10 milliGRAM(s) Oral every 8 hours  norepinephrine Infusion 0.05 MICROgram(s)/kG/Min (9.06 mL/Hr) IV Continuous <Continuous>  pilocarpine 4% Solution 1 Drop(s) Both EYES every 8 hours  piperacillin/tazobactam IVPB.. 3.375 Gram(s) IV Intermittent every 12 hours    MEDICATIONS  (PRN):      Patient is a 89y old  Male who presents with a chief complaint of bradycardia (21 May 2021 08:53)      REVIEW OF SYSTEMS    General:nad "Im fine"  Skin/Breast:no co no ch  Ophthalmologic:sees but ack lo v  ENMT:	hears ok  no co  Respiratory and Thorax:no cough no sp no sob  Cardiovascular:no cp no palp  Gastrointestinal:no nvcd  Genitourinary:no co  Musculoskeletal:	  Neurological:	  Psychiatric:	  Hematology/Lymphatics:	  Endocrine:	  Allergic/Immunologic:  AOSN	y      Vital Signs Last 24 Hrs  T(C): 36.5 (21 May 2021 08:00), Max: 42 (20 May 2021 12:00)  T(F): 97.7 (21 May 2021 08:00), Max: 107.6 (20 May 2021 12:00)  HR: 53 (21 May 2021 09:00) (49 - 85)  BP: 92/54 (21 May 2021 09:00) (75/43 - 129/57)  BP(mean): 68 (21 May 2021 09:00) (55 - 100)  RR: 14 (21 May 2021 09:00) (12 - 25)  SpO2: 97% (21 May 2021 09:00) (96% - 100%)    PHYSICAL EXAM:    Constitutional:BOP lo HR ok alert converses nad  H+N nc at o cb no tm  Eyes:martell cwnl sees  ENMT:hears swallows spaeaks ok  Neck:no cb no tm  Breasts:  Back:  Respiratory:ctab norrw  Cardiovascular:rrr  Gastrointestinal:soft nt obese  Genitourinary:rivera  Rectal:  Extremities:edema same some pitting  Vascular:  Neurological:alert spaeaks mnore clearly  Skin:abd wound , no ch  Lymph Nodes:  Musculoskeletal:  Psychiatric:calm coop    LABS  CBC Full  -  ( 21 May 2021 04:26 )  WBC Count : 11.79 K/uL  RBC Count : 2.67 M/uL  Hemoglobin : 8.0 g/dL  Hematocrit : 26.2 %  Platelet Count - Automated : 158 K/uL  Mean Cell Volume : 98.1 fl  Mean Cell Hemoglobin : 30.0 pg  Mean Cell Hemoglobin Concentration : 30.5 gm/dL  Auto Neutrophil # : 8.77 K/uL  Auto Lymphocyte # : 1.92 K/uL  Auto Monocyte # : 0.89 K/uL  Auto Eosinophil # : 0.08 K/uL  Auto Basophil # : 0.02 K/uL  Auto Neutrophil % : 74.4 %  Auto Lymphocyte % : 16.3 %  Auto Monocyte % : 7.5 %  Auto Eosinophil % : 0.7 %  Auto Basophil % : 0.2 %      05-21    144  |  111<H>  |  48<H>  ----------------------------<  120<H>  3.8   |  19<L>  |  2.89<H>    Ca    7.5<L>      21 May 2021 04:26  Phos  3.6     05-21  Mg     1.7     05-21    TPro  5.0<L>  /  Alb  2.0<L>  /  TBili  0.3  /  DBili  x   /  AST  16  /  ALT  15  /  AlkPhos  103  05-21      PT/INR - ( 21 May 2021 04:26 )   PT: 17.6 sec;   INR: 1.50 ratio         PTT - ( 21 May 2021 04:26 )  PTT:35.9 sec    Imaging:    Xray:    Echo:    CT:    MRI:    Tele:    Orders:    DAVID Lincoln [phone]

## 2021-05-21 NOTE — PROGRESS NOTE ADULT - ASSESSMENT
89 year old male with pmhx MDS, a fib on eliquis, ckd, BPH w urinary retention requiring self-catheterization, peritonitis from duodenal perforation s/p ex lap with pyloric excluding gastrojejunostomy and ethan patch (4/21) who is presenting with sepsis 2/2 UTI     #Neuro  AMS  - currently baseline mental status, alert oriented x3   -2/2 sepsis associated metabolic encephalopathy   -CT head no evidence of skull fracture, intracranial hemorrhage or mass effect.  -CT head no cervical spine fracture or traumatic malalignment  -Tx of sepsis as below     #CV  Hypotension  -resolved, off levophed gtt, on midodrine 10 q8h w/ hold parameters (hold for HR < 50)     Bradycardia   -a fib w slow ventricular, HR currently 50s-60s  -hold home beta blockers   -EP following, appreciate recs     A fib  - on home eliquis 2.5 BID  - Holding eliquis currently as Hgb 6.9 on admission, just weaned off pressor  - [id number] score 4 ----will address restart eliquis as patient medicaion condition further stablizes     CHF  -BNP 2685  -TTE 59%, normal systolic function   -takes 40mg PO lasix at home --- hold lasix in setting of septic shock     #Resp  - On RA  -CXR clear lungs   -Saturating 97% on 4L NC     #Renal  MICH on CKD  - Cr plateau, Cr 2.89 today 5/21; baseline Cr 2-2.5 in 4/2021   - Beckman in place, with ample urine output   - likely prerenal vs. ATN in setting of septic shock     #GI  Peritonitis   -4/21 duodenal perforation s/p ex lap with pyloric excluding gastrojejunostomy and ethan patch   - benign abdominal exam, surgical wound open healing by second intention, no evidence of infection/drainage/warmth/tenderness   -CT abdomen Trace ascites, decreased. Inflammatory changes in the right upper quadrant, significantly improved from prior exam  -surgery consulted, no plan for surgical intervention     #ID  Sepsis  -2/2 UTI, UCx gram negative  -s/p vanc/zosyn ----- discontinue vanc on 5/21   -c/w zosyn   -f/u BCx       #Endo  Hyperglycemia   -Resolved     #Heme  MDS  -Hematologist Dr. Drummond ([phone])  -On aranesp  -no active bleeding     #DVT  -SCDs

## 2021-05-21 NOTE — CONSULT NOTE ADULT - CONVERSATION DETAILS
Family meeting held with patient's two daughters Damari and Lasha. Pt is  but pt's wife has cognitive impairment and cannot make medical decisions for him (she is primary HCP). Damari is 1st alternate and Lasha is 2nd alternate HCP. Meeting with daughters to discuss pt's prognosis and GOC. They endorsed that pt had developed COVID last year and was hospitalized and when that had occurred, they had made him DNR because he had previously endorsed that that was what he wanted and had documented that in his living will. However, when he found out he was DNR, he had yelled at them "are you trying to kill me?" and they had rescinded it. Since then, they had made him full code on future hospitalizations. Now, they are considering DNR again given his age and comorbidities. They stated that getting back to [facility] was his goal but they wer unsure if he would make it there as he needed to have a level of independence he has yet to regain since his ex-lap. They said he was miserable in rehab and a nursing home would likely cause him to feel very similarly. I explained that given his age and comorbidities, if his heart were to stop, he would very likely not return to his former functional status even if we were able to restart his heart. Knowing this, his daughters are very torn about what to do about advanced directives. I gave my support for the difficult situation they are in and explained that they don't need to make a decision now but that it is important for them to think about what he would want if he could partake in this discussion and he knew he would be unlikely to return to [facility] if he coded. They appreciated the information that was shared and said that they would discuss it over the weekend. I told them that palliative would be available to give support and follow-up with them next week. Family meeting held with patient's two daughters Damari and Lasha. Pt is  but pt's wife has cognitive impairment and cannot make medical decisions for him (she is primary HCP). Damari is 1st alternate and Lasha is 2nd alternate HCP. Meeting with daughters to discuss pt's prognosis and GOC. They endorsed that pt had developed COVID last year and was hospitalized and when that had occurred, they had made him DNR because he had previously endorsed that that was what he wanted and had documented that in his living will. However, when he found out he was DNR, he had yelled at them "are you trying to kill me?" and they had rescinded it. Since then, they had made him full code on future hospitalizations. Now, they are considering DNR again given his age and comorbidities. They stated that getting back to [facility] was his goal but they were unsure if he would make it there as he needed to have a level of independence he has yet to regain since his ex-lap. They said he was miserable in rehab and a nursing home would likely cause him to feel very similarly. I explained that given his age and comorbidities, if his heart were to stop, he would very likely not return to his former functional status even if we were able to restart his heart. Knowing this, his daughters are very torn about what to do about advanced directives. I gave my support for the difficult situation they are in and explained that they don't need to make a decision now but that it is important for them to think about what he would want if he could partake in this discussion and he knew he would be unlikely to return to group home if he coded. They appreciated the information that was shared and said that they would discuss it over the weekend. I told them that palliative would be available to give support and follow-up with them next week.

## 2021-05-21 NOTE — CONSULT NOTE ADULT - PROBLEM SELECTOR RECOMMENDATION 4
GOC initiated with daughters who make decisions together. Discussed code status but decision difficult given history. GOC documentation above.  - Full Code  - Will continue to follow for further GOC GOC initiated with daughters who make decisions together. Discussed code status but decision difficult given history. GOC documentation above. >16 minutes spent on ACP  - Full Code  - Will continue to follow for further GOC

## 2021-05-21 NOTE — CONSULT NOTE ADULT - ATTENDING COMMENTS
89yo M admitted with septic shock secondary to UTI, previous ICU admission for duodenal perforation. Palliative consulted for advance care planning due to advanced age, multiple ICU admissions and medical comorbidities. meeting held with patients daughters today, code status discussed at length. HCP in chart naming wife (with dementia) and daughters as alternates. Family considering [facility] but had concerns given previous DNR status and patient response when he clinically recovered. Emotional support provided and will remain available for ongoing discussion with family. follow up likely next week. please page if acute issues [phone]

## 2021-05-21 NOTE — DIETITIAN INITIAL EVALUATION ADULT. - PERTINENT MEDS FT
MEDICATIONS  (STANDING):  brimonidine 0.2% Ophthalmic Solution 1 Drop(s) Both EYES every 8 hours  chlorhexidine 4% Liquid 1 Application(s) Topical <User Schedule>  dorzolamide 2%/timolol 0.5% Ophthalmic Solution 1 Drop(s) Both EYES two times a day  heparin   Injectable 5000 Unit(s) SubCutaneous every 8 hours  latanoprost 0.005% Ophthalmic Solution 1 Drop(s) Both EYES at bedtime  midodrine 10 milliGRAM(s) Oral every 8 hours  norepinephrine Infusion 0.05 MICROgram(s)/kG/Min (9.06 mL/Hr) IV Continuous <Continuous>  pilocarpine 4% Solution 1 Drop(s) Both EYES every 8 hours  piperacillin/tazobactam IVPB.. 3.375 Gram(s) IV Intermittent every 12 hours

## 2021-05-21 NOTE — PROGRESS NOTE ADULT - SUBJECTIVE AND OBJECTIVE BOX
ep    PROGRESS  NOTE   ________________________________________________    CHIEF COMPLAINT:Patient is a 89y old  Male who presents with a chief complaint of bradycardia (20 May 2021 19:54)  no complain.  	  REVIEW OF SYSTEMS:  CONSTITUTIONAL: No fever, weight loss, or fatigue  EYES: No eye pain, visual disturbances, or discharge  ENT:  No difficulty hearing, tinnitus, vertigo; No sinus or throat pain  NECK: No pain or stiffness  RESPIRATORY: No cough, wheezing, chills or hemoptysis; No Shortness of Breath  CARDIOVASCULAR: No chest pain, palpitations, passing out, dizziness, or leg swelling  GASTROINTESTINAL: No abdominal or epigastric pain. No nausea, vomiting, or hematemesis; No diarrhea or constipation. No melena or hematochezia.  GENITOURINARY: No dysuria, frequency, hematuria, or incontinence  NEUROLOGICAL: No headaches, memory loss, loss of strength, numbness, or tremors  SKIN: No itching, burning, rashes, or lesions   LYMPH Nodes: No enlarged glands  ENDOCRINE: No heat or cold intolerance; No hair loss  MUSCULOSKELETAL: No joint pain or swelling; No muscle, back, or extremity pain  PSYCHIATRIC: No depression, anxiety, mood swings, or difficulty sleeping  HEME/LYMPH: No easy bruising, or bleeding gums  ALLERGY AND IMMUNOLOGIC: No hives or eczema	    [ ] All others negative	  [x ] Unable to obtain    PHYSICAL EXAM:  T(C): 36.5 (05-21-21 @ 06:00), Max: 42 (05-20-21 @ 12:00)  HR: 57 (05-21-21 @ 07:00) (49 - 85)  BP: 96/54 (05-21-21 @ 07:00) (75/43 - 129/57)  RR: 16 (05-21-21 @ 07:00) (12 - 25)  SpO2: 99% (05-21-21 @ 07:00) (96% - 100%)  Wt(kg): --  I&O's Summary    20 May 2021 07:01  -  21 May 2021 07:00  --------------------------------------------------------  IN: 2473.1 mL / OUT: 1130 mL / NET: 1343.1 mL        Appearance: Normal	  HEENT:   Normal oral mucosa, PERRL, EOMI	  Lymphatic: No lymphadenopathy  Cardiovascular: Normal S1 S2, No JVD, + murmurs, No edema  Respiratory: Lungs clear to auscultation	  Gastrointestinal:  Soft, Non-tender, + BS	  Skin: No rashes, No ecchymoses, No cyanosis	  Neurologic: Non-focal  Extremities: Normal range of motion, No clubbing, cyanosis or edema  Vascular: Peripheral pulses palpable 2+ bilaterally    MEDICATIONS  (STANDING):  brimonidine 0.2% Ophthalmic Solution 1 Drop(s) Both EYES every 8 hours  chlorhexidine 4% Liquid 1 Application(s) Topical <User Schedule>  dorzolamide 2%/timolol 0.5% Ophthalmic Solution 1 Drop(s) Both EYES two times a day  heparin   Injectable 5000 Unit(s) SubCutaneous every 8 hours  latanoprost 0.005% Ophthalmic Solution 1 Drop(s) Both EYES at bedtime  midodrine 10 milliGRAM(s) Oral every 8 hours  norepinephrine Infusion 0.05 MICROgram(s)/kG/Min (9.06 mL/Hr) IV Continuous <Continuous>  pilocarpine 4% Solution 1 Drop(s) Both EYES every 8 hours  piperacillin/tazobactam IVPB.. 3.375 Gram(s) IV Intermittent every 12 hours      TELEMETRY: 	    ECG:  	  RADIOLOGY:  OTHER: 	  	  LABS:	 	    CARDIAC MARKERS:  CARDIAC MARKERS ( 19 May 2021 18:33 )  x     / x     / 28 U/L / x     / 4.2 ng/mL  CARDIAC MARKERS ( 19 May 2021 16:35 )  x     / x     / 41 U/L / x     / 5.0 ng/mL                                8.0    11.79 )-----------( 158      ( 21 May 2021 04:26 )             26.2     05-21    144  |  111<H>  |  48<H>  ----------------------------<  120<H>  3.8   |  19<L>  |  2.89<H>    Ca    7.5<L>      21 May 2021 04:26  Phos  3.6     05-21  Mg     1.7     05-21    TPro  5.0<L>  /  Alb  2.0<L>  /  TBili  0.3  /  DBili  x   /  AST  16  /  ALT  15  /  AlkPhos  103  05-21    proBNP: Serum Pro-Brain Natriuretic Peptide: 2685 pg/mL (05-19 @ 18:33)    Lipid Profile:   HgA1c:   TSH: Thyroid Stimulating Hormone, Serum: 2.45 uIU/mL (05-19 @ 20:44)    PT/INR - ( 21 May 2021 04:26 )   PT: 17.6 sec;   INR: 1.50 ratio         PTT - ( 21 May 2021 04:26 )  PTT:35.9 sec    < from: US TTE 2D F/U, Limited w/o Contrast (ED) (05.19.21 @ 17:22) >  INTERPRETATION:  A focused transthoracic cardiac ultrasound examination was performed.  No significant pericardial effusion was present.  Reduced cardiac contractility however the patient was significantly bradycardic during the exam  Bilateral pleural effusions present  IVC was plethoric  Mitral regurgitation present    IMPRESSION:  No Pericardial Effusion.  IVC was plethoric        Assessment and plan  ---------------------------  89y M MDS, CKD, Afib on Eliquis BPH with intermittent self-catheterization, CHF, presents to ED BIBEMS from assisted living after he was found to be hypotensive and bradycardiac.  was given some IV fluids but did not improve so they called 911.   pt is well known to me with last admission , pmd Dr Lemus asked me to see pt.  pt with hx of a.fib, bradycardia and a.fib with hypotension and bradycardia  continue pressors keep MAP>65  abx   fu cultures  echo  hr is overall controlled

## 2021-05-21 NOTE — DIETITIAN INITIAL EVALUATION ADULT. - ETIOLOGY
insufficient protein, energy intake 2/2 recent insufficient protein, energy intake 2/2 recent abd surgery

## 2021-05-21 NOTE — CONSULT NOTE ADULT - SUBJECTIVE AND OBJECTIVE BOX
HPI:     89y M    h/o   MDS, CKD, Afib on eliquis, BPH with intermittent self-catheterization, CHF    perforation/ ethan patch on 4/21     , presents to ED BIBEMS from assisted living  by  dr blanquita lincoln,  after he was found to be hypotensive and bradycardic,      .  was given some IV fluids but did not improve so they called 911.       reporting the pt is normally verbal and this is a deviation from his baseline.      still   hypotensive in er (19 May 2021 18:37)    Per family, pt was living independently in [place] prior to hospitalization 1 month ago when he came in for abd pain and was found to have a perforated duodenal ulcer. He underwent ex-lap repar and was discharged to rehab. Daughters endorse that ever since his surgery, he has been confused and paranoid and physically weaker, unable to care for himself and unable to return to his longterm. At rehab he became hypotensive and was sent to ED where he was found to be in septic shock 2/2 UTI shortly after admission. He was transferred to MICU for IVP support. Currently weaned off pressors but still confused.    PERTINENT PM/SXH:   Benign Hypertension    BPH (Benign Prostatic Hypertrophy)    MDS (Myelodysplastic Syndrome)    Glaucoma    Anemia    Chronic constipation    Gall stones    Nocturia associated with benign prostatic hypertrophy    Osteomyelitis of arm    Choledocholithiasis    CKD (chronic kidney disease) stage 3, GFR 30-59 ml/min    BPH (benign prostatic hyperplasia)    MDS (myelodysplastic syndrome)    Anemia    Glaucoma      S/P Cholecystectomy    S/P TURP    S/P Hernia Surgery    History of cataract extraction    Osteomyelitis    Laceration of finger, left, with tendon    Mohs defect of nose    No significant past surgical history      FAMILY HISTORY:  Family history of prostate cancer    FH: CVA (cerebrovascular accident)  father    FH: arthritis  mother      ITEMS NOT CHECKED ARE NOT PRESENT    SOCIAL HISTORY:   Significant other/partner[X]  Children[X]  Episcopalian/Spirituality:  Substance hx:  [ ]   Tobacco hx:  [ ]   Alcohol hx: [ ]   Home Opioid hx:  [ ] I-Stop Reference No:  Living Situation: [X] longterm [ ]Home  [ ]Long term care  [ ]Rehab [ ]Other    ADVANCE DIRECTIVES:    DNR  MOLST  [ ]  Living Will  [X]   DECISION MAKER(s):  [X] Health Care Proxy(s)  [ ] Surrogate(s)  [ ] Guardian           Name(s): Damari Julien  Phone Number(s): [phone]    BASELINE (I)ADL(s) (prior to admission):  [place]: [ ]Total  [X] Moderate [ ]Dependent    Allergies    Cipro (Swelling)  Cipro (Unknown)  Levaquin (Unknown)  sulfa drugs (Rash)  sulfa drugs (Unknown)    Intolerances    MEDICATIONS  (STANDING):  brimonidine 0.2% Ophthalmic Solution 1 Drop(s) Both EYES every 8 hours  chlorhexidine 4% Liquid 1 Application(s) Topical <User Schedule>  dorzolamide 2%/timolol 0.5% Ophthalmic Solution 1 Drop(s) Both EYES two times a day  furosemide   Injectable 20 milliGRAM(s) IV Push once  heparin   Injectable 5000 Unit(s) SubCutaneous every 8 hours  latanoprost 0.005% Ophthalmic Solution 1 Drop(s) Both EYES at bedtime  midodrine 10 milliGRAM(s) Oral every 8 hours  norepinephrine Infusion 0.05 MICROgram(s)/kG/Min (9.06 mL/Hr) IV Continuous <Continuous>  pilocarpine 4% Solution 1 Drop(s) Both EYES every 8 hours  piperacillin/tazobactam IVPB.. 3.375 Gram(s) IV Intermittent every 12 hours    MEDICATIONS  (PRN):    PRESENT SYMPTOMS:  Source if other than patient:  [X]Family   [X]Team     Pain: [ ]yes [ ]no  QOL impact -   Location -                    Aggravating factors -  Quality -  Radiation -  Timing-  Severity (0-10 scale):  Minimal acceptable level (0-10 scale):     CPOT:    https://www.[facility].org/getattachment/[email or web address]/Critical-Care-Pain-Observation-Tool-(CPOT)      PAIN AD Score: 0    http://geriatrictoolkit.[place].[facility]/cog/painad.pdf (press ctrl +  left click to view)    Dyspnea:                           [ ]Mild [ ]Moderate [ ]Severe  Anxiety:                             [ ]Mild [ ]Moderate [ ]Severe  Fatigue:                             [ ]Mild [ ]Moderate [ ]Severe  Nausea:                             [ ]Mild [ ]Moderate [ ]Severe  Loss of appetite:              [ ]Mild [ ]Moderate [ ]Severe  Constipation:                    [ ]Mild [ ]Moderate [ ]Severe    Other Symptoms:  [X]All other review of systems negative     Palliative Performance Status Version 2:       40%    http://[facility].org/files/news/palliative_performance_scale_ppsv2.pdf  PHYSICAL EXAM:   Vital Signs Last 24 Hrs  T(C): 36.3 (21 May 2021 16:00), Max: 36.5 (20 May 2021 20:00)  T(F): 97.3 (21 May 2021 16:00), Max: 97.7 (20 May 2021 20:00)  HR: 56 (21 May 2021 16:00) (49 - 85)  BP: 101/50 (21 May 2021 16:00) (78/47 - 128/62)  BP(mean): 72 (21 May 2021 16:00) (58 - 100)  RR: 17 (21 May 2021 16:00) (12 - 25)  SpO2: 99% (21 May 2021 16:00) (95% - 100%) I&O's Summary    20 May 2021 07:01  -  21 May 2021 07:00  --------------------------------------------------------  IN: 2473.1 mL / OUT: 1130 mL / NET: 1343.1 mL    21 May 2021 07:01  -  21 May 2021 17:02  --------------------------------------------------------  IN: 240 mL / OUT: 275 mL / NET: -35 mL      GENERAL:  [X]Alert  [ ]Oriented x   [ ]Lethargic  [ ]Cachexia  [ ]Unarousable  [X]Verbal  [ ]Non-Verbal  Behavioral:   [ ] Anxiety  [ ] Delirium [ ] Agitation [ ] Other  HEENT:  [ ]Normal   [X]Dry mouth   [ ]ET Tube/Trach  [ ]Oral lesions  PULMONARY:   [X]Clear [ ]Tachypnea  [ ]Audible excessive secretions   [ ]Rhonchi        [ ]Right [ ]Left [ ]Bilateral  [ ]Crackles        [ ]Right [ ]Left [ ]Bilateral  [ ]Wheezing     [ ]Right [ ]Left [ ]Bilateral  [ ]Diminished breath sounds [ ]right [ ]left [ ]bilateral  CARDIOVASCULAR:    [X]Regular [ ]Irregular [ ]Tachy  [ ]Chau [ ]Murmur [ ]Other  GASTROINTESTINAL:  [X]Soft  [ ]Distended   [ ]+BS  [X]Non tender [ ]Tender  [ ]PEG [ ]OGT/ NGT  Last BM:   GENITOURINARY:  [ ]Normal [ ] Incontinent   [ ]Oliguria/Anuria   [ ]Beckman  MUSCULOSKELETAL:   [ ]Normal   [ ]Weakness  [ ]Bed/Wheelchair bound [ ]Edema  NEUROLOGIC:   [ ]No focal deficits  [ ]Cognitive impairment  [ ]Dysphagia [ ]Dysarthria [ ]Paresis [ ]Other   SKIN:   [ ]Normal    [ ]Rash  [ ]Pressure ulcer(s)       Present on admission [ ]y [ ]n    CRITICAL CARE:  [ ] Shock Present  [ ]Septic [ ]Cardiogenic [ ]Neurologic [ ]Hypovolemic  [ ]  Vasopressors [ ]  Inotropes   [ ]Respiratory failure present [ ]Mechanical ventilation [ ]Non-invasive ventilatory support [ ]High flow     [ ]Acute  [ ]Chronic [ ]Hypoxic  [ ]Hypercarbic [ ]Other  [ ]Other organ failure     LABS:                        8.0    11.79 )-----------( 158      ( 21 May 2021 04:26 )             26.2   05-21    144  |  111<H>  |  48<H>  ----------------------------<  120<H>  3.8   |  19<L>  |  2.89<H>    Ca    7.5<L>      21 May 2021 04:26  Phos  3.6     05-21  Mg     1.7     05-21    TPro  5.0<L>  /  Alb  2.0<L>  /  TBili  0.3  /  DBili  x   /  AST  16  /  ALT  15  /  AlkPhos  103  05-21  PT/INR - ( 21 May 2021 04:26 )   PT: 17.6 sec;   INR: 1.50 ratio      PTT - ( 21 May 2021 04:26 )  PTT:35.9 sec    Procalcitonin, Serum: 0.12 ng/mL (05-19-21 @ 16:35)      RADIOLOGY & ADDITIONAL STUDIES:  Radiology reviewed    PROTEIN CALORIE MALNUTRITION PRESENT: [ ]mild [ ]moderate [ ]severe [ ]underweight [ ]morbid obesity  https://www.andeal.org/vault/2440/web/files/ONC/Table_Clinical%20Characteristics%20to%20Document%20Malnutrition-White%20JV%20et%20al%202012.pdf    Height (cm): 167.6 (05-19-21 @ 23:20), 167.6 (04-22-21 @ 07:36), 167.6 (04-09-21 @ 17:40)  Weight (kg): 97.8 (05-19-21 @ 23:20), 102.1 (04-20-21 @ 17:05), 99.8 (04-09-21 @ 17:40)  BMI (kg/m2): 34.8 (05-19-21 @ 23:20), 36.3 (04-22-21 @ 07:36), 36.3 (04-20-21 @ 17:05)    [ ]PPSV2 < or = to 30% [ ]significant weight loss  [ ]poor nutritional intake  [ ]anasarca     Albumin, Serum: 2.0 g/dL (05-21-21 @ 04:26)   [ ]Artificial Nutrition      REFERRALS:   [ ]Chaplaincy  [ ]Hospice  [ ]Child Life  [ ]Social Work  [ ]Case management [ ]Holistic Therapy     Goals of Care Document:  HPI:     89y M    h/o   MDS, CKD, Afib on eliquis, BPH with intermittent self-catheterization, CHF    perforation/ ethan patch on 4/21     , presents to ED BIBEMS from assisted living  by  dr blanquita lincoln,  after he was found to be hypotensive and bradycardic,      .  was given some IV fluids but did not improve so they called 911.       reporting the pt is normally verbal and this is a deviation from his baseline.      still   hypotensive in er (19 May 2021 18:37)    Per family, pt was living independently in [place] prior to hospitalization 1 month ago when he came in for abd pain and was found to have a perforated duodenal ulcer. He underwent ex-lap repar and was discharged to rehab. Daughters endorse that ever since his surgery, he has been confused and paranoid and physically weaker, unable to care for himself and unable to return to his retirement. At rehab he became hypotensive and was sent to ED where he was found to be in septic shock 2/2 UTI shortly after admission. He was transferred to MICU for IVP support. Currently weaned off pressors but still confused.    PERTINENT PM/SXH:   Benign Hypertension    BPH (Benign Prostatic Hypertrophy)    MDS (Myelodysplastic Syndrome)    Glaucoma    Anemia    Chronic constipation    Gall stones    Nocturia associated with benign prostatic hypertrophy    Osteomyelitis of arm    Choledocholithiasis    CKD (chronic kidney disease) stage 3, GFR 30-59 ml/min    BPH (benign prostatic hyperplasia)    MDS (myelodysplastic syndrome)    Anemia    Glaucoma      S/P Cholecystectomy    S/P TURP    S/P Hernia Surgery    History of cataract extraction    Osteomyelitis    Laceration of finger, left, with tendon    Mohs defect of nose    No significant past surgical history      FAMILY HISTORY:  Family history of prostate cancer    FH: CVA (cerebrovascular accident)  father    FH: arthritis  mother      ITEMS NOT CHECKED ARE NOT PRESENT    SOCIAL HISTORY:   Significant other/partner[X]  Children[X]  Hinduism/Spirituality:  Substance hx:  [ ]   Tobacco hx:  [ ]   Alcohol hx: [ ]   Home Opioid hx:  [ ] I-Stop Reference No:  Living Situation: [X] retirement [ ]Home  [ ]Long term care  [ ]Rehab [ ]Other    ADVANCE DIRECTIVES:    DNR  MOLST  [ ]  Living Will  [X]   DECISION MAKER(s):  [X] Health Care Proxy(s)  [ ] Surrogate(s)  [ ] Guardian           Name(s): Damari Julien  Phone Number(s): [phone]    BASELINE (I)ADL(s) (prior to admission):  [place]: [ ]Total  [X] Moderate [ ]Dependent    Allergies    Cipro (Swelling)  Cipro (Unknown)  Levaquin (Unknown)  sulfa drugs (Rash)  sulfa drugs (Unknown)    Intolerances    MEDICATIONS  (STANDING):  brimonidine 0.2% Ophthalmic Solution 1 Drop(s) Both EYES every 8 hours  chlorhexidine 4% Liquid 1 Application(s) Topical <User Schedule>  dorzolamide 2%/timolol 0.5% Ophthalmic Solution 1 Drop(s) Both EYES two times a day  furosemide   Injectable 20 milliGRAM(s) IV Push once  heparin   Injectable 5000 Unit(s) SubCutaneous every 8 hours  latanoprost 0.005% Ophthalmic Solution 1 Drop(s) Both EYES at bedtime  midodrine 10 milliGRAM(s) Oral every 8 hours  norepinephrine Infusion 0.05 MICROgram(s)/kG/Min (9.06 mL/Hr) IV Continuous <Continuous>  pilocarpine 4% Solution 1 Drop(s) Both EYES every 8 hours  piperacillin/tazobactam IVPB.. 3.375 Gram(s) IV Intermittent every 12 hours    MEDICATIONS  (PRN):    PRESENT SYMPTOMS:  Source if other than patient:  [X]Family   [X]Team     Pain: [ ]yes [ ]no  QOL impact -   Location -                    Aggravating factors -  Quality -  Radiation -  Timing-  Severity (0-10 scale):  Minimal acceptable level (0-10 scale):     CPOT:    https://www.[facility].org/getattachment/[email or web address]/Critical-Care-Pain-Observation-Tool-(CPOT)      PAIN AD Score: 0    http://geriatrictoolkit.[place].[facility]/cog/painad.pdf (press ctrl +  left click to view)    Dyspnea:                           [ ]Mild [ ]Moderate [ ]Severe  Anxiety:                             [ ]Mild [ ]Moderate [ ]Severe  Fatigue:                             [ ]Mild [ ]Moderate [ ]Severe  Nausea:                             [ ]Mild [ ]Moderate [ ]Severe  Loss of appetite:              [ ]Mild [ ]Moderate [ ]Severe  Constipation:                    [ ]Mild [ ]Moderate [ ]Severe    Other Symptoms:  [X]All other review of systems negative     Palliative Performance Status Version 2:       40%    http://[facility].org/files/news/palliative_performance_scale_ppsv2.pdf  PHYSICAL EXAM:   Vital Signs Last 24 Hrs  T(C): 36.3 (21 May 2021 16:00), Max: 36.5 (20 May 2021 20:00)  T(F): 97.3 (21 May 2021 16:00), Max: 97.7 (20 May 2021 20:00)  HR: 56 (21 May 2021 16:00) (49 - 85)  BP: 101/50 (21 May 2021 16:00) (78/47 - 128/62)  BP(mean): 72 (21 May 2021 16:00) (58 - 100)  RR: 17 (21 May 2021 16:00) (12 - 25)  SpO2: 99% (21 May 2021 16:00) (95% - 100%) I&O's Summary    20 May 2021 07:01  -  21 May 2021 07:00  --------------------------------------------------------  IN: 2473.1 mL / OUT: 1130 mL / NET: 1343.1 mL    21 May 2021 07:01  -  21 May 2021 17:02  --------------------------------------------------------  IN: 240 mL / OUT: 275 mL / NET: -35 mL      GENERAL:  [X]Alert  [ ]Oriented x   [ ]Lethargic  [ ]Cachexia  [ ]Unarousable  [X]Verbal  [ ]Non-Verbal  Behavioral:   [ ] Anxiety  [ ] Delirium [ ] Agitation [ ] Other  HEENT:  [ ]Normal   [X]Dry mouth   [ ]ET Tube/Trach  [ ]Oral lesions  PULMONARY:   [X]Clear [ ]Tachypnea  [ ]Audible excessive secretions   [ ]Rhonchi        [ ]Right [ ]Left [ ]Bilateral  [ ]Crackles        [ ]Right [ ]Left [ ]Bilateral  [ ]Wheezing     [ ]Right [ ]Left [ ]Bilateral  [ ]Diminished breath sounds [ ]right [ ]left [ ]bilateral  CARDIOVASCULAR:    [X]Regular [ ]Irregular [ ]Tachy  [ ]Chau [ ]Murmur [ ]Other  GASTROINTESTINAL:  [X]Soft  [ ]Distended   [ ]+BS  [X]Non tender [ ]Tender  [ ]PEG [ ]OGT/ NGT  Last BM:   GENITOURINARY:  [ ]Normal [ ] Incontinent   [ ]Oliguria/Anuria   [x ]Beckman  MUSCULOSKELETAL:   [ ]Normal   [x ]Weakness  [ ]Bed/Wheelchair bound [ ]Edema  NEUROLOGIC: +metabolic encephalopathy  [ ]No focal deficits  [ ]Cognitive impairment  [ ]Dysphagia [ ]Dysarthria [ ]Paresis [x ]Other   SKIN:   [ ]Normal    [ ]Rash  [ ]Pressure ulcer(s)       Present on admission [ ]y [ ]n    CRITICAL CARE:  [ ] Shock Present  [ ]Septic [ ]Cardiogenic [ ]Neurologic [ ]Hypovolemic  [ ]  Vasopressors [ ]  Inotropes   [ ]Respiratory failure present [ ]Mechanical ventilation [ ]Non-invasive ventilatory support [ ]High flow     [ ]Acute  [ ]Chronic [ ]Hypoxic  [ ]Hypercarbic [ ]Other  [ x]Other organ failure - renal dysfunction    LABS:                        8.0    11.79 )-----------( 158      ( 21 May 2021 04:26 )             26.2   05-21    144  |  111<H>  |  48<H>  ----------------------------<  120<H>  3.8   |  19<L>  |  2.89<H>    Ca    7.5<L>      21 May 2021 04:26  Phos  3.6     05-21  Mg     1.7     05-21    TPro  5.0<L>  /  Alb  2.0<L>  /  TBili  0.3  /  DBili  x   /  AST  16  /  ALT  15  /  AlkPhos  103  05-21  PT/INR - ( 21 May 2021 04:26 )   PT: 17.6 sec;   INR: 1.50 ratio      PTT - ( 21 May 2021 04:26 )  PTT:35.9 sec    Procalcitonin, Serum: 0.12 ng/mL (05-19-21 @ 16:35)      RADIOLOGY & ADDITIONAL STUDIES:  < from: CT Abdomen and Pelvis No Cont (05.19.21 @ 15:11) >    EXAM:  CT ABDOMEN AND PELVIS                          EXAM:  CT CHEST                            PROCEDURE DATE:  05/19/2021            INTERPRETATION:  CLINICAL INFORMATION: Altered mental status. Bruises on body. Evaluate for injury.    COMPARISON: CT abdomen/pelvis 4/20/2021. CTA chest 7/3/2020    CONTRAST/COMPLICATIONS:  IV Contrast: None  Oral Contrast: None  Complications: None    PROCEDURE:  CT of the Chest, Abdomen and Pelvis was performed.  Sagittal and coronal reformats were performed.    FINDINGS:  CHEST:  LUNGS AND LARGE AIRWAYS: Patent central airways. Bilateral compressive atelectasis.  PLEURA: Small bilateral pleural effusions. No pneumothorax.  VESSELS: Atherosclerotic changes of the aorta and coronary arteries.  HEART: Biatrial enlargement. No pericardial effusion. Mitral annular calcification.  MEDIASTINUM AND ALEXI: No lymphadenopathy.  CHEST WALL AND LOWER NECK: Small hypodense thyroid nodules. Bilateral gynecomastia.    ABDOMEN AND PELVIS:  Limited evaluation of the abdominal and pelvic viscera due to lack of oral and IV contrast.    LIVER: Stable 1.4 cm cyst at the dome.  BILE DUCTS: Normal caliber.  GALLBLADDER: Cholecystectomy.  SPLEEN: Within normal limits.  PANCREAS: Multiple pancreatic cystic lesions, measuring up to 2.9 cm in the tail, unchanged  ADRENALS: Within normal limits.  KIDNEYS/URETERS: No hydronephrosis. Unchanged bilateral hypodense and hyperdense cystic lesions, incompletely characterized without intravenous contrast.    BLADDER: Within normal limits.  REPRODUCTIVE ORGANS: Prostate within normal limits.    BOWEL: Interval gastrojejunostomy. No bowel obstruction. Appendix is normal. Colonic diverticulosis.  PERITONEUM: Trace ascites, decreased. Inflammatory changes in the right upper quadrant, significantly improved from prior exam.  VESSELS: Atherosclerotic changes.  RETROPERITONEUM/LYMPH NODES: No lymphadenopathy.  ABDOMINAL WALL: Postsurgical changes.  BONES: Degenerative changes.    IMPRESSION:  Status post interval gastrojejunostomy. Inflammation in the right upper quadrant significantly improved from prior exam.    Small bilateral pleural effusions.    Russell Rodriguez MD; Fellow Radiology  This document has been electronically signed.  LENI GRAMAJO MD; Attending Radiologist  This document has been electronically signed. May 19 2021  4:50PM    < end of copied text >      PROTEIN CALORIE MALNUTRITION PRESENT: [ ]mild [ ]moderate [ x]severe [ ]underweight [ ]morbid obesity  https://www.andeal.org/vault/2440/web/files/ONC/Table_Clinical%20Characteristics%20to%20Document%20Malnutrition-White%20JV%20et%20al%690558.pdf    Height (cm): 167.6 (05-19-21 @ 23:20), 167.6 (04-22-21 @ 07:36), 167.6 (04-09-21 @ 17:40)  Weight (kg): 97.8 (05-19-21 @ 23:20), 102.1 (04-20-21 @ 17:05), 99.8 (04-09-21 @ 17:40)  BMI (kg/m2): 34.8 (05-19-21 @ 23:20), 36.3 (04-22-21 @ 07:36), 36.3 (04-20-21 @ 17:05)    [x ]PPSV2 < or = to 30% [ ]significant weight loss  [x ]poor nutritional intake  [ ]anasarca     Albumin, Serum: 2.0 g/dL (05-21-21 @ 04:26)   [ ]Artificial Nutrition      REFERRALS:   [ ]Chaplaincy  [ ]Hospice  [ ]Child Life  [x ]Social Work  [ ]Case management [ ]Holistic Therapy     Goals of Care Document:

## 2021-05-21 NOTE — PROGRESS NOTE ADULT - ASSESSMENT
89 y/o male pt w/ left foot 2nd toe ulcer to subQ with no signs of infection  - pt seen and evaluated   - left foot ulcer with no signs of infection  - rec painting with betadine daily and leaving open to air  - z flows in bed at all times  - toenails debrided x10 using sterile nippers  - all offending ingrowing nail boarders excised   - patient educated on proper foot care and importance of regular examinations   - recommend z flow boots at all times while in bed  - follow up as outpatient for routine care

## 2021-05-21 NOTE — PROGRESS NOTE ADULT - SUBJECTIVE AND OBJECTIVE BOX
PATIENT: DAVID MCKENZIE   AGE: o     CHIEF COMPLAINT:  Patient is a 89y old  Male who presents with a chief complaint of bradycardia (21 May 2021 07:58)      OVERNIGHT EVENTS:    SUBJECTIVE: Patient seen and examined at bedside.     REVIEW OF SYSTEM:      OBJECTIVE:    VITAL SIGNS:  ICU Vital Signs Last 24 Hrs  T(C): 36.5 (21 May 2021 06:00), Max: 42 (20 May 2021 12:00)  T(F): 97.7 (21 May 2021 06:00), Max: 107.6 (20 May 2021 12:00)  HR: 57 (21 May 2021 07:00) (49 - 85)  BP: 96/54 (21 May 2021 07:00) (75/43 - 129/57)  BP(mean): 72 (21 May 2021 07:00) (55 - 100)  ABP: --  ABP(mean): --  RR: 16 (21 May 2021 07:00) (12 - 25)  SpO2: 99% (21 May 2021 07:00) (96% - 100%)         @ 07:  -   @ 07:00  --------------------------------------------------------  IN: 2473.1 mL / OUT: 1130 mL / NET: 1343.1 mL      CAPILLARY BLOOD GLUCOSE      POCT Blood Glucose.: 129 mg/dL (19 May 2021 14:51)      I/O SUMMARY:    21 @ 07:  -  21 @ 07:00  --------------------------------------------------------  IN: 2473.1 mL / OUT: 1130 mL / NET: 1343.1 mL      PHYSICAL EXAM:      MEDICATIONS:  MEDICATIONS  (STANDING):  brimonidine 0.2% Ophthalmic Solution 1 Drop(s) Both EYES every 8 hours  chlorhexidine 4% Liquid 1 Application(s) Topical <User Schedule>  dorzolamide 2%/timolol 0.5% Ophthalmic Solution 1 Drop(s) Both EYES two times a day  heparin   Injectable 5000 Unit(s) SubCutaneous every 8 hours  latanoprost 0.005% Ophthalmic Solution 1 Drop(s) Both EYES at bedtime  midodrine 10 milliGRAM(s) Oral every 8 hours  norepinephrine Infusion 0.05 MICROgram(s)/kG/Min (9.06 mL/Hr) IV Continuous <Continuous>  pilocarpine 4% Solution 1 Drop(s) Both EYES every 8 hours  piperacillin/tazobactam IVPB.. 3.375 Gram(s) IV Intermittent every 12 hours    MEDICATIONS  (PRN):      ALLERGIES:  Allergies    Cipro (Swelling)  Cipro (Unknown)  Levaquin (Unknown)  sulfa drugs (Rash)  sulfa drugs (Unknown)    Intolerances        LABS:                        8.0    11.79 )-----------( 158      ( 21 May 2021 04:26 )             26.2         144  |  111<H>  |  48<H>  ----------------------------<  120<H>  3.8   |  19<L>  |  2.89<H>    Ca    7.5<L>      21 May 2021 04:26  Phos  3.6       Mg     1.7         TPro  5.0<L>  /  Alb  2.0<L>  /  TBili  0.3  /  DBili  x   /  AST  16  /  ALT  15  /  AlkPhos  103      LIVER FUNCTIONS - ( 21 May 2021 04:26 )  Alb: 2.0 g/dL / Pro: 5.0 g/dL / ALK PHOS: 103 U/L / ALT: 15 U/L / AST: 16 U/L / GGT: x           COAGULATION STUDIES:     aPTT  35.9 sec    (21 @ 04:26)     PT     17.6 sec    (21 @ 04:26)     INR    1.50 ratio         (21 @ 04:26), COAGULATION STUDIES:     aPTT  35.6 sec    (21 @ 00:13)     PT     17.9 sec    (21 @ 00:13)     INR    1.52 ratio         (21 @ 00:13), COAGULATION STUDIES:     aPTT  37.6 sec    (21 @ 14:57)     PT     21.0 sec    (21 @ 14:57)     INR    1.80 ratio         (21 @ 14:57)   PT/INR - ( 21 May 2021 04:26 )   PT: 17.6 sec;   INR: 1.50 ratio         PTT - ( 21 May 2021 04:26 )  PTT:35.9 sec  Urinalysis Basic - ( 19 May 2021 15:57 )    Color: Light Yellow / Appearance: Slightly Turbid / S.009 / pH: x  Gluc: x / Ketone: Negative  / Bili: Negative / Urobili: Negative   Blood: x / Protein: Trace / Nitrite: Negative   Leuk Esterase: Large / RBC: 3 /hpf /  /HPF   Sq Epi: x / Non Sq Epi: 0 /hpf / Bacteria: Few            Urinalysis Basic - ( 19 May 2021 15:57 )    Color: Light Yellow / Appearance: Slightly Turbid / S.009 / pH: x  Gluc: x / Ketone: Negative  / Bili: Negative / Urobili: Negative   Blood: x / Protein: Trace / Nitrite: Negative   Leuk Esterase: Large / RBC: 3 /hpf /  /HPF   Sq Epi: x / Non Sq Epi: 0 /hpf / Bacteria: Few        MICROBIOLOGY:    Culture - Urine (collected 21 @ 23:14)  Source: .Urine Clean Catch (Midstream)  Preliminary Report (21 @ 20:08):    >100,000 CFU/ml Gram Negative Rods        RADIOLOGY & ADDITIONAL TESTS: Reviewed. PATIENT: DAVID MCKENZIE   AGE: o     CHIEF COMPLAINT:  Patient is a 89y old  Male who presents with a chief complaint of bradycardia (21 May 2021 07:58)      OVERNIGHT EVENTS:    SUBJECTIVE: Patient seen and examined at bedside.     REVIEW OF SYSTEM:  Patient denies chest pain/shortness of breath/abdominal pain.     OBJECTIVE:    VITAL SIGNS:  ICU Vital Signs Last 24 Hrs  T(C): 36.5 (21 May 2021 06:00), Max: 42 (20 May 2021 12:00)  T(F): 97.7 (21 May 2021 06:00), Max: 107.6 (20 May 2021 12:00)  HR: 57 (21 May 2021 07:00) (49 - 85)  BP: 96/54 (21 May 2021 07:00) (75/43 - 129/57)  BP(mean): 72 (21 May 2021 07:00) (55 - 100)  ABP: --  ABP(mean): --  RR: 16 (21 May 2021 07:00) (12 - 25)  SpO2: 99% (21 May 2021 07:00) (96% - 100%)    GENERAL: NAD  HEAD:  Atraumatic, Normocephalic  HEENT: scleral anicteric.   CV: Regular rate and rhythm. No murmurs, rubs, or gallops  Respiratory: normal respiratory effort, speaking in complete sentences. Lungs clear to auscultation bilaterally, no wheezes/crackles.  ABDOMEN: Soft, Nontender, Nondistended; Bowel sounds normal  EXTREMITIES: 2+ pitting edema bilateral LEs   PSYCH: AAOx3.   NEURO: Symmetric facial expressions. Moves 4 extremities spontaneously.   Skin: No rashes         @ :  -   @ 07:00  --------------------------------------------------------  IN: 2473.1 mL / OUT: 1130 mL / NET: 1343.1 mL      CAPILLARY BLOOD GLUCOSE      POCT Blood Glucose.: 129 mg/dL (19 May 2021 14:51)      I/O SUMMARY:    21 @ :  -  21 @ 07:00  --------------------------------------------------------  IN: 2473.1 mL / OUT: 1130 mL / NET: 1343.1 mL      PHYSICAL EXAM:      MEDICATIONS:  MEDICATIONS  (STANDING):  brimonidine 0.2% Ophthalmic Solution 1 Drop(s) Both EYES every 8 hours  chlorhexidine 4% Liquid 1 Application(s) Topical <User Schedule>  dorzolamide 2%/timolol 0.5% Ophthalmic Solution 1 Drop(s) Both EYES two times a day  heparin   Injectable 5000 Unit(s) SubCutaneous every 8 hours  latanoprost 0.005% Ophthalmic Solution 1 Drop(s) Both EYES at bedtime  midodrine 10 milliGRAM(s) Oral every 8 hours  norepinephrine Infusion 0.05 MICROgram(s)/kG/Min (9.06 mL/Hr) IV Continuous <Continuous>  pilocarpine 4% Solution 1 Drop(s) Both EYES every 8 hours  piperacillin/tazobactam IVPB.. 3.375 Gram(s) IV Intermittent every 12 hours    MEDICATIONS  (PRN):      ALLERGIES:  Allergies    Cipro (Swelling)  Cipro (Unknown)  Levaquin (Unknown)  sulfa drugs (Rash)  sulfa drugs (Unknown)    Intolerances        LABS:                        8.0    11.79 )-----------( 158      ( 21 May 2021 04:26 )             26.2     05-21    144  |  111<H>  |  48<H>  ----------------------------<  120<H>  3.8   |  19<L>  |  2.89<H>    Ca    7.5<L>      21 May 2021 04:26  Phos  3.6     05-21  Mg     1.7     -    TPro  5.0<L>  /  Alb  2.0<L>  /  TBili  0.3  /  DBili  x   /  AST  16  /  ALT  15  /  AlkPhos  103  05-21    LIVER FUNCTIONS - ( 21 May 2021 04:26 )  Alb: 2.0 g/dL / Pro: 5.0 g/dL / ALK PHOS: 103 U/L / ALT: 15 U/L / AST: 16 U/L / GGT: x           COAGULATION STUDIES:     aPTT  35.9 sec    (21 @ 04:26)     PT     17.6 sec    (21 @ 04:26)     INR    1.50 ratio         (21 @ 04:26), COAGULATION STUDIES:     aPTT  35.6 sec    (21 @ 00:13)     PT     17.9 sec    (21 @ 00:13)     INR    1.52 ratio         (21 @ 00:13), COAGULATION STUDIES:     aPTT  37.6 sec    (21 @ 14:57)     PT     21.0 sec    (21 @ 14:57)     INR    1.80 ratio         (21 @ 14:57)   PT/INR - ( 21 May 2021 04:26 )   PT: 17.6 sec;   INR: 1.50 ratio         PTT - ( 21 May 2021 04:26 )  PTT:35.9 sec  Urinalysis Basic - ( 19 May 2021 15:57 )    Color: Light Yellow / Appearance: Slightly Turbid / S.009 / pH: x  Gluc: x / Ketone: Negative  / Bili: Negative / Urobili: Negative   Blood: x / Protein: Trace / Nitrite: Negative   Leuk Esterase: Large / RBC: 3 /hpf /  /HPF   Sq Epi: x / Non Sq Epi: 0 /hpf / Bacteria: Few            Urinalysis Basic - ( 19 May 2021 15:57 )    Color: Light Yellow / Appearance: Slightly Turbid / S.009 / pH: x  Gluc: x / Ketone: Negative  / Bili: Negative / Urobili: Negative   Blood: x / Protein: Trace / Nitrite: Negative   Leuk Esterase: Large / RBC: 3 /hpf /  /HPF   Sq Epi: x / Non Sq Epi: 0 /hpf / Bacteria: Few        MICROBIOLOGY:    Culture - Urine (collected 21 @ 23:14)  Source: .Urine Clean Catch (Midstream)  Preliminary Report (21 @ 20:08):    >100,000 CFU/ml Gram Negative Rods        RADIOLOGY & ADDITIONAL TESTS: Reviewed.

## 2021-05-21 NOTE — DIETITIAN INITIAL EVALUATION ADULT. - OTHER INFO
Pt seen for: consult                                  GI issues:  denies N/V, has clamped NGT in place          Last  BM: +diarrhea 5/20              Food Allergies/Intolerances: NKFA                 Vitamins/Supplements: none  Wt hx: on previous adm 4/22 wt was 225 lb, dosing wt this adm 215 lb                             Skin: toe, buttocks and abd wound (insicion from surgery last month)    Subjective/Objective: pt has been on clear liquids, ready for solid food, soft diet just ordered, pt willing to try mighty shakes. Has poor dentition.    Wt used for nutrition needs: dosing wt 5/19 97.8 kg for energy needs, IBW 64.5 kg for protein needs

## 2021-05-21 NOTE — CONSULT NOTE ADULT - ASSESSMENT
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 Palliative consulted for GOC.

## 2021-05-22 LAB
ALBUMIN SERPL ELPH-MCNC: 2.1 G/DL — LOW (ref 3.3–5)
ALP SERPL-CCNC: 106 U/L — SIGNIFICANT CHANGE UP (ref 40–120)
ALT FLD-CCNC: 14 U/L — SIGNIFICANT CHANGE UP (ref 10–45)
ANION GAP SERPL CALC-SCNC: 12 MMOL/L — SIGNIFICANT CHANGE UP (ref 5–17)
APTT BLD: 33.5 SEC — SIGNIFICANT CHANGE UP (ref 27.5–35.5)
AST SERPL-CCNC: 14 U/L — SIGNIFICANT CHANGE UP (ref 10–40)
BASE EXCESS BLDV CALC-SCNC: 0.6 MMOL/L — SIGNIFICANT CHANGE UP (ref -2–2)
BASOPHILS # BLD AUTO: 0.01 K/UL — SIGNIFICANT CHANGE UP (ref 0–0.2)
BASOPHILS NFR BLD AUTO: 0.1 % — SIGNIFICANT CHANGE UP (ref 0–2)
BILIRUB SERPL-MCNC: 0.4 MG/DL — SIGNIFICANT CHANGE UP (ref 0.2–1.2)
BUN SERPL-MCNC: 45 MG/DL — HIGH (ref 7–23)
CA-I SERPL-SCNC: 1.13 MMOL/L — SIGNIFICANT CHANGE UP (ref 1.12–1.3)
CALCIUM SERPL-MCNC: 7.8 MG/DL — LOW (ref 8.4–10.5)
CHLORIDE BLDV-SCNC: 110 MMOL/L — HIGH (ref 96–108)
CHLORIDE SERPL-SCNC: 111 MMOL/L — HIGH (ref 96–108)
CO2 BLDV-SCNC: 27 MMOL/L — SIGNIFICANT CHANGE UP (ref 22–30)
CO2 SERPL-SCNC: 20 MMOL/L — LOW (ref 22–31)
CORTIS AM PEAK SERPL-MCNC: 12.4 UG/DL — SIGNIFICANT CHANGE UP (ref 6–18.4)
CREAT SERPL-MCNC: 2.86 MG/DL — HIGH (ref 0.5–1.3)
EOSINOPHIL # BLD AUTO: 0.13 K/UL — SIGNIFICANT CHANGE UP (ref 0–0.5)
EOSINOPHIL NFR BLD AUTO: 1.5 % — SIGNIFICANT CHANGE UP (ref 0–6)
GAS PNL BLDV: 144 MMOL/L — SIGNIFICANT CHANGE UP (ref 135–145)
GAS PNL BLDV: SIGNIFICANT CHANGE UP
GAS PNL BLDV: SIGNIFICANT CHANGE UP
GLUCOSE BLDV-MCNC: 113 MG/DL — HIGH (ref 70–99)
GLUCOSE SERPL-MCNC: 110 MG/DL — HIGH (ref 70–99)
HCO3 BLDV-SCNC: 25 MMOL/L — SIGNIFICANT CHANGE UP (ref 21–29)
HCT VFR BLD CALC: 27 % — LOW (ref 39–50)
HCT VFR BLDA CALC: 27 % — LOW (ref 39–50)
HGB BLD CALC-MCNC: 8.7 G/DL — LOW (ref 13–17)
HGB BLD-MCNC: 8.3 G/DL — LOW (ref 13–17)
IMM GRANULOCYTES NFR BLD AUTO: 1.3 % — SIGNIFICANT CHANGE UP (ref 0–1.5)
INR BLD: 1.34 RATIO — HIGH (ref 0.88–1.16)
LACTATE BLDV-MCNC: 0.9 MMOL/L — SIGNIFICANT CHANGE UP (ref 0.7–2)
LYMPHOCYTES # BLD AUTO: 1.9 K/UL — SIGNIFICANT CHANGE UP (ref 1–3.3)
LYMPHOCYTES # BLD AUTO: 21.4 % — SIGNIFICANT CHANGE UP (ref 13–44)
MAGNESIUM SERPL-MCNC: 1.8 MG/DL — SIGNIFICANT CHANGE UP (ref 1.6–2.6)
MCHC RBC-ENTMCNC: 30.4 PG — SIGNIFICANT CHANGE UP (ref 27–34)
MCHC RBC-ENTMCNC: 30.7 GM/DL — LOW (ref 32–36)
MCV RBC AUTO: 98.9 FL — SIGNIFICANT CHANGE UP (ref 80–100)
MONOCYTES # BLD AUTO: 0.71 K/UL — SIGNIFICANT CHANGE UP (ref 0–0.9)
MONOCYTES NFR BLD AUTO: 8 % — SIGNIFICANT CHANGE UP (ref 2–14)
NEUTROPHILS # BLD AUTO: 6.02 K/UL — SIGNIFICANT CHANGE UP (ref 1.8–7.4)
NEUTROPHILS NFR BLD AUTO: 67.7 % — SIGNIFICANT CHANGE UP (ref 43–77)
NRBC # BLD: 1 /100 WBCS — HIGH (ref 0–0)
OTHER CELLS CSF MANUAL: 9 ML/DL — LOW (ref 18–22)
PCO2 BLDV: 44 MMHG — SIGNIFICANT CHANGE UP (ref 35–50)
PH BLDV: 7.38 — SIGNIFICANT CHANGE UP (ref 7.35–7.45)
PHOSPHATE SERPL-MCNC: 3.2 MG/DL — SIGNIFICANT CHANGE UP (ref 2.5–4.5)
PLATELET # BLD AUTO: 160 K/UL — SIGNIFICANT CHANGE UP (ref 150–400)
PO2 BLDV: 43 MMHG — SIGNIFICANT CHANGE UP (ref 25–45)
POTASSIUM BLDV-SCNC: 3.6 MMOL/L — SIGNIFICANT CHANGE UP (ref 3.5–5.3)
POTASSIUM SERPL-MCNC: 3.7 MMOL/L — SIGNIFICANT CHANGE UP (ref 3.5–5.3)
POTASSIUM SERPL-SCNC: 3.7 MMOL/L — SIGNIFICANT CHANGE UP (ref 3.5–5.3)
PROT SERPL-MCNC: 5.2 G/DL — LOW (ref 6–8.3)
PROTHROM AB SERPL-ACNC: 15.9 SEC — HIGH (ref 10.6–13.6)
RBC # BLD: 2.73 M/UL — LOW (ref 4.2–5.8)
RBC # FLD: 23.9 % — HIGH (ref 10.3–14.5)
SAO2 % BLDV: 76 % — SIGNIFICANT CHANGE UP (ref 67–88)
SODIUM SERPL-SCNC: 143 MMOL/L — SIGNIFICANT CHANGE UP (ref 135–145)
WBC # BLD: 8.89 K/UL — SIGNIFICANT CHANGE UP (ref 3.8–10.5)
WBC # FLD AUTO: 8.89 K/UL — SIGNIFICANT CHANGE UP (ref 3.8–10.5)

## 2021-05-22 PROCEDURE — 99233 SBSQ HOSP IP/OBS HIGH 50: CPT | Mod: GC

## 2021-05-22 RX ORDER — APIXABAN 2.5 MG/1
2.5 TABLET, FILM COATED ORAL
Refills: 0 | Status: DISCONTINUED | OUTPATIENT
Start: 2021-05-22 | End: 2021-05-26

## 2021-05-22 RX ORDER — MAGNESIUM SULFATE 500 MG/ML
1 VIAL (ML) INJECTION ONCE
Refills: 0 | Status: COMPLETED | OUTPATIENT
Start: 2021-05-22 | End: 2021-05-22

## 2021-05-22 RX ORDER — POTASSIUM CHLORIDE 20 MEQ
40 PACKET (EA) ORAL ONCE
Refills: 0 | Status: DISCONTINUED | OUTPATIENT
Start: 2021-05-22 | End: 2021-05-22

## 2021-05-22 RX ORDER — POTASSIUM CHLORIDE 20 MEQ
40 PACKET (EA) ORAL ONCE
Refills: 0 | Status: COMPLETED | OUTPATIENT
Start: 2021-05-22 | End: 2021-05-22

## 2021-05-22 RX ADMIN — DORZOLAMIDE HYDROCHLORIDE TIMOLOL MALEATE 1 DROP(S): 20; 5 SOLUTION/ DROPS OPHTHALMIC at 17:07

## 2021-05-22 RX ADMIN — Medication 100 GRAM(S): at 07:47

## 2021-05-22 RX ADMIN — PIPERACILLIN AND TAZOBACTAM 25 GRAM(S): 4; .5 INJECTION, POWDER, LYOPHILIZED, FOR SOLUTION INTRAVENOUS at 12:21

## 2021-05-22 RX ADMIN — DORZOLAMIDE HYDROCHLORIDE TIMOLOL MALEATE 1 DROP(S): 20; 5 SOLUTION/ DROPS OPHTHALMIC at 06:17

## 2021-05-22 RX ADMIN — Medication 40 MILLIEQUIVALENT(S): at 08:43

## 2021-05-22 RX ADMIN — CHLORHEXIDINE GLUCONATE 1 APPLICATION(S): 213 SOLUTION TOPICAL at 06:16

## 2021-05-22 RX ADMIN — BRIMONIDINE TARTRATE 1 DROP(S): 2 SOLUTION/ DROPS OPHTHALMIC at 06:16

## 2021-05-22 RX ADMIN — Medication 1 DROP(S): at 06:16

## 2021-05-22 RX ADMIN — HEPARIN SODIUM 5000 UNIT(S): 5000 INJECTION INTRAVENOUS; SUBCUTANEOUS at 06:16

## 2021-05-22 RX ADMIN — Medication 1 DROP(S): at 14:21

## 2021-05-22 RX ADMIN — LATANOPROST 1 DROP(S): 0.05 SOLUTION/ DROPS OPHTHALMIC; TOPICAL at 22:26

## 2021-05-22 RX ADMIN — APIXABAN 2.5 MILLIGRAM(S): 2.5 TABLET, FILM COATED ORAL at 17:07

## 2021-05-22 RX ADMIN — PIPERACILLIN AND TAZOBACTAM 25 GRAM(S): 4; .5 INJECTION, POWDER, LYOPHILIZED, FOR SOLUTION INTRAVENOUS at 23:00

## 2021-05-22 RX ADMIN — Medication 1 DROP(S): at 22:27

## 2021-05-22 RX ADMIN — BRIMONIDINE TARTRATE 1 DROP(S): 2 SOLUTION/ DROPS OPHTHALMIC at 16:35

## 2021-05-22 RX ADMIN — BRIMONIDINE TARTRATE 1 DROP(S): 2 SOLUTION/ DROPS OPHTHALMIC at 22:26

## 2021-05-22 NOTE — PHYSICAL THERAPY INITIAL EVALUATION ADULT - TRANSFER TRAINING, PT EVAL
GOAL: pt will be able to perform transfers with min assist x 1 or less & use of rolling walker within 2 weeks

## 2021-05-22 NOTE — PROGRESS NOTE ADULT - ATTENDING COMMENTS
Patient examined and care reviewed in detail on bedside rounds  Critically ill on pressors with probable urosepsis  Frequent bedside visits with therapy change today.   I have personally provided 35+ minutes of critical care time concurrently with the resident/fellow; this excludes time spent on separate procedures.
89 M with history of recently duodenal perforation s/p exploratory laparotomy here now admitted for severe sepsis with septic shock secondary to enterobacter aerogenes urinary tract infection. Vasopressors off since yesterday. Continue antibiotics. Overall clinically improved. Eligible for transfer today to medicine floor.
Patient examined and care reviewed in detail on bedside rounds  Critically ill on pressors with urosepsis Will attempt to wean pressors with low dose midodrine Bradycardia noted  Frequent bedside visits with therapy change today.   I have personally provided 35+ minutes of critical care time concurrently with the resident/fellow; this excludes time spent on separate procedures.

## 2021-05-22 NOTE — PHYSICAL THERAPY INITIAL EVALUATION ADULT - BALANCE TRAINING, PT EVAL
GOAL: pt will be able to independently sit at edge of bed x 10 minutes without loss of balance in order to assist with ADL"s within 2 weeks. pt will be able to ambulate 100ft with min assist x 1 or less & use of rolling walker without loss of balance within 2 weeks

## 2021-05-22 NOTE — CHART NOTE - NSCHARTNOTEFT_GEN_A_CORE
MAR Accept Note  Transfer to:  [facility]      Patient seen and examined.   Labs and data reviewed.   No findings precluding transfer of service.       HPI/MICU COURSE:   Please refer to MICU transfer note for full details. Briefly, this is a 89 year old male with pmhx MDS, a fib on eliquis, ckd, BPH w urinary retention requiring self-catheterization, peritonitis from duodenal perforation s/p ex lap with pyloric excluding gastrojejunostomy and ethan patch (4/21) who is presenting with sepsis 2/2 UTI. The patient was admitted to the MICU for Vasopressor support. During MICU stay midodrine was attempted however, the patient was noted to have (+) bradycardia hrt rate noted to be high 30's to 40's at times. The patient remained asymptomatic . Urine culture (+) for enterobacter MRSA swab (+). BCx (5/20) NGTD x2. s/p vancomycin x 2 does, vanc discontinued on 5/21 considering urine culture only grew enterobacter. c/w zosyn, plan for 7 day course ( 5/19--5/25).           Eliquis initially was held due to Hgb 6.9 on admission, s/p 1 unit pRBC, H & H remained stable w/ no evidence of bleeding. Eliquis resumed on 5/22.   ASSESSMENT & PLAN:   89 year old male with pmhx MDS, a fib on eliquis, ckd, BPH w urinary retention requiring self-catheterization, peritonitis from duodenal perforation s/p ex lap with pyloric excluding gastrojejunostomy and etahn patch (4/21), admitted to MICU due to septic shock 2/2 UTI, now off levophed since 5/21    #Neuro  AMS  Resolved   - currently baseline mental status, alert oriented x3, follows commands   -2/2 sepsis associated metabolic encephalopathy   -CT head no evidence of skull fracture, intracranial hemorrhage or mass effect.  -CT head no cervical spine fracture or traumatic malalignment  -Tx of sepsis as below     #CV  Hypotension  -resolved, off levophed gtt since 5/21 , overnight HR 30s, midodrine discontinued overnight      Bradycardia   -a fib w slow ventricular, HR currently 50s-60s----overnight HR 30s, midodrine discontinued   -hold home beta blockers   -EP following, appreciate recs     A fib  - on home eliquis 2.5 BID  - Eliquis was held on admission due to Hgb 6.9 on admission ---- consider resume Eliquis today considering H & H stable, no evidence of bleed, and stable clinical condition with no plan for surgical intervention ----Eliquis resumed on 5/22.   - [id number] score 4     CHF  -BNP 2685  -TTE 59%, normal systolic function   -takes 40mg PO lasix at home ---currently holding standing lasix   -s/p lasix 20mg IV 5/21, last 24 hr UOP 1750, net negative 1410     #Resp  - On RA  -CXR clear lungs     #Renal  MICH on CKD  - Cr plateau, Cr 2.86 today 5/22; baseline Cr 2-2.5 in 4/2021   - Beckman in place, last 24 hr UOP 1750, net negative 1410   - likely prerenal vs. ATN in setting of septic shock     #GI  Peritonitis   -4/21 duodenal perforation s/p ex lap with pyloric excluding gastrojejunostomy and ethan patch   - benign abdominal exam, surgical wound open healing by second intention, no evidence of infection/drainage/warmth/tenderness   -CT abdomen Trace ascites, decreased. Inflammatory changes in the right upper quadrant, significantly improved from prior exam  -surgery consulted, no plan for surgical intervention     #ID  Sepsis  -2/2 UTI, UCx >100,000 Enterobacter aerogenos  -s/p vanc/zosyn ----- discontinue vanc on 5/21   -c/w zosyn, plan for 7 day course ( 5/19--5/25)  -BCx (5/20) NGTD x2      #Endo  Hyperglycemia   -Resolved     #Heme  MDS  -Hematologist Dr. Drummond ([phone])  -On aranesp ---holding while inpatient   -no active bleeding         FOR FOLLOW-UP:  [ ] c/w zosyn for 7 day course ( 5/19--5/25)  [ ] f/u PT eval ---currently recommend return to subacute rehab     Landon Mosqueda PGY3  MAR [id number]

## 2021-05-22 NOTE — PHYSICAL THERAPY INITIAL EVALUATION ADULT - DIAGNOSIS, PT EVAL
Decreased ROM, decreased strength, decreased endurance, impaired balance, impaired functional mobility

## 2021-05-22 NOTE — PHYSICAL THERAPY INITIAL EVALUATION ADULT - RANGE OF MOTION, PT EVAL
GOAL: pt will demonstrate AROM improvements by 10 degrees in order to improve independence with functional mobility within 2 weeks

## 2021-05-22 NOTE — PHYSICAL THERAPY INITIAL EVALUATION ADULT - WEIGHT-BEARING RESTRICTIONS: SIT/STAND, REHAB EVAL
Problem: Patient Care Overview (Adult)  Goal: Plan of Care Review  Outcome: Ongoing (interventions implemented as appropriate)   01/19/18 1133   Coping/Psychosocial Response Interventions   Plan Of Care Reviewed With patient   Patient Care Overview   Progress progress toward functional goals as expected   Outcome Evaluation   Outcome Summary/Follow up Plan Pt is new admit and has an intact back incision and wears a brace when OOB.     Goal: Adult Individualization and Mutuality  Outcome: Ongoing (interventions implemented as appropriate)    Goal: Discharge Needs Assessment  Outcome: Ongoing (interventions implemented as appropriate)      Problem: Pain, Chronic (Adult)  Goal: Identify Related Risk Factors and Signs and Symptoms  Outcome: Outcome(s) achieved Date Met: 01/19/18    Goal: Acceptable Pain Control/Comfort Level  Outcome: Ongoing (interventions implemented as appropriate)      Problem: Skin Integrity Impairment, Risk/Actual (Adult)  Goal: Identify Related Risk Factors and Signs and Symptoms  Outcome: Outcome(s) achieved Date Met: 01/19/18    Goal: Skin Integrity/Wound Healing  Outcome: Ongoing (interventions implemented as appropriate)      Problem: Mobility, Physical Impaired (Adult)  Goal: Identify Related Risk Factors and Signs and Symptoms  Outcome: Outcome(s) achieved Date Met: 01/19/18    Goal: Enhanced Mobility Skills  Outcome: Outcome(s) achieved Date Met: 01/19/18    Goal: Enhanced Functionality Ability  Outcome: Ongoing (interventions implemented as appropriate)         full weight-bearing

## 2021-05-22 NOTE — PHYSICAL THERAPY INITIAL EVALUATION ADULT - GAIT TRAINING, PT EVAL
GOAL: pt will be able to ambulate 100ft with min assist x 1 or less & use of rolling walker within 2 weeks

## 2021-05-22 NOTE — PHYSICAL THERAPY INITIAL EVALUATION ADULT - GENERAL OBSERVATIONS, REHAB EVAL
Pt encountered semi-supine in bed + IV lock, cardiac monitor, continuous pulse ox, BP cuff, rivera, B/L heel off-loading boots donned

## 2021-05-22 NOTE — PROGRESS NOTE ADULT - ASSESSMENT
89 year old male with pmhx MDS, a fib on eliquis, ckd, BPH w urinary retention requiring self-catheterization, peritonitis from duodenal perforation s/p ex lap with pyloric excluding gastrojejunostomy and ethan patch (4/21), admitted to MICU due to septic shock 2/2 UTI, now off levophed since 5/21    #Neuro  AMS  Resolved   - currently baseline mental status, alert oriented x3, follows commands   -2/2 sepsis associated metabolic encephalopathy   -CT head no evidence of skull fracture, intracranial hemorrhage or mass effect.  -CT head no cervical spine fracture or traumatic malalignment  -Tx of sepsis as below     #CV  Hypotension  -resolved, off levophed gtt since 5/21 , overnight HR 30s, midodrine discontinued overnight      Bradycardia   -a fib w slow ventricular, HR currently 50s-60s----overnight HR 30s, midodrine discontinued   -hold home beta blockers   -EP following, appreciate recs     A fib  - on home eliquis 2.5 BID  - Eliquis was held on admission due to Hgb 6.9 on admission ---- consider resume Eliquis today considering H & H stable, no evidence of bleed, and stable clinical condition with no plan for surgical intervention   - [id number] score 4     CHF  -BNP 2685  -TTE 59%, normal systolic function   -takes 40mg PO lasix at home   -s/p lasix 20mg IV 5/21, last 24 hr UOP 1750, net negative 1410 ---hypervolemic on exam, consider resume daily lasix 40 PO     #Resp  - On RA  -CXR clear lungs     #Renal  MICH on CKD  - Cr plateau, Cr 2.86 today 5/22; baseline Cr 2-2.5 in 4/2021   - Beckman in place, last 24 hr UOP 1750, net negative 1410   - likely prerenal vs. ATN in setting of septic shock     #GI  Peritonitis   -4/21 duodenal perforation s/p ex lap with pyloric excluding gastrojejunostomy and ethan patch   - benign abdominal exam, surgical wound open healing by second intention, no evidence of infection/drainage/warmth/tenderness   -CT abdomen Trace ascites, decreased. Inflammatory changes in the right upper quadrant, significantly improved from prior exam  -surgery consulted, no plan for surgical intervention     #ID  Sepsis  -2/2 UTI, UCx >100,000 Enterobacter aerogenos  -s/p vanc/zosyn ----- discontinue vanc on 5/21   -c/w zosyn, plan for 7 day course ( 5/19--5/25)  -BCx (5/20) NGTD x2      #Endo  Hyperglycemia   -Resolved     #Heme  MDS  -Hematologist Dr. Drummond ([phone])  -On aranesp ---holding while inpatient   -no active bleeding     #DVT  -SCDs       89 year old male with pmhx MDS, a fib on eliquis, ckd, BPH w urinary retention requiring self-catheterization, peritonitis from duodenal perforation s/p ex lap with pyloric excluding gastrojejunostomy and ethan patch (4/21), admitted to MICU due to septic shock 2/2 UTI, now off levophed since 5/21    #Neuro  AMS  Resolved   - currently baseline mental status, alert oriented x3, follows commands   -2/2 sepsis associated metabolic encephalopathy   -CT head no evidence of skull fracture, intracranial hemorrhage or mass effect.  -CT head no cervical spine fracture or traumatic malalignment  -Tx of sepsis as below     #CV  Hypotension  -resolved, off levophed gtt since 5/21 , overnight HR 30s, midodrine discontinued overnight      Bradycardia   -a fib w slow ventricular, HR currently 50s-60s----overnight HR 30s, midodrine discontinued   -hold home beta blockers   -EP following, appreciate recs     Chronic A fib  - on home eliquis 2.5 BID  - Eliquis was held on admission due to Hgb 6.9 on admission ---- consider resume Eliquis today considering H & H stable, no evidence of bleed, and stable clinical condition with no plan for surgical intervention   - [id number] score 4     CHF  -BNP 2685  -TTE 59%, normal systolic function   -takes 40mg PO lasix at home   -s/p lasix 20mg IV 5/21, last 24 hr UOP 1750, net negative 1410 ---hypervolemic on exam, consider resume daily lasix 40 PO     #Resp  - On RA  -CXR clear lungs     #Renal  MICH on CKD  - Cr plateau, Cr 2.86 today 5/22; baseline Cr 2-2.5 in 4/2021   - Beckman in place, last 24 hr UOP 1750, net negative 1410   - likely prerenal vs. ATN in setting of septic shock     #GI  Peritonitis   -4/21 duodenal perforation s/p ex lap with pyloric excluding gastrojejunostomy and ethan patch   - benign abdominal exam, surgical wound open healing by second intention, no evidence of infection/drainage/warmth/tenderness   -CT abdomen Trace ascites, decreased. Inflammatory changes in the right upper quadrant, significantly improved from prior exam  -surgery consulted, no plan for surgical intervention     #ID  Sepsis  -2/2 UTI, UCx >100,000 Enterobacter aerogenos  -s/p vanc/zosyn ----- discontinue vanc on 5/21   -c/w zosyn, plan for 7 day course ( 5/19--5/25)  -BCx (5/20) NGTD x2      #Endo  Hyperglycemia   -Resolved     #Heme  MDS  -Hematologist Dr. Drummond ([phone])  -On aranesp ---holding while inpatient   -no active bleeding     #DVT  -SCDs

## 2021-05-22 NOTE — PROGRESS NOTE ADULT - SUBJECTIVE AND OBJECTIVE BOX
EP    PROGRESS  NOTE   ________________________________________________    CHIEF COMPLAINT:Patient is a 89y old  Male who presents with a chief complaint of bradycardia (22 May 2021 07:47)  more awake.  	  REVIEW OF SYSTEMS:  CONSTITUTIONAL: No fever, weight loss, or fatigue  EYES: No eye pain, visual disturbances, or discharge  ENT:  No difficulty hearing, tinnitus, vertigo; No sinus or throat pain  NECK: No pain or stiffness  RESPIRATORY: No cough, wheezing, chills or hemoptysis; No Shortness of Breath  CARDIOVASCULAR: No chest pain, palpitations, passing out, dizziness, or leg swelling  GASTROINTESTINAL: No abdominal or epigastric pain. No nausea, vomiting, or hematemesis; No diarrhea or constipation. No melena or hematochezia.  GENITOURINARY: No dysuria, frequency, hematuria, or incontinence  NEUROLOGICAL: No headaches, memory loss, loss of strength, numbness, or tremors  SKIN: No itching, burning, rashes, or lesions   LYMPH Nodes: No enlarged glands  ENDOCRINE: No heat or cold intolerance; No hair loss  MUSCULOSKELETAL: No joint pain or swelling; No muscle, back, or extremity pain  PSYCHIATRIC: No depression, anxiety, mood swings, or difficulty sleeping  HEME/LYMPH: No easy bruising, or bleeding gums  ALLERGY AND IMMUNOLOGIC: No hives or eczema	    [ ] All others negative	  [x ] Unable to obtain    PHYSICAL EXAM:  T(C): 36.3 (05-22-21 @ 07:00), Max: 36.5 (05-21-21 @ 12:00)  HR: 55 (05-22-21 @ 08:00) (50 - 67)  BP: 119/59 (05-22-21 @ 08:00) (85/58 - 133/63)  RR: 15 (05-22-21 @ 08:00) (13 - 21)  SpO2: 98% (05-22-21 @ 08:00) (95% - 99%)  Wt(kg): --  I&O's Summary    21 May 2021 07:01  -  22 May 2021 07:00  --------------------------------------------------------  IN: 340 mL / OUT: 1750 mL / NET: -1410 mL        Appearance: Normal	  HEENT:   Normal oral mucosa, PERRL, EOMI	  Lymphatic: No lymphadenopathy  Cardiovascular: Normal S1 S2, No JVD, + murmurs, No edema  Respiratory: Lungs clear to auscultation	  Psychiatry: A & O x 3, Mood & affect appropriate  Gastrointestinal:  Soft, Non-tender, + BS	  Skin: No rashes, No ecchymoses, No cyanosis	  Neurologic: Non-focal  Extremities: Normal range of motion, No clubbing, cyanosis or edema  Vascular: Peripheral pulses palpable 2+ bilaterally    MEDICATIONS  (STANDING):  brimonidine 0.2% Ophthalmic Solution 1 Drop(s) Both EYES every 8 hours  chlorhexidine 4% Liquid 1 Application(s) Topical <User Schedule>  dorzolamide 2%/timolol 0.5% Ophthalmic Solution 1 Drop(s) Both EYES two times a day  heparin   Injectable 5000 Unit(s) SubCutaneous every 8 hours  latanoprost 0.005% Ophthalmic Solution 1 Drop(s) Both EYES at bedtime  pilocarpine 4% Solution 1 Drop(s) Both EYES every 8 hours  piperacillin/tazobactam IVPB.. 3.375 Gram(s) IV Intermittent every 12 hours      TELEMETRY: 	    ECG:  	  RADIOLOGY:  OTHER: 	  	  LABS:	 	    CARDIAC MARKERS:                                8.3    8.89  )-----------( 160      ( 22 May 2021 00:09 )             27.0     05-22    143  |  111<H>  |  45<H>  ----------------------------<  110<H>  3.7   |  20<L>  |  2.86<H>    Ca    7.8<L>      22 May 2021 00:09  Phos  3.2     05-22  Mg     1.8     05-22    TPro  5.2<L>  /  Alb  2.1<L>  /  TBili  0.4  /  DBili  x   /  AST  14  /  ALT  14  /  AlkPhos  106  05-22    proBNP: Serum Pro-Brain Natriuretic Peptide: 2685 pg/mL (05-19 @ 18:33)    Lipid Profile:   HgA1c:   TSH: Thyroid Stimulating Hormone, Serum: 2.45 uIU/mL (05-19 @ 20:44)    PT/INR - ( 22 May 2021 00:09 )   PT: 15.9 sec;   INR: 1.34 ratio         PTT - ( 22 May 2021 00:09 )  PTT:33.5 sec  < from: Transthoracic Echocardiogram (04.23.20 @ 17:12) >  1. Calcified trileaflet aortic valve with mildly decreased  opening.  2. Normal left ventricular systolic function. No segmental  wall motion abnormalities.  3. Normal right ventricular size and function.  4. Normal tricuspid valve. Mild tricuspid regurgitation.  5. Estimated pulmonary artery systolic pressure equals 41  mm Hg, assuming right atrial pressure equals 10  mm Hg,  consistent with mild pulmonary hypertension.    < from: Xray Chest 1 View- PORTABLE-Urgent (Xray Chest 1 View- PORTABLE-Urgent .) (05.20.21 @ 07:41) >  The enteric tube tip is in the stomach.      Right perihilar and medial right basilar opacities which can be due to subsegmental atelectasis, asymmetric pulmonary edema, and/or pneumonia.    New left basilar and retrocardiac opacity which may be due to a left pleural effusion with passive atelectasis, atelectasis of other cause, and/or pneumonia.    < end of copied text >        Assessment and plan  ---------------------------  89y M MDS, CKD, Afib on Eliquis BPH with intermittent self-catheterization, CHF, presents to ED BIBEMS from assisted living after he was found to be hypotensive and bradycardiac.  was given some IV fluids but did not improve so they called 911.   pt is well known to me with last admission , pmd Dr Lemus asked me to see pt.  pt with hx of a.fib, bradycardia and a.fib with hypotension and bradycardia  continue pressors keep MAP>65  abx   fu cultures  echo  hr is overall controlled  gentle hydration

## 2021-05-22 NOTE — PHYSICAL THERAPY INITIAL EVALUATION ADULT - ADDITIONAL COMMENTS
PT was at [facility] for subacute rehab prior to admission. Pt required 1-2 person assist for OOB to wheelchair transfers with nursing staff. Pt did not ambulate with nursing staff. + assist for ADL"s. Pt was receiving PT & was able to ambulate 30 steps with use of rolling walker & 1 person assist. Goal of therapy: return to rehab (different facility) per daughter report.

## 2021-05-22 NOTE — PHYSICAL THERAPY INITIAL EVALUATION ADULT - IMPAIRMENTS FOUND, PT EVAL
bed mobility, transfers/aerobic capacity/endurance/gait, locomotion, and balance/muscle strength/ROM

## 2021-05-22 NOTE — CHART NOTE - NSCHARTNOTEFT_GEN_A_CORE
MICU Transfer Note    Transfer from: MICU    Transfer to: (  ) Medicine    (  ) Telemetry     (   ) RCU        (    ) Palliative         (   ) Stroke Unit          (   ) __________________    Accepting Physician:  Signout given to:     MICU COURSE:  89 year old male with pmhx MDS, a fib on eliquis, ckd, BPH w urinary retention requiring self-catheterization, peritonitis from duodenal perforation s/p ex lap with pyloric excluding gastrojejunostomy and ethan patch (4/21) who is presenting with sepsis 2/2 UTI. The patient was admitted to the MICU for Vasopressor support. During MICU stay midodrine was attempted however, the patient was noted to have (+) bradycardia hrt rate noted to be high 30's to 40's at times. The patient remained a symptomatic . Urine culture (+) for enterobacter MRSA swab also (+).       ASSESSMENT & PLAN:   89 year old male with pmhx MDS, a fib on eliquis, ckd, BPH w urinary retention requiring self-catheterization, peritonitis from duodenal perforation s/p ex lap with pyloric excluding gastrojejunostomy and ethan patch (4/21), admitted to MICU due to septic shock 2/2 UTI, now off levophed since 5/21    #Neuro  AMS  Resolved   - currently baseline mental status, alert oriented x3, follows commands   -2/2 sepsis associated metabolic encephalopathy   -CT head no evidence of skull fracture, intracranial hemorrhage or mass effect.  -CT head no cervical spine fracture or traumatic malalignment  -Tx of sepsis as below     #CV  Hypotension  -resolved, off levophed gtt since 5/21 , overnight HR 30s, midodrine discontinued overnight      Bradycardia   -a fib w slow ventricular, HR currently 50s-60s----overnight HR 30s, midodrine discontinued   -hold home beta blockers   -EP following, appreciate recs     A fib  - on home eliquis 2.5 BID  - Eliquis was held on admission due to Hgb 6.9 on admission ---- consider resume Eliquis today considering H & H stable, no evidence of bleed, and stable clinical condition with no plan for surgical intervention   - [id number] score 4     CHF  -BNP 2685  -TTE 59%, normal systolic function   -takes 40mg PO lasix at home   -s/p lasix 20mg IV 5/21, last 24 hr UOP 1750, net negative 1410 ---hypervolemic on exam, consider resume daily lasix 40 PO     #Resp  - On RA  -CXR clear lungs     #Renal  MICH on CKD  - Cr plateau, Cr 2.86 today 5/22; baseline Cr 2-2.5 in 4/2021   - Beckman in place, last 24 hr UOP 1750, net negative 1410   - likely prerenal vs. ATN in setting of septic shock     #GI  Peritonitis   -4/21 duodenal perforation s/p ex lap with pyloric excluding gastrojejunostomy and ethan patch   - benign abdominal exam, surgical wound open healing by second intention, no evidence of infection/drainage/warmth/tenderness   -CT abdomen Trace ascites, decreased. Inflammatory changes in the right upper quadrant, significantly improved from prior exam  -surgery consulted, no plan for surgical intervention     #ID  Sepsis  -2/2 UTI, UCx >100,000 Enterobacter aerogenos  -s/p vanc/zosyn ----- discontinue vanc on 5/21   -c/w zosyn, plan for 7 day course ( 5/19--5/25)  -BCx (5/20) NGTD x2      #Endo  Hyperglycemia   -Resolved     #Heme  MDS  -Hematologist Dr. Drummond ([phone])  -On aranesp ---holding while inpatient   -no active bleeding     #DVT  -SCDs    FOR FOLLOW UP: MICU Transfer Note    Transfer from: MICU    Transfer to: (  ) Medicine    ( X ) Telemetry     (   ) RCU        (    ) Palliative         (   ) Stroke Unit          (   ) __________________    Accepting Physician:  Signout given to:     MICU COURSE:  89 year old male with pmhx MDS, a fib on eliquis, ckd, BPH w urinary retention requiring self-catheterization, peritonitis from duodenal perforation s/p ex lap with pyloric excluding gastrojejunostomy and ethan patch (4/21) who is presenting with sepsis 2/2 UTI. The patient was admitted to the MICU for Vasopressor support. During MICU stay midodrine was attempted however, the patient was noted to have (+) bradycardia hrt rate noted to be high 30's to 40's at times. The patient remained a symptomatic . Urine culture (+) for enterobacter MRSA swab (+). BCx (5/20) NGTD x2. s/p vancomycin x 2 does, vanc discontinued on 5/21 considering urine culture only grew enterobacter. c/w zosyn, plan for 7 day course ( 5/19--5/25).    ASSESSMENT & PLAN:   89 year old male with pmhx MDS, a fib on eliquis, ckd, BPH w urinary retention requiring self-catheterization, peritonitis from duodenal perforation s/p ex lap with pyloric excluding gastrojejunostomy and ethan patch (4/21), admitted to MICU due to septic shock 2/2 UTI, now off levophed since 5/21    #Neuro  AMS  Resolved   - currently baseline mental status, alert oriented x3, follows commands   -2/2 sepsis associated metabolic encephalopathy   -CT head no evidence of skull fracture, intracranial hemorrhage or mass effect.  -CT head no cervical spine fracture or traumatic malalignment  -Tx of sepsis as below     #CV  Hypotension  -resolved, off levophed gtt since 5/21 , overnight HR 30s, midodrine discontinued overnight      Bradycardia   -a fib w slow ventricular, HR currently 50s-60s----overnight HR 30s, midodrine discontinued   -hold home beta blockers   -EP following, appreciate recs     A fib  - on home eliquis 2.5 BID  - Eliquis was held on admission due to Hgb 6.9 on admission ---- consider resume Eliquis today considering H & H stable, no evidence of bleed, and stable clinical condition with no plan for surgical intervention   - [id number] score 4     CHF  -BNP 2685  -TTE 59%, normal systolic function   -takes 40mg PO lasix at home   -s/p lasix 20mg IV 5/21, last 24 hr UOP 1750, net negative 1410 ---hypervolemic on exam, consider resume daily lasix 40 PO     #Resp  - On RA  -CXR clear lungs     #Renal  MICH on CKD  - Cr plateau, Cr 2.86 today 5/22; baseline Cr 2-2.5 in 4/2021   - Beckman in place, last 24 hr UOP 1750, net negative 1410   - likely prerenal vs. ATN in setting of septic shock     #GI  Peritonitis   -4/21 duodenal perforation s/p ex lap with pyloric excluding gastrojejunostomy and ethan patch   - benign abdominal exam, surgical wound open healing by second intention, no evidence of infection/drainage/warmth/tenderness   -CT abdomen Trace ascites, decreased. Inflammatory changes in the right upper quadrant, significantly improved from prior exam  -surgery consulted, no plan for surgical intervention     #ID  Sepsis  -2/2 UTI, UCx >100,000 Enterobacter aerogenos  -s/p vanc/zosyn ----- discontinue vanc on 5/21   -c/w zosyn, plan for 7 day course ( 5/19--5/25)  -BCx (5/20) NGTD x2      #Endo  Hyperglycemia   -Resolved     #Heme  MDS  -Hematologist Dr. Drummond ([phone])  -On aranesp ---holding while inpatient   -no active bleeding     #DVT  -SCDs    FOR FOLLOW UP: MICU Transfer Note    Transfer from: MICU    Transfer to: ( X ) Medicine    (  ) Telemetry     (   ) RCU        (    ) Palliative         (   ) Stroke Unit          (   ) __________________    Accepting Physician:  Signout given to:     MICU COURSE:  89 year old male with pmhx MDS, a fib on eliquis, ckd, BPH w urinary retention requiring self-catheterization, peritonitis from duodenal perforation s/p ex lap with pyloric excluding gastrojejunostomy and ethan patch (4/21) who is presenting with sepsis 2/2 UTI. The patient was admitted to the MICU for Vasopressor support. During MICU stay midodrine was attempted however, the patient was noted to have (+) bradycardia hrt rate noted to be high 30's to 40's at times. The patient remained asymptomatic . Urine culture (+) for enterobacter MRSA swab (+). BCx (5/20) NGTD x2. s/p vancomycin x 2 does, vanc discontinued on 5/21 considering urine culture only grew enterobacter. c/w zosyn, plan for 7 day course ( 5/19--5/25).           Eliquis initially was held due to Hgb 6.9 on admission, s/p 1 unit pRBC, H & H remained stable w/ no evidence of bleeding. Eliquis resumed on 5/22.     FOR FOLLOW UP:  [ ] c/w zosyn for 7 day course ( 5/19--5/25)  [ ] f/u PT eval ---currently recommend return to subacute rehab     ASSESSMENT & PLAN:   89 year old male with pmhx MDS, a fib on eliquis, ckd, BPH w urinary retention requiring self-catheterization, peritonitis from duodenal perforation s/p ex lap with pyloric excluding gastrojejunostomy and ethan patch (4/21), admitted to MICU due to septic shock 2/2 UTI, now off levophed since 5/21    #Neuro  AMS  Resolved   - currently baseline mental status, alert oriented x3, follows commands   -2/2 sepsis associated metabolic encephalopathy   -CT head no evidence of skull fracture, intracranial hemorrhage or mass effect.  -CT head no cervical spine fracture or traumatic malalignment  -Tx of sepsis as below     #CV  Hypotension  -resolved, off levophed gtt since 5/21 , overnight HR 30s, midodrine discontinued overnight      Bradycardia   -a fib w slow ventricular, HR currently 50s-60s----overnight HR 30s, midodrine discontinued   -hold home beta blockers   -EP following, appreciate recs     A fib  - on home eliquis 2.5 BID  - Eliquis was held on admission due to Hgb 6.9 on admission ---- consider resume Eliquis today considering H & H stable, no evidence of bleed, and stable clinical condition with no plan for surgical intervention ----Eliquis resumed on 5/22.   - [id number] score 4     CHF  -BNP 2685  -TTE 59%, normal systolic function   -takes 40mg PO lasix at home ---currently holding standing lasix   -s/p lasix 20mg IV 5/21, last 24 hr UOP 1750, net negative 1410     #Resp  - On RA  -CXR clear lungs     #Renal  MICH on CKD  - Cr plateau, Cr 2.86 today 5/22; baseline Cr 2-2.5 in 4/2021   - Beckman in place, last 24 hr UOP 1750, net negative 1410   - likely prerenal vs. ATN in setting of septic shock     #GI  Peritonitis   -4/21 duodenal perforation s/p ex lap with pyloric excluding gastrojejunostomy and ethan patch   - benign abdominal exam, surgical wound open healing by second intention, no evidence of infection/drainage/warmth/tenderness   -CT abdomen Trace ascites, decreased. Inflammatory changes in the right upper quadrant, significantly improved from prior exam  -surgery consulted, no plan for surgical intervention     #ID  Sepsis  -2/2 UTI, UCx >100,000 Enterobacter aerogenos  -s/p vanc/zosyn ----- discontinue vanc on 5/21   -c/w zosyn, plan for 7 day course ( 5/19--5/25)  -BCx (5/20) NGTD x2      #Endo  Hyperglycemia   -Resolved     #Heme  MDS  -Hematologist Dr. Drummond ([phone])  -On aranesp ---holding while inpatient   -no active bleeding     #DVT  -SCDs MICU Transfer Note    Transfer from: MICU    Transfer to: ( X ) Medicine    (  ) Telemetry     (   ) RCU        (    ) Palliative         (   ) Stroke Unit          (   ) __________________    Accepting Physician: Mariah  Signout given to: Mariah    MICU COURSE:  89 year old male with pmhx MDS, a fib on eliquis, ckd, BPH w urinary retention requiring self-catheterization, peritonitis from duodenal perforation s/p ex lap with pyloric excluding gastrojejunostomy and ethan patch (4/21) who is presenting with sepsis 2/2 UTI. The patient was admitted to the MICU for Vasopressor support. During MICU stay midodrine was attempted however, the patient was noted to have (+) bradycardia hrt rate noted to be high 30's to 40's at times. The patient remained asymptomatic . Urine culture (+) for enterobacter MRSA swab (+). BCx (5/20) NGTD x2. s/p vancomycin x 2 does, vanc discontinued on 5/21 considering urine culture only grew enterobacter. c/w zosyn, plan for 7 day course ( 5/19--5/25).           Eliquis initially was held due to Hgb 6.9 on admission, s/p 1 unit pRBC, H & H remained stable w/ no evidence of bleeding. Eliquis resumed on 5/22.     FOR FOLLOW UP:  [ ] c/w zosyn for 7 day course ( 5/19--5/25)  [ ] f/u PT eval ---currently recommend return to subacute rehab     ASSESSMENT & PLAN:   89 year old male with pmhx MDS, a fib on eliquis, ckd, BPH w urinary retention requiring self-catheterization, peritonitis from duodenal perforation s/p ex lap with pyloric excluding gastrojejunostomy and ethan patch (4/21), admitted to MICU due to septic shock 2/2 UTI, now off levophed since 5/21    #Neuro  AMS  Resolved   - currently baseline mental status, alert oriented x3, follows commands   -2/2 sepsis associated metabolic encephalopathy   -CT head no evidence of skull fracture, intracranial hemorrhage or mass effect.  -CT head no cervical spine fracture or traumatic malalignment  -Tx of sepsis as below     #CV  Hypotension  -resolved, off levophed gtt since 5/21 , overnight HR 30s, midodrine discontinued overnight      Bradycardia   -a fib w slow ventricular, HR currently 50s-60s----overnight HR 30s, midodrine discontinued   -hold home beta blockers   -EP following, appreciate recs     A fib  - on home eliquis 2.5 BID  - Eliquis was held on admission due to Hgb 6.9 on admission ---- consider resume Eliquis today considering H & H stable, no evidence of bleed, and stable clinical condition with no plan for surgical intervention ----Eliquis resumed on 5/22.   - [id number] score 4     CHF  -BNP 2685  -TTE 59%, normal systolic function   -takes 40mg PO lasix at home ---currently holding standing lasix   -s/p lasix 20mg IV 5/21, last 24 hr UOP 1750, net negative 1410     #Resp  - On RA  -CXR clear lungs     #Renal  MICH on CKD  - Cr plateau, Cr 2.86 today 5/22; baseline Cr 2-2.5 in 4/2021   - Beckman in place, last 24 hr UOP 1750, net negative 1410   - likely prerenal vs. ATN in setting of septic shock     #GI  Peritonitis   -4/21 duodenal perforation s/p ex lap with pyloric excluding gastrojejunostomy and ethan patch   - benign abdominal exam, surgical wound open healing by second intention, no evidence of infection/drainage/warmth/tenderness   -CT abdomen Trace ascites, decreased. Inflammatory changes in the right upper quadrant, significantly improved from prior exam  -surgery consulted, no plan for surgical intervention     #ID  Sepsis  -2/2 UTI, UCx >100,000 Enterobacter aerogenos  -s/p vanc/zosyn ----- discontinue vanc on 5/21   -c/w zosyn, plan for 7 day course ( 5/19--5/25)  -BCx (5/20) NGTD x2      #Endo  Hyperglycemia   -Resolved     #Heme  MDS  -Hematologist Dr. Drummond ([phone])  -On aranesp ---holding while inpatient   -no active bleeding     #DVT  -SCDs

## 2021-05-22 NOTE — PHYSICAL THERAPY INITIAL EVALUATION ADULT - GAIT DEVIATIONS NOTED, PT EVAL
decreased endurance, + BLE buckling/decreased doc/decreased step length/decreased stride length/decreased weight-shifting ability

## 2021-05-22 NOTE — PROGRESS NOTE ADULT - NUTRITIONAL ASSESSMENT
Arlene Alas(Attending)
This patient has been assessed with a concern for Malnutrition and has been determined to have a diagnosis/diagnoses of Moderate protein-calorie malnutrition.    This patient is being managed with:   Diet Soft-  Entered: May 21 2021  7:56AM    
This patient has been assessed with a concern for Malnutrition and has been determined to have a diagnosis/diagnoses of Moderate protein-calorie malnutrition.    This patient is being managed with:   Diet Soft-  Entered: May 21 2021  7:56AM    
see MICU note

## 2021-05-22 NOTE — PHYSICAL THERAPY INITIAL EVALUATION ADULT - PERTINENT HX OF CURRENT PROBLEM, REHAB EVAL
Pt is a 89yo M admitted from rehab facility 2/2 hypotension & bradycardia. Pt transferred to MICU for septic shock 2/2 UTI. Palliative care consult appreciated.

## 2021-05-22 NOTE — PROGRESS NOTE ADULT - SUBJECTIVE AND OBJECTIVE BOX
PATIENT: DAVID MCKENZIE   AGE: o     CHIEF COMPLAINT:  Patient is a 89y old  Male who presents with a chief complaint of bradycardia (21 May 2021 19:13)      OVERNIGHT EVENTS:    SUBJECTIVE: Patient seen and examined at bedside.     REVIEW OF SYSTEM:  Patient denies chest pain, shortness of breath, abdominal pain, nausea/vomiting.     OBJECTIVE:    VITAL SIGNS:  ICU Vital Signs Last 24 Hrs  T(C): 36.3 (22 May 2021 07:00), Max: 36.5 (21 May 2021 08:00)  T(F): 97.3 (22 May 2021 07:00), Max: 97.7 (21 May 2021 08:00)  HR: 56 (22 May 2021 07:00) (50 - 67)  BP: 111/55 (22 May 2021 07:00) (85/58 - 133/63)  BP(mean): 77 (22 May 2021 07:00) (68 - 91)  ABP: --  ABP(mean): --  RR: 16 (22 May 2021 07:00) (13 - 21)  SpO2: 98% (22 May 2021 07:00) (95% - 99%)        05-21 @ 07:01  -  05-22 @ 07:00  --------------------------------------------------------  IN: 340 mL / OUT: 1750 mL / NET: -1410 mL      CAPILLARY BLOOD GLUCOSE          I/O SUMMARY:    05-21-21 @ 07:01  -  05-22-21 @ 07:00  --------------------------------------------------------  IN: 340 mL / OUT: 1750 mL / NET: -1410 mL      PHYSICAL EXAM:    General: NAD  HEENT: NC/AT; clear conjunctiva  Neck: supple  Respiratory: CTA b/l  Cardiovascular: +S1/S2; RRR  Abdomen: soft, NT/ND; +BS; abdominal wound open (healing by 2nd intension), no drainage/erythema/warmth   Extremities: 2+ pitting edema bilateral lower extremity edema   Skin: normal color and turgor; no rash  Neurological: alert, oriented x3, moves 4 extremities spontaneously     MEDICATIONS:  MEDICATIONS  (STANDING):  brimonidine 0.2% Ophthalmic Solution 1 Drop(s) Both EYES every 8 hours  chlorhexidine 4% Liquid 1 Application(s) Topical <User Schedule>  dorzolamide 2%/timolol 0.5% Ophthalmic Solution 1 Drop(s) Both EYES two times a day  heparin   Injectable 5000 Unit(s) SubCutaneous every 8 hours  latanoprost 0.005% Ophthalmic Solution 1 Drop(s) Both EYES at bedtime  pilocarpine 4% Solution 1 Drop(s) Both EYES every 8 hours  piperacillin/tazobactam IVPB.. 3.375 Gram(s) IV Intermittent every 12 hours  potassium chloride   Solution 40 milliEquivalent(s) Oral once    MEDICATIONS  (PRN):      ALLERGIES:  Allergies    Cipro (Swelling)  Cipro (Unknown)  Levaquin (Unknown)  sulfa drugs (Rash)  sulfa drugs (Unknown)    Intolerances        LABS:                        8.3    8.89  )-----------( 160      ( 22 May 2021 00:09 )             27.0     05-22    143  |  111<H>  |  45<H>  ----------------------------<  110<H>  3.7   |  20<L>  |  2.86<H>    Ca    7.8<L>      22 May 2021 00:09  Phos  3.2     05-22  Mg     1.8     05-22    TPro  5.2<L>  /  Alb  2.1<L>  /  TBili  0.4  /  DBili  x   /  AST  14  /  ALT  14  /  AlkPhos  106  05-22    LIVER FUNCTIONS - ( 22 May 2021 00:09 )  Alb: 2.1 g/dL / Pro: 5.2 g/dL / ALK PHOS: 106 U/L / ALT: 14 U/L / AST: 14 U/L / GGT: x           COAGULATION STUDIES:     aPTT  33.5 sec    (05-22-21 @ 00:09)     PT     15.9 sec    (05-22-21 @ 00:09)     INR    1.34 ratio         (05-22-21 @ 00:09), COAGULATION STUDIES:     aPTT  35.9 sec    (05-21-21 @ 04:26)     PT     17.6 sec    (05-21-21 @ 04:26)     INR    1.50 ratio         (05-21-21 @ 04:26)   PT/INR - ( 22 May 2021 00:09 )   PT: 15.9 sec;   INR: 1.34 ratio         PTT - ( 22 May 2021 00:09 )  PTT:33.5 sec              MICROBIOLOGY:    Culture - Urine (collected 05-19-21 @ 23:14)  Source: .Urine Clean Catch (Midstream)  Final Report (05-21-21 @ 18:28):    >100,000 CFU/ml Enterobacter aerogenes  Organism: Enterobacter aerogenes (05-21-21 @ 18:28)  Organism: Enterobacter aerogenes (05-21-21 @ 18:28)        RADIOLOGY & ADDITIONAL TESTS: Reviewed.

## 2021-05-23 DIAGNOSIS — I48.91 UNSPECIFIED ATRIAL FIBRILLATION: ICD-10-CM

## 2021-05-23 LAB
ANION GAP SERPL CALC-SCNC: 11 MMOL/L — SIGNIFICANT CHANGE UP (ref 5–17)
BUN SERPL-MCNC: 38 MG/DL — HIGH (ref 7–23)
CALCIUM SERPL-MCNC: 8.2 MG/DL — LOW (ref 8.4–10.5)
CHLORIDE SERPL-SCNC: 110 MMOL/L — HIGH (ref 96–108)
CO2 SERPL-SCNC: 23 MMOL/L — SIGNIFICANT CHANGE UP (ref 22–31)
CREAT SERPL-MCNC: 2.66 MG/DL — HIGH (ref 0.5–1.3)
GLUCOSE SERPL-MCNC: 92 MG/DL — SIGNIFICANT CHANGE UP (ref 70–99)
POTASSIUM SERPL-MCNC: 4 MMOL/L — SIGNIFICANT CHANGE UP (ref 3.5–5.3)
POTASSIUM SERPL-SCNC: 4 MMOL/L — SIGNIFICANT CHANGE UP (ref 3.5–5.3)
SODIUM SERPL-SCNC: 144 MMOL/L — SIGNIFICANT CHANGE UP (ref 135–145)

## 2021-05-23 RX ADMIN — Medication 1 DROP(S): at 18:20

## 2021-05-23 RX ADMIN — BRIMONIDINE TARTRATE 1 DROP(S): 2 SOLUTION/ DROPS OPHTHALMIC at 06:26

## 2021-05-23 RX ADMIN — Medication 1 DROP(S): at 06:26

## 2021-05-23 RX ADMIN — BRIMONIDINE TARTRATE 1 DROP(S): 2 SOLUTION/ DROPS OPHTHALMIC at 18:19

## 2021-05-23 RX ADMIN — PIPERACILLIN AND TAZOBACTAM 25 GRAM(S): 4; .5 INJECTION, POWDER, LYOPHILIZED, FOR SOLUTION INTRAVENOUS at 23:09

## 2021-05-23 RX ADMIN — DORZOLAMIDE HYDROCHLORIDE TIMOLOL MALEATE 1 DROP(S): 20; 5 SOLUTION/ DROPS OPHTHALMIC at 18:20

## 2021-05-23 RX ADMIN — BRIMONIDINE TARTRATE 1 DROP(S): 2 SOLUTION/ DROPS OPHTHALMIC at 23:09

## 2021-05-23 RX ADMIN — LATANOPROST 1 DROP(S): 0.05 SOLUTION/ DROPS OPHTHALMIC; TOPICAL at 21:04

## 2021-05-23 RX ADMIN — DORZOLAMIDE HYDROCHLORIDE TIMOLOL MALEATE 1 DROP(S): 20; 5 SOLUTION/ DROPS OPHTHALMIC at 05:20

## 2021-05-23 RX ADMIN — Medication 1 DROP(S): at 23:09

## 2021-05-23 RX ADMIN — APIXABAN 2.5 MILLIGRAM(S): 2.5 TABLET, FILM COATED ORAL at 05:19

## 2021-05-23 RX ADMIN — APIXABAN 2.5 MILLIGRAM(S): 2.5 TABLET, FILM COATED ORAL at 18:21

## 2021-05-23 RX ADMIN — PIPERACILLIN AND TAZOBACTAM 25 GRAM(S): 4; .5 INJECTION, POWDER, LYOPHILIZED, FOR SOLUTION INTRAVENOUS at 13:32

## 2021-05-23 NOTE — PROGRESS NOTE ADULT - SUBJECTIVE AND OBJECTIVE BOX
CARDIOLOGY     PROGRESS  NOTE   ________________________________________________    CHIEF COMPLAINT:Patient is a 89y old  Male who presents with a chief complaint of bradycardia (22 May 2021 09:16)  doing better.  	  REVIEW OF SYSTEMS:  CONSTITUTIONAL: No fever, weight loss, or fatigue  EYES: No eye pain, visual disturbances, or discharge  ENT:  No difficulty hearing, tinnitus, vertigo; No sinus or throat pain  NECK: No pain or stiffness  RESPIRATORY: No cough, wheezing, chills or hemoptysis; No Shortness of Breath  CARDIOVASCULAR: No chest pain, palpitations, passing out, dizziness, or leg swelling  GASTROINTESTINAL: No abdominal or epigastric pain. No nausea, vomiting, or hematemesis; No diarrhea or constipation. No melena or hematochezia.  GENITOURINARY: No dysuria, frequency, hematuria, or incontinence  NEUROLOGICAL: No headaches, memory loss, loss of strength, numbness, or tremors  SKIN: No itching, burning, rashes, or lesions   LYMPH Nodes: No enlarged glands  ENDOCRINE: No heat or cold intolerance; No hair loss  MUSCULOSKELETAL: No joint pain or swelling; No muscle, back, or extremity pain  PSYCHIATRIC: No depression, anxiety, mood swings, or difficulty sleeping  HEME/LYMPH: No easy bruising, or bleeding gums  ALLERGY AND IMMUNOLOGIC: No hives or eczema	    [ ] All others negative	  x[ ] Unable to obtain    PHYSICAL EXAM:  T(C): 36.9 (05-23-21 @ 05:13), Max: 36.9 (05-23-21 @ 05:13)  HR: 75 (05-23-21 @ 05:13) (55 - 75)  BP: 140/87 (05-23-21 @ 05:13) (100/54 - 140/87)  RR: 20 (05-23-21 @ 05:13) (16 - 23)  SpO2: 97% (05-23-21 @ 05:13) (96% - 98%)  Wt(kg): --  I&O's Summary    22 May 2021 07:01  -  23 May 2021 07:00  --------------------------------------------------------  IN: 1000 mL / OUT: 635 mL / NET: 365 mL        Appearance: Normal	  HEENT:   Normal oral mucosa, PERRL, EOMI	  Lymphatic: No lymphadenopathy  Cardiovascular: Normal S1 S2, No JVD, + murmurs, No edema  Respiratory: rhonchi  Psychiatry: A & O x 3, Mood & affect appropriate  Gastrointestinal:  Soft, Non-tender, + BS	  Skin: No rashes, No ecchymoses, No cyanosis	  Neurologic: Non-focal  Extremities: Normal range of motion, No clubbing, cyanosis or edema  Vascular: Peripheral pulses palpable 2+ bilaterally    MEDICATIONS  (STANDING):  apixaban 2.5 milliGRAM(s) Oral two times a day  brimonidine 0.2% Ophthalmic Solution 1 Drop(s) Both EYES every 8 hours  dorzolamide 2%/timolol 0.5% Ophthalmic Solution 1 Drop(s) Both EYES two times a day  latanoprost 0.005% Ophthalmic Solution 1 Drop(s) Both EYES at bedtime  pilocarpine 4% Solution 1 Drop(s) Both EYES every 8 hours  piperacillin/tazobactam IVPB.. 3.375 Gram(s) IV Intermittent every 12 hours      TELEMETRY: 	    ECG:  	  RADIOLOGY:  OTHER: 	  	  LABS:	 	    CARDIAC MARKERS:                                8.3    8.89  )-----------( 160      ( 22 May 2021 00:09 )             27.0     05-23    144  |  110<H>  |  38<H>  ----------------------------<  92  4.0   |  23  |  2.66<H>    Ca    8.2<L>      23 May 2021 07:06  Phos  3.2     05-22  Mg     1.8     05-22    TPro  5.2<L>  /  Alb  2.1<L>  /  TBili  0.4  /  DBili  x   /  AST  14  /  ALT  14  /  AlkPhos  106  05-22    proBNP: Serum Pro-Brain Natriuretic Peptide: 2685 pg/mL (05-19 @ 18:33)    Lipid Profile:   HgA1c:   TSH: Thyroid Stimulating Hormone, Serum: 2.45 uIU/mL (05-19 @ 20:44)    PT/INR - ( 22 May 2021 00:09 )   PT: 15.9 sec;   INR: 1.34 ratio         PTT - ( 22 May 2021 00:09 )  PTT:33.5 sec      Assessment and plan  ---------------------------  ---------------------------  89y M MDS, CKD, Afib on Eliquis BPH with intermittent self-catheterization, CHF, presents to ED BIBEMS from assisted living after he was found to be hypotensive and bradycardiac.  was given some IV fluids but did not improve so they called 911.   pt is well known to me with last admission , pmd Dr Lemus asked me to see pt.  pt with hx of a.fib, bradycardia and a.fib with hypotension and bradycardia  continue pressors keep MAP>65  abx   fu cultures  echo  hr is overall controlled  gentle hydration  continue AC for a.fib  continue abx  physical therapy  NO AV BLOCKING AGENT

## 2021-05-23 NOTE — PROGRESS NOTE ADULT - SUBJECTIVE AND OBJECTIVE BOX
DATE OF SERVICE: 05-23-21 @ 10:57    HPI:     89y M    h/o   MDS, CKD, Afib on eliquis, BPH with intermittent self-catheterization, CHF    perforation/ ethan patch on 4/21     , presents to ED [facility] from assisted living  by  dr blanquita lincoln,  after he was found to be hypotensive and bradycardic,      .  was given some IV fluids but did not improve so they called 911.       reporting the pt is normally verbal and this is a deviation from his baseline.      still   hypotensive in er (19 May 2021 18:37)      Interval Events  disc c MICU 2x, transf to [facility]  bradyc better 50's,restarted ac, got lasix > inr Urine, SCr stable not worse, likely to improve as pt has been ther before  H/Hsame , usu gets procrit  omn abx, has rivera  looks better feels better , clearer sensorium,   PT amb 5', was oob and getting out again now, ate bkfast  no co, dtrs visited    MEDICATIONS  (STANDING):  apixaban 2.5 milliGRAM(s) Oral two times a day  brimonidine 0.2% Ophthalmic Solution 1 Drop(s) Both EYES every 8 hours  dorzolamide 2%/timolol 0.5% Ophthalmic Solution 1 Drop(s) Both EYES two times a day  latanoprost 0.005% Ophthalmic Solution 1 Drop(s) Both EYES at bedtime  pilocarpine 4% Solution 1 Drop(s) Both EYES every 8 hours  piperacillin/tazobactam IVPB.. 3.375 Gram(s) IV Intermittent every 12 hours    MEDICATIONS  (PRN):      Patient is a 89y old  Male who presents with a chief complaint of bradycardia (23 May 2021 08:50)      REVIEW OF SYSTEMS    General:nad no  co  Skin/Breast:nio co  Ophthalmologic:sees no co no ch  ENMT:	no co nio ch  Respiratory and Thorax:no cough no sp no sob  Cardiovascular:no cp no palp  Gastrointestinal:no nvcd  Genitourinary:no co no pain  Musculoskeletal:	  Neurological:	no co no ch  Psychiatric:	  Hematology/Lymphatics:	  Endocrine:	no polyn udd  Allergic/Immunologic:  AOSNy	      Vital Signs Last 24 Hrs  T(C): 36.9 (23 May 2021 05:13), Max: 36.9 (23 May 2021 05:13)  T(F): 98.4 (23 May 2021 05:13), Max: 98.4 (23 May 2021 05:13)  HR: 75 (23 May 2021 05:13) (56 - 75)  BP: 140/87 (23 May 2021 05:13) (100/54 - 140/87)  BP(mean): 87 (22 May 2021 18:00) (73 - 87)  RR: 20 (23 May 2021 05:13) (16 - 23)  SpO2: 97% (23 May 2021 05:13) (96% - 98%)    PHYSICAL EXAM:    Constitutional:vss bp better abril, 50's  H+Nnc at  Eyes:martell cwnl  ENMT:sees hears swallows ok  Neck:no cb no tm  Breasts:  Back:  Respiratory:ctab no rrw  Cardiovascular:irreg irrreg  Gastrointestinal:soft nt  Genitourinary:rivera  Rectal:  Extremities:no edema, much better  Vascular:hm-, vv-  Neurological:nfatmae, amb yesterday  Skin:cdi x adb  Lymph Nodes:  Musculoskeletal:  Psychiatric:no confusion, not demented    LABS  CBC Full  -  ( 22 May 2021 00:09 )  WBC Count : 8.89 K/uL  RBC Count : 2.73 M/uL  Hemoglobin : 8.3 g/dL  Hematocrit : 27.0 %  Platelet Count - Automated : 160 K/uL  Mean Cell Volume : 98.9 fl  Mean Cell Hemoglobin : 30.4 pg  Mean Cell Hemoglobin Concentration : 30.7 gm/dL  Auto Neutrophil # : 6.02 K/uL  Auto Lymphocyte # : 1.90 K/uL  Auto Monocyte # : 0.71 K/uL  Auto Eosinophil # : 0.13 K/uL  Auto Basophil # : 0.01 K/uL  Auto Neutrophil % : 67.7 %  Auto Lymphocyte % : 21.4 %  Auto Monocyte % : 8.0 %  Auto Eosinophil % : 1.5 %  Auto Basophil % : 0.1 %      05-23    144  |  110<H>  |  38<H>  ----------------------------<  92  4.0   |  23  |  2.66<H>    Ca    8.2<L>      23 May 2021 07:06  Phos  3.2     05-22  Mg     1.8     05-22    TPro  5.2<L>  /  Alb  2.1<L>  /  TBili  0.4  /  DBili  x   /  AST  14  /  ALT  14  /  AlkPhos  106  05-22      PT/INR - ( 22 May 2021 00:09 )   PT: 15.9 sec;   INR: 1.34 ratio         PTT - ( 22 May 2021 00:09 )  PTT:33.5 sec    Imaging:    Xray:    Echo:    CT:    MRI:    Tele:    Orders:    DAVID Lincoln [phone]

## 2021-05-23 NOTE — PROGRESS NOTE ADULT - ASSESSMENT
improving on abx  will need to rtn to REhab  Cardio fu re Chau  will restart flomax, dc nicole later for tov soon  fu SCr  if H/AH decr, will give procrit

## 2021-05-24 DIAGNOSIS — R19.7 DIARRHEA, UNSPECIFIED: ICD-10-CM

## 2021-05-24 LAB
ALBUMIN SERPL ELPH-MCNC: 2.3 G/DL — LOW (ref 3.3–5)
ALP SERPL-CCNC: 104 U/L — SIGNIFICANT CHANGE UP (ref 40–120)
ALT FLD-CCNC: 9 U/L — LOW (ref 10–45)
ANION GAP SERPL CALC-SCNC: 11 MMOL/L — SIGNIFICANT CHANGE UP (ref 5–17)
APPEARANCE UR: ABNORMAL
AST SERPL-CCNC: 13 U/L — SIGNIFICANT CHANGE UP (ref 10–40)
BACTERIA # UR AUTO: NEGATIVE — SIGNIFICANT CHANGE UP
BILIRUB SERPL-MCNC: 0.5 MG/DL — SIGNIFICANT CHANGE UP (ref 0.2–1.2)
BILIRUB UR-MCNC: NEGATIVE — SIGNIFICANT CHANGE UP
BUN SERPL-MCNC: 31 MG/DL — HIGH (ref 7–23)
CALCIUM SERPL-MCNC: 8.3 MG/DL — LOW (ref 8.4–10.5)
CHLORIDE SERPL-SCNC: 109 MMOL/L — HIGH (ref 96–108)
CO2 SERPL-SCNC: 22 MMOL/L — SIGNIFICANT CHANGE UP (ref 22–31)
COLOR SPEC: SIGNIFICANT CHANGE UP
CREAT SERPL-MCNC: 2.24 MG/DL — HIGH (ref 0.5–1.3)
DIFF PNL FLD: NEGATIVE — SIGNIFICANT CHANGE UP
EPI CELLS # UR: 6 /HPF — HIGH
GLUCOSE SERPL-MCNC: 92 MG/DL — SIGNIFICANT CHANGE UP (ref 70–99)
GLUCOSE UR QL: NEGATIVE — SIGNIFICANT CHANGE UP
HCT VFR BLD CALC: 29.5 % — LOW (ref 39–50)
HGB BLD-MCNC: 9 G/DL — LOW (ref 13–17)
HYALINE CASTS # UR AUTO: 2 /LPF — SIGNIFICANT CHANGE UP (ref 0–2)
KETONES UR-MCNC: NEGATIVE — SIGNIFICANT CHANGE UP
LEUKOCYTE ESTERASE UR-ACNC: ABNORMAL
MCHC RBC-ENTMCNC: 30.4 PG — SIGNIFICANT CHANGE UP (ref 27–34)
MCHC RBC-ENTMCNC: 30.5 GM/DL — LOW (ref 32–36)
MCV RBC AUTO: 99.7 FL — SIGNIFICANT CHANGE UP (ref 80–100)
NITRITE UR-MCNC: NEGATIVE — SIGNIFICANT CHANGE UP
NRBC # BLD: 0 /100 WBCS — SIGNIFICANT CHANGE UP (ref 0–0)
PH UR: 5.5 — SIGNIFICANT CHANGE UP (ref 5–8)
PLATELET # BLD AUTO: 165 K/UL — SIGNIFICANT CHANGE UP (ref 150–400)
POTASSIUM SERPL-MCNC: 3.9 MMOL/L — SIGNIFICANT CHANGE UP (ref 3.5–5.3)
POTASSIUM SERPL-SCNC: 3.9 MMOL/L — SIGNIFICANT CHANGE UP (ref 3.5–5.3)
PROT SERPL-MCNC: 5.7 G/DL — LOW (ref 6–8.3)
PROT UR-MCNC: ABNORMAL
RBC # BLD: 2.96 M/UL — LOW (ref 4.2–5.8)
RBC # FLD: 23.7 % — HIGH (ref 10.3–14.5)
RBC CASTS # UR COMP ASSIST: 11 /HPF — HIGH (ref 0–4)
SODIUM SERPL-SCNC: 142 MMOL/L — SIGNIFICANT CHANGE UP (ref 135–145)
SP GR SPEC: 1.02 — SIGNIFICANT CHANGE UP (ref 1.01–1.02)
UROBILINOGEN FLD QL: NEGATIVE — SIGNIFICANT CHANGE UP
WBC # BLD: 7.49 K/UL — SIGNIFICANT CHANGE UP (ref 3.8–10.5)
WBC # FLD AUTO: 7.49 K/UL — SIGNIFICANT CHANGE UP (ref 3.8–10.5)
WBC UR QL: 16 /HPF — HIGH (ref 0–5)

## 2021-05-24 PROCEDURE — 71045 X-RAY EXAM CHEST 1 VIEW: CPT | Mod: 26

## 2021-05-24 RX ORDER — TAMSULOSIN HYDROCHLORIDE 0.4 MG/1
0.4 CAPSULE ORAL AT BEDTIME
Refills: 0 | Status: DISCONTINUED | OUTPATIENT
Start: 2021-05-24 | End: 2021-05-26

## 2021-05-24 RX ORDER — FUROSEMIDE 40 MG
40 TABLET ORAL DAILY
Refills: 0 | Status: DISCONTINUED | OUTPATIENT
Start: 2021-05-24 | End: 2021-05-25

## 2021-05-24 RX ADMIN — APIXABAN 2.5 MILLIGRAM(S): 2.5 TABLET, FILM COATED ORAL at 17:07

## 2021-05-24 RX ADMIN — DORZOLAMIDE HYDROCHLORIDE TIMOLOL MALEATE 1 DROP(S): 20; 5 SOLUTION/ DROPS OPHTHALMIC at 06:18

## 2021-05-24 RX ADMIN — Medication 1 DROP(S): at 15:30

## 2021-05-24 RX ADMIN — BRIMONIDINE TARTRATE 1 DROP(S): 2 SOLUTION/ DROPS OPHTHALMIC at 06:18

## 2021-05-24 RX ADMIN — LATANOPROST 1 DROP(S): 0.05 SOLUTION/ DROPS OPHTHALMIC; TOPICAL at 21:54

## 2021-05-24 RX ADMIN — Medication 1 DROP(S): at 06:18

## 2021-05-24 RX ADMIN — Medication 1 DROP(S): at 22:02

## 2021-05-24 RX ADMIN — Medication 40 MILLIGRAM(S): at 12:00

## 2021-05-24 RX ADMIN — BRIMONIDINE TARTRATE 1 DROP(S): 2 SOLUTION/ DROPS OPHTHALMIC at 22:02

## 2021-05-24 RX ADMIN — BRIMONIDINE TARTRATE 1 DROP(S): 2 SOLUTION/ DROPS OPHTHALMIC at 15:31

## 2021-05-24 RX ADMIN — TAMSULOSIN HYDROCHLORIDE 0.4 MILLIGRAM(S): 0.4 CAPSULE ORAL at 21:54

## 2021-05-24 RX ADMIN — DORZOLAMIDE HYDROCHLORIDE TIMOLOL MALEATE 1 DROP(S): 20; 5 SOLUTION/ DROPS OPHTHALMIC at 19:42

## 2021-05-24 RX ADMIN — APIXABAN 2.5 MILLIGRAM(S): 2.5 TABLET, FILM COATED ORAL at 06:18

## 2021-05-24 NOTE — CHART NOTE - NSCHARTNOTEFT_GEN_A_CORE
Nutrition Follow Up Note  Patient seen for: malnutrition follow up     Chart reviewed, events noted.    Source: [X] Patient       [x] EMR        [X] RN        [ ] Family at bedside       [ ] Other:    Diet Order:   Diet, Soft (21)    - Is current order appropriate/adequate? [X] Yes  [ ]  No:     - PO intake :   [X] >75%  Adequate    [ ] 50-75%  Fair        [ ] <50%  Poor  - Nutrition-Related Concerns: Pt and RN both endorse pt with good appetite; consumed >75% of breakfast this morning per RN. Pt continues to require some assistance with feeding. No new food preferences at this time. Pt states he has not yet received mighty shakes; kitchen made aware.     - Micronutrient Supplementation:   GI:  Last BM .   Bowel Regimen? [ ] Yes   [X] No    Weights:   Daily Weight in k.8 ()  MEDICATIONS  (STANDING):  apixaban  brimonidine 0.2% Ophthalmic Solution  dorzolamide 2%/timolol 0.5% Ophthalmic Solution  furosemide    Tablet  latanoprost 0.005% Ophthalmic Solution  pilocarpine 4% Solution  tamsulosin    Pertinent Labs:  @ 07:43: Na 142, BUN 31<H>, Cr 2.24<H>, BG 92, K+ 3.9, Phos --, Mg --, Alk Phos 104, ALT/SGPT 9<L>, AST/SGOT 13, HbA1c --      Finger Sticks:      Skin per nursing documentation: no pressure injury documented   Edema: 3+ (BLE/BUE)    Estimated Needs:   [X] no change since previous assessment  [ ] recalculated:     Previous Nutrition Diagnosis: moderate malnutritioin  Nutrition Diagnosis is: [X] ongoing  [ ] resolved [ ] not applicable     New Nutrition Diagnosis: [X] Not applicable    Nutrition Care Plan:  [X] In Progress  [ ] Achieved  [ ] Not applicable    Nutrition Interventions / Recommendations:  1) RD to add Mighty Shakes (200kcal, 6g protein/serving) with meals to promote adequate energy intake.   2) Encourage PO intake, obtain food preferences, provide feeding assistance as needed.     Monitoring and Evaluation:   Continue to monitor Nutritional intake, Tolerance to diet prescription, weights, labs, skin integrity    Dao Mak RD  Pager [phone]  RD remains available upon request and will follow up per protocol

## 2021-05-24 NOTE — PROGRESS NOTE ADULT - SUBJECTIVE AND OBJECTIVE BOX
DATE OF SERVICE: 05-24-21 @ 08:39    HPI:     89y M    h/o   MDS, CKD, Afib on eliquis, BPH with intermittent self-catheterization, CHF    perforation/ ethan patch on 4/21     , presents to ED [facility] from assisted living  by  dr blanquita lincoln,  after he was found to be hypotensive and bradycardic,      .  was given some IV fluids but did not improve so they called 911.       reporting the pt is normally verbal and this is a deviation from his baseline.      still   hypotensive in er (19 May 2021 18:37)      Interval Events  OOB early this am, rivera out, off O2  disc c Dr Stewart, no PPM now, starting lasix for Pl Eff, CXR and BMP repeat p   labs are ok, H/H better, SCr not worse, will rivera. starting Flomax tyoday also  co diarrhea watery stool since Abd Sx for Duod ulcer perf  more alert and clearer sensorium    MEDICATIONS  (STANDING):  apixaban 2.5 milliGRAM(s) Oral two times a day  brimonidine 0.2% Ophthalmic Solution 1 Drop(s) Both EYES every 8 hours  dorzolamide 2%/timolol 0.5% Ophthalmic Solution 1 Drop(s) Both EYES two times a day  furosemide    Tablet 40 milliGRAM(s) Oral daily  latanoprost 0.005% Ophthalmic Solution 1 Drop(s) Both EYES at bedtime  pilocarpine 4% Solution 1 Drop(s) Both EYES every 8 hours  piperacillin/tazobactam IVPB.. 3.375 Gram(s) IV Intermittent every 12 hours  tamsulosin 0.4 milliGRAM(s) Oral at bedtime    MEDICATIONS  (PRN):      Patient is a 89y old  Male who presents with a chief complaint of bradycardia (24 May 2021 07:32)      REVIEW OF SYSTEMS    General:nad only co diarrhea  Skin/Breast: no co aware odf abd wound  Ophthalmologic:no c no  ch  ENMT:	no co no ch  Respiratory and Thorax:no cough no sp no sob  Cardiovascular:no cp no palp  Gastrointestinal:no nvc  Genitourinary:no fdi  Musculoskeletal:	no a no p  Neurological:	no c no ch  Psychiatric:	  Hematology/Lymphatics:	  Endocrine:no polyudd	  Allergic/Immunologic:  AOSN	y      Vital Signs Last 24 Hrs  T(C): 36.3 (24 May 2021 06:14), Max: 36.7 (23 May 2021 16:28)  T(F): 97.4 (24 May 2021 06:14), Max: 98 (23 May 2021 16:28)  HR: 82 (24 May 2021 06:14) (72 - 82)  BP: 146/86 (24 May 2021 06:14) (116/78 - 146/86)  BP(mean): --  RR: 18 (24 May 2021 06:14) (17 - 20)  SpO2: 98% (24 May 2021 06:14) (97% - 98%)    PHYSICAL EXAM:    Constitutional:vss nad hr, bp better   H+N ncat  Eyes:martell cwnl  ENMT:hears swallows speaks better  Neck:no cb no tm  Breasts:  Back:  Respiratory:ctab no rrw  Cardiovascular:rrr now, m  Gastrointestinal:soft nt  Genitourinary:rivera out  Rectal:  Extremities:no cce  Vascular:hm-, vv-  Neurological:nfatmae no hx  Lymph Nodes:  Musculoskeletal:  Psychiatric:ca;lm as always , coop    LABS  CBC Full  -  ( 24 May 2021 07:45 )  WBC Count : 7.49 K/uL  RBC Count : 2.96 M/uL  Hemoglobin : 9.0 g/dL  Hematocrit : 29.5 %  Platelet Count - Automated : 165 K/uL  Mean Cell Volume : 99.7 fl  Mean Cell Hemoglobin : 30.4 pg  Mean Cell Hemoglobin Concentration : 30.5 gm/dL  Auto Neutrophil # : x  Auto Lymphocyte # : x  Auto Monocyte # : x  Auto Eosinophil # : x  Auto Basophil # : x  Auto Neutrophil % : x  Auto Lymphocyte % : x  Auto Monocyte % : x  Auto Eosinophil % : x  Auto Basophil % : x      05-23    144  |  110<H>  |  38<H>  ----------------------------<  92  4.0   |  23  |  2.66<H>    Ca    8.2<L>      23 May 2021 07:06            Imaging:    Xray:    Echo:    CT:    MRI:    Tele:    Orders:    DAVID Lincoln [phone]

## 2021-05-24 NOTE — PROGRESS NOTE ADULT - SUBJECTIVE AND OBJECTIVE BOX
CARDIOLOGY     PROGRESS  NOTE   ________________________________________________    CHIEF COMPLAINT:Patient is a 89y old  Male who presents with a chief complaint of bradycardia (23 May 2021 10:57)  doing better.  	  REVIEW OF SYSTEMS:  CONSTITUTIONAL: No fever, weight loss, or fatigue  EYES: No eye pain, visual disturbances, or discharge  ENT:  No difficulty hearing, tinnitus, vertigo; No sinus or throat pain  NECK: No pain or stiffness  RESPIRATORY: No cough, wheezing, chills or hemoptysis; No Shortness of Breath  CARDIOVASCULAR: No chest pain, palpitations, passing out, dizziness, or leg swelling  GASTROINTESTINAL: No abdominal or epigastric pain. No nausea, vomiting, or hematemesis; No diarrhea or constipation. No melena or hematochezia.  GENITOURINARY: No dysuria, frequency, hematuria, or incontinence  NEUROLOGICAL: No headaches, memory loss, loss of strength, numbness, or tremors  SKIN: No itching, burning, rashes, or lesions   LYMPH Nodes: No enlarged glands  ENDOCRINE: No heat or cold intolerance; No hair loss  MUSCULOSKELETAL: No joint pain or swelling; No muscle, back, or extremity pain  PSYCHIATRIC: No depression, anxiety, mood swings, or difficulty sleeping  HEME/LYMPH: No easy bruising, or bleeding gums  ALLERGY AND IMMUNOLOGIC: No hives or eczema	    [ ] All others negative	  [ ] Unable to obtain    PHYSICAL EXAM:  T(C): 36.3 (05-24-21 @ 06:14), Max: 36.7 (05-23-21 @ 16:28)  HR: 82 (05-24-21 @ 06:14) (72 - 82)  BP: 146/86 (05-24-21 @ 06:14) (116/78 - 146/86)  RR: 18 (05-24-21 @ 06:14) (17 - 20)  SpO2: 98% (05-24-21 @ 06:14) (97% - 98%)  Wt(kg): --  I&O's Summary    23 May 2021 07:01  -  24 May 2021 07:00  --------------------------------------------------------  IN: 820 mL / OUT: 600 mL / NET: 220 mL        Appearance: Normal	  HEENT:   Normal oral mucosa, PERRL, EOMI	  Lymphatic: No lymphadenopathy  Cardiovascular: Normal S1 S2, No JVD, +murmurs, No edema  Respiratory: Lungs clear to auscultation	  Psychiatry: A & O x 3, Mood & affect appropriate  Gastrointestinal:  Soft, Non-tender, + BS	  Skin: No rashes, No ecchymoses, No cyanosis	  Neurologic: Non-focal  Extremities: Normal range of motion, No clubbing, cyanosis or edema  Vascular: Peripheral pulses palpable 2+ bilaterally    MEDICATIONS  (STANDING):  apixaban 2.5 milliGRAM(s) Oral two times a day  brimonidine 0.2% Ophthalmic Solution 1 Drop(s) Both EYES every 8 hours  dorzolamide 2%/timolol 0.5% Ophthalmic Solution 1 Drop(s) Both EYES two times a day  latanoprost 0.005% Ophthalmic Solution 1 Drop(s) Both EYES at bedtime  pilocarpine 4% Solution 1 Drop(s) Both EYES every 8 hours  piperacillin/tazobactam IVPB.. 3.375 Gram(s) IV Intermittent every 12 hours      TELEMETRY: 	    ECG:  	  RADIOLOGY:  OTHER: 	  	  LABS:	 	    CARDIAC MARKERS:    < from: Xray Chest 1 View- PORTABLE-Urgent (Xray Chest 1 View- PORTABLE-Urgent .) (05.20.21 @ 07:41) >  The enteric tube tip is in the stomach.      Right perihilar and medial right basilar opacities which can be due to subsegmental atelectasis, asymmetric pulmonary edema, and/or pneumonia.    New left basilar and retrocardiac opacity which may be due to a left pleural effusion with passive atelectasis, atelectasis of other cause, and/or pneumonia.              05-23    144  |  110<H>  |  38<H>  ----------------------------<  92  4.0   |  23  |  2.66<H>    Ca    8.2<L>      23 May 2021 07:06      proBNP: Serum Pro-Brain Natriuretic Peptide: 2685 pg/mL (05-19 @ 18:33)    Lipid Profile:   HgA1c:   TSH: Thyroid Stimulating Hormone, Serum: 2.45 uIU/mL (05-19 @ 20:44)          Assessment and plan  ---------------------------  89y M MDS, CKD, Afib on Eliquis BPH with intermittent self-catheterization, CHF, presents to ED BIBEMS from assisted living after he was found to be hypotensive and bradycardiac.  was given some IV fluids but did not improve so they called 911.   pt is well known to me with last admission , pmd Dr Lemus asked me to see pt.  pt with hx of a.fib, bradycardia and a.fib with hypotension and bradycardia  continue pressors keep MAP>65  abx   fu cultures  echo  hr is overall controlled  gentle hydration  continue AC for a.fib  continue abx  physical therapy  NO AV BLOCKING AGENT  add norvasc 2.45 mg daily for bp control  physical therapy  repeat chest x ray, may need lasix

## 2021-05-25 LAB
ANION GAP SERPL CALC-SCNC: 13 MMOL/L — SIGNIFICANT CHANGE UP (ref 5–17)
BUN SERPL-MCNC: 27 MG/DL — HIGH (ref 7–23)
CALCIUM SERPL-MCNC: 8.3 MG/DL — LOW (ref 8.4–10.5)
CHLORIDE SERPL-SCNC: 108 MMOL/L — SIGNIFICANT CHANGE UP (ref 96–108)
CO2 SERPL-SCNC: 22 MMOL/L — SIGNIFICANT CHANGE UP (ref 22–31)
CREAT SERPL-MCNC: 2.14 MG/DL — HIGH (ref 0.5–1.3)
CULTURE RESULTS: SIGNIFICANT CHANGE UP
GLUCOSE SERPL-MCNC: 85 MG/DL — SIGNIFICANT CHANGE UP (ref 70–99)
POTASSIUM SERPL-MCNC: 3.5 MMOL/L — SIGNIFICANT CHANGE UP (ref 3.5–5.3)
POTASSIUM SERPL-SCNC: 3.5 MMOL/L — SIGNIFICANT CHANGE UP (ref 3.5–5.3)
SARS-COV-2 RNA SPEC QL NAA+PROBE: SIGNIFICANT CHANGE UP
SODIUM SERPL-SCNC: 143 MMOL/L — SIGNIFICANT CHANGE UP (ref 135–145)
SPECIMEN SOURCE: SIGNIFICANT CHANGE UP

## 2021-05-25 RX ORDER — AMLODIPINE BESYLATE 2.5 MG/1
2.5 TABLET ORAL DAILY
Refills: 0 | Status: DISCONTINUED | OUTPATIENT
Start: 2021-05-25 | End: 2021-05-26

## 2021-05-25 RX ORDER — PANTOPRAZOLE SODIUM 20 MG/1
40 TABLET, DELAYED RELEASE ORAL ONCE
Refills: 0 | Status: COMPLETED | OUTPATIENT
Start: 2021-05-25 | End: 2021-05-25

## 2021-05-25 RX ADMIN — APIXABAN 2.5 MILLIGRAM(S): 2.5 TABLET, FILM COATED ORAL at 05:33

## 2021-05-25 RX ADMIN — Medication 1 DROP(S): at 14:04

## 2021-05-25 RX ADMIN — Medication 1 DROP(S): at 06:04

## 2021-05-25 RX ADMIN — Medication 1 DROP(S): at 22:56

## 2021-05-25 RX ADMIN — BRIMONIDINE TARTRATE 1 DROP(S): 2 SOLUTION/ DROPS OPHTHALMIC at 14:02

## 2021-05-25 RX ADMIN — Medication 40 MILLIGRAM(S): at 05:33

## 2021-05-25 RX ADMIN — DORZOLAMIDE HYDROCHLORIDE TIMOLOL MALEATE 1 DROP(S): 20; 5 SOLUTION/ DROPS OPHTHALMIC at 05:33

## 2021-05-25 RX ADMIN — LATANOPROST 1 DROP(S): 0.05 SOLUTION/ DROPS OPHTHALMIC; TOPICAL at 21:51

## 2021-05-25 RX ADMIN — PANTOPRAZOLE SODIUM 40 MILLIGRAM(S): 20 TABLET, DELAYED RELEASE ORAL at 13:32

## 2021-05-25 RX ADMIN — Medication 30 MILLILITER(S): at 11:44

## 2021-05-25 RX ADMIN — TAMSULOSIN HYDROCHLORIDE 0.4 MILLIGRAM(S): 0.4 CAPSULE ORAL at 21:50

## 2021-05-25 RX ADMIN — AMLODIPINE BESYLATE 2.5 MILLIGRAM(S): 2.5 TABLET ORAL at 11:44

## 2021-05-25 RX ADMIN — BRIMONIDINE TARTRATE 1 DROP(S): 2 SOLUTION/ DROPS OPHTHALMIC at 06:04

## 2021-05-25 RX ADMIN — DORZOLAMIDE HYDROCHLORIDE TIMOLOL MALEATE 1 DROP(S): 20; 5 SOLUTION/ DROPS OPHTHALMIC at 16:58

## 2021-05-25 RX ADMIN — BRIMONIDINE TARTRATE 1 DROP(S): 2 SOLUTION/ DROPS OPHTHALMIC at 22:13

## 2021-05-25 RX ADMIN — APIXABAN 2.5 MILLIGRAM(S): 2.5 TABLET, FILM COATED ORAL at 16:58

## 2021-05-25 NOTE — PROGRESS NOTE ADULT - SUBJECTIVE AND OBJECTIVE BOX
DATE OF SERVICE: 05-25-21 @ 09:22    HPI:     89y M    h/o   MDS, CKD, Afib on eliquis, BPH with intermittent self-catheterization, CHF    perforation/ ethan patch on 4/21     , presents to ED [facility] from assisted living  by  dr blanquita lincoln,  after he was found to be hypotensive and bradycardic,      .  was given some IV fluids but did not improve so they called 911.       reporting the pt is normally verbal and this is a deviation from his baseline.      still   hypotensive in er (19 May 2021 18:37)      Interval Events  got laasix, SCr lower. CXR  ok seee rpt, npo edema, cardio started Amlodipine again efor HTN,. been on it b4 but off bblkr now  Urinates and still co watery diarrhea but N staff say bm was formed, ABx Dc'd. UA still pos, c/s   oob chair feeding himserlf.  naveen need to return to rehab, [place] should be an option  disc c team on Rounds      MEDICATIONS  (STANDING):  amLODIPine   Tablet 2.5 milliGRAM(s) Oral daily  apixaban 2.5 milliGRAM(s) Oral two times a day  brimonidine 0.2% Ophthalmic Solution 1 Drop(s) Both EYES every 8 hours  dorzolamide 2%/timolol 0.5% Ophthalmic Solution 1 Drop(s) Both EYES two times a day  latanoprost 0.005% Ophthalmic Solution 1 Drop(s) Both EYES at bedtime  pilocarpine 4% Solution 1 Drop(s) Both EYES every 8 hours  tamsulosin 0.4 milliGRAM(s) Oral at bedtime    MEDICATIONS  (PRN):      Patient is a 89y old  Male who presents with a chief complaint of bradycardia (25 May 2021 07:30)      REVIEW OF SYSTEMS    General: watery diarrhea and u only w bm but otherwise npo co, nad , improved  Skin/Breast:co Sx wound but no specific co  Ophthalmologic:no co no ch  ENMT:	no co no ch  Respiratory and Thorax:no cough no sp o sob  Cardiovascular:no cp no palp  Gastrointestinal:no nvc  Genitourinary:no fdi  Musculoskeletal:	no a no p  Neurological:	no co no ch  Psychiatric:no co 	  Hematology/Lymphatics:	  Endocrine:	no polyudd  Allergic/Immunologic:  AOSN	y      Vital Signs Last 24 Hrs  T(C): 36.5 (25 May 2021 07:38), Max: 36.8 (24 May 2021 09:29)  T(F): 97.7 (25 May 2021 07:38), Max: 98.2 (24 May 2021 09:29)  HR: 76 (25 May 2021 07:38) (67 - 84)  BP: 130/89 (25 May 2021 07:38) (127/79 - 153/95)  BP(mean): --  RR: 17 (25 May 2021 07:38) (17 - 18)  SpO2: 97% (25 May 2021 07:38) (97% - 100%)    PHYSICAL EXAM:    Constitutional:vss nad oob   H+N ncat  Eyes:saicwnl  ENMT:hears swallow speaks ok  Neck:no cb no tm  Breasts:  Back:  Respiratory:ctab no rrw  Cardiovascular:rrr now  Gastrointestinal:soft nt bs+, Wound dry, steri strips  Genitourinary:  Rectal:  Extremities:no cce  Vascular:hm-, vv-  Neurological:nfatmae, alert, not demented , only occ confused but reorients and coop  Skin:cdi x abd  Lymph Nodes:  Musculoskeletal:  Psychiatric:    LABS  CBC Full  -  ( 24 May 2021 07:45 )  WBC Count : 7.49 K/uL  RBC Count : 2.96 M/uL  Hemoglobin : 9.0 g/dL  Hematocrit : 29.5 %  Platelet Count - Automated : 165 K/uL  Mean Cell Volume : 99.7 fl  Mean Cell Hemoglobin : 30.4 pg  Mean Cell Hemoglobin Concentration : 30.5 gm/dL  Auto Neutrophil # : x  Auto Lymphocyte # : x  Auto Monocyte # : x  Auto Eosinophil # : x  Auto Basophil # : x  Auto Neutrophil % : x  Auto Lymphocyte % : x  Auto Monocyte % : x  Auto Eosinophil % : x  Auto Basophil % : x      05-25    143  |  108  |  27<H>  ----------------------------<  85  3.5   |  22  |  2.14<H>    Ca    8.3<L>      25 May 2021 07:09    TPro  5.7<L>  /  Alb  2.3<L>  /  TBili  0.5  /  DBili  x   /  AST  13  /  ALT  9<L>  /  AlkPhos  104  05-24          Imaging:    Xray:    Echo:    CT:    MRI:    Tele:    Orders:    DAVID Lincoln [phone]

## 2021-05-25 NOTE — CHART NOTE - NSCHARTNOTEFT_GEN_A_CORE
Chart reviewed, patient planned for OCTAVIO. HCP on chart  family aware of options from palliative care standpoint including further advance care planning documents.  Will defer to primary team and primary care provider.  please call if acute issues arise. [phone]

## 2021-05-25 NOTE — PROGRESS NOTE ADULT - SUBJECTIVE AND OBJECTIVE BOX
CARDIOLOGY     PROGRESS  NOTE   ________________________________________________    CHIEF COMPLAINT:Patient is a 89y old  Male who presents with a chief complaint of bradycardia (24 May 2021 08:39)  doing well, no complain.  	  REVIEW OF SYSTEMS:  CONSTITUTIONAL: No fever, weight loss, or fatigue  EYES: No eye pain, visual disturbances, or discharge  ENT:  No difficulty hearing, tinnitus, vertigo; No sinus or throat pain  NECK: No pain or stiffness  RESPIRATORY: No cough, wheezing, chills or hemoptysis; No Shortness of Breath  CARDIOVASCULAR: No chest pain, palpitations, passing out, dizziness, or leg swelling  GASTROINTESTINAL: No abdominal or epigastric pain. No nausea, vomiting, or hematemesis; No diarrhea or constipation. No melena or hematochezia.  GENITOURINARY: No dysuria, frequency, hematuria, or incontinence  NEUROLOGICAL: No headaches, memory loss, loss of strength, numbness, or tremors  SKIN: No itching, burning, rashes, or lesions   LYMPH Nodes: No enlarged glands  ENDOCRINE: No heat or cold intolerance; No hair loss  MUSCULOSKELETAL: No joint pain or swelling; No muscle, back, or extremity pain  PSYCHIATRIC: No depression, anxiety, mood swings, or difficulty sleeping  HEME/LYMPH: No easy bruising, or bleeding gums  ALLERGY AND IMMUNOLOGIC: No hives or eczema	    [ ] All others negative	  [ ] Unable to obtain    PHYSICAL EXAM:  T(C): 36.7 (05-25-21 @ 04:38), Max: 36.8 (05-24-21 @ 09:29)  HR: 84 (05-25-21 @ 04:38) (67 - 84)  BP: 153/95 (05-25-21 @ 04:38) (127/79 - 153/95)  RR: 18 (05-25-21 @ 04:38) (17 - 18)  SpO2: 99% (05-25-21 @ 04:38) (97% - 100%)  Wt(kg): --  I&O's Summary    24 May 2021 07:01  -  25 May 2021 07:00  --------------------------------------------------------  IN: 840 mL / OUT: 750 mL / NET: 90 mL        Appearance: Normal	  HEENT:   Normal oral mucosa, PERRL, EOMI	  Lymphatic: No lymphadenopathy  Cardiovascular: Normal S1 S2, No JVD, + murmurs, No edema  Respiratory: Lungs clear to auscultation	  Psychiatry: A & O x 3, Mood & affect appropriate  Gastrointestinal:  Soft, Non-tender, + BS	  Skin: No rashes, No ecchymoses, No cyanosis	  Neurologic: Non-focal  Extremities: Normal range of motion, No clubbing, cyanosis or edema  Vascular: Peripheral pulses palpable 2+ bilaterally    MEDICATIONS  (STANDING):  apixaban 2.5 milliGRAM(s) Oral two times a day  brimonidine 0.2% Ophthalmic Solution 1 Drop(s) Both EYES every 8 hours  dorzolamide 2%/timolol 0.5% Ophthalmic Solution 1 Drop(s) Both EYES two times a day  furosemide    Tablet 40 milliGRAM(s) Oral daily  latanoprost 0.005% Ophthalmic Solution 1 Drop(s) Both EYES at bedtime  pilocarpine 4% Solution 1 Drop(s) Both EYES every 8 hours  tamsulosin 0.4 milliGRAM(s) Oral at bedtime      TELEMETRY: 	    ECG:  	  RADIOLOGY:  OTHER: 	  	  LABS:	 	    CARDIAC MARKERS:                                9.0    7.49  )-----------( 165      ( 24 May 2021 07:45 )             29.5     05-24    142  |  109<H>  |  31<H>  ----------------------------<  92  3.9   |  22  |  2.24<H>    Ca    8.3<L>      24 May 2021 07:43    TPro  5.7<L>  /  Alb  2.3<L>  /  TBili  0.5  /  DBili  x   /  AST  13  /  ALT  9<L>  /  AlkPhos  104  05-24    proBNP: Serum Pro-Brain Natriuretic Peptide: 2685 pg/mL (05-19 @ 18:33)    Lipid Profile:   HgA1c:   TSH: Thyroid Stimulating Hormone, Serum: 2.45 uIU/mL (05-19 @ 20:44)    < from: Transthoracic Echocardiogram (04.23.20 @ 17:12) >  Mitral Valve: Mitral annular calcification, otherwise  normal mitral valve. Mild mitral regurgitation.  Aortic Root: Normal aortic root.  Aortic Valve: Calcified trileaflet aortic valve with mildly  decreased opening. Minimal aortic regurgitation.  Left Atrium: Normal left atrium.  Left Ventricle: Normal left ventricular systolic function.  No segmental wall motion abnormalities. Increased relative  wall thickness with normal left ventricular mass index,  consistent with concentric left ventricular remodeling.  Right Heart: Normal right atrium. Normal right ventricular  size and function. Normal tricuspid valve. Mild tricuspid  regurgitation. Normal pulmonic valve. Minimal pulmonic  regurgitation.  Pericardium/PleuraNormal pericardium with no pericardial  effusion.  Hemodynamic: Estimated right ventricular systolic pressure  equals 41 mm Hg, assuming right atrial pressure equals 10  mm Hg, consistent with mild pulmonary hypertension.  ------------------------------------------------------------------------  CONCLUSIONS:  1. Calcified trileaflet aortic valve with mildly decreased  opening.  2. Normal left ventricular systolic function. No segmental  wall motion abnormalities.  3. Normal right ventricular size and function.  4. Normal tricuspid valve. Mild tricuspid regurgitation.  5. Estimated pulmonary artery systolic pressure equals 41  mm Hg, assuming right atrial pressure equals 10  mm Hg,  consistent with mild pulmonary hypertension.    < from: Xray Chest 1 View- PORTABLE-Urgent (Xray Chest 1 View- PORTABLE-Urgent .) (05.24.21 @ 08:40) >  The feeding tube has been removed.  The heart size is unchanged.  There is been progression of left basilar airspace disease since the prior study.  There may be a small left effusion.    IMPRESSION:    Left basilar airspace disease, likely atelectasis. Underlying infection can't be totally excluded.    Small left effusion            Assessment and plan  ---------------------------  89y M MDS, CKD, Afib on Eliquis BPH with intermittent self-catheterization, CHF, presents to ED [facility] from assisted living after he was found to be hypotensive and bradycardiac.  was given some IV fluids but did not improve so they called 911.   pt is well known to me with last admission , pmd Dr Lemus asked me to see pt.  pt with hx of a.fib, bradycardia and a.fib with hypotension and bradycardia  continue pressors keep MAP>65  abx   fu cultures  echo  hr is overall controlled  gentle hydration  continue AC for a.fib  continue abx  physical therapy  NO AV BLOCKING AGENT  add norvasc 2.5 mg daily for bp control  physical therapy  chest x ray noted small l pleural effusion  dc lasix

## 2021-05-26 ENCOUNTER — TRANSCRIPTION ENCOUNTER (OUTPATIENT)
Age: 86
End: 2021-05-26

## 2021-05-26 VITALS — WEIGHT: 210.32 LBS

## 2021-05-26 PROCEDURE — 96375 TX/PRO/DX INJ NEW DRUG ADDON: CPT

## 2021-05-26 PROCEDURE — 70450 CT HEAD/BRAIN W/O DYE: CPT

## 2021-05-26 PROCEDURE — 87640 STAPH A DNA AMP PROBE: CPT

## 2021-05-26 PROCEDURE — 85027 COMPLETE CBC AUTOMATED: CPT

## 2021-05-26 PROCEDURE — 80202 ASSAY OF VANCOMYCIN: CPT

## 2021-05-26 PROCEDURE — 87086 URINE CULTURE/COLONY COUNT: CPT

## 2021-05-26 PROCEDURE — 87186 SC STD MICRODIL/AGAR DIL: CPT

## 2021-05-26 PROCEDURE — 83605 ASSAY OF LACTIC ACID: CPT

## 2021-05-26 PROCEDURE — 84443 ASSAY THYROID STIM HORMONE: CPT

## 2021-05-26 PROCEDURE — U0003: CPT

## 2021-05-26 PROCEDURE — 84436 ASSAY OF TOTAL THYROXINE: CPT

## 2021-05-26 PROCEDURE — 97110 THERAPEUTIC EXERCISES: CPT

## 2021-05-26 PROCEDURE — 86850 RBC ANTIBODY SCREEN: CPT

## 2021-05-26 PROCEDURE — 85730 THROMBOPLASTIN TIME PARTIAL: CPT

## 2021-05-26 PROCEDURE — 82550 ASSAY OF CK (CPK): CPT

## 2021-05-26 PROCEDURE — 82533 TOTAL CORTISOL: CPT

## 2021-05-26 PROCEDURE — 96374 THER/PROPH/DIAG INJ IV PUSH: CPT

## 2021-05-26 PROCEDURE — 85610 PROTHROMBIN TIME: CPT

## 2021-05-26 PROCEDURE — 84145 PROCALCITONIN (PCT): CPT

## 2021-05-26 PROCEDURE — 85014 HEMATOCRIT: CPT

## 2021-05-26 PROCEDURE — 84132 ASSAY OF SERUM POTASSIUM: CPT

## 2021-05-26 PROCEDURE — 87641 MR-STAPH DNA AMP PROBE: CPT

## 2021-05-26 PROCEDURE — 93308 TTE F-UP OR LMTD: CPT

## 2021-05-26 PROCEDURE — 83735 ASSAY OF MAGNESIUM: CPT

## 2021-05-26 PROCEDURE — 86901 BLOOD TYPING SEROLOGIC RH(D): CPT

## 2021-05-26 PROCEDURE — 82803 BLOOD GASES ANY COMBINATION: CPT

## 2021-05-26 PROCEDURE — 82330 ASSAY OF CALCIUM: CPT

## 2021-05-26 PROCEDURE — 82435 ASSAY OF BLOOD CHLORIDE: CPT

## 2021-05-26 PROCEDURE — 80053 COMPREHEN METABOLIC PANEL: CPT

## 2021-05-26 PROCEDURE — 84480 ASSAY TRIIODOTHYRONINE (T3): CPT

## 2021-05-26 PROCEDURE — 93005 ELECTROCARDIOGRAM TRACING: CPT

## 2021-05-26 PROCEDURE — 86769 SARS-COV-2 COVID-19 ANTIBODY: CPT

## 2021-05-26 PROCEDURE — 85018 HEMOGLOBIN: CPT

## 2021-05-26 PROCEDURE — 80048 BASIC METABOLIC PNL TOTAL CA: CPT

## 2021-05-26 PROCEDURE — 97163 PT EVAL HIGH COMPLEX 45 MIN: CPT

## 2021-05-26 PROCEDURE — 82962 GLUCOSE BLOOD TEST: CPT

## 2021-05-26 PROCEDURE — 85025 COMPLETE CBC W/AUTO DIFF WBC: CPT

## 2021-05-26 PROCEDURE — 71250 CT THORAX DX C-: CPT

## 2021-05-26 PROCEDURE — 84484 ASSAY OF TROPONIN QUANT: CPT

## 2021-05-26 PROCEDURE — 82553 CREATINE MB FRACTION: CPT

## 2021-05-26 PROCEDURE — U0005: CPT

## 2021-05-26 PROCEDURE — 72125 CT NECK SPINE W/O DYE: CPT

## 2021-05-26 PROCEDURE — 84295 ASSAY OF SERUM SODIUM: CPT

## 2021-05-26 PROCEDURE — 71045 X-RAY EXAM CHEST 1 VIEW: CPT

## 2021-05-26 PROCEDURE — 74176 CT ABD & PELVIS W/O CONTRAST: CPT

## 2021-05-26 PROCEDURE — 83880 ASSAY OF NATRIURETIC PEPTIDE: CPT

## 2021-05-26 PROCEDURE — 99291 CRITICAL CARE FIRST HOUR: CPT | Mod: 25

## 2021-05-26 PROCEDURE — 86900 BLOOD TYPING SEROLOGIC ABO: CPT

## 2021-05-26 PROCEDURE — 82947 ASSAY GLUCOSE BLOOD QUANT: CPT

## 2021-05-26 PROCEDURE — 84100 ASSAY OF PHOSPHORUS: CPT

## 2021-05-26 PROCEDURE — 81001 URINALYSIS AUTO W/SCOPE: CPT

## 2021-05-26 RX ORDER — DARBEPOETIN ALFA IN POLYSORBAT 200MCG/0.4
0 PEN INJECTOR (ML) SUBCUTANEOUS
Qty: 0 | Refills: 0 | DISCHARGE

## 2021-05-26 RX ORDER — PANTOPRAZOLE SODIUM 20 MG/1
1 TABLET, DELAYED RELEASE ORAL
Qty: 0 | Refills: 0 | DISCHARGE
Start: 2021-05-26

## 2021-05-26 RX ORDER — FAMOTIDINE 10 MG/ML
20 INJECTION INTRAVENOUS AT BEDTIME
Refills: 0 | Status: DISCONTINUED | OUTPATIENT
Start: 2021-05-26 | End: 2021-05-26

## 2021-05-26 RX ORDER — TAMSULOSIN HYDROCHLORIDE 0.4 MG/1
1 CAPSULE ORAL
Qty: 0 | Refills: 0 | DISCHARGE
Start: 2021-05-26

## 2021-05-26 RX ORDER — BIMATOPROST 0.3 MG/ML
1 SOLUTION/ DROPS OPHTHALMIC
Qty: 0 | Refills: 0 | DISCHARGE

## 2021-05-26 RX ORDER — PANTOPRAZOLE SODIUM 20 MG/1
40 TABLET, DELAYED RELEASE ORAL
Refills: 0 | Status: DISCONTINUED | OUTPATIENT
Start: 2021-05-26 | End: 2021-05-26

## 2021-05-26 RX ORDER — PILOCARPINE HCL 4 %
1 DROPS OPHTHALMIC (EYE)
Qty: 0 | Refills: 0 | DISCHARGE

## 2021-05-26 RX ORDER — DORZOLAMIDE HYDROCHLORIDE TIMOLOL MALEATE 20; 5 MG/ML; MG/ML
1 SOLUTION/ DROPS OPHTHALMIC
Qty: 0 | Refills: 0 | DISCHARGE
Start: 2021-05-26

## 2021-05-26 RX ORDER — FAMOTIDINE 10 MG/ML
1 INJECTION INTRAVENOUS
Qty: 0 | Refills: 0 | DISCHARGE
Start: 2021-05-26

## 2021-05-26 RX ORDER — FUROSEMIDE 40 MG
1 TABLET ORAL
Qty: 0 | Refills: 0 | DISCHARGE

## 2021-05-26 RX ORDER — BRIMONIDINE TARTRATE 2 MG/MG
1 SOLUTION/ DROPS OPHTHALMIC
Qty: 0 | Refills: 0 | DISCHARGE

## 2021-05-26 RX ADMIN — PANTOPRAZOLE SODIUM 40 MILLIGRAM(S): 20 TABLET, DELAYED RELEASE ORAL at 11:13

## 2021-05-26 RX ADMIN — APIXABAN 2.5 MILLIGRAM(S): 2.5 TABLET, FILM COATED ORAL at 05:03

## 2021-05-26 RX ADMIN — AMLODIPINE BESYLATE 2.5 MILLIGRAM(S): 2.5 TABLET ORAL at 05:03

## 2021-05-26 RX ADMIN — DORZOLAMIDE HYDROCHLORIDE TIMOLOL MALEATE 1 DROP(S): 20; 5 SOLUTION/ DROPS OPHTHALMIC at 05:03

## 2021-05-26 RX ADMIN — BRIMONIDINE TARTRATE 1 DROP(S): 2 SOLUTION/ DROPS OPHTHALMIC at 06:01

## 2021-05-26 RX ADMIN — Medication 1 DROP(S): at 06:01

## 2021-05-26 NOTE — DISCHARGE NOTE PROVIDER - NSDCCPCAREPLAN_GEN_ALL_CORE_FT
PRINCIPAL DISCHARGE DIAGNOSIS  Diagnosis: Septic shock  Assessment and Plan of Treatment: Requiring MICU admission. Condition resolved  Sepsis from enterobacter urinary tract infection.  Completed course of intravenous antibiotic therapy      SECONDARY DISCHARGE DIAGNOSES  Diagnosis: Duodenal ulcer  Assessment and Plan of Treatment: Continue with Famotidine and Pantoprazole    Diagnosis: Acute kidney injury superimposed on CKD  Assessment and Plan of Treatment: Seen by the Kidney doctor  Creatinine stable    Diagnosis: Congestive heart failure (CHF)  Assessment and Plan of Treatment: PBNP 2685  Lasix held during hospitalization, discuss with your doctor when to resume.Enter shared details here, e.g., 'Noted on CXR 1/1/16.'    Diagnosis: Bradycardia  Assessment and Plan of Treatment: Condition resolved. Metoprolol held.    Diagnosis: Glaucoma  Assessment and Plan of Treatment: Continue with eye drops as prescribed    Diagnosis: Chronic atrial fibrillation  Assessment and Plan of Treatment: Continue with Apixaban

## 2021-05-26 NOTE — DISCHARGE NOTE PROVIDER - DETAILS OF MALNUTRITION DIAGNOSIS/DIAGNOSES
This patient has been assessed with a concern for Malnutrition and was treated during this hospitalization for the following Nutrition diagnosis/diagnoses:     -  05/21/2021: Moderate protein-calorie malnutrition

## 2021-05-26 NOTE — PROGRESS NOTE ADULT - PROBLEM SELECTOR PROBLEM 2
Diarrhea
Sepsis
UTI (urinary tract infection)

## 2021-05-26 NOTE — PROGRESS NOTE ADULT - NSICDXPILOT_GEN_ALL_CORE
[place]

## 2021-05-26 NOTE — PROGRESS NOTE ADULT - PROBLEM SELECTOR PLAN 7
amlodipioner ok or lasix if needed no bblkr  on flomax
wound dehissence, dry, Sx FU
better not too hi off bblkr re abril  off midodrine - has Hx orthostatic  and lo bp w sepsis on adfm req pressors, better now
MDS, gets procrit, occ PRBCs
long Hx U retention, str cath as needed

## 2021-05-26 NOTE — PROGRESS NOTE ADULT - PROBLEM SELECTOR PLAN 6
Hxd MDS, gets procrit but H/H 9. hold
better off BBlkr
Hx U rtntn, used to str cath at home, rivera for now
LONG hX u RTNTN, STR CATH AT HOME, QUISPE IN hOSP, NEEDS TOV, can start flomax again
chronic MDS, gets procrit outpt

## 2021-05-26 NOTE — PROGRESS NOTE ADULT - PROBLEM SELECTOR PLAN 9
NO BBLKR 2/2 episodic Bradycardia
gtt
PAF, o Eliquis
gtt
been there before and recovered, FU mngmnt and progress

## 2021-05-26 NOTE — DISCHARGE NOTE PROVIDER - HOSPITAL COURSE
89 year old male with pmhx MDS, a fib on eliquis, ckd, BPH w urinary retention requiring self-catheterization, peritonitis from duodenal perforation s/p ex lap with pyloric excluding gastrojejunostomy and ethan patch (4/21) who is presenting with sepsis 2/2 UTI. The patient was admitted to the MICU for Vasopressor support. During MICU stay midodrine was attempted however, the patient was noted to have (+) bradycardia hrt rate noted to be high 30's to 40's at times. The patient remained asymptomatic . Urine culture (+) for enterobacter. MRSA swab (+). BCx (5/20) NGTD x2. s/p zosyn for 7 day course ( 5/19--5/25)   Eliquis initially was held due to Hgb 6.9 on admission, s/p 1 unit pRBC, H & H remained stable w/ no evidence of bleeding. Eliquis resumed on 5/22. 89 year old male with pmhx MDS, a fib on eliquis, ckd, BPH w urinary retention requiring self-catheterization, peritonitis from duodenal perforation s/p ex lap with pyloric excluding gastrojejunostomy and ethan patch (4/21) who is presenting with sepsis 2/2 UTI. The patient was admitted to the MICU for Vasopressor support. During MICU stay midodrine was attempted however, the patient was noted to have (+) bradycardia hrt rate noted to be high 30's to 40's at times. The patient remained asymptomatic . Urine culture (+) for enterobacter. MRSA swab (+). BCx (5/20) NGTD x2. s/p zosyn -7 day course ( 5/19--5/25)   Eliquis initially was held due to Hgb 6.9 on admission, s/p 1 unit pRBC, H & H remained stable w/ no evidence of bleeding. Eliquis resumed on 5/22.   PT recommend return to subacute rehab. Pt medically cleared. 89 year old male with pmhx MDS, a fib on eliquis, ckd, BPH w urinary retention requiring self-catheterization, peritonitis from duodenal perforation s/p ex lap with pyloric excluding gastrojejunostomy and ethan patch (4/21) who is presenting with sepsis 2/2 UTI. The patient was admitted to the MICU for Vasopressor support. During MICU stay midodrine was attempted however, the patient was noted to have (+) bradycardia hrt rate noted to be high 30's to 40's at times. The patient remained asymptomatic . Urine culture (+) for enterobacter. MRSA swab (+). BCx (5/20) NGTD x2. s/p zosyn -7 day course ( 5/19--5/25)   Eliquis initially was held due to Hgb 6.9 on admission, s/p 1 unit pRBC, H & H remained stable w/ no evidence of bleeding. Eliquis resumed on 5/22.   PT recommend return to subacute rehab. Pt medically cleared.    -4/21 duodenal perforation s/p ex lap with pyloric excluding gastrojejunostomy and ethan patch   - benign abdominal exam, surgical wound open healing by second intention, no evidence of infection/drainage/warmth/tenderness   -CT abdomen Trace ascites, decreased. Inflammatory changes in the right upper quadrant, significantly improved from prior exam  -surgery consulted, no plan for surgical intervention

## 2021-05-26 NOTE — PROGRESS NOTE ADULT - REASON FOR ADMISSION
bradycardia

## 2021-05-26 NOTE — PROGRESS NOTE ADULT - PROVIDER SPECIALTY LIST ADULT
Surgery
Cardiology
MICU
MICU
Electrophysiology
MICU
Cardiology
Electrophysiology
Podiatry
Family Medicine

## 2021-05-26 NOTE — DISCHARGE NOTE PROVIDER - NSDCMRMEDTOKEN_GEN_ALL_CORE_FT
Alphagan P 0.1% ophthalmic solution: 1 drop(s) to each affected eye every 8 hours  ALPRAZolam 0.25 mg oral tablet: 1 tab(s) orally once a day, As needed  amLODIPine 2.5 mg oral tablet: 1 tab(s) orally once a day  Aranesp 25 mcg/0.42 mL injectable solution:   bimatoprost 0.01% ophthalmic solution: 1 drop(s) to both eyes once a day (in the evening)  Eliquis 2.5 mg oral tablet: 1 tab(s) orally 2 times a day  febuxostat 40 mg oral tablet: 1 tab(s) orally once a day  finasteride 5 mg oral tablet: 1 tab(s) orally once a day  Lasix 40 mg oral tablet: 1 tab(s) orally once a day  metoprolol tartrate 25 mg oral tablet: 1 tab(s) orally 2 times a day  pilocarpine 4% ophthalmic gel: 1 day(s) to each affected eye 3 times a day  Rhopressa 0.02% ophthalmic solution: 1 drop(s) in the right eye once a day  tamsulosin 0.4 mg oral capsule: 1 cap(s) orally once a day (at bedtime)  Aldair 250 mg oral tablet: 1 tab(s) orally once a day   Alphagan P 0.1% ophthalmic solution: 1 drop(s) to each affected eye every 8 hours  amLODIPine 2.5 mg oral tablet: 1 tab(s) orally once a day  Aranesp 25 mcg/0.42 mL injectable solution:   bimatoprost 0.01% ophthalmic solution: 1 drop(s) to both eyes once a day (in the evening)  Eliquis 2.5 mg oral tablet: 1 tab(s) orally 2 times a day  famotidine 20 mg oral tablet: 1 tab(s) orally once a day (at bedtime)  febuxostat 40 mg oral tablet: 1 tab(s) orally once a day  pantoprazole 40 mg oral delayed release tablet: 1 tab(s) orally once a day (before a meal)  pilocarpine 4% ophthalmic gel: 1 day(s) to each affected eye 3 times a day  Rhopressa 0.02% ophthalmic solution: 1 drop(s) in the right eye once a day  tamsulosin 0.4 mg oral capsule: 1 cap(s) orally once a day (at bedtime)  Aldair 250 mg oral tablet: 1 tab(s) orally once a day   Alphagan P 0.1% ophthalmic solution: 1 drop(s) to each affected eye every 8 hours  amLODIPine 2.5 mg oral tablet: 1 tab(s) orally once a day  bimatoprost 0.01% ophthalmic solution: 1 drop(s) to both eyes once a day (in the evening)  dorzolamide-timolol 2%-0.5% preservative-free ophthalmic solution: 1 drop(s) to each affected eye 2 times a day  Eliquis 2.5 mg oral tablet: 1 tab(s) orally 2 times a day  famotidine 20 mg oral tablet: 1 tab(s) orally once a day (at bedtime)  febuxostat 40 mg oral tablet: 1 tab(s) orally once a day  pantoprazole 40 mg oral delayed release tablet: 1 tab(s) orally once a day (before a meal)  pilocarpine 4% ophthalmic gel: 1 day(s) to each affected eye 3 times a day  Rhopressa 0.02% ophthalmic solution: 1 drop(s) in the right eye once a day  tamsulosin 0.4 mg oral capsule: 1 cap(s) orally once a day (at bedtime)  Aldair 250 mg oral tablet: 1 tab(s) orally once a day

## 2021-05-26 NOTE — PROGRESS NOTE ADULT - PROBLEM SELECTOR PLAN 3
improved  will speak c cardio re plan. PPM?
stool for c diff
long Hx, str cath at home occ  urinating now, off lasix, on flomax
pOp 1 month, needs PPI and will add H2B  monitor, jeff on ac for af
better, off bblkr

## 2021-05-26 NOTE — DISCHARGE NOTE PROVIDER - CARE PROVIDER_API CALL
Rocky Lemus  [address] [address]  Phone: [phone]  Fax: [phone]  Established Patient  Follow Up Time: 1 week

## 2021-05-26 NOTE — PROGRESS NOTE ADULT - PROBLEM SELECTOR PLAN 8
tee worsened in hosp but improving, has recovered in past
gtt
restrat ac
restarted eliquis  cardio will fui abrilc
lo v  gtt

## 2021-05-26 NOTE — DISCHARGE NOTE NURSING/CASE MANAGEMENT/SOCIAL WORK - PATIENT PORTAL LINK FT
You can access the FollowMyHealth Patient Portal offered by [facility] by registering at the following website: [email or web address]. By joining [facility]’s FollowMyHealth portal, you will also be able to view your health information using other applications (apps) compatible with our system.

## 2021-05-26 NOTE — PROGRESS NOTE ADULT - PROBLEM SELECTOR PROBLEM 8
Atrial fibrillation
CKD (chronic kidney disease) stage 3, GFR 30-59 ml/min
Glaucoma
Atrial fibrillation
Glaucoma

## 2021-05-26 NOTE — PROGRESS NOTE ADULT - PROBLEM SELECTOR PROBLEM 4
CKD (chronic kidney disease) stage 3, GFR 30-59 ml/min
Duodenal ulcer
Atrial fibrillation
CKD (chronic kidney disease) stage 3, GFR 30-59 ml/min
Hypotension

## 2021-05-26 NOTE — PROGRESS NOTE ADULT - PROBLEM SELECTOR PROBLEM 9
Glaucoma
Hypertension
Glaucoma
CKD (chronic kidney disease) stage 3, GFR 30-59 ml/min
Atrial fibrillation

## 2021-05-26 NOTE — PROGRESS NOTE ADULT - SUBJECTIVE AND OBJECTIVE BOX
DATE OF SERVICE: 05-26-21 @ 09:08    HPI:     89y M    h/o   MDS, CKD, Afib on eliquis, BPH with intermittent self-catheterization, CHF    perforation/ ethan patch on 4/21     , presents to ED [facility] from assisted living  by  dr blanquita lincoln,  after he was found to be hypotensive and bradycardic,      .  was given some IV fluids but did not improve so they called 911.       reporting the pt is normally verbal and this is a deviation from his baseline.      still   hypotensive in er (19 May 2021 18:37)      Interval Events  co heartburn - burning in throat, got alhy the PPI. should start standing PPI and H2B, sp Sx Duodenal ulcer  labs today p, dc planning started  ht rate and BP ok, on ccb , off bblkr  disc c dtr now and c team 3Co now    MEDICATIONS  (STANDING):  amLODIPine   Tablet 2.5 milliGRAM(s) Oral daily  apixaban 2.5 milliGRAM(s) Oral two times a day  brimonidine 0.2% Ophthalmic Solution 1 Drop(s) Both EYES every 8 hours  dorzolamide 2%/timolol 0.5% Ophthalmic Solution 1 Drop(s) Both EYES two times a day  latanoprost 0.005% Ophthalmic Solution 1 Drop(s) Both EYES at bedtime  pilocarpine 4% Solution 1 Drop(s) Both EYES every 8 hours  tamsulosin 0.4 milliGRAM(s) Oral at bedtime    MEDICATIONS  (PRN):  aluminum hydroxide/magnesium hydroxide/simethicone Suspension 30 milliLiter(s) Oral every 6 hours PRN Dyspepsia      Patient is a 89y old  Male who presents with a chief complaint of bradycardia (26 May 2021 06:57)      REVIEW OF SYSTEMS    General:co stomach, aosn , nad  Skin/Breast:  Ophthalmologic:npo ch no co  ENMT:	no co no ch  Respiratory and Thorax:no cough no sp no sob  Cardiovascular:no cp no palp  Gastrointestinal:no nvcd  Genitourinary:no fdi  Musculoskeletal:	no a no p  Neurological:	no co no ch  Psychiatric:	bno co  Hematology/Lymphatics:	  Endocrine:	no polyudd  Allergic/Immunologic:  AOSN	y      Vital Signs Last 24 Hrs  T(C): 36.4 (26 May 2021 04:49), Max: 36.4 (26 May 2021 04:49)  T(F): 97.6 (26 May 2021 04:49), Max: 97.6 (26 May 2021 04:49)  HR: 80 (26 May 2021 04:49) (66 - 88)  BP: 145/85 (26 May 2021 04:49) (118/80 - 149/94)  BP(mean): --  RR: 18 (26 May 2021 04:49) (17 - 18)  SpO2: 98% (26 May 2021 04:49) (97% - 99%)    PHYSICAL EXAM:    Constitutional:vss nad no co ate as little  H+N ncat  Eyes:saiu cwnl  ENMT:hears swallows speaks ok  Neck:no cb no tm  Breasts:  Back:  Respiratory:ctasb no rrw  Cardiovascular:irreg, irreg  Gastrointestinal:soft nt  Genitourinary:  Rectal:  Extremities:v mild edema R foot , l less  Vascular:hm - , vv-  Neurological:clear no p  Skin:cdi + abd=dry c steristrips  Lymph Nodes:  Musculoskeletal:  Psychiatric:clear, nad    LABS      05-25    143  |  108  |  27<H>  ----------------------------<  85  3.5   |  22  |  2.14<H>    Ca    8.3<L>      25 May 2021 07:09            Imaging:    Xray:    Echo:    CT:    MRI:    Tele:    [place]:    DAVID Lincoln [phone]

## 2021-05-26 NOTE — PROGRESS NOTE ADULT - PROBLEM SELECTOR PLAN 5
Hx
hx mds GOT PROCRIT Q 2 WKS BUT STABLE HERE
mds  CONSIDER PROCRIT
long Hx, [facility] str cath at home daily or qod, con monitor here and start flomax again
needs rehab, rec [facility] or [facility]

## 2021-05-26 NOTE — DISCHARGE NOTE PROVIDER - NSDCCONDITION_GEN_ALL_CORE
Referral Type: INTERNAL  Location: Call  Procedure: Colonoscopy  Diagnosis: Special screening for malignant neoplasms, colon [Z12.11]  Referring Provider: Carla Rolon MD  Referral To: UNSPECIFIED  Referral Notes: n/a   Previous Procedure: YES   · Date: 4/15/2015  · Provider: Megha  · Sedation Used: IV Sedation  · Findings: 6mm polyp distal ascending colon and 5mm polyp distal sigmoid   · Notes: n/a    Left message for patient to call back.  1st call, sending reminder letter.    Stable

## 2021-05-26 NOTE — PROGRESS NOTE ADULT - PROBLEM SELECTOR PLAN 4
SCR dropped sl. even after lasix
pressors , MICU care
MICH, Fu SCr
Recent sx> wound dehissence but healing and dry, dressed serjio Ledezma fu  Sx fu?
eliquis

## 2021-05-26 NOTE — PROGRESS NOTE ADULT - SUBJECTIVE AND OBJECTIVE BOX
CARDIOLOGY     PROGRESS  NOTE   ________________________________________________    CHIEF COMPLAINT:Patient is a 89y old  Male who presents with a chief complaint of bradycardia (25 May 2021 09:21)  no complain.  	  REVIEW OF SYSTEMS:  CONSTITUTIONAL: No fever, weight loss, or fatigue  EYES: No eye pain, visual disturbances, or discharge  ENT:  No difficulty hearing, tinnitus, vertigo; No sinus or throat pain  NECK: No pain or stiffness  RESPIRATORY: No cough, wheezing, chills or hemoptysis; No Shortness of Breath  CARDIOVASCULAR: No chest pain, palpitations, passing out, dizziness, or leg swelling  GASTROINTESTINAL: No abdominal or epigastric pain. No nausea, vomiting, or hematemesis; No diarrhea or constipation. No melena or hematochezia.  GENITOURINARY: No dysuria, frequency, hematuria, or incontinence  NEUROLOGICAL: No headaches, memory loss, loss of strength, numbness, or tremors  SKIN: No itching, burning, rashes, or lesions   LYMPH Nodes: No enlarged glands  ENDOCRINE: No heat or cold intolerance; No hair loss  MUSCULOSKELETAL: No joint pain or swelling; No muscle, back, or extremity pain  PSYCHIATRIC: No depression, anxiety, mood swings, or difficulty sleeping  HEME/LYMPH: No easy bruising, or bleeding gums  ALLERGY AND IMMUNOLOGIC: No hives or eczema	    [ ] All others negative	  [ ] Unable to obtain    PHYSICAL EXAM:  T(C): 36.4 (05-26-21 @ 04:49), Max: 36.5 (05-25-21 @ 07:38)  HR: 80 (05-26-21 @ 04:49) (66 - 88)  BP: 145/85 (05-26-21 @ 04:49) (118/80 - 149/94)  RR: 18 (05-26-21 @ 04:49) (17 - 18)  SpO2: 98% (05-26-21 @ 04:49) (97% - 99%)  Wt(kg): --  I&O's Summary    24 May 2021 07:01  -  25 May 2021 07:00  --------------------------------------------------------  IN: 840 mL / OUT: 750 mL / NET: 90 mL    25 May 2021 07:01  -  26 May 2021 06:58  --------------------------------------------------------  IN: 1200 mL / OUT: 975 mL / NET: 225 mL        Appearance: Normal	  HEENT:   Normal oral mucosa, PERRL, EOMI	  Lymphatic: No lymphadenopathy  Cardiovascular: Normal S1 S2, No JVD, + murmurs, No edema  Respiratory: Lungs clear to auscultation	  Psychiatry: A & O x 3, Mood & affect appropriate  Gastrointestinal:  Soft, Non-tender, + BS	  Skin: No rashes, No ecchymoses, No cyanosis	  Neurologic: Non-focal  Extremities: Normal range of motion, No clubbing, cyanosis or edema  Vascular: Peripheral pulses palpable 2+ bilaterally    MEDICATIONS  (STANDING):  amLODIPine   Tablet 2.5 milliGRAM(s) Oral daily  apixaban 2.5 milliGRAM(s) Oral two times a day  brimonidine 0.2% Ophthalmic Solution 1 Drop(s) Both EYES every 8 hours  dorzolamide 2%/timolol 0.5% Ophthalmic Solution 1 Drop(s) Both EYES two times a day  latanoprost 0.005% Ophthalmic Solution 1 Drop(s) Both EYES at bedtime  pilocarpine 4% Solution 1 Drop(s) Both EYES every 8 hours  tamsulosin 0.4 milliGRAM(s) Oral at bedtime      TELEMETRY: 	    ECG:  	  RADIOLOGY:  OTHER: 	  	  LABS:	 	    CARDIAC MARKERS:                                9.0    7.49  )-----------( 165      ( 24 May 2021 07:45 )             29.5     05-25    143  |  108  |  27<H>  ----------------------------<  85  3.5   |  22  |  2.14<H>    Ca    8.3<L>      25 May 2021 07:09    TPro  5.7<L>  /  Alb  2.3<L>  /  TBili  0.5  /  DBili  x   /  AST  13  /  ALT  9<L>  /  AlkPhos  104  05-24    proBNP: Serum Pro-Brain Natriuretic Peptide: 2685 pg/mL (05-19 @ 18:33)    Lipid Profile:   HgA1c:   TSH: Thyroid Stimulating Hormone, Serum: 2.45 uIU/mL (05-19 @ 20:44)          Assessment and plan  ---------------------------  89y M MDS, CKD, Afib on Eliquis BPH with intermittent self-catheterization, CHF, presents to ED BIBEMS from assisted living after he was found to be hypotensive and bradycardiac.  was given some IV fluids but did not improve so they called 911.   pt is well known to me with last admission , pmd Dr Lemus asked me to see pt.  pt with hx of a.fib, bradycardia and a.fib with hypotension and bradycardia  continue pressors keep MAP>65  abx   fu cultures  echo  hr is overall controlled  gentle hydration  continue AC for a.fib  continue abx  physical therapy  NO AV BLOCKING AGENT  add norvasc 2.5 mg daily for bp control  physical therapy  chest x ray noted small l pleural effusion  dc lasix  renal function is improving  hr is well controlled  physical therapy

## 2021-05-26 NOTE — PROGRESS NOTE ADULT - PROBLEM SELECTOR PROBLEM 10
Glaucoma
Hypertension
CKD (chronic kidney disease) stage 3, GFR 30-59 ml/min
Debility
Duodenal ulcer

## 2021-05-26 NOTE — PROGRESS NOTE ADULT - PROBLEM SELECTOR PLAN 10
myeshal need tortn to rehab  Prefer [facility] and ref [facility] but needs fu now after starting lasix for bl pl eff - no sob , lungs basically clear
not now, been lo, retsart f;vanda and fu
mult gtt
1-2 mos ago Sx> wound abd , healing

## 2021-08-30 NOTE — PHYSICAL THERAPY INITIAL EVALUATION ADULT - MANUAL MUSCLE TESTING RESULTS, REHAB EVAL
[Stable] : stable [Atrial Fibrillation] : atrial fibrillation [Controlled Ventricular Response] : controlled ventricular response [Rate Control] : rate control [Possible Cardiac Ischemia (Intermd Prob)] : possible cardiac ischemia (intermediate probability) [Responding to Treatment] : responding to treatment [Outpatient Evaluation] : outpatient evaluation [Ambulatory BP Monitoring] : ambulatory blood pressure monitoring [Weight Loss] : weight loss [Moderate Mitral Regurgitation] : moderate mitral regurgitation [Echocardiogram] : echocardiogram [Inpatient Evaluation] : inpatient evaluation [Troponin T] : troponin T [Adenosine Stress Test] : adenosine stress test [de-identified] : future holter, pt defers [de-identified] : pt refuses coumadin, will consider eloquis, xarelto, only wants asa,plavix now- understands risk of cva. [de-identified] : white coat [de-identified] : carotid doppler BUE and BLE grossly 3/5

## 2022-03-09 NOTE — PROGRESS NOTE ADULT - ASSESSMENT
improving slowly  6 bdays abx, can dc, repeat ua c/s  stool for cdiff  Hold re procrit for now  cont lasix, add flomax and fu BP and heart rate  disc c Dr Stewart and JAMILA Wright, will call dtr riickey  disc c p[t also Notification Instructions: Patient will be notified of biopsy results. However, patient instructed to call the office if not contacted within 2 weeks.

## 2022-05-05 NOTE — H&P ADULT - ATTENDING COMMENTS
Called spoke with patient daughter Yelena notified calling with update on patients lab results.  reviewed stated \" All test results are normal, except slightly elevated triglyceride cholesterol. Recommend low fat diet, continue meds, and have at least 2 servings of fish every week. Blood sugar fairly well controlled (A1c 6.9 at goal) - recommend continue meds, diabetic diet, and exercise daily/walk at least 30 minutes.\" She verbalized understanding of pateint lab results but she will try her best to give her a healthy diet as patient is difficult when it comes to her food and eating. I stated understood if any questions or concerns please don't  hesitate to call the office.    Patient seen and evaluated by attending on 4/22; H&P completed by attending;

## 2022-07-25 NOTE — H&P ADULT - NSICDXPASTMEDICALHX_GEN_ALL_CORE_FT
Quality 226: Preventive Care And Screening: Tobacco Use: Screening And Cessation Intervention: Patient screened for tobacco use and is an ex/non-smoker Quality 130: Documentation Of Current Medications In The Medical Record: Current Medications Documented Detail Level: Detailed Quality 431: Preventive Care And Screening: Unhealthy Alcohol Use - Screening: Patient not identified as an unhealthy alcohol user when screened for unhealthy alcohol use using a systematic screening method Quality 110: Preventive Care And Screening: Influenza Immunization: Influenza immunization was not ordered or administered, reason not given PAST MEDICAL HISTORY:  Anemia     Anemia     Benign Hypertension     BPH (benign prostatic hyperplasia)     BPH (Benign Prostatic Hypertrophy)     Choledocholithiasis     Chronic constipation     CKD (chronic kidney disease) stage 3, GFR 30-59 ml/min     Gall stones     Glaucoma     Glaucoma     MDS (myelodysplastic syndrome)     MDS (Myelodysplastic Syndrome)     Nocturia associated with benign prostatic hypertrophy     Osteomyelitis of arm

## 2022-11-03 NOTE — PROGRESS NOTE ADULT - PROBLEM SELECTOR PROBLEM 6
Patient Name: Celeste Villegas  Date:11/3/2022  : 1958  [x]  Patient  Verified  Payor: Gina Kerr / Plan: [place] [id number] PPO / Product Type: PPO /    Total Treatment Time (min): 40 min  1:1 Treatment Time ( only):   Referring provider: Varun Nguyen DO  1. Acute pain of right shoulder  2. Stiffness of right shoulder joint  3. Status post arthroscopy of shoulder      SUBJECTIVE  I think I'm doing well. OBJECTIVE  AROM:   PROM: elevation:130 degrees, abd:80 degrees  TTP over   Modality:   []  E-Stim: type _ x _ min     []att   []unatt   []w/US   []w/ice   []w/heat  []  Ultrasound: []cont   []pulse    _ W/cm2 x _  min   []1MHz   []3MHz  []  Ice pack _  Post       [] Hot pack _  Pre       []  Other:    Man: 20 min GH mobilization to the posterior/inferior capsule in combination with PROM into all directions as tolerated. NMR:  min  Neuromuscular reeducation/proprioceptive training listed in exercise below. Ex: 20 min  Therapeutic exercise/strength/endurance completed here in clinic today per the exercise log. PT Exercise Log         EXERCISE 11/3/2022   Pulleys 20x   Scap retractions 20x   Standing ff stretch 10x10\"   Bicep curls 20x 1#   Supine cane OH 20x       Cane IR 20x                                                          ASSESSMENT  [x]  See Plan of Care  [x]  Patient will continue to benefit from skilled therapy to address remaining functional deficits:   Patient has increased PROM. Progressing well per POC. Tolerating exercise progression w/o increased symptoms. Encouraged patient to not overdo. PLAN  Continue with current plan of care and progress as appropriate towards functional goals.   [x]  Upgrade activities as tolerated     [x]  Continue plan of care  []  Discharge due to:_  [] Other:_       Nicklas Plaster, PT  11/3/2022    1:10 PM
CKD (chronic kidney disease), stage III

## 2023-01-01 NOTE — PATIENT PROFILE ADULT - NSPROGENPREVTRANSF_GEN_A_NUR
by: Brant Kidd PCT    POCT Glucose    Collection Time: 23 12:29 AM   Result Value Ref Range    POC Glucose 57 50 - 110 mg/dL    Performed by: Chaz Martin (CON)        Feeding method:         Assessment:     Principal Problem:    Term  delivered vaginally, current hospitalization  Resolved Problems:    * No resolved hospital problems. *       Plan:     Continue routine care. Discharge 2023. Follow-up:  Parents to make appointment with PCP in 2 days.   Special Instructions:     Signed By:  Janine Escoto MD     2023 no

## 2023-03-09 NOTE — ED PROVIDER NOTE - IV ALTEPLASE EXCL ABS HIDDEN
Goal Outcome Evaluation:      Plan of Care Reviewed With: family          Outcome Evaluation: eating well for most meals. ordering more than adequate kcal/protein. continue current interventions. If pt eats poorly, replace with back up bolus.       show

## 2023-07-03 NOTE — ED ADULT NURSE NOTE - PMH
Anemia    Benign Hypertension    BPH (Benign Prostatic Hypertrophy)    Chronic constipation    Gall stones    Glaucoma    MDS (Myelodysplastic Syndrome)    Nocturia associated with benign prostatic hypertrophy Speaking Coherently

## 2024-02-10 NOTE — H&P ADULT - NSHPROSALLOTHERNEGRD_GEN_ALL_CORE
All other review of systems negative, except as noted in HPI
PMHx MI (x3 stents) - on aspirin  C/o chest pain r/t back x yesterday.

## 2024-05-08 NOTE — ED PROVIDER NOTE - OBJECTIVE STATEMENT
Procedure Note - EEG  EEG awake, asleep PS/HV performed well 3 min.    89M presents with urinary retention since 8pm after having outpatient abdominal hernia repair today; no rivera required for the surgery. Pt states that he self-catheterizes nightly to help him sleep and attempted to self-catheterize tonight but was only able to get approx 30cc out. Denies fevers/chills, nausea/vomiting, chest pain, abdominal pain, bowel/bladder changes.     PMD/Card: Jorge  Uro: Gamaliel, Oheb  Surg: Venancio Rao

## 2024-06-05 NOTE — PROVIDER CONTACT NOTE (OTHER) - RECOMMENDATIONS
Have provider put in an order for a bear hugger/
Warming blanket, EKG. Cultures. Will continue to monitor.
98.4

## 2024-06-20 NOTE — ED ADULT NURSE NOTE - NSFALLRSKHRMRISKTYPE_ED_ALL_ED
Neurosurgery Patient Information    -No driving while taking pain medication.  -Take medications as prescribed at discharge.  -Do not take any OTC products containing acetaminophen at the same time as you take your narcotic pain medication. Medications that may contain acetaminophen include but are not limited to: Excedrin and other headache medications, arthritis medications, cold and sinus medications, etc. Please review the list of active ingredients in any OTC medication prior to taking it.  -Do not take any Aspirin or Aspirin containing products for 2 weeks after surgery.  -Do not take any Aleeve, Naprosyn, Naproxen, Ibuprofen, Advil or any other NSAID for 2 weeks after surgery.  -Do not take any herbal supplements for 2 weeks after surgery.   -Do not consume any alcoholic beverages until released by your Neurosurgeon  -Do not perform any excessive bending over or leaning forward as this is a fall hazard.  -Do not perform any heavy lifting or lifting more than 10 lbs from the ground level as this is a fall hazard.    Contact the Neurosurgery Office immediately if:  -If you begin to notice any neurologic changes such as:           -Sudden onset of lethargy or sleepiness           -Sudden confusion, trouble speaking, or understanding            -Sudden trouble seeing in one or both eyes            -Sudden trouble walking, dizziness, loss of coordination            -Sudden severe headache with no known cause            -Sudden onset of numbness or weakness     Wound Care:  Keep your incision open to air. You may shower on the 2nd day after your surgery. Keep the incision clean and dry at all times. Please cover the incision while showering and REMOVE once you have completed taking your shower. Do not allow the force of water to hit the incision. If the incision gets damp, pat it dry. Do not rub or scrub the incision. You cannot take a bath/swim/submerge the incision until 8 weeks after surgery.    Apply Bacitracin  ointment (you can get this over the counter) to incision twice daily.    Call your doctor or go to the Emergency Room for any signs of infection including: increased redness, drainage, pain or fever (temperature greater than or equal to 101.4).       Miscellaneous:  -Follow up with neurosurgery in 2 weeks for a wound check.   -Follow up with your primary care provider in 1-2 weeks for hospital follow up. This is your responsibility to schedule.       Neurosurgery Office: [phone]     age(85 years old or older)

## 2024-07-30 NOTE — PROGRESS NOTE ADULT - PROBLEM SELECTOR PLAN 3
Started metoprolol tartrate.  Continue eliquis.
on proscar and flomax
Started metoprolol tartrate.  Continue eliquis.
Started metoprolol tartrate.  Continue eliquis.
Attending Attestation (For Attendings USE Only)...
proscar and flomax (bid)

## 2024-10-17 NOTE — CONSULT NOTE ADULT - NSTELEHEALTH_GEN_ALL_CORE
3 WEEK PO OS, 5 WKS OD    doing okay  finishING gtts this week  vision better   irritation resolved    SLE : wound intact       Cornea: clear, edema resolved       no c/f       PC IOL centered OU    FUNDUS: C/D  0.4 /0.3           vitr clear with nl ON/V/P          mac: OD: clear                   OS: clear          no break/RD    A : stable 3 WEEK POSTOPERATIVE OS, 5 WKS OD    TARGET:  OD -1.00 **   OS: -0.50 to -1.00 **        P : Rx per CR/FINAL OU :  HOLD  --HAPPY SC**  +- over-the-counter RO prn        see orders/disposition    Follow up with Optometry: GREY **   --me prn      No

## 2025-01-07 NOTE — PROGRESS NOTE ADULT - PROBLEM/PLAN-10
DISPLAY PLAN FREE TEXT
DISPLAY PLAN FREE TEXT
Caregiver
DISPLAY PLAN FREE TEXT

## 2025-02-04 NOTE — H&P ADULT - MUSCULOSKELETAL
[facility] OPEN ACCESS COLONOSCOPY SCREENING FORM       Last Colonoscopy? Where: [facility]  When: 2015  ANESTHESIA SCREENING   1. Height 5'6     Weight 215  BMI 34.57    (Clinic Visit if BMI over 40)   2. Are you on any blood thinning medications including  mg? no  ( Clinic visit if yes)  3. Are you on oxygen at home? no (Clinic visit if yes)   4. Have you seen your primary care provider within the last 12 months? 1.13.25 (Clinic visit if NO)   5. History of:   Pacemaker/Defibrillator no                          Heart Failure no                         Heart Disease no                          Stroke no   Details of cardiac history: HTN, HLD  (Clinic visit if history of heart failure or new diagnosis/new intervention in last year)     6. Do you have renal failure or require dialysis? 1 kidney  7. Do you have sleep Apnea? Yes CPAP  8. Are you on insulin for diabetes? no If yes, patient should discuss elly-procedural medication adjustment with PCP.   9. Are you currently on SGLT2 inhibitors? no  10. Do you have a history of seizures? no (If YES- indicate recent or NOT recent. Anesthesia to review if recent.)    GI SCREENING   Have you had a positive stool based screening test? (i.e. fecal occult, FIT, or Cologuard) no   ? If yes and pass anesthesia screen, no further questions are needed. Schedule OA procedure.   ? If yes and they do NOT pass anesthesia screen then refer to office for expedited review/visit.   ? If no, proceed to the following GI screening questions to determine if office visit is needed.      If patient answers YES to any of the following please send to the office for an appointment.  1. Do you have chronic diarrhea lasting longer than 3 weeks? no   2. Do you have a large amount of rectal bleeding or frequent rectal bleeding? no  3. Do you have significant, unexplained weight loss? no    When I went to schedule Patient she stated that she wanted Dr. Souza   details… detailed exam no joint warmth/no calf tenderness/no joint swelling/no joint erythema

## 2025-03-13 NOTE — ED ADULT TRIAGE NOTE - INTERNATIONAL TRAVEL
Patient called out to the desk reporting she was feeling more abdominal cramping. RN at bedside. EFM and [place] applied with consent of the patient. Dr. Hayley goff.    No